# Patient Record
Sex: FEMALE | Race: WHITE | NOT HISPANIC OR LATINO | Employment: OTHER | URBAN - NONMETROPOLITAN AREA
[De-identification: names, ages, dates, MRNs, and addresses within clinical notes are randomized per-mention and may not be internally consistent; named-entity substitution may affect disease eponyms.]

---

## 2017-01-16 DIAGNOSIS — I10 ESSENTIAL HYPERTENSION: ICD-10-CM

## 2017-01-16 DIAGNOSIS — R60.1 GENERALIZED EDEMA: ICD-10-CM

## 2017-01-16 RX ORDER — POTASSIUM CHLORIDE 750 MG/1
10 CAPSULE, EXTENDED RELEASE ORAL DAILY
Qty: 30 CAPSULE | Refills: 5 | Status: SHIPPED | OUTPATIENT
Start: 2017-01-16 | End: 2017-07-20 | Stop reason: SDUPTHER

## 2017-01-16 RX ORDER — METOPROLOL SUCCINATE 25 MG/1
25 TABLET, EXTENDED RELEASE ORAL DAILY
Qty: 30 TABLET | Refills: 5 | Status: SHIPPED | OUTPATIENT
Start: 2017-01-16 | End: 2017-07-20 | Stop reason: SDUPTHER

## 2017-01-16 RX ORDER — FUROSEMIDE 20 MG/1
20 TABLET ORAL DAILY
Qty: 30 TABLET | Refills: 5 | Status: SHIPPED | OUTPATIENT
Start: 2017-01-16 | End: 2017-07-20 | Stop reason: SDUPTHER

## 2017-06-14 ENCOUNTER — OFFICE VISIT (OUTPATIENT)
Dept: CARDIOLOGY | Facility: CLINIC | Age: 47
End: 2017-06-14

## 2017-06-14 VITALS
HEIGHT: 61 IN | HEART RATE: 67 BPM | WEIGHT: 166.2 LBS | OXYGEN SATURATION: 96 % | DIASTOLIC BLOOD PRESSURE: 62 MMHG | BODY MASS INDEX: 31.38 KG/M2 | SYSTOLIC BLOOD PRESSURE: 104 MMHG

## 2017-06-14 DIAGNOSIS — I35.0 AORTIC VALVE STENOSIS, UNSPECIFIED ETIOLOGY: ICD-10-CM

## 2017-06-14 DIAGNOSIS — R07.9 CHEST PAIN, UNSPECIFIED TYPE: Primary | ICD-10-CM

## 2017-06-14 DIAGNOSIS — R00.2 PALPITATIONS: ICD-10-CM

## 2017-06-14 DIAGNOSIS — I38 VHD (VALVULAR HEART DISEASE): ICD-10-CM

## 2017-06-14 DIAGNOSIS — I34.2 NON-RHEUMATIC MITRAL VALVE STENOSIS: ICD-10-CM

## 2017-06-14 PROCEDURE — 99214 OFFICE O/P EST MOD 30 MIN: CPT | Performed by: PHYSICIAN ASSISTANT

## 2017-06-14 PROCEDURE — 93000 ELECTROCARDIOGRAM COMPLETE: CPT | Performed by: PHYSICIAN ASSISTANT

## 2017-06-14 NOTE — PROGRESS NOTES
Problem list     Subjective   Mara Martínez is a 46 y.o. female     Chief Complaint   Patient presents with   • Follow-up     presents as a follow up       HPI      1.Valvular heart disease  a. Moderate Aortic Stenosis with GUEVARA of 1.22cm^2, mean gradient of 24mmHg with moderate to severe AI  b. Moderate to severe MR with no evidence of Mitral Stenosis  c. Repeat echo on December 22 2014 showed worsening GUEVARA at 1cm^2, Moderate MR with Mild MS with MVA at 1.69cm^2 and mean gradient of 5mmHg. Grade 2 DD  d. LHC and RHC revealed normal coronaries, mod-severe AI with GUEVARA at 1.2 with moderate AS, Moderate Mitral Stenosis with mild PTHN arguing against severe mitral disease. Medical management indicated at this point.  E. mild to moderate aortic stenosis with moderate aortic regurgitation, mean gradient of 29 and aortic valve area 1.5 cm². Moderate mitral regurgitation with mild to moderate mitral stenosis with mitral valve area 1.  Meter squared   2)Normal systolic fxn   3)Hypertension  3.1) Stress test 8/3/15 - no ischemia, low risk  4)Dyslipidemia  5)Epilepsy  6) Nonobstructive ISREAL by carotid duplex on December 2014  7) Tobacco Habituation, smokes pack a day  8) Migraines      Patient is a 46-year-old female that presents back for routine follow-up. She has been doing well. She denies chest pain or pressure. She has moderate dyspnea at baseline but does not describe progressive dyspnea. She denies PND orthopnea. She does not palpitate or have dysrhythmic symptoms. Otherwise is doing well  Outpatient Encounter Prescriptions as of 6/14/2017   Medication Sig Dispense Refill   • Ascorbic Acid (VITAMIN C) 500 MG capsule Take 1 tablet by mouth 2 (two) times a day.     • aspirin 325 MG tablet Take 1 tablet by mouth daily.     • Cholecalciferol (VITAMIN D3 PO) Take 500 Units by mouth. 1 capsules BID      • furosemide (LASIX) 20 MG tablet Take 1 tablet by mouth Daily. TAKE 1 TABLET BY MOUTH DAILY EVERY MORNING FOR FLUID  RETENTION & SWELLING 30 tablet 5   • isosorbide mononitrate (IMDUR) 30 MG 24 hr tablet Take 0.5 tablets by mouth daily. 30 tablet 5   • meloxicam (MOBIC) 15 MG tablet Daily.     • metoprolol succinate XL (TOPROL-XL) 25 MG 24 hr tablet Take 1 tablet by mouth Daily. TAKE 1 TABLET BY MOUTH DAILY FOR HEART RATE & BLOOD PRESSURE 30 tablet 5   • nitroglycerin (NITROSTAT) 0.4 MG SL tablet Place  under the tongue. PLACE 1 TABLET UNDER THE TONGUE EVERY 5 MINUTES FOR UP TO 3 DOSES AS NEEDED FOR CHEST PAIN CALL 911 IF PAIN PERSISTS     • nitroglycerin (NITROSTAT) 0.4 MG SL tablet 1 under the tongue as needed for angina, may repeat q5mins for up three doses 100 tablet 11   • nortriptyline (PAMELOR) 50 MG capsule Take  by mouth. 2 CAPSULES AT HS     • OXcarbazepine (TRILEPTAL) 300 MG tablet Take  by mouth. 2 tabs in am and 3 tabs in pm     • pantoprazole (PROTONIX) 40 MG EC tablet Take 1 tablet by mouth Daily. 30 tablet 6   • potassium chloride (MICRO-K) 10 MEQ CR capsule Take 1 capsule by mouth Daily. For potassium 30 capsule 5   • topiramate (TOPAMAX) 100 MG tablet Take 1 tablet by mouth 2 (two) times a day.       No facility-administered encounter medications on file as of 6/14/2017.        Azithromycin and Corticosteroids    Past Medical History:   Diagnosis Date   • Anemia    • Aortic regurgitation    • Carotid bruit    • ISREAL (cerebral atherosclerosis) 12/2014    nonobstructive   • Chest pain    • D-dimer, elevated    • Dizziness    • Edema    • Epilepsy    • Fatigue    • Heart murmur    • Hyperlipidemia    • Hypertension    • Migraines    • Mitral regurgitation    • Mitral stenosis     mild   • Palpitations    • Pulmonary edema    • Seizure disorder    • Sleeping difficulties    • Snoring    • SOB (shortness of breath)    • Supraventricular aortic stenosis    • Syncope    • Tachycardia    • Tobacco abuse    • VHD (valvular heart disease)        Social History     Social History   • Marital status:      Spouse name:  "N/A   • Number of children: N/A   • Years of education: N/A     Occupational History   • Not on file.     Social History Main Topics   • Smoking status: Current Every Day Smoker     Packs/day: 0.25   • Smokeless tobacco: Not on file   • Alcohol use No   • Drug use: No   • Sexual activity: Not on file     Other Topics Concern   • Not on file     Social History Narrative       Family History   Problem Relation Age of Onset   • Cancer Mother    • Cancer Father    • Hypertension Father    • Other Sister      ACUTE MYOCARDIAL INFARCTION,CABG   • Heart failure Sister    • Hypertension Sister    • Stroke Other        Review of Systems   Constitutional: Positive for fatigue.   Eyes: Positive for visual disturbance (wears glasses).   Respiratory: Positive for shortness of breath.    Cardiovascular: Positive for leg swelling. Negative for chest pain and palpitations.   Gastrointestinal: Positive for constipation and diarrhea.   Endocrine: Negative.    Genitourinary: Positive for frequency.   Musculoskeletal: Positive for arthralgias.   Skin: Negative.    Allergic/Immunologic: Negative.    Neurological: Positive for headaches.   Hematological: Bruises/bleeds easily.   Psychiatric/Behavioral: Negative.        Objective     /62 (BP Location: Left arm, Patient Position: Sitting)  Pulse 67  Ht 61\" (154.9 cm)  Wt 166 lb 3.2 oz (75.4 kg)  SpO2 96%  BMI 31.4 kg/m2    Lab Results (most recent)     None          Physical Exam   Constitutional: She is oriented to person, place, and time. She appears well-developed and well-nourished. No distress.   HENT:   Head: Normocephalic and atraumatic.   Eyes: EOM are normal. Pupils are equal, round, and reactive to light.   Neck: No JVD present.   Cardiovascular: Normal rate, regular rhythm and normal heart sounds.  Exam reveals no gallop and no friction rub.    No murmur heard.  Grade 2/6 systolic ejection murmur right upper sternal border, systolic murmur heard at the entirety of the " left sternal border, no mitral stenosis murmur noted   Pulmonary/Chest: Effort normal and breath sounds normal. No respiratory distress. She has no wheezes. She has no rales. She exhibits no tenderness.   Abdominal: Soft.   Musculoskeletal: Normal range of motion. She exhibits no edema.   Neurological: She is alert and oriented to person, place, and time. No cranial nerve deficit.   Skin: Skin is warm and dry. No rash noted. No erythema. No pallor.   Psychiatric: She has a normal mood and affect. Her behavior is normal.   Nursing note and vitals reviewed.      Procedure     ECG 12 Lead  Date/Time: 6/14/2017 10:27 AM  Performed by: MIKKI GRACE  Authorized by: MIKKI GRACE   Comments: Chest pain  Palpitations    EKG demonstrates sinus rhythm at 67 bpm, first-degree AV block, right bundle branch block (bifascicular block), no acute ST changes                 Assessment/Plan     Problems Addressed this Visit        Cardiovascular and Mediastinum    Palpitations    Relevant Orders    ECG 12 Lead    Adult Transthoracic Echo Complete    Mitral stenosis    Relevant Orders    Adult Transthoracic Echo Complete    VHD (valvular heart disease)    Relevant Orders    Adult Transthoracic Echo Complete    Aortic valve stenosis    Relevant Orders    Adult Transthoracic Echo Complete       Nervous and Auditory    Chest pain - Primary    Relevant Orders    ECG 12 Lead              Recommendations  1. Patient doing well from cardiac standpoint. She describes no symptoms of angina failure or dysrhythmia. We will like to reevaluate patient's valvular disease by echocardiogram. With history of mitral and aortic stenosis, we will like to monitor this closely. She has no symptoms of severe valvular disease but would like to monitor closely. Otherwise she is on appropriate medications. We will see her back for follow-up after echocardiogram. Follow-up primary as scheduled

## 2017-07-20 DIAGNOSIS — K21.9 GASTROESOPHAGEAL REFLUX DISEASE, ESOPHAGITIS PRESENCE NOT SPECIFIED: Primary | ICD-10-CM

## 2017-07-20 DIAGNOSIS — I10 ESSENTIAL HYPERTENSION: ICD-10-CM

## 2017-07-20 DIAGNOSIS — R60.1 GENERALIZED EDEMA: ICD-10-CM

## 2017-07-20 RX ORDER — FUROSEMIDE 20 MG/1
20 TABLET ORAL DAILY
Qty: 30 TABLET | Refills: 5 | Status: SHIPPED | OUTPATIENT
Start: 2017-07-20 | End: 2017-12-28

## 2017-07-20 RX ORDER — METOPROLOL SUCCINATE 25 MG/1
25 TABLET, EXTENDED RELEASE ORAL DAILY
Qty: 30 TABLET | Refills: 5 | Status: SHIPPED | OUTPATIENT
Start: 2017-07-20 | End: 2018-01-24 | Stop reason: SDUPTHER

## 2017-07-20 RX ORDER — POTASSIUM CHLORIDE 750 MG/1
10 CAPSULE, EXTENDED RELEASE ORAL DAILY
Qty: 30 CAPSULE | Refills: 5 | Status: SHIPPED | OUTPATIENT
Start: 2017-07-20 | End: 2018-01-27 | Stop reason: SDUPTHER

## 2017-07-20 RX ORDER — PANTOPRAZOLE SODIUM 40 MG/1
40 TABLET, DELAYED RELEASE ORAL DAILY
Qty: 30 TABLET | Refills: 6 | Status: SHIPPED | OUTPATIENT
Start: 2017-07-20 | End: 2018-03-20 | Stop reason: SDUPTHER

## 2017-08-14 ENCOUNTER — TELEPHONE (OUTPATIENT)
Dept: CARDIOLOGY | Facility: CLINIC | Age: 47
End: 2017-08-14

## 2017-08-14 DIAGNOSIS — R07.89 OTHER CHEST PAIN: ICD-10-CM

## 2017-08-14 RX ORDER — ISOSORBIDE MONONITRATE 30 MG/1
15 TABLET, EXTENDED RELEASE ORAL DAILY
Qty: 30 TABLET | Refills: 5 | Status: SHIPPED | OUTPATIENT
Start: 2017-08-14 | End: 2018-01-24 | Stop reason: SDUPTHER

## 2017-08-14 NOTE — TELEPHONE ENCOUNTER
----- Message from Mckenzie Posdaas sent at 8/14/2017 10:18 AM EDT -----  Contact: PT  PT NEEDS ISOSORBIDE REFILLED TO "[employer].

## 2017-11-08 ENCOUNTER — OUTSIDE FACILITY SERVICE (OUTPATIENT)
Dept: CARDIOLOGY | Facility: CLINIC | Age: 47
End: 2017-11-08

## 2017-11-08 ENCOUNTER — HOSPITAL ENCOUNTER (OUTPATIENT)
Dept: CARDIOLOGY | Facility: HOSPITAL | Age: 47
Discharge: HOME | End: 2017-11-08
Admitting: PHYSICIAN ASSISTANT

## 2017-11-08 LAB
MAXIMAL PREDICTED HEART RATE: 173 BPM
STRESS TARGET HR: 147 BPM

## 2017-11-08 PROCEDURE — 93306 TTE W/DOPPLER COMPLETE: CPT | Performed by: INTERNAL MEDICINE

## 2017-11-08 PROCEDURE — 93306 TTE W/DOPPLER COMPLETE: CPT

## 2017-11-09 ENCOUNTER — DOCUMENTATION (OUTPATIENT)
Dept: CARDIOLOGY | Facility: CLINIC | Age: 47
End: 2017-11-09

## 2017-11-22 ENCOUNTER — OFFICE VISIT (OUTPATIENT)
Dept: CARDIOLOGY | Facility: CLINIC | Age: 47
End: 2017-11-22

## 2017-11-22 VITALS
DIASTOLIC BLOOD PRESSURE: 56 MMHG | HEIGHT: 61 IN | SYSTOLIC BLOOD PRESSURE: 106 MMHG | BODY MASS INDEX: 31.6 KG/M2 | WEIGHT: 167.4 LBS | OXYGEN SATURATION: 97 % | HEART RATE: 81 BPM

## 2017-11-22 DIAGNOSIS — I05.0 RHEUMATIC MITRAL STENOSIS: ICD-10-CM

## 2017-11-22 DIAGNOSIS — I06.0 RHEUMATIC AORTIC STENOSIS: ICD-10-CM

## 2017-11-22 DIAGNOSIS — R06.02 SHORTNESS OF BREATH: Primary | ICD-10-CM

## 2017-11-22 DIAGNOSIS — R07.9 CHEST PAIN, UNSPECIFIED TYPE: ICD-10-CM

## 2017-11-22 DIAGNOSIS — R06.02 SHORTNESS OF BREATH: ICD-10-CM

## 2017-11-22 DIAGNOSIS — R07.9 CHEST PAIN, UNSPECIFIED TYPE: Primary | ICD-10-CM

## 2017-11-22 DIAGNOSIS — I06.1 RHEUMATIC AORTIC VALVE INSUFFICIENCY: ICD-10-CM

## 2017-11-22 PROCEDURE — 99214 OFFICE O/P EST MOD 30 MIN: CPT | Performed by: PHYSICIAN ASSISTANT

## 2017-11-22 RX ORDER — HYDROCODONE BITARTRATE AND ACETAMINOPHEN 5; 325 MG/1; MG/1
TABLET ORAL
COMMUNITY
Start: 2017-09-25 | End: 2018-03-19

## 2017-11-22 NOTE — PROGRESS NOTES
Problem list     Subjective   Mara Martínez is a 47 y.o. female     Chief Complaint   Patient presents with   • Hypertension     Here for 2-4 week f/u on echo   • Heart Murmur   • Hyperlipidemia   • Rapid Heart Rate   • Cardiac Valve Problem   • Loss of Consciousness   • Palpitations       HPI    1.Valvular heart disease  a. Moderate Aortic Stenosis with GUEVARA of 1.22cm^2, mean gradient of 24mmHg with moderate to severe AI  b. Moderate to severe MR with no evidence of Mitral Stenosis  c. Repeat echo on December 22 2014 showed worsening GUEVARA at 1cm^2, Moderate MR with Mild MS with MVA at 1.69cm^2 and mean gradient of 5mmHg. Grade 2 DD  d. LHC and RHC revealed normal coronaries, mod-severe AI with GUEVARA at 1.2 with moderate AS, Moderate Mitral Stenosis with mild PTHN arguing against severe mitral disease. Medical management indicated at this point.  E. mild to moderate aortic stenosis with moderate aortic regurgitation, mean gradient of 29 and aortic valve area 1.5 cm². Moderate mitral regurgitation with mild to moderate mitral stenosis with mitral valve area 1.Meter squared   F.  Moderate to severe aortic stenosis, severe aortic insufficiency, severe mitral stenosis with mild to moderate mitral regurgitation by echocardiogram November 2017  2)Normal systolic fxn   3)Hypertension  3.1) Stress test 8/3/15 - no ischemia, low risk  4)Dyslipidemia  5)Epilepsy  6) Nonobstructive ISREAL by carotid duplex on December 2014  7) Tobacco Habituation, smokes pack a day  8) Migraines    Patient is a 47-year-old female that presents back for follow-up on echocardiogram.  She describes feeling poorly.  She has been experiencing chest discomfort on occasion but it only happens on occasion.  It is usually resolves without any medications.  She describes having moderate levels of shortness of breath which do appear to have progressed.  She also describes to me that she's had failure symptoms to include PND and orthopnea.  She doesn't palpitate  but on occasion.  No syncopal episodes noted.  Otherwise she feels well.  Her main complaint is her level of dyspnea and the chest discomfort which is recently started.    Endocardial gram findings were reviewed as above      Outpatient Encounter Prescriptions as of 11/22/2017   Medication Sig Dispense Refill   • Ascorbic Acid (VITAMIN C) 500 MG capsule Take 1 tablet by mouth 2 (two) times a day.     • aspirin 325 MG tablet Take 1 tablet by mouth daily.     • Cholecalciferol (VITAMIN D3 PO) Take 500 Units by mouth. 1 capsules BID      • furosemide (LASIX) 20 MG tablet Take 1 tablet by mouth Daily. TAKE 1 TABLET BY MOUTH DAILY EVERY MORNING FOR FLUID RETENTION & SWELLING 30 tablet 5   • HYDROcodone-acetaminophen (NORCO) 5-325 MG per tablet prn     • isosorbide mononitrate (IMDUR) 30 MG 24 hr tablet Take 0.5 tablets by mouth Daily. 30 tablet 5   • meloxicam (MOBIC) 15 MG tablet Daily.     • metoprolol succinate XL (TOPROL-XL) 25 MG 24 hr tablet Take 1 tablet by mouth Daily. TAKE 1 TABLET BY MOUTH DAILY FOR HEART RATE & BLOOD PRESSURE 30 tablet 5   • nortriptyline (PAMELOR) 50 MG capsule Take  by mouth. 2 CAPSULES AT HS     • OXcarbazepine (TRILEPTAL) 300 MG tablet Take  by mouth. 2 tabs in am and 2 tabs in pm     • pantoprazole (PROTONIX) 40 MG EC tablet Take 1 tablet by mouth Daily. 30 tablet 6   • potassium chloride (MICRO-K) 10 MEQ CR capsule Take 1 capsule by mouth Daily. For potassium 30 capsule 5   • topiramate (TOPAMAX) 100 MG tablet Take 1 tablet by mouth 2 (two) times a day.     • nitroglycerin (NITROSTAT) 0.4 MG SL tablet Place  under the tongue. PLACE 1 TABLET UNDER THE TONGUE EVERY 5 MINUTES FOR UP TO 3 DOSES AS NEEDED FOR CHEST PAIN CALL 911 IF PAIN PERSISTS     • [DISCONTINUED] nitroglycerin (NITROSTAT) 0.4 MG SL tablet 1 under the tongue as needed for angina, may repeat q5mins for up three doses 100 tablet 11     No facility-administered encounter medications on file as of 11/22/2017.         Azithromycin and Corticosteroids    Past Medical History:   Diagnosis Date   • Anemia    • Aortic regurgitation    • Carotid bruit    • ISREAL (cerebral atherosclerosis) 12/2014    nonobstructive   • Chest pain    • D-dimer, elevated    • Dizziness    • Edema    • Epilepsy    • Fatigue    • Heart murmur    • Hyperlipidemia    • Hypertension    • Migraines    • Mitral regurgitation    • Mitral stenosis     mild   • Palpitations    • Pulmonary edema    • Seizure disorder    • Sleeping difficulties    • Snoring    • SOB (shortness of breath)    • Supraventricular aortic stenosis    • Syncope    • Tachycardia    • Tobacco abuse    • VHD (valvular heart disease)        Social History     Social History   • Marital status:      Spouse name: N/A   • Number of children: N/A   • Years of education: N/A     Occupational History   • Not on file.     Social History Main Topics   • Smoking status: Current Every Day Smoker     Packs/day: 0.25   • Smokeless tobacco: Not on file   • Alcohol use No   • Drug use: No   • Sexual activity: Not on file     Other Topics Concern   • Not on file     Social History Narrative       Family History   Problem Relation Age of Onset   • Cancer Mother    • Cancer Father    • Hypertension Father    • Other Sister      ACUTE MYOCARDIAL INFARCTION,CABG   • Heart failure Sister    • Hypertension Sister    • Stroke Other        Review of Systems   Constitutional: Negative.    HENT: Negative.    Eyes: Positive for visual disturbance (wears glasses).   Respiratory: Positive for shortness of breath.    Cardiovascular: Positive for chest pain. Negative for leg swelling.   Gastrointestinal: Negative.    Endocrine: Negative.    Genitourinary: Negative.    Musculoskeletal: Positive for arthralgias and myalgias.   Skin: Negative.    Allergic/Immunologic: Negative.    Neurological: Positive for dizziness.   Hematological: Bruises/bleeds easily.   Psychiatric/Behavioral: Positive for sleep disturbance.  "      Objective   Vitals:    11/22/17 0958   BP: 106/56   BP Location: Left arm   Patient Position: Sitting   Pulse: 81   SpO2: 97%   Weight: 167 lb 6.4 oz (75.9 kg)   Height: 61\" (154.9 cm)      /56 (BP Location: Left arm, Patient Position: Sitting)  Pulse 81  Ht 61\" (154.9 cm)  Wt 167 lb 6.4 oz (75.9 kg)  SpO2 97%  BMI 31.63 kg/m2    Lab Results (most recent)     None          Physical Exam   Constitutional: She is oriented to person, place, and time. She appears well-developed and well-nourished. No distress.   HENT:   Head: Normocephalic and atraumatic.   Eyes: EOM are normal. Pupils are equal, round, and reactive to light.   Neck: No JVD present.   Cardiovascular: Normal rate, regular rhythm and normal heart sounds.  Exam reveals no gallop and no friction rub.    No murmur heard.  Grade 3/6 systolic ejection murmur of aortic stenosis noted at the right upper sternal border radiating to almost the entirety of the precordium.  No AI murmur appreciated.  MR murmur noted but no mitral stenosis murmur auscultated   Pulmonary/Chest: Effort normal and breath sounds normal. No respiratory distress. She has no wheezes. She has no rales. She exhibits no tenderness.   Abdominal: Soft.   Musculoskeletal: Normal range of motion. She exhibits no edema.   Neurological: She is alert and oriented to person, place, and time. No cranial nerve deficit.   Skin: Skin is warm and dry. No rash noted. No erythema. No pallor.   Psychiatric: She has a normal mood and affect. Her behavior is normal.   Nursing note and vitals reviewed.      Procedure   Procedures       Assessment/Plan     Problems Addressed this Visit        Cardiovascular and Mediastinum    Mitral stenosis    Relevant Orders    Cardiac catheterization    Catheter TDILUT [place] VIP PLS 7.5F 110CM    Rheumatic aortic stenosis    Relevant Orders    Cardiac catheterization    Catheter TDILUT [place] VIP PLS 7.5F 110CM    Rheumatic aortic valve insufficiency    " Relevant Orders    Cardiac catheterization    Catheter TDILUT [place] VIP PLS 7.5F 110CM       Respiratory    Shortness of breath - Primary    Relevant Orders    Cardiac catheterization    Catheter TDILUT [place] VIP PLS 7.5F 110CM       Nervous and Auditory    Chest pain    Relevant Orders    Cardiac catheterization    Catheter TDILUT [place] VIP PLS 7.5F 110CM              Recommendation  1.  Patient has echocardiogram demonstrating worsening valvular heart disease with moderate to severe aortic and mitral stenosis with severe aortic insufficiency and mild-to-moderate mitral regurgitation.  She has symptoms of failure as well as angina.  Patient will be scheduled for left and right heart catheterization to assess valves and redefine coronary anatomy as it is been 2 years since evaluation.  She will likely need referral to CT surgery if catheterization findings corroborate echocardiogram findings.  2.  She is on appropriate medications.we will see her back for follow-up after catheterization.  Follow-up primary as scheduled.  For any chest pain resolved by nitroglycerin, she is currently on.

## 2017-12-18 ENCOUNTER — OUTSIDE FACILITY SERVICE (OUTPATIENT)
Dept: CARDIOLOGY | Facility: CLINIC | Age: 47
End: 2017-12-18

## 2017-12-18 PROCEDURE — 93460 R&L HRT ART/VENTRICLE ANGIO: CPT | Performed by: INTERNAL MEDICINE

## 2017-12-28 ENCOUNTER — OFFICE VISIT (OUTPATIENT)
Dept: CARDIOLOGY | Facility: CLINIC | Age: 47
End: 2017-12-28

## 2017-12-28 VITALS
HEIGHT: 61 IN | BODY MASS INDEX: 32.17 KG/M2 | OXYGEN SATURATION: 94 % | WEIGHT: 170.4 LBS | DIASTOLIC BLOOD PRESSURE: 59 MMHG | SYSTOLIC BLOOD PRESSURE: 97 MMHG | HEART RATE: 74 BPM

## 2017-12-28 DIAGNOSIS — I35.0 MODERATE AORTIC STENOSIS: Primary | ICD-10-CM

## 2017-12-28 DIAGNOSIS — I06.1 RHEUMATIC AORTIC VALVE INSUFFICIENCY: ICD-10-CM

## 2017-12-28 DIAGNOSIS — I38 VHD (VALVULAR HEART DISEASE): ICD-10-CM

## 2017-12-28 DIAGNOSIS — I34.0 MITRAL VALVE INSUFFICIENCY, UNSPECIFIED ETIOLOGY: ICD-10-CM

## 2017-12-28 PROCEDURE — 99213 OFFICE O/P EST LOW 20 MIN: CPT | Performed by: INTERNAL MEDICINE

## 2017-12-28 NOTE — PROGRESS NOTES
Subjective   Mara Martínez is a 47 y.o. female     Chief Complaint   Patient presents with   • Follow-up     cath f/u    • Dizziness     patient c/o worsening dizziness since cath        PROBLEM LIST:     1.Valvular heart disease  a. Moderate Aortic Stenosis with GUEVARA of 1.22cm^2, mean gradient of 24mmHg with moderate to severe AI  b. Moderate to severe MR with no evidence of Mitral Stenosis  c. Repeat echo on December 22 2014 showed worsening GUEVARA at 1cm^2, Moderate MR with Mild MS with MVA at 1.69cm^2 and mean gradient of 5mmHg. Grade 2 DD  d. LHC and RHC revealed normal coronaries, mod-severe AI with GUEVARA at 1.2 with moderate AS, Moderate Mitral Stenosis with mild PTHN arguing against severe mitral disease. Medical management indicated at this point.  E. mild to moderate aortic stenosis with moderate aortic regurgitation, mean gradient of 29 and aortic valve area 1.5 cm². Moderate mitral regurgitation with mild to moderate mitral stenosis with mitral valve area 1.Meter squared   F.  Moderate to severe aortic stenosis, severe aortic insufficiency, severe mitral stenosis with mild to moderate mitral regurgitation by echocardiogram November 2017  G. Cardiac cath 12/18/17 demonstrated potentially hemodynamically significant mitral regurgitation associated with mildly to moderately elevated PA pressures. moderate aortic stenosis. The study excluded angiographically significant epicardial coronary disease as a contributing factor to the patient's symptoms.   2)Normal systolic fxn   3)Hypertension  3.1) Stress test 8/3/15 - no ischemia, low risk  4)Dyslipidemia  5)Epilepsy  6) Nonobstructive ISREAL by carotid duplex on December 2014  7) Tobacco Habituation, smokes pack a day  8) Migraines      Specialty Problems        Cardiology Problems    ISREAL (cerebral atherosclerosis)        Aortic valve insufficiency        Carotid bruit        Heart murmur        Hyperlipidemia        Hypertension        Mitral valve insufficiency         Mitral valve stenosis        Palpitations        Supravalvular aortic stenosis        Syncope        Tachycardia        Mitral stenosis        Rheumatic aortic stenosis        Aortic regurgitation        Migraines        Mitral regurgitation        Supraventricular aortic stenosis        VHD (valvular heart disease)        Aortic valve stenosis        Rheumatic aortic valve insufficiency                HPI:  Ms. Martínez  returns for follow-up after cardiac catheterization which demonstrated hemodynamically significant mitral stenosis, mitral regurgitation, and potentially aortic stenosis.     Since the time of her surgery she has had a mild increase in her orthostatic dizziness.  She has had no catheter site complications.                CURRENT MEDICATION:    Current Outpatient Prescriptions   Medication Sig Dispense Refill   • Ascorbic Acid (VITAMIN C) 500 MG capsule Take 1 tablet by mouth 2 (two) times a day.     • aspirin 325 MG tablet Take 1 tablet by mouth daily.     • furosemide (LASIX) 20 MG tablet Take 1 tablet by mouth Daily. TAKE 1 TABLET BY MOUTH DAILY EVERY MORNING FOR FLUID RETENTION & SWELLING 30 tablet 5   • HYDROcodone-acetaminophen (NORCO) 5-325 MG per tablet prn     • isosorbide mononitrate (IMDUR) 30 MG 24 hr tablet Take 0.5 tablets by mouth Daily. 30 tablet 5   • meloxicam (MOBIC) 15 MG tablet Daily.     • metoprolol succinate XL (TOPROL-XL) 25 MG 24 hr tablet Take 1 tablet by mouth Daily. TAKE 1 TABLET BY MOUTH DAILY FOR HEART RATE & BLOOD PRESSURE 30 tablet 5   • nitroglycerin (NITROSTAT) 0.4 MG SL tablet Place  under the tongue. PLACE 1 TABLET UNDER THE TONGUE EVERY 5 MINUTES FOR UP TO 3 DOSES AS NEEDED FOR CHEST PAIN CALL 911 IF PAIN PERSISTS     • nortriptyline (PAMELOR) 50 MG capsule Take  by mouth. 2 CAPSULES AT HS     • OXcarbazepine (TRILEPTAL) 300 MG tablet Take  by mouth. 2 tabs in am and 2 tabs in pm     • pantoprazole (PROTONIX) 40 MG EC tablet Take 1 tablet by mouth Daily. 30 tablet  6   • potassium chloride (MICRO-K) 10 MEQ CR capsule Take 1 capsule by mouth Daily. For potassium 30 capsule 5   • topiramate (TOPAMAX) 100 MG tablet Take 1 tablet by mouth 2 (two) times a day.     • Cholecalciferol (VITAMIN D3 PO) Take 500 Units by mouth. 1 capsules BID        No current facility-administered medications for this visit.        ALLERGIES:    Azithromycin and Corticosteroids    PAST MEDICAL HISTORY:    Past Medical History:   Diagnosis Date   • Anemia    • Aortic regurgitation    • Carotid bruit    • ISREAL (cerebral atherosclerosis) 2014    nonobstructive   • Chest pain    • D-dimer, elevated    • Dizziness    • Edema    • Epilepsy    • Fatigue    • Heart murmur    • Hyperlipidemia    • Hypertension    • Migraines    • Mitral regurgitation    • Mitral stenosis     mild   • Palpitations    • Pulmonary edema    • Seizure disorder    • Sleeping difficulties    • Snoring    • SOB (shortness of breath)    • Supraventricular aortic stenosis    • Syncope    • Tachycardia    • Tobacco abuse    • VHD (valvular heart disease)        SURGICAL HISTORY:    Past Surgical History:   Procedure Laterality Date   • APPENDECTOMY     •  SECTION     • CHOLECYSTECTOMY     • HYSTERECTOMY     • KNEE SURGERY         SOCIAL HISTORY:    Social History     Social History   • Marital status:      Spouse name: N/A   • Number of children: N/A   • Years of education: N/A     Occupational History   • Not on file.     Social History Main Topics   • Smoking status: Current Every Day Smoker     Packs/day: 0.25   • Smokeless tobacco: Not on file   • Alcohol use No   • Drug use: No   • Sexual activity: Not on file     Other Topics Concern   • Not on file     Social History Narrative       FAMILY HISTORY:    Family History   Problem Relation Age of Onset   • Cancer Mother    • Cancer Father    • Hypertension Father    • Other Sister      ACUTE MYOCARDIAL INFARCTION,CABG   • Heart failure Sister    • Hypertension Sister   "  • Stroke Other        Review of Systems   Constitutional: Positive for fatigue. Negative for activity change (stable strenght and stamina,limited due to dizziness and fatigue), chills, diaphoresis and fever.   HENT: Negative.  Negative for congestion, ear discharge, ear pain, facial swelling, hearing loss, nosebleeds, sinus pain, sinus pressure, sneezing, sore throat, tinnitus and trouble swallowing.    Eyes: Positive for visual disturbance (wears glasses).   Respiratory: Negative.  Negative for apnea, cough, choking, chest tightness, shortness of breath (no orthopnea or PND), wheezing and stridor.    Cardiovascular: Positive for palpitations (occasional fluttering ). Negative for chest pain and leg swelling.   Gastrointestinal: Negative.  Negative for abdominal pain, blood in stool (no melena, no hematuria, no hematemesis, no hematochezia), constipation, diarrhea, nausea and vomiting.   Endocrine: Negative.  Negative for cold intolerance and heat intolerance.   Genitourinary: Negative.  Negative for difficulty urinating, dyspareunia, dysuria, enuresis and flank pain.   Musculoskeletal: Positive for arthralgias and back pain. Negative for myalgias.   Skin: Negative.  Negative for color change, pallor, rash and wound.   Allergic/Immunologic: Negative.  Negative for environmental allergies, food allergies and immunocompromised state.   Neurological: Positive for dizziness (pt states dizziness has worsened since cath ) and light-headedness. Negative for tremors, seizures, syncope, facial asymmetry (no stroke like symptoms), speech difficulty, weakness, numbness and headaches.   Hematological: Negative.  Negative for adenopathy. Does not bruise/bleed easily.   Psychiatric/Behavioral: Positive for sleep disturbance.       VISIT VITALS:  Vitals:    12/28/17 0928   BP: 97/59   BP Location: Left arm   Patient Position: Sitting   Pulse: 74   SpO2: 94%   Weight: 77.3 kg (170 lb 6.4 oz)   Height: 154.9 cm (61\")      BP 97/59 " "(BP Location: Left arm, Patient Position: Sitting)  Pulse 74  Ht 154.9 cm (61\")  Wt 77.3 kg (170 lb 6.4 oz)  SpO2 94%  BMI 32.2 kg/m2    RECENT LABS:    Objective       Physical Exam   Constitutional: She is oriented to person, place, and time. She appears well-developed and well-nourished. No distress.   HENT:   Head: Normocephalic and atraumatic.   Eyes: Conjunctivae and EOM are normal. Pupils are equal, round, and reactive to light.   Neck: Normal range of motion. Neck supple. Normal carotid pulses, no hepatojugular reflux and no JVD present. Carotid bruit is not present. No tracheal deviation present. No thyroid mass and no thyromegaly present.   Cardiovascular: Normal rate, regular rhythm and intact distal pulses.  Exam reveals no gallop and no friction rub.    Murmur (1-2/6 AI murmur, 1-2/6 MR murmur) heard.   Systolic (2-3/6 mid peaking MYRON ) murmur is present   Pulmonary/Chest: Effort normal and breath sounds normal. No respiratory distress. She has no decreased breath sounds. She has no wheezes. She has no rhonchi. She has no rales. She exhibits no tenderness.   Abdominal: Soft. Bowel sounds are normal. She exhibits no distension, no abdominal bruit (negative organomegaly ) and no mass. There is no tenderness. There is no rebound and no guarding.   Musculoskeletal: Normal range of motion. She exhibits no edema, tenderness or deformity.   Lymphadenopathy:     She has no cervical adenopathy.     She has no axillary adenopathy.   Neurological: She is alert and oriented to person, place, and time.   Skin: Skin is warm and dry. No rash noted. No erythema. No pallor.   Psychiatric: She has a normal mood and affect. Her speech is normal and behavior is normal. Judgment and thought content normal. Cognition and memory are normal.   Nursing note and vitals reviewed.      Procedures      Assessment/Plan    #1.  I reviewed catheterization findings with the patient and her  today.  Catheter findings " supported echocardiographic evaluation with significant MS, MR, and moderate to severe aortic stenosis.  There was no evidence of coronary artery disease.  The patient has expressed an interest in pursuing surgical correction in [place], therefore, I will contact Dr. Alfonzo Charles to see if he will evaluate the patient's suitability for valve surgery.    #2.  Because end-diastolic pressure was not significantly elevated at the time of catheter, and with low blood pressure dizziness, we will have the patient temporarily hold Lasix.  She will also liberalize her sodium intake.    #3.  Other medications will be continued.    #4.  Mara will follow-up with Richard as instructed, and we will contact her reference scheduling appointment for her surgery.              No diagnosis found.    No Follow-up on file.         Marcelle Hernández MA  Scribed for Dr. Alfonzo Garcia by DEBORAH Cruz December 28, 2017 10:26 AM             Electronically signed by:            This note is dictated utilizing voice recognition software.  Although this record has been proof read, transcriptional errors may still be present. If questions occur regarding the content of this record please do not hesitate to call our office.

## 2018-01-08 ENCOUNTER — HOSPITAL ENCOUNTER (OUTPATIENT)
Dept: CARDIOLOGY | Facility: HOSPITAL | Age: 48
Discharge: HOME | End: 2018-01-08
Attending: THORACIC SURGERY (CARDIOTHORACIC VASCULAR SURGERY)

## 2018-01-08 DIAGNOSIS — Z00.6 ENCOUNTER FOR EXAMINATION FOR NORMAL COMPARISON AND CONTROL IN CLINICAL RESEARCH PROGRAM: ICD-10-CM

## 2018-01-22 ENCOUNTER — OFFICE VISIT (OUTPATIENT)
Dept: CARDIAC SURGERY | Facility: CLINIC | Age: 48
End: 2018-01-22

## 2018-01-22 VITALS
HEIGHT: 61 IN | BODY MASS INDEX: 32.28 KG/M2 | WEIGHT: 171 LBS | OXYGEN SATURATION: 96 % | HEART RATE: 81 BPM | DIASTOLIC BLOOD PRESSURE: 55 MMHG | TEMPERATURE: 98 F | SYSTOLIC BLOOD PRESSURE: 101 MMHG

## 2018-01-22 DIAGNOSIS — I05.0 MITRAL VALVE STENOSIS, UNSPECIFIED ETIOLOGY: ICD-10-CM

## 2018-01-22 DIAGNOSIS — I35.0 AORTIC VALVE STENOSIS, ETIOLOGY OF CARDIAC VALVE DISEASE UNSPECIFIED: Primary | ICD-10-CM

## 2018-01-22 PROCEDURE — 99205 OFFICE O/P NEW HI 60 MIN: CPT | Performed by: THORACIC SURGERY (CARDIOTHORACIC VASCULAR SURGERY)

## 2018-01-22 NOTE — PROGRESS NOTES
01/22/2018  Patient Information  Mara HERNANDEZ Lay                                                                                          [address]  [place] [address]   1970  'PCP/Referring Physician'  William Ramos, APRN  [phone]  No ref. provider found    Chief Complaint   Patient presents with   • Dizziness     Referred by Dr. Alfonzo Garcia for mitral and aortic stenosis       History of Present Illness:  This patient is referred to me to evaluate for double valve surgery.  She has had worsening shortness of breath and fatigue for 3-6 months.  She has had no distinct anginal symptoms.  Echocardiogram has demonstrated mixed aortic valve stenosis and insufficiency and mitral stenosis and insufficiency.  Cardiac catheterization demonstrates normal coronary arteries.  At this time in the office she is pain-free but states that if she walks fast as little as 100-150 feet she becomes winded and if she rests for a few minutes and her breathing returns to normal.  There has been no associated nausea      Patient Active Problem List   Diagnosis   • Anemia   • Aortic valve insufficiency   • Carotid bruit   • Chest pain   • Positive D-dimer   • Dizziness   • Edema   • Fatigue   • Hypertension   • Heart murmur   • Hyperlipidemia   • Mitral valve stenosis   • Mitral valve insufficiency   • Palpitations   • Pulmonary edema   • Seizure disorder   • Shortness of breath   • Difficulty sleeping   • Snoring   • Supravalvular aortic stenosis   • Syncope   • Tachycardia   • Mitral stenosis   • Rheumatic aortic stenosis   • VHD (valvular heart disease)   • Epilepsy   • ISREAL (cerebral atherosclerosis)   • Tobacco abuse   • Migraines   • Aortic regurgitation   • Mitral regurgitation   • SOB (shortness of breath)   • Supraventricular aortic stenosis   • Aortic valve stenosis   • Rheumatic aortic valve insufficiency   • Moderate aortic stenosis     Past Medical History:   Diagnosis Date   • Anemia    • Aortic regurgitation    •  Arthritis    • Carotid bruit    • ISREAL (cerebral atherosclerosis) 2014    nonobstructive   • Chest pain    • D-dimer, elevated    • Dizziness    • Edema    • Epilepsy    • Fatigue    • Heart murmur    • Hyperlipidemia    • Hypertension    • Kidney stones    • Migraines    • Mitral regurgitation    • Mitral stenosis     mild   • Palpitations    • Pulmonary edema    • Seizure disorder    • Sleeping difficulties    • Snoring    • SOB (shortness of breath)    • Supraventricular aortic stenosis    • Syncope    • Tachycardia    • Tobacco abuse    • VHD (valvular heart disease)      Past Surgical History:   Procedure Laterality Date   • APPENDECTOMY     • CARDIAC CATHETERIZATION     •  SECTION     • CHOLECYSTECTOMY     • COLONOSCOPY     • HYSTERECTOMY     • KNEE SURGERY     • UPPER GASTROINTESTINAL ENDOSCOPY         Current Outpatient Prescriptions:   •  Ascorbic Acid (VITAMIN C) 500 MG capsule, Take 1 tablet by mouth 2 (two) times a day., Disp: , Rfl:   •  aspirin 325 MG tablet, Take 1 tablet by mouth daily., Disp: , Rfl:   •  Cholecalciferol (VITAMIN D3 PO), Take 500 Units by mouth. 1 capsules BID , Disp: , Rfl:   •  isosorbide mononitrate (IMDUR) 30 MG 24 hr tablet, Take 0.5 tablets by mouth Daily., Disp: 30 tablet, Rfl: 5  •  meloxicam (MOBIC) 15 MG tablet, Daily., Disp: , Rfl:   •  metoprolol succinate XL (TOPROL-XL) 25 MG 24 hr tablet, Take 1 tablet by mouth Daily. TAKE 1 TABLET BY MOUTH DAILY FOR HEART RATE & BLOOD PRESSURE, Disp: 30 tablet, Rfl: 5  •  nitroglycerin (NITROSTAT) 0.4 MG SL tablet, Place  under the tongue. PLACE 1 TABLET UNDER THE TONGUE EVERY 5 MINUTES FOR UP TO 3 DOSES AS NEEDED FOR CHEST PAIN CALL 911 IF PAIN PERSISTS, Disp: , Rfl:   •  nortriptyline (PAMELOR) 50 MG capsule, Take  by mouth. 2 CAPSULES AT HS, Disp: , Rfl:   •  OXcarbazepine (TRILEPTAL) 300 MG tablet, Take  by mouth. 2 tabs in am and 2 tabs in pm, Disp: , Rfl:   •  pantoprazole (PROTONIX) 40 MG EC tablet, Take 1 tablet by  mouth Daily., Disp: 30 tablet, Rfl: 6  •  potassium chloride (MICRO-K) 10 MEQ CR capsule, Take 1 capsule by mouth Daily. For potassium, Disp: 30 capsule, Rfl: 5  •  topiramate (TOPAMAX) 100 MG tablet, Take 1 tablet by mouth 2 (two) times a day., Disp: , Rfl:   •  HYDROcodone-acetaminophen (NORCO) 5-325 MG per tablet, prn, Disp: , Rfl:   Allergies   Allergen Reactions   • Azithromycin    • Corticosteroids      Social History     Social History   • Marital status:      Spouse name: N/A   • Number of children: 2   • Years of education: N/A     Occupational History   •  Disabled     Social History Main Topics   • Smoking status: Current Every Day Smoker     Packs/day: 0.25     Years: 36.00     Types: Cigarettes   • Smokeless tobacco: Never Used   • Alcohol use No   • Drug use: No   • Sexual activity: Not on file     Other Topics Concern   • Not on file     Social History Narrative    Lives in [place], [place] with spouse, children, and grandchildren     Family History   Problem Relation Age of Onset   • Cancer Mother    • Cancer Father    • Hypertension Father    • Other Sister      ACUTE MYOCARDIAL INFARCTION,CABG   • Heart failure Sister    • Hypertension Sister    • Stroke Other      Review of Systems   Constitution: Positive for malaise/fatigue and night sweats. Negative for chills, fever and weight loss.   HENT: Negative for hearing loss, odynophagia and sore throat.    Cardiovascular: Positive for dyspnea on exertion and palpitations. Negative for chest pain, leg swelling and orthopnea.   Respiratory: Positive for cough and shortness of breath. Negative for hemoptysis.    Endocrine: Negative for cold intolerance, heat intolerance, polydipsia, polyphagia and polyuria.   Hematologic/Lymphatic: Does not bruise/bleed easily.   Skin: Negative for itching and rash.   Musculoskeletal: Positive for back pain. Negative for joint pain, joint swelling and myalgias.   Gastrointestinal: Negative for abdominal pain,  "constipation, diarrhea, hematemesis, hematochezia, melena, nausea and vomiting.   Genitourinary: Negative for dysuria, frequency and hematuria.   Neurological: Positive for dizziness, headaches, light-headedness, loss of balance and vertigo. Negative for focal weakness, numbness and seizures.   Psychiatric/Behavioral: Negative for depression and suicidal ideas. The patient is not nervous/anxious.    All other systems reviewed and are negative.    Vitals:    01/22/18 0659   BP: 101/55   BP Location: Right arm   Patient Position: Sitting   Pulse: 81   Temp: 98 °F (36.7 °C)   SpO2: 96%   Weight: 77.6 kg (171 lb)   Height: 154.9 cm (61\")      Physical Exam   CONSTITUTIONAL: Alert and conversant, Well dressed, Well nourished, No acute distress  EYES: Sclera clean, Anicteric, Pupils equal  ENT: No nasal deviation, Trachea midline  NECK: No neck masses, Supple  LUNGS: No wheezing, Cough, non-congested  HEART: No rubs, there is a soft murmur to the right and left of the sternal border  GASTROINTESTINAL: Soft, non-distended, No masses, Non tender  to palpation, normal bowel sounds  NEURO: No motor deficits, No sensory deficits, Cranial Nerves 2 through 12 grossly intact  PSYCHIATRIC: Oriented to person, place and time, No memory deficits, Mood appropriate  VASCULAR: No carotid bruits, Femoral pulses palpable and symmetric  MUSKULOSKELETAL: No extremity trauma or extremity asymmetry    Labs/Imaging:   I reviewed the echocardiogram demonstrating both aortic and mitral valvular disease.    Assessment/Plan:   Patient will most likely require double valve replacement with a replaced aortic and mitral valves.  We have discussed that this surgery is a complex procedure with risk of stroke, bleeding, infection and death and renal failure and no guarantees are made as to outcome.  We further discussed that at her age and with need for double valve surgery a mechanical valve would be her best option and that would require the risks " and responsibilities of lifetime anticoagulation with Coumadin.  Patient is agreeable to proceed and we will try to control the continued time to schedule this within the next 30 days.        Patient Active Problem List   Diagnosis   • Anemia   • Aortic valve insufficiency   • Carotid bruit   • Chest pain   • Positive D-dimer   • Dizziness   • Edema   • Fatigue   • Hypertension   • Heart murmur   • Hyperlipidemia   • Mitral valve stenosis   • Mitral valve insufficiency   • Palpitations   • Pulmonary edema   • Seizure disorder   • Shortness of breath   • Difficulty sleeping   • Snoring   • Supravalvular aortic stenosis   • Syncope   • Tachycardia   • Mitral stenosis   • Rheumatic aortic stenosis   • VHD (valvular heart disease)   • Epilepsy   • ISREAL (cerebral atherosclerosis)   • Tobacco abuse   • Migraines   • Aortic regurgitation   • Mitral regurgitation   • SOB (shortness of breath)   • Supraventricular aortic stenosis   • Aortic valve stenosis   • Rheumatic aortic valve insufficiency   • Moderate aortic stenosis     Signed by: Alfonzo Charles M.D.    1/22/2018    CC:  CHARLIE Fraire MD Debbie Moore, , editing for Alfonzo Charles M.D.    I, Alfonzo Charles MD, have read and agree with the editing done by Geneva Napoles .

## 2018-01-24 DIAGNOSIS — I10 ESSENTIAL HYPERTENSION: ICD-10-CM

## 2018-01-24 DIAGNOSIS — R07.89 OTHER CHEST PAIN: ICD-10-CM

## 2018-01-24 RX ORDER — ISOSORBIDE MONONITRATE 30 MG/1
15 TABLET, EXTENDED RELEASE ORAL DAILY
Qty: 30 TABLET | Refills: 5 | Status: SHIPPED | OUTPATIENT
Start: 2018-01-24 | End: 2018-02-27 | Stop reason: SDUPTHER

## 2018-01-24 RX ORDER — METOPROLOL SUCCINATE 25 MG/1
25 TABLET, EXTENDED RELEASE ORAL DAILY
Qty: 30 TABLET | Refills: 5 | Status: SHIPPED | OUTPATIENT
Start: 2018-01-24 | End: 2018-02-27 | Stop reason: SDUPTHER

## 2018-01-27 DIAGNOSIS — R60.1 GENERALIZED EDEMA: ICD-10-CM

## 2018-01-29 RX ORDER — FUROSEMIDE 20 MG/1
TABLET ORAL
Qty: 30 TABLET | Refills: 5 | Status: SHIPPED | OUTPATIENT
Start: 2018-01-29 | End: 2018-03-19 | Stop reason: SDUPTHER

## 2018-01-29 RX ORDER — POTASSIUM CHLORIDE 750 MG/1
CAPSULE, EXTENDED RELEASE ORAL
Qty: 30 CAPSULE | Refills: 5 | Status: ON HOLD | OUTPATIENT
Start: 2018-01-29 | End: 2018-03-02

## 2018-02-08 ENCOUNTER — OFFICE VISIT (OUTPATIENT)
Dept: CARDIOLOGY | Facility: CLINIC | Age: 48
End: 2018-02-08

## 2018-02-08 VITALS
DIASTOLIC BLOOD PRESSURE: 58 MMHG | BODY MASS INDEX: 31.53 KG/M2 | SYSTOLIC BLOOD PRESSURE: 108 MMHG | OXYGEN SATURATION: 100 % | HEART RATE: 76 BPM | WEIGHT: 167 LBS | HEIGHT: 61 IN

## 2018-02-08 DIAGNOSIS — I35.0 MODERATE AORTIC STENOSIS: ICD-10-CM

## 2018-02-08 DIAGNOSIS — I06.0 RHEUMATIC AORTIC STENOSIS: ICD-10-CM

## 2018-02-08 DIAGNOSIS — I35.1 AORTIC VALVE INSUFFICIENCY, ETIOLOGY OF CARDIAC VALVE DISEASE UNSPECIFIED: ICD-10-CM

## 2018-02-08 DIAGNOSIS — R53.82 CHRONIC FATIGUE: ICD-10-CM

## 2018-02-08 DIAGNOSIS — I38 VHD (VALVULAR HEART DISEASE): Primary | ICD-10-CM

## 2018-02-08 DIAGNOSIS — I05.1 RHEUMATIC MITRAL REGURGITATION: ICD-10-CM

## 2018-02-08 DIAGNOSIS — R06.02 SHORTNESS OF BREATH: ICD-10-CM

## 2018-02-08 DIAGNOSIS — I06.1 RHEUMATIC AORTIC VALVE INSUFFICIENCY: ICD-10-CM

## 2018-02-08 DIAGNOSIS — I05.0 RHEUMATIC MITRAL STENOSIS: ICD-10-CM

## 2018-02-08 DIAGNOSIS — R42 DIZZINESS: ICD-10-CM

## 2018-02-08 DIAGNOSIS — I10 ESSENTIAL HYPERTENSION: ICD-10-CM

## 2018-02-08 PROCEDURE — 99214 OFFICE O/P EST MOD 30 MIN: CPT | Performed by: INTERNAL MEDICINE

## 2018-02-08 RX ORDER — AMOXICILLIN AND CLAVULANATE POTASSIUM 875; 125 MG/1; MG/1
1 TABLET, FILM COATED ORAL EVERY 12 HOURS SCHEDULED
Qty: 14 TABLET | Refills: 0 | Status: SHIPPED | OUTPATIENT
Start: 2018-02-08 | End: 2018-03-19

## 2018-02-08 RX ORDER — FERROUS SULFATE 325(65) MG
325 TABLET ORAL 2 TIMES DAILY
COMMUNITY

## 2018-02-08 NOTE — PROGRESS NOTES
Subjective   Mara Martínez is a 47 y.o. female     Chief Complaint   Patient presents with   • Follow-up     6 week f/u        PROBLEM LIST:     1.Valvular heart disease  a. Moderate Aortic Stenosis with GUEVARA of 1.22cm^2, mean gradient of 24mmHg with moderate to severe AI  b. Moderate to severe MR with no evidence of Mitral Stenosis  c. Repeat echo on December 22 2014 showed worsening GUEVARA at 1cm^2, Moderate MR with Mild MS with MVA at 1.69cm^2 and mean gradient of 5mmHg. Grade 2 DD  d. LHC and RHC revealed normal coronaries, mod-severe AI with GUEVARA at 1.2 with moderate AS, Moderate Mitral Stenosis with mild PTHN arguing against severe mitral disease. Medical management indicated at this point.  E. mild to moderate aortic stenosis with moderate aortic regurgitation, mean gradient of 29 and aortic valve area 1.5 cm². Moderate mitral regurgitation with mild to moderate mitral stenosis with mitral valve area 1.Meter squared   F.  Moderate to severe aortic stenosis, severe aortic insufficiency, severe mitral stenosis with mild to moderate mitral regurgitation by echocardiogram November 2017  G. Cardiac cath 12/18/17 demonstrated potentially hemodynamically significant mitral regurgitation associated with mildly to moderately elevated PA pressures. moderate aortic stenosis. The study excluded angiographically significant epicardial coronary disease as a contributing factor to the patient's symptoms.   2)Normal systolic fxn   3)Hypertension  3.1) Stress test 8/3/15 - no ischemia, low risk  4)Dyslipidemia  5)Epilepsy  6) Nonobstructive ISREAL by carotid duplex on December 2014  7) Tobacco Habituation, smokes pack a day  8) Migraines       Specialty Problems        Cardiology Problems    ISREAL (cerebral atherosclerosis)        Aortic valve insufficiency        Carotid bruit        Heart murmur        Hyperlipidemia        Hypertension        Mitral valve insufficiency        Mitral valve stenosis        Palpitations         "Supravalvular aortic stenosis        Syncope        Tachycardia        Mitral stenosis        Rheumatic aortic stenosis        Aortic regurgitation        Migraines        Mitral regurgitation        Supraventricular aortic stenosis        VHD (valvular heart disease)        Aortic valve stenosis        Rheumatic aortic valve insufficiency        Moderate aortic stenosis                HPI:  Ms. Al returns for follow-up on valvular heart disease.    She saw Dr. Alfonzo Charles and isn't dissipated he mitral and aortic valve replacement in March.    Ms. ortiz describes several concerning circumstances.  She states that she has had a \"cold\" for several weeks.  For the last several days she has coughed up blood tinged sputum.  She has no riders, chills, or fever.  Coughing is keeping her awake at night.  She continues to have profound exertional dyspnea which is unchanged.  She describes no symptoms to suggest endocarditis or embolism.    Additionally, the patient relates that she was given warfarin after knee surgery apparently for DVT prophylaxis.  He had severe bleeding from her surgical site and actually required 2 transfusions the first day or so after surgery the next a week later.  She also states that she has severe nosebleeds and that warfarin had to be stopped.  I think this may be pertinent as the intent was for mechanical valve replacement.  I'm not sure that the patient prior bleeding is an absolute contraindication by think it certainly needs to be taken into consideration preoperatively.            CURRENT MEDICATION:    Current Outpatient Prescriptions   Medication Sig Dispense Refill   • Ascorbic Acid (VITAMIN C) 500 MG capsule Take 1 tablet by mouth 2 (two) times a day.     • aspirin 325 MG tablet Take 1 tablet by mouth daily.     • Cholecalciferol (VITAMIN D3 PO) Take 500 Units by mouth. 1 capsules BID      • ferrous sulfate 325 (65 FE) MG tablet Take 325 mg by mouth 2 (Two) Times a Day.     • " furosemide (LASIX) 20 MG tablet TAKE 1 TABLET BY MOUTH DAILY EVERY MORNING FOR FLUID RETENTION &SWELLLING 30 tablet 5   • isosorbide mononitrate (IMDUR) 30 MG 24 hr tablet Take 0.5 tablets by mouth Daily. 30 tablet 5   • meloxicam (MOBIC) 15 MG tablet Daily.     • metoprolol succinate XL (TOPROL-XL) 25 MG 24 hr tablet Take 1 tablet by mouth Daily. TAKE 1 TABLET BY MOUTH DAILY FOR HEART RATE & BLOOD PRESSURE 30 tablet 5   • nortriptyline (PAMELOR) 50 MG capsule Take  by mouth. 2 CAPSULES AT HS     • OXcarbazepine (TRILEPTAL) 300 MG tablet Take  by mouth. 2 tabs in am and 2 tabs in pm     • pantoprazole (PROTONIX) 40 MG EC tablet Take 1 tablet by mouth Daily. 30 tablet 6   • potassium chloride (MICRO-K) 10 MEQ CR capsule TAKE 1 CAPSULE BY MOUTH DAILY FOR POTASSIUM 30 capsule 5   • topiramate (TOPAMAX) 100 MG tablet Take 1 tablet by mouth 2 (two) times a day.     • amoxicillin-clavulanate (AUGMENTIN) 875-125 MG per tablet Take 1 tablet by mouth Every 12 (Twelve) Hours. 14 tablet 0   • HYDROcodone-acetaminophen (NORCO) 5-325 MG per tablet prn     • nitroglycerin (NITROSTAT) 0.4 MG SL tablet Place  under the tongue. PLACE 1 TABLET UNDER THE TONGUE EVERY 5 MINUTES FOR UP TO 3 DOSES AS NEEDED FOR CHEST PAIN CALL 911 IF PAIN PERSISTS       No current facility-administered medications for this visit.        ALLERGIES:    Azithromycin and Corticosteroids    PAST MEDICAL HISTORY:    Past Medical History:   Diagnosis Date   • Anemia    • Aortic regurgitation    • Arthritis    • Carotid bruit    • ISREAL (cerebral atherosclerosis) 12/2014    nonobstructive   • Chest pain    • D-dimer, elevated    • Dizziness    • Edema    • Epilepsy    • Fatigue    • Heart murmur    • Hyperlipidemia    • Hypertension    • Kidney stones    • Migraines    • Mitral regurgitation    • Mitral stenosis     mild   • Palpitations    • Pulmonary edema    • Seizure disorder    • Sleeping difficulties    • Snoring    • SOB (shortness of breath)    •  "Supraventricular aortic stenosis    • Syncope    • Tachycardia    • Tobacco abuse    • VHD (valvular heart disease)        SURGICAL HISTORY:    Past Surgical History:   Procedure Laterality Date   • APPENDECTOMY     • CARDIAC CATHETERIZATION     •  SECTION     • CHOLECYSTECTOMY     • COLONOSCOPY     • HYSTERECTOMY     • KNEE SURGERY     • UPPER GASTROINTESTINAL ENDOSCOPY         SOCIAL HISTORY:    Social History     Social History   • Marital status:      Spouse name: N/A   • Number of children: 2   • Years of education: N/A     Occupational History   •  Disabled     Social History Main Topics   • Smoking status: Current Every Day Smoker     Packs/day: 0.25     Years: 36.00     Types: Cigarettes   • Smokeless tobacco: Never Used   • Alcohol use No   • Drug use: No   • Sexual activity: Not on file     Other Topics Concern   • Not on file     Social History Narrative    Lives in [place], [place] with spouse, children, and grandchildren       FAMILY HISTORY:    Family History   Problem Relation Age of Onset   • Cancer Mother    • Cancer Father    • Hypertension Father    • Other Sister      ACUTE MYOCARDIAL INFARCTION,CABG   • Heart failure Sister    • Hypertension Sister    • Stroke Other        Review of Systems   Constitutional: Positive for fatigue. Negative for chills, diaphoresis and fever.   HENT: Positive for nosebleeds (occurs with pressure while blowing nose, very mild ).         Bleeding with blowing nose   Eyes: Positive for visual disturbance (glasses).   Respiratory: Positive for cough (\"coughing up blood\" with sputum ) and shortness of breath. Negative for choking, chest tightness, wheezing and stridor.    Cardiovascular: Positive for palpitations (increased with coughing). Negative for chest pain and leg swelling.   Gastrointestinal: Positive for constipation. Negative for abdominal pain, blood in stool, diarrhea, nausea and vomiting.        \"spitting up blood\"   Endocrine: Negative for cold " "intolerance and heat intolerance.   Genitourinary: Negative.  Negative for difficulty urinating, hematuria and urgency.   Musculoskeletal: Positive for arthralgias and myalgias.   Skin: Negative.  Negative for color change, pallor, rash and wound.   Allergic/Immunologic: Negative.    Neurological: Positive for dizziness. Negative for tremors, seizures, syncope, facial asymmetry, speech difficulty, weakness, light-headedness, numbness and headaches.   Hematological: Bruises/bleeds easily.   Psychiatric/Behavioral: Positive for sleep disturbance.       VISIT VITALS:  Vitals:    02/08/18 1106   BP: 108/58   BP Location: Left arm   Patient Position: Sitting   Pulse: 76   SpO2: 100%   Weight: 75.8 kg (167 lb)   Height: 154.9 cm (60.98\")      /58 (BP Location: Left arm, Patient Position: Sitting)  Pulse 76  Ht 154.9 cm (60.98\")  Wt 75.8 kg (167 lb)  SpO2 100%  BMI 31.57 kg/m2    RECENT LABS:    Objective       Physical Exam   Constitutional: She is oriented to person, place, and time. She appears well-developed and well-nourished. No distress.   HENT:   Head: Normocephalic and atraumatic.   Eyes: Conjunctivae, EOM and lids are normal. Pupils are equal, round, and reactive to light. Lids are everted and swept, no foreign bodies found.   Neck: Normal range of motion. Neck supple. Normal carotid pulses, no hepatojugular reflux and no JVD present. Carotid bruit is present (transmitted murmur BL). No tracheal deviation present. No thyroid mass and no thyromegaly present.   Cardiovascular: Normal rate, regular rhythm and intact distal pulses.    Murmur (1-2/6 AI murmur, 1-2/6 MR murmur ) heard.   Systolic murmur is present with a grade of 3/6   Pulses:       Carotid pulses are 2+ on the right side, and 2+ on the left side.       Radial pulses are 2+ on the right side, and 2+ on the left side.        Dorsalis pedis pulses are 2+ on the right side, and 2+ on the left side.        Posterior tibial pulses are 2+ on the " right side, and 2+ on the left side.   S1 decreased   S2 single      Pulmonary/Chest: Effort normal and breath sounds normal. She has no decreased breath sounds. She has no wheezes. She has no rhonchi. She has no rales.   Abdominal: Soft. Bowel sounds are normal. She exhibits no distension, no abdominal bruit and no mass. There is no tenderness. There is no rebound and no guarding.   Negative organomegaly      Musculoskeletal: Normal range of motion. She exhibits no edema, tenderness or deformity.   Lymphadenopathy:     She has no cervical adenopathy.     She has no axillary adenopathy.   Neurological: She is alert and oriented to person, place, and time.   Skin: Skin is warm and dry. No rash noted. No erythema. No pallor.   Psychiatric: She has a normal mood and affect. Her behavior is normal. Judgment and thought content normal.   Nursing note and vitals reviewed.      Procedures      Assessment/Plan   #1.  Given the patient's chronic cough and blood-tinged sputum I would like to treat empirically for upper respiratory tract infection.  The patient is allergic to azithromycin therefore, we will start Augmentin 875 mg twice daily for 7 days.  I discussed using Mucinex and floor fan for secretions and cough suppression.    #2.  I've also asked the patient to stop aspirin for a week.  If her moccasins resolve she is to restart aspirin at 81 mg a day.  If not she will notify us for further evaluation.    #3.  I also asked the patient to ensure that Dr. Zamora awoke aware of her prior problems with bleeding on warfarin.  I will attempt to related this information to Route telephonically.    #4.  His labile follow-up with Mr. Ramos as instructed, with Dr. Charles as scheduled, and I would like to see the patient in our office within a week after her return home from surgery.                           Diagnosis Plan   1. VHD (valvular heart disease)     2. Moderate aortic stenosis     3. Essential hypertension     4.  Shortness of breath     5. Chronic fatigue     6. Aortic valve insufficiency, etiology of cardiac valve disease unspecified     7. Dizziness     8. Rheumatic aortic valve insufficiency     9. Rheumatic aortic stenosis     10. Rheumatic mitral regurgitation     11. Rheumatic mitral stenosis         Return for F/U AFTER dR. [facility]         Alfonzo Garcia MD   Scribed for Dr. Alfonzo Garcia by DEBORAH Cruz February 8, 2018 12:00 PM               Electronically signed by:            This note is dictated utilizing voice recognition software.  Although this record has been proof read, transcriptional errors may still be present. If questions occur regarding the content of this record please do not hesitate to call our office.

## 2018-02-13 DIAGNOSIS — I06.1 RHEUMATIC AORTIC VALVE INSUFFICIENCY: Primary | ICD-10-CM

## 2018-02-21 ENCOUNTER — PREP FOR SURGERY (OUTPATIENT)
Dept: OTHER | Facility: HOSPITAL | Age: 48
End: 2018-02-21

## 2018-02-21 DIAGNOSIS — I35.9 AORTIC VALVE DISEASE: Primary | ICD-10-CM

## 2018-02-21 RX ORDER — NITROGLYCERIN 0.4 MG/1
0.4 TABLET SUBLINGUAL
Status: CANCELLED | OUTPATIENT
Start: 2018-03-02

## 2018-02-21 RX ORDER — CEFAZOLIN SODIUM 2 G/100ML
2 INJECTION, SOLUTION INTRAVENOUS ONCE
Status: CANCELLED | OUTPATIENT
Start: 2018-03-02 | End: 2018-03-02

## 2018-02-21 RX ORDER — CHLORHEXIDINE GLUCONATE 0.12 MG/ML
15 RINSE ORAL ONCE
Status: CANCELLED | OUTPATIENT
Start: 2018-03-02 | End: 2018-03-02

## 2018-02-21 RX ORDER — CHLORHEXIDINE GLUCONATE 500 MG/1
1 CLOTH TOPICAL EVERY 12 HOURS PRN
Status: CANCELLED | OUTPATIENT
Start: 2018-03-01

## 2018-02-21 RX ORDER — CHLORHEXIDINE GLUCONATE 500 MG/1
1 CLOTH TOPICAL EVERY 12 HOURS PRN
Status: CANCELLED | OUTPATIENT
Start: 2018-03-02

## 2018-02-21 RX ORDER — ASPIRIN 325 MG
325 TABLET ORAL NIGHTLY
Status: CANCELLED | OUTPATIENT
Start: 2018-03-01 | End: 2018-03-02

## 2018-02-21 RX ORDER — ACETAMINOPHEN 325 MG/1
650 TABLET ORAL EVERY 4 HOURS PRN
Status: CANCELLED | OUTPATIENT
Start: 2018-03-02

## 2018-02-27 DIAGNOSIS — R07.89 OTHER CHEST PAIN: ICD-10-CM

## 2018-02-27 DIAGNOSIS — I10 ESSENTIAL HYPERTENSION: ICD-10-CM

## 2018-02-27 RX ORDER — METOPROLOL SUCCINATE 25 MG/1
25 TABLET, EXTENDED RELEASE ORAL DAILY
Qty: 30 TABLET | Refills: 5 | Status: SHIPPED | OUTPATIENT
Start: 2018-02-27 | End: 2018-07-26

## 2018-02-27 RX ORDER — ISOSORBIDE MONONITRATE 30 MG/1
15 TABLET, EXTENDED RELEASE ORAL DAILY
Qty: 30 TABLET | Refills: 5 | Status: SHIPPED | OUTPATIENT
Start: 2018-02-27 | End: 2018-07-26

## 2018-03-01 ENCOUNTER — HOSPITAL ENCOUNTER (OUTPATIENT)
Dept: PULMONOLOGY | Facility: HOSPITAL | Age: 48
Discharge: HOME | End: 2018-03-01

## 2018-03-01 ENCOUNTER — APPOINTMENT (OUTPATIENT)
Dept: PREADMISSION TESTING | Facility: HOSPITAL | Age: 48
End: 2018-03-01

## 2018-03-01 ENCOUNTER — HOSPITAL ENCOUNTER (OUTPATIENT)
Dept: GENERAL RADIOLOGY | Facility: HOSPITAL | Age: 48
Discharge: HOME | End: 2018-03-01
Admitting: PHYSICIAN ASSISTANT

## 2018-03-01 VITALS — WEIGHT: 167.11 LBS | BODY MASS INDEX: 31.55 KG/M2 | HEIGHT: 61 IN | OXYGEN SATURATION: 97 %

## 2018-03-01 DIAGNOSIS — I35.9 AORTIC VALVE DISEASE: ICD-10-CM

## 2018-03-01 LAB
ABO GROUP BLD: NORMAL
ALBUMIN SERPL-MCNC: 4.4 G/DL (ref 3.2–4.8)
ALBUMIN/GLOB SERPL: 1.5 G/DL (ref 1.5–2.5)
ALP SERPL-CCNC: 151 U/L (ref 25–100)
ALT SERPL W P-5'-P-CCNC: 11 U/L (ref 7–40)
AMPHET+METHAMPHET UR QL: NEGATIVE
AMPHETAMINES UR QL: NEGATIVE
ANION GAP SERPL CALCULATED.3IONS-SCNC: 5 MMOL/L (ref 3–11)
APTT PPP: 31.3 SECONDS (ref 24–31)
AST SERPL-CCNC: 13 U/L (ref 0–33)
BARBITURATES UR QL SCN: NEGATIVE
BASOPHILS # BLD AUTO: 0.02 10*3/MM3 (ref 0–0.2)
BASOPHILS NFR BLD AUTO: 0.2 % (ref 0–1)
BENZODIAZ UR QL SCN: NEGATIVE
BILIRUB SERPL-MCNC: 0.2 MG/DL (ref 0.3–1.2)
BLD GP AB SCN SERPL QL: NEGATIVE
BUN BLD-MCNC: 16 MG/DL (ref 9–23)
BUN/CREAT SERPL: 17.8 (ref 7–25)
BUPRENORPHINE SERPL-MCNC: NEGATIVE NG/ML
CALCIUM SPEC-SCNC: 8.9 MG/DL (ref 8.7–10.4)
CANNABINOIDS SERPL QL: NEGATIVE
CHLORIDE SERPL-SCNC: 107 MMOL/L (ref 99–109)
CO2 SERPL-SCNC: 26 MMOL/L (ref 20–31)
COCAINE UR QL: NEGATIVE
CREAT BLD-MCNC: 0.9 MG/DL (ref 0.6–1.3)
DEPRECATED RDW RBC AUTO: 59.6 FL (ref 37–54)
EOSINOPHIL # BLD AUTO: 0.14 10*3/MM3 (ref 0–0.3)
EOSINOPHIL NFR BLD AUTO: 1.6 % (ref 0–3)
ERYTHROCYTE [DISTWIDTH] IN BLOOD BY AUTOMATED COUNT: 17 % (ref 11.3–14.5)
GFR SERPL CREATININE-BSD FRML MDRD: 67 ML/MIN/1.73
GLOBULIN UR ELPH-MCNC: 3 GM/DL
GLUCOSE BLD-MCNC: 92 MG/DL (ref 70–100)
HBA1C MFR BLD: 5.1 % (ref 4.8–5.6)
HCT VFR BLD AUTO: 37.4 % (ref 34.5–44)
HGB BLD-MCNC: 12 G/DL (ref 11.5–15.5)
IMM GRANULOCYTES # BLD: 0.03 10*3/MM3 (ref 0–0.03)
IMM GRANULOCYTES NFR BLD: 0.4 % (ref 0–0.6)
INR PPP: 0.95 (ref 0.91–1.09)
LYMPHOCYTES # BLD AUTO: 1.75 10*3/MM3 (ref 0.6–4.8)
LYMPHOCYTES NFR BLD AUTO: 20.5 % (ref 24–44)
MAGNESIUM SERPL-MCNC: 2 MG/DL (ref 1.3–2.7)
MCH RBC QN AUTO: 30.5 PG (ref 27–31)
MCHC RBC AUTO-ENTMCNC: 32.1 G/DL (ref 32–36)
MCV RBC AUTO: 94.9 FL (ref 80–99)
METHADONE UR QL SCN: NEGATIVE
MONOCYTES # BLD AUTO: 0.86 10*3/MM3 (ref 0–1)
MONOCYTES NFR BLD AUTO: 10.1 % (ref 0–12)
NEUTROPHILS # BLD AUTO: 5.75 10*3/MM3 (ref 1.5–8.3)
NEUTROPHILS NFR BLD AUTO: 67.2 % (ref 41–71)
OPIATES UR QL: NEGATIVE
OXYCODONE UR QL SCN: NEGATIVE
PA ADP PRP-ACNC: 264 PRU
PCP UR QL SCN: NEGATIVE
PLATELET # BLD AUTO: 240 10*3/MM3 (ref 150–450)
PMV BLD AUTO: 10.1 FL (ref 6–12)
POTASSIUM BLD-SCNC: 3.9 MMOL/L (ref 3.5–5.5)
PROPOXYPH UR QL: NEGATIVE
PROT SERPL-MCNC: 7.4 G/DL (ref 5.7–8.2)
PROTHROMBIN TIME: 10 SECONDS (ref 9.6–11.5)
RBC # BLD AUTO: 3.94 10*6/MM3 (ref 3.89–5.14)
RH BLD: POSITIVE
SODIUM BLD-SCNC: 138 MMOL/L (ref 132–146)
TRICYCLICS UR QL SCN: POSITIVE
WBC NRBC COR # BLD: 8.55 10*3/MM3 (ref 3.5–10.8)

## 2018-03-01 PROCEDURE — 85025 COMPLETE CBC W/AUTO DIFF WBC: CPT | Performed by: PHYSICIAN ASSISTANT

## 2018-03-01 PROCEDURE — 93010 ELECTROCARDIOGRAM REPORT: CPT | Performed by: INTERNAL MEDICINE

## 2018-03-01 PROCEDURE — 94010 BREATHING CAPACITY TEST: CPT

## 2018-03-01 PROCEDURE — 80053 COMPREHEN METABOLIC PANEL: CPT | Performed by: PHYSICIAN ASSISTANT

## 2018-03-01 PROCEDURE — 86901 BLOOD TYPING SEROLOGIC RH(D): CPT | Performed by: PHYSICIAN ASSISTANT

## 2018-03-01 PROCEDURE — 36415 COLL VENOUS BLD VENIPUNCTURE: CPT

## 2018-03-01 PROCEDURE — 71046 X-RAY EXAM CHEST 2 VIEWS: CPT

## 2018-03-01 PROCEDURE — 86900 BLOOD TYPING SEROLOGIC ABO: CPT | Performed by: PHYSICIAN ASSISTANT

## 2018-03-01 PROCEDURE — 93005 ELECTROCARDIOGRAM TRACING: CPT

## 2018-03-01 PROCEDURE — 83735 ASSAY OF MAGNESIUM: CPT | Performed by: PHYSICIAN ASSISTANT

## 2018-03-01 PROCEDURE — 86923 COMPATIBILITY TEST ELECTRIC: CPT

## 2018-03-01 PROCEDURE — 83036 HEMOGLOBIN GLYCOSYLATED A1C: CPT | Performed by: PHYSICIAN ASSISTANT

## 2018-03-01 PROCEDURE — 86850 RBC ANTIBODY SCREEN: CPT | Performed by: PHYSICIAN ASSISTANT

## 2018-03-01 PROCEDURE — 94010 BREATHING CAPACITY TEST: CPT | Performed by: INTERNAL MEDICINE

## 2018-03-01 PROCEDURE — 85576 BLOOD PLATELET AGGREGATION: CPT | Performed by: PHYSICIAN ASSISTANT

## 2018-03-01 PROCEDURE — 85610 PROTHROMBIN TIME: CPT | Performed by: PHYSICIAN ASSISTANT

## 2018-03-01 PROCEDURE — 85730 THROMBOPLASTIN TIME PARTIAL: CPT | Performed by: PHYSICIAN ASSISTANT

## 2018-03-01 PROCEDURE — 80306 DRUG TEST PRSMV INSTRMNT: CPT | Performed by: PHYSICIAN ASSISTANT

## 2018-03-01 RX ORDER — FUROSEMIDE 20 MG/1
20 TABLET ORAL DAILY
COMMUNITY
End: 2018-07-26

## 2018-03-01 RX ORDER — ASPIRIN 81 MG/1
81 TABLET ORAL DAILY
COMMUNITY

## 2018-03-01 RX ORDER — CHLORHEXIDINE GLUCONATE 500 MG/1
1 CLOTH TOPICAL EVERY 12 HOURS PRN
Status: ACTIVE | OUTPATIENT
Start: 2018-03-01

## 2018-03-01 RX ORDER — ASPIRIN 325 MG
325 TABLET ORAL NIGHTLY
Status: SHIPPED | OUTPATIENT
Start: 2018-03-01 | End: 2018-03-02

## 2018-03-01 RX ORDER — ASCORBIC ACID 500 MG
500 TABLET ORAL 2 TIMES DAILY
COMMUNITY
End: 2018-03-19 | Stop reason: SDUPTHER

## 2018-03-01 RX ORDER — POTASSIUM CHLORIDE 750 MG/1
10 TABLET, FILM COATED, EXTENDED RELEASE ORAL DAILY
COMMUNITY
End: 2018-08-16 | Stop reason: SDUPTHER

## 2018-03-01 NOTE — PAT
BACTROBAN APPLIED TO EACH NOSTRIL DURING PAT VISIT  instructed to take three 81 mg asa this evening per orders- has taken one 81 mg asa this morning- pt agrees

## 2018-03-01 NOTE — DISCHARGE INSTRUCTIONS
The following information and instructions were given:    NPO after MN except sips of water with routine prescribed medication (except blood thinner, diabetes, or weight reducing medication) unless otherwise instructed by your physician.  Do not eat, drink, smoke or chew gum after MN the night before surgery. This also includes no mints.    DO NOT shave for two days before your procedure.  Do not wear makeup.      DO NOT wear fingernail polish (gel/regular) and/or acrylic/artificial nails on the day of surgery.   If a patient had recent manicure and would rather not remove polish or artificial nails, then the minimum requirement is that the polish/artificial nails must be removed from the middle finger on each hand.      If patient was having surgery on an upper extremity, then the patient was instructed that fingernail polish/artificial fingernails must be removed for surgery.  NO EXCEPTIONS.      If patient was having surgery on a lower extremity, then the patient was instructed that toenail polish on both extremities must be removed for surgery.  NO EXCEPTIONS.    Remove all jewelry (advised to go to jeweler if unable to remove).  Jewelry especially rings can no longer be taped for surgery.    Leave anything you consider valuable at home.    Leave your suitcase in the car until after your surgery.    Bring the following with you (if applicable)   -picture ID and insurance cards   -Co-pay/deductible required by insurance   -Medications in the original bottles (not a list) including all over-the-counter  medications if not brought to PAT   -Copy of advance directive, living will or power of  documents if not  brought to PAT   -CPAP or BIPAP mask and tubing (do not bring machine)   -Skin prep instructions sheet   -PAT Pass    Education booklet, brochure, handout or binder given to patient.    Pain Control After Surgery handout given to patient.    Respirex use (handout given to patient) and pneumonia  prevention.    Signs and Symptoms of infection.    DVT Prevention stressing the importance of ambulation.    Patient to apply Chlorhexadine wipes to surgical area (as instructed) the night before procedure and the AM of procedure.    When applicable for ERAS patients (colon, orthropedic), patients were instructed to drink 20 ounces of Gatorade or G2 for diabetics (or until full) the morning of surgery.  The Gatorade or G2 must be consumed at least 3 hours before surgery start time.  No RED Gatorade or G2.  Appropriated ERAS handout given to patient during PAT visit.      cabg book and sheet given at office

## 2018-03-01 NOTE — RESEARCH
RISK SCORES  About the STS Risk Calculator  Procedure: AV Replacement   Risk of Mortality: 0.639%   Morbidity or Mortality: 7.854%   Long Length of Stay: 2.168%   Short Length of Stay: 56.624%   Permanent Stroke: 0.949%   Prolonged Ventilation: 4.103%   DSW Infection: 0.072%   Renal Failure: 1.197%   Reoperation: 4.562%

## 2018-03-02 ENCOUNTER — ANESTHESIA EVENT (OUTPATIENT)
Dept: PERIOP | Facility: HOSPITAL | Age: 48
End: 2018-03-02

## 2018-03-02 ENCOUNTER — HOSPITAL ENCOUNTER (OUTPATIENT)
Facility: HOSPITAL | Age: 48
Setting detail: HOSPITAL OUTPATIENT SURGERY
Discharge: HOME | End: 2018-03-02
Attending: THORACIC SURGERY (CARDIOTHORACIC VASCULAR SURGERY) | Admitting: THORACIC SURGERY (CARDIOTHORACIC VASCULAR SURGERY)

## 2018-03-02 ENCOUNTER — ANESTHESIA (OUTPATIENT)
Dept: PERIOP | Facility: HOSPITAL | Age: 48
End: 2018-03-02

## 2018-03-02 VITALS
DIASTOLIC BLOOD PRESSURE: 65 MMHG | HEIGHT: 61 IN | BODY MASS INDEX: 31.53 KG/M2 | TEMPERATURE: 97.9 F | WEIGHT: 167 LBS | OXYGEN SATURATION: 94 % | HEART RATE: 69 BPM | SYSTOLIC BLOOD PRESSURE: 136 MMHG | RESPIRATION RATE: 18 BRPM

## 2018-03-02 DIAGNOSIS — I35.9 AORTIC VALVE DISEASE: ICD-10-CM

## 2018-03-02 LAB
ABO GROUP BLD: NORMAL
RH BLD: POSITIVE

## 2018-03-02 PROCEDURE — 86900 BLOOD TYPING SEROLOGIC ABO: CPT

## 2018-03-02 PROCEDURE — 86901 BLOOD TYPING SEROLOGIC RH(D): CPT

## 2018-03-02 PROCEDURE — S0260 H&P FOR SURGERY: HCPCS | Performed by: THORACIC SURGERY (CARDIOTHORACIC VASCULAR SURGERY)

## 2018-03-02 RX ORDER — SODIUM CHLORIDE, SODIUM LACTATE, POTASSIUM CHLORIDE, CALCIUM CHLORIDE 600; 310; 30; 20 MG/100ML; MG/100ML; MG/100ML; MG/100ML
9 INJECTION, SOLUTION INTRAVENOUS CONTINUOUS
Status: DISCONTINUED | OUTPATIENT
Start: 2018-03-02 | End: 2018-03-02 | Stop reason: HOSPADM

## 2018-03-02 RX ORDER — FAMOTIDINE 20 MG/1
20 TABLET, FILM COATED ORAL ONCE
Status: COMPLETED | OUTPATIENT
Start: 2018-03-02 | End: 2018-03-02

## 2018-03-02 RX ORDER — NITROGLYCERIN 0.4 MG/1
0.4 TABLET SUBLINGUAL
Status: DISCONTINUED | OUTPATIENT
Start: 2018-03-02 | End: 2018-03-02 | Stop reason: HOSPADM

## 2018-03-02 RX ORDER — LIDOCAINE HYDROCHLORIDE 10 MG/ML
0.5 INJECTION, SOLUTION EPIDURAL; INFILTRATION; INTRACAUDAL; PERINEURAL ONCE AS NEEDED
Status: COMPLETED | OUTPATIENT
Start: 2018-03-02 | End: 2018-03-02

## 2018-03-02 RX ORDER — CEFAZOLIN SODIUM 2 G/100ML
2 INJECTION, SOLUTION INTRAVENOUS ONCE
Status: DISCONTINUED | OUTPATIENT
Start: 2018-03-02 | End: 2018-03-02 | Stop reason: HOSPADM

## 2018-03-02 RX ORDER — CHLORHEXIDINE GLUCONATE 500 MG/1
1 CLOTH TOPICAL EVERY 12 HOURS PRN
Status: DISCONTINUED | OUTPATIENT
Start: 2018-03-02 | End: 2018-03-02 | Stop reason: HOSPADM

## 2018-03-02 RX ORDER — ACETAMINOPHEN 325 MG/1
650 TABLET ORAL EVERY 4 HOURS PRN
Status: DISCONTINUED | OUTPATIENT
Start: 2018-03-02 | End: 2018-03-02 | Stop reason: HOSPADM

## 2018-03-02 RX ORDER — MIDAZOLAM HYDROCHLORIDE 1 MG/ML
INJECTION INTRAMUSCULAR; INTRAVENOUS AS NEEDED
Status: SHIPPED | OUTPATIENT
Start: 2018-03-02

## 2018-03-02 RX ORDER — SODIUM CHLORIDE 0.9 % (FLUSH) 0.9 %
1-10 SYRINGE (ML) INJECTION AS NEEDED
Status: DISCONTINUED | OUTPATIENT
Start: 2018-03-02 | End: 2018-03-02 | Stop reason: HOSPADM

## 2018-03-02 RX ORDER — CHLORHEXIDINE GLUCONATE 0.12 MG/ML
15 RINSE ORAL ONCE
Status: COMPLETED | OUTPATIENT
Start: 2018-03-02 | End: 2018-03-02

## 2018-03-02 RX ADMIN — MIDAZOLAM HYDROCHLORIDE 2 MG: 1 INJECTION INTRAMUSCULAR; INTRAVENOUS at 06:55

## 2018-03-02 RX ADMIN — SODIUM CHLORIDE, POTASSIUM CHLORIDE, SODIUM LACTATE AND CALCIUM CHLORIDE 9 ML/HR: 600; 310; 30; 20 INJECTION, SOLUTION INTRAVENOUS at 06:05

## 2018-03-02 RX ADMIN — CHLORHEXIDINE GLUCONATE 15 ML: 1.2 RINSE ORAL at 06:27

## 2018-03-02 RX ADMIN — MUPIROCIN 1 APPLICATION: 20 OINTMENT TOPICAL at 06:27

## 2018-03-02 RX ADMIN — LIDOCAINE HYDROCHLORIDE 0.2 ML: 10 INJECTION, SOLUTION EPIDURAL; INFILTRATION; INTRACAUDAL; PERINEURAL at 06:05

## 2018-03-02 RX ADMIN — FAMOTIDINE 20 MG: 20 TABLET, FILM COATED ORAL at 06:27

## 2018-03-02 NOTE — PROGRESS NOTES
"[facility] preop:    Dr. Charles with plans today for mechanical valve replacement with lifelong Coumadin therapy.  She reports history of adverse reaction to blood thinner.  In discussion with pt in preop, unable to determine what her history and reaction is with unknown blood thinner in 2014 following knee surgery in [place].  Per Dr. Garcia's note, she reported to him, it was Coumadin with post op incisional bleeding and nosebleeds requiring PRBC transfusions.  However, in preop she reports the blood thinner started with a \"T\" and only took for a few days during her hospitalization for her knee surgery with subsequent hospitalization for nosebleeds after discontinuation of blood thinner. Pt is a poor historian and family is unaware of details.  Dr. Charles discussed in length with pt and  and unable to determine what blood thinner she was on in 2014 which caused bleeding.  Surgery has been cancelled today until further information can be obtained about adverse reaction to blood thinner in 2014 and to determine proper plan of care with valve choice.  Plan was discussed with pt and  and verbalized understanding.    See H&P for Dr. Charles's full note    Lauren Jimenez, CHARLIE    "

## 2018-03-02 NOTE — H&P
Pre-Op H&P  Mara Martínez  [id number]  1970    Chief complaint: Dizziness and Fatigue    HPI:  1/22/18 Office visit:  This patient is referred to me to evaluate for double valve surgery.  She has had worsening shortness of breath and fatigue for 3-6 months.  She has had no distinct anginal symptoms.  Echocardiogram has demonstrated mixed aortic valve stenosis and insufficiency and mitral stenosis and insufficiency.  Cardiac catheterization demonstrates normal coronary arteries.  At this time in the office she is pain-free but states that if she walks fast as little as 100-150 feet she becomes winded and if she rests for a few minutes and her breathing returns to normal.  There has been no associated nausea    3/2/18:  Here today for aortic valve repair/replacement with mitral valve repair/replacement, JASON    Review of Systems:  General ROS: negative for chills, fever or skin lesions;  No changes since last office visit.  Recent URI with Augmentin therapy  Cardiovascular ROS: no chest pain or dyspnea on exertion  Respiratory ROS: no cough, shortness of breath, or wheezing    Allergies:   Allergies   Allergen Reactions   • Azithromycin Shortness Of Breath     Rash, itching   • Corticosteroids Swelling     rash       Home Meds:    No current facility-administered medications on file prior to encounter.      Current Outpatient Prescriptions on File Prior to Encounter   Medication Sig Dispense Refill   • Ascorbic Acid (VITAMIN C) 500 MG capsule Take 1 tablet by mouth 2 (two) times a day.     • Cholecalciferol (VITAMIN D3 PO) Take 1,000 Units by mouth Daily.     • ferrous sulfate 325 (65 FE) MG tablet Take 325 mg by mouth 2 (Two) Times a Day.     • furosemide (LASIX) 20 MG tablet TAKE 1 TABLET BY MOUTH DAILY EVERY MORNING FOR FLUID RETENTION &SWELLLING 30 tablet 5   • meloxicam (MOBIC) 15 MG tablet Daily.     • nortriptyline (PAMELOR) 50 MG capsule Take 100 mg by mouth Every Night. 2 CAPSULES AT HS     •  OXcarbazepine (TRILEPTAL) 300 MG tablet Take 600 mg by mouth Every 12 (Twelve) Hours. 2 tabs in am and 2 tabs in pm     • pantoprazole (PROTONIX) 40 MG EC tablet Take 1 tablet by mouth Daily. 30 tablet 6   • topiramate (TOPAMAX) 100 MG tablet Take 100 mg by mouth 2 (Two) Times a Day.     • amoxicillin-clavulanate (AUGMENTIN) 875-125 MG per tablet Take 1 tablet by mouth Every 12 (Twelve) Hours. 14 tablet 0   • aspirin 325 MG tablet Take 1 tablet by mouth daily.     • HYDROcodone-acetaminophen (NORCO) 5-325 MG per tablet prn     • nitroglycerin (NITROSTAT) 0.4 MG SL tablet Place 0.4 mg under the tongue Every 5 (Five) Minutes As Needed for Chest Pain. PLACE 1 TABLET UNDER THE TONGUE EVERY 5 MINUTES FOR UP TO 3 DOSES AS NEEDED FOR CHEST PAIN CALL 911 IF PAIN PERSISTS     • [DISCONTINUED] potassium chloride (MICRO-K) 10 MEQ CR capsule TAKE 1 CAPSULE BY MOUTH DAILY FOR POTASSIUM 30 capsule 5       PMH:   Past Medical History:   Diagnosis Date   • Anemia    • Aortic regurgitation    • Arthritis    • Back pain    • Carotid bruit    • ISREAL (cerebral atherosclerosis) 2014    nonobstructive   • Chest pain    • D-dimer, elevated    • Dizziness    • Edema    • Epilepsy    • Fatigue    • Heart murmur    • Hyperlipidemia    • Hypertension    • Kidney stones    • Migraines    • Mitral regurgitation    • Mitral stenosis     mild   • Palpitations    • Pulmonary edema    • Sleeping difficulties    • SOB (shortness of breath)    • Supraventricular aortic stenosis    • Syncope    • Tachycardia    • Tobacco abuse    • VHD (valvular heart disease)    • Wears dentures    • Wears eyeglasses      PSH:    Past Surgical History:   Procedure Laterality Date   • APPENDECTOMY     • CARDIAC CATHETERIZATION     •  SECTION      x 2   • CHOLECYSTECTOMY     • COLONOSCOPY     • EXPLORATORY LAPAROTOMY     • HERNIA REPAIR     • HYSTERECTOMY     • JOINT REPLACEMENT Right     knee replacement   • UPPER GASTROINTESTINAL ENDOSCOPY    "      Immunization History:  Influenza: no  Pneumococcal: no  Tetanus: unknown    Social History:   Tobacco:   History   Smoking Status   • Current Every Day Smoker   • Packs/day: 0.25   • Years: 36.00   • Types: Cigarettes   Smokeless Tobacco   • Never Used      Alcohol:     History   Alcohol Use No       Vitals:           /65 (BP Location: Right arm, Patient Position: Lying) Comment: Left arm 135/67  Pulse 69  Temp 97.9 °F (36.6 °C) (Temporal Artery )   Resp 18  Ht 154.9 cm (61\")  Wt 75.8 kg (167 lb)  SpO2 94%  BMI 31.55 kg/m2    Physical Exam:  General Appearance:    Alert, cooperative, no distress, appears stated age   Head:    Normocephalic, without obvious abnormality, atraumatic   Lungs:     Clear to auscultation bilaterally, respirations unlabored    Heart:   Regular rate and rhythm, S1 and S2 normal, + murmur, rub    or gallop    Abdomen:    Soft, non-tender.  +bowel sounds   Breast Exam:    deferred   Genitalia:    deferred   Extremities:   Extremities normal, atraumatic, no cyanosis or edema   Skin:   Skin color, texture, turgor normal, no rashes or lesions   Neurologic:   Grossly intact   Results Review  I reviewed the patient's new clinical results.  2/8/18:      Cancer Staging (if applicable)  Cancer Patient: __ yes _x_no __unknown; If yes, clinical stage T:__ N:__M:__, stage group or __N/A    Impression/Plan: Patient will most likely require double valve replacement with a replaced aortic and mitral valves.  We have discussed that this surgery is a complex procedure with risk of stroke, bleeding, infection and death and renal failure and no guarantees are made as to outcome.  We further discussed that at her age and with need for double valve surgery a mechanical valve would be her best option and that would require the risks and responsibilities of lifetime anticoagulation with Coumadin.  Patient is agreeable to proceed.  Per Dr. Garcia 2/8/18 note, Concerns with Coumadin and bleeding " history.  In discussion with pt in preop, pt unaware of what blood thinner she was on in 2014 for ortho surgery and subsequent nose bleeds and PRBC transfusions.  Dr. Charles will discuss with pt prior to surgery.    I just spoke with the patient and her family in the preoperative area again in detail regarding blood thinners.  She had a seen  me in the office on January 22, 2018 and we have discussed double valve replacement with mechanical valves because of her young age.  We had also discussed that lifetime anticoagulation with Coumadin blood thinner would be required and we discussed the risks and responsibility of lifetime anticoagulation.  She seemed to clearly understand this in the office at that time.  Additionally in the office she had not indicated any past medical history of bleeding from her colon or nosebleeds.  Actually when asked the question she denied issues with colon bleeding.  But apparently when she has been anticoagulated before she had colonic bleeding requiring transfusion and also apparently after knee surgery she had been placed on warfarin and had a significant bleed requiring transfusion.  None of these issues were mentioned to me in the office when we discussed Coumadin.  Therefore I believe we should not proceed with surgery today and we should regroup and rethink this. I will  contact the patient's cardiologist Dr. Garcia later today we'll try to decide the best plan for this patient.  I am becoming convinced that this patient is an extremely poor historian.  Her surgery is not an emergency and I believe we need to cancel today's surgery and I will talk in detail with her other physicians involved so that we can come up with the best and safest plan for the care of this patient.  Lauren Jimenez, APRN   3/2/2018   6:40 AM

## 2018-03-02 NOTE — PERIOPERATIVE NURSING NOTE
Arterial line removed from right wrist post patient being cancelled for surgery.  Suture removed and pressure applied for 10 minutes upon removal with pressure dressing applied thereafter.

## 2018-03-02 NOTE — ANESTHESIA PREPROCEDURE EVALUATION
Anesthesia Evaluation     Patient summary reviewed and Nursing notes reviewed   NPO Solid Status: > 8 hours  NPO Liquid Status: > 8 hours           Airway   Mallampati: I  TM distance: >3 FB  Neck ROM: full  No difficulty expected  Dental    (+) upper dentures    Pulmonary    (+) decreased breath sounds,   Cardiovascular   Exercise tolerance: poor (<4 METS)    Rhythm: regular  Rate: normal    (+) murmur,       Neuro/Psych  GI/Hepatic/Renal/Endo      Musculoskeletal     Abdominal    Substance History      OB/GYN          Other                        Anesthesia Plan    ASA 4     general     intravenous induction   Anesthetic plan and risks discussed with patient.    A line ebenezer pa cath return to icu post op post op ventilation

## 2018-03-05 LAB
ABO + RH BLD: NORMAL
ABO + RH BLD: NORMAL
BH BB BLOOD EXPIRATION DATE: NORMAL
BH BB BLOOD EXPIRATION DATE: NORMAL
BH BB BLOOD TYPE BARCODE: 5100
BH BB BLOOD TYPE BARCODE: 5100
BH BB DISPENSE STATUS: NORMAL
BH BB DISPENSE STATUS: NORMAL
BH BB PRODUCT CODE: NORMAL
BH BB PRODUCT CODE: NORMAL
BH BB UNIT NUMBER: NORMAL
BH BB UNIT NUMBER: NORMAL
UNIT  ABO: NORMAL
UNIT  ABO: NORMAL
UNIT  RH: NORMAL
UNIT  RH: NORMAL

## 2018-03-07 ENCOUNTER — APPOINTMENT (OUTPATIENT)
Dept: PREADMISSION TESTING | Facility: HOSPITAL | Age: 48
End: 2018-03-07

## 2018-03-08 ENCOUNTER — TELEPHONE (OUTPATIENT)
Dept: CARDIAC SURGERY | Facility: CLINIC | Age: 48
End: 2018-03-08

## 2018-03-08 NOTE — TELEPHONE ENCOUNTER
S/w pt told her that Dr. Charles will schedule her once she ahs spoken w/ Dr Garcia on valve choice.

## 2018-03-13 NOTE — DISCHARGE SUMMARY
CTS Discharge Summary    Patient Care Team:  CHARLIE Fraire as PCP - General (Certified Clinical Nurse Specialist)  Alfonzo Garcia MD as Consulting Physician (Cardiology)      Date of Admission: 3/2/2018  5:23 AM  Date of Discharge:  3/2/18    Discharge Diagnosis  Past Medical History:   Diagnosis Date   • Anemia    • Aortic regurgitation    • Arthritis    • Back pain    • Carotid bruit    • ISREAL (cerebral atherosclerosis) 12/2014    nonobstructive   • Chest pain    • D-dimer, elevated    • Dizziness    • Edema    • Epilepsy    • Fatigue    • Heart murmur    • Hyperlipidemia    • Hypertension    • Kidney stones    • Migraines    • Mitral regurgitation    • Mitral stenosis     mild   • Palpitations    • Pulmonary edema    • Sleeping difficulties    • SOB (shortness of breath)    • Supraventricular aortic stenosis    • Syncope    • Tachycardia    • Tobacco abuse    • VHD (valvular heart disease)    • Wears dentures    • Wears eyeglasses        Active Problems:    Aortic valve disorder      History of Present Illness1/22/18 Office visit:  This patient is referred to me to evaluate for double valve surgery.  She has had worsening shortness of breath and fatigue for 3-6 months.  She has had no distinct anginal symptoms.  Echocardiogram has demonstrated mixed aortic valve stenosis and insufficiency and mitral stenosis and insufficiency.  Cardiac catheterization demonstrates normal coronary arteries.  At this time in the office she is pain-free but states that if she walks fast as little as 100-150 feet she becomes winded and if she rests for a few minutes and her breathing returns to normal.  There has been no associated nausea        Hospital Course  Patient is a 47 y.o. female admitted for aortic valve possible mitral valve replacement.  While in preop long discussions with the patient as to her inability to take anticoagulation secondary to previous problems with bleeding.  At that point it was felt the patient  may be treated medically for the time being and after further evaluation of her bleeding diathesis may still require replacement in the future.    Procedures Performed  Procedure(s): None        Consults:   Consults     No orders found from 2/1/2018 to 3/3/2018.            Discharge Medications   Mara Martínez   Home Medication Instructions JONO:[id number]    Printed on:03/13/18 1164   Medication Information                      amoxicillin-clavulanate (AUGMENTIN) 875-125 MG per tablet  Take 1 tablet by mouth Every 12 (Twelve) Hours.             Ascorbic Acid (VITAMIN C) 500 MG capsule  Take 1 tablet by mouth 2 (two) times a day.             aspirin 325 MG tablet  Take 1 tablet by mouth daily.             aspirin 81 MG EC tablet  Take 81 mg by mouth Daily.             Cholecalciferol (VITAMIN D3 PO)  Take 1,000 Units by mouth Daily.             ferrous sulfate 325 (65 FE) MG tablet  Take 325 mg by mouth 2 (Two) Times a Day.             furosemide (LASIX) 20 MG tablet  TAKE 1 TABLET BY MOUTH DAILY EVERY MORNING FOR FLUID RETENTION &SWELLLING             furosemide (LASIX) 20 MG tablet  Take 20 mg by mouth Daily.             HYDROcodone-acetaminophen (NORCO) 5-325 MG per tablet  prn             isosorbide mononitrate (IMDUR) 30 MG 24 hr tablet  Take 0.5 tablets by mouth Daily.             meloxicam (MOBIC) 15 MG tablet  Daily.             metoprolol succinate XL (TOPROL-XL) 25 MG 24 hr tablet  Take 1 tablet by mouth Daily. TAKE 1 TABLET BY MOUTH DAILY FOR HEART RATE & BLOOD PRESSURE             nitroglycerin (NITROSTAT) 0.4 MG SL tablet  Place 0.4 mg under the tongue Every 5 (Five) Minutes As Needed for Chest Pain. PLACE 1 TABLET UNDER THE TONGUE EVERY 5 MINUTES FOR UP TO 3 DOSES AS NEEDED FOR CHEST PAIN CALL 911 IF PAIN PERSISTS             nortriptyline (PAMELOR) 50 MG capsule  Take 100 mg by mouth Every Night. 2 CAPSULES AT HS             OXcarbazepine (TRILEPTAL) 300 MG tablet  Take 600 mg by mouth Every 12  (Twelve) Hours. 2 tabs in am and 2 tabs in pm             pantoprazole (PROTONIX) 40 MG EC tablet  Take 1 tablet by mouth Daily.             potassium chloride (K-DUR) 10 MEQ CR tablet  Take 10 mEq by mouth Daily.             topiramate (TOPAMAX) 100 MG tablet  Take 100 mg by mouth 2 (Two) Times a Day.             vitamin C (ASCORBIC ACID) 500 MG tablet  Take 500 mg by mouth 2 (Two) Times a Day.                 Discharge Diet:  as per preadmission     Activity at Discharge:   Do not drive while taking narcotics    Follow-up Appointments  Future Appointments  Date Time Provider Department Center   3/19/2018 8:45 AM YUE Melara [facility] None    discharge summary took less than 30 minutes to compile        YUE Alfaro  03/13/18  10:57 AM

## 2018-03-19 ENCOUNTER — OFFICE VISIT (OUTPATIENT)
Dept: CARDIOLOGY | Facility: CLINIC | Age: 48
End: 2018-03-19

## 2018-03-19 VITALS
OXYGEN SATURATION: 97 % | BODY MASS INDEX: 31.11 KG/M2 | HEIGHT: 61 IN | DIASTOLIC BLOOD PRESSURE: 65 MMHG | WEIGHT: 164.8 LBS | HEART RATE: 83 BPM | SYSTOLIC BLOOD PRESSURE: 106 MMHG

## 2018-03-19 DIAGNOSIS — R06.02 SHORTNESS OF BREATH: Primary | ICD-10-CM

## 2018-03-19 DIAGNOSIS — I05.0 RHEUMATIC MITRAL STENOSIS: ICD-10-CM

## 2018-03-19 DIAGNOSIS — I05.1 RHEUMATIC MITRAL REGURGITATION: ICD-10-CM

## 2018-03-19 DIAGNOSIS — I06.0 RHEUMATIC AORTIC STENOSIS: ICD-10-CM

## 2018-03-19 DIAGNOSIS — I06.1 RHEUMATIC AORTIC VALVE INSUFFICIENCY: ICD-10-CM

## 2018-03-19 PROCEDURE — 99214 OFFICE O/P EST MOD 30 MIN: CPT | Performed by: PHYSICIAN ASSISTANT

## 2018-03-19 NOTE — PROGRESS NOTES
Problem list     Subjective   Mara Martínez is a 47 y.o. female     Chief Complaint   Patient presents with   • Cardiac Valve Problem     mitral and aortic. Here for f/u on appt. with Dr. Charles   • Hypertension   • Hyperlipidemia   • Heart Murmur   • Rapid Heart Rate   • Syncope       HPI       PROBLEM LIST:      1.Valvular heart disease  a. Moderate Aortic Stenosis with GUEVARA of 1.22cm^2, mean gradient of 24mmHg with moderate to severe AI  b. Moderate to severe MR with no evidence of Mitral Stenosis  c. Repeat echo on December 22 2014 showed worsening GUEVARA at 1cm^2, Moderate MR with Mild MS with MVA at 1.69cm^2 and mean gradient of 5mmHg. Grade 2 DD  d. LHC and RHC revealed normal coronaries, mod-severe AI with GUEVARA at 1.2 with moderate AS, Moderate Mitral Stenosis with mild PTHN arguing against severe mitral disease. Medical management indicated at this point.  E. mild to moderate aortic stenosis with moderate aortic regurgitation, mean gradient of 29 and aortic valve area 1.5 cm². Moderate mitral regurgitation with mild to moderate mitral stenosis with mitral valve area 1.Meter squared   F.  Moderate to severe aortic stenosis, severe aortic insufficiency, severe mitral stenosis with mild to moderate mitral regurgitation by echocardiogram November 2017  G. Cardiac cath 12/18/17 demonstrated potentially hemodynamically significant mitral regurgitation associated with mildly to moderately elevated PA pressures. moderate aortic stenosis. The study excluded angiographically significant epicardial coronary disease as a contributing factor to the patient's symptoms. Double valve surgery replacement recommended  2)Normal systolic fxn   3)Hypertension  3.1) Stress test 8/3/15 - no ischemia, low risk  4)Dyslipidemia  5)Epilepsy  6) Nonobstructive ISREAL by carotid duplex on December 2014  7) Tobacco Habituation, smokes pack a day  8) Migraines     patient is a 47-year-old female that presents back for follow-up.  Patient was  evaluated by CT surgery and was present for double valve replacement by Dr. Charles.  Patient procedure was aborted because of concern about anticoagulation according to the patient and from review of records from [facility].  I discussed with the patient who describes being on anticoagulation in the past and having epistaxis but that it resolved.  She also described having a GI bleed in the past that was corrected.  Patient is very concerned today and would like to have valve replacement surgery performed.    She has chest discomfort and moderate to severe levels of dyspnea.  She has been to the emergency room because of the symptoms.  She has had a significant status: Functional status.  She has occasional orthopnea although end diastolic pressure was not elevated during catheterization.  She doesn't palpitate or dysrhythmic symptoms and otherwise is feeling well        Outpatient Encounter Prescriptions as of 3/19/2018   Medication Sig Dispense Refill   • Ascorbic Acid (VITAMIN C) 500 MG capsule Take 1 tablet by mouth 2 (two) times a day.     • aspirin 81 MG EC tablet Take 81 mg by mouth Daily.     • Cholecalciferol (VITAMIN D3 PO) Take 1,000 Units by mouth Daily.     • ferrous sulfate 325 (65 FE) MG tablet Take 325 mg by mouth 2 (Two) Times a Day.     • furosemide (LASIX) 20 MG tablet Take 20 mg by mouth Daily.     • isosorbide mononitrate (IMDUR) 30 MG 24 hr tablet Take 0.5 tablets by mouth Daily. 30 tablet 5   • meloxicam (MOBIC) 15 MG tablet Daily.     • metoprolol succinate XL (TOPROL-XL) 25 MG 24 hr tablet Take 1 tablet by mouth Daily. TAKE 1 TABLET BY MOUTH DAILY FOR HEART RATE & BLOOD PRESSURE 30 tablet 5   • nitroglycerin (NITROSTAT) 0.4 MG SL tablet Place 0.4 mg under the tongue Every 5 (Five) Minutes As Needed for Chest Pain. PLACE 1 TABLET UNDER THE TONGUE EVERY 5 MINUTES FOR UP TO 3 DOSES AS NEEDED FOR CHEST PAIN CALL 911 IF PAIN PERSISTS     • nortriptyline (PAMELOR) 50 MG capsule  Take 100 mg by mouth Every Night. 2 CAPSULES AT HS     • OXcarbazepine (TRILEPTAL) 300 MG tablet Take 600 mg by mouth Every 12 (Twelve) Hours. 2 tabs in am and 2 tabs in pm     • pantoprazole (PROTONIX) 40 MG EC tablet Take 1 tablet by mouth Daily. 30 tablet 6   • potassium chloride (K-DUR) 10 MEQ CR tablet Take 10 mEq by mouth Daily.     • topiramate (TOPAMAX) 100 MG tablet Take 100 mg by mouth 2 (Two) Times a Day.     • [DISCONTINUED] amoxicillin-clavulanate (AUGMENTIN) 875-125 MG per tablet Take 1 tablet by mouth Every 12 (Twelve) Hours. 14 tablet 0   • [DISCONTINUED] aspirin 325 MG tablet Take 1 tablet by mouth daily.     • [DISCONTINUED] furosemide (LASIX) 20 MG tablet TAKE 1 TABLET BY MOUTH DAILY EVERY MORNING FOR FLUID RETENTION &SWELLLING 30 tablet 5   • [DISCONTINUED] HYDROcodone-acetaminophen (NORCO) 5-325 MG per tablet prn     • [DISCONTINUED] vitamin C (ASCORBIC ACID) 500 MG tablet Take 500 mg by mouth 2 (Two) Times a Day.       Facility-Administered Encounter Medications as of 3/19/2018   Medication Dose Route Frequency Provider Last Rate Last Dose   • Chlorhexidine Gluconate Cloth 2 % pads 1 application  1 application Topical Q12H PRN YUE Brunson       • midazolam (VERSED) injection   Intravenous PRN Krishna Blank MD   2 mg at 03/02/18 0655       Azithromycin and Corticosteroids    Past Medical History:   Diagnosis Date   • Anemia    • Aortic regurgitation    • Arthritis    • Back pain    • Carotid bruit    • ISREAL (cerebral atherosclerosis) 12/2014    nonobstructive   • Chest pain    • D-dimer, elevated    • Dizziness    • Edema    • Epilepsy    • Fatigue    • Heart murmur    • Hyperlipidemia    • Hypertension    • Kidney stones    • Migraines    • Mitral regurgitation    • Mitral stenosis     mild   • Palpitations    • Pulmonary edema    • Sleeping difficulties    • SOB (shortness of breath)    • Supraventricular aortic stenosis    • Syncope    • Tachycardia    • Tobacco abuse    • VHD  "(valvular heart disease)    • Wears dentures    • Wears eyeglasses        Social History     Social History   • Marital status:      Spouse name: N/A   • Number of children: 2   • Years of education: N/A     Occupational History   •  Disabled     Social History Main Topics   • Smoking status: Current Every Day Smoker     Packs/day: 0.25     Years: 36.00     Types: Cigarettes   • Smokeless tobacco: Never Used   • Alcohol use No   • Drug use: No   • Sexual activity: Defer     Other Topics Concern   • Not on file     Social History Narrative    Lives in [place], [place] with spouse, children, and grandchildren       Family History   Problem Relation Age of Onset   • Cancer Mother    • Cancer Father    • Hypertension Father    • Other Sister      ACUTE MYOCARDIAL INFARCTION,CABG   • Heart failure Sister    • Hypertension Sister    • Stroke Other        Review of Systems   Constitutional: Positive for fatigue.   HENT: Negative.    Eyes: Positive for visual disturbance (glasses).   Respiratory: Positive for shortness of breath (with c/p).    Cardiovascular: Positive for chest pain. Negative for palpitations and leg swelling.   Gastrointestinal: Positive for abdominal pain.   Endocrine: Negative.    Genitourinary: Negative.    Musculoskeletal: Positive for arthralgias and myalgias.   Skin: Negative.    Allergic/Immunologic: Negative.    Neurological: Positive for dizziness and light-headedness.   Hematological: Bruises/bleeds easily (bleed).   Psychiatric/Behavioral: Positive for sleep disturbance.       Objective   Vitals:    03/19/18 0856   BP: 106/65   BP Location: Left arm   Patient Position: Sitting   Pulse: 83   SpO2: 97%   Weight: 74.8 kg (164 lb 12.8 oz)   Height: 154.9 cm (60.98\")      /65 (BP Location: Left arm, Patient Position: Sitting)   Pulse 83   Ht 154.9 cm (60.98\")   Wt 74.8 kg (164 lb 12.8 oz)   SpO2 97%   BMI 31.16 kg/m²     Lab Results (most recent)     None          Physical Exam "   Constitutional: She is oriented to person, place, and time. She appears well-developed and well-nourished. No distress.   HENT:   Head: Normocephalic and atraumatic.   Eyes: EOM are normal. Pupils are equal, round, and reactive to light.   Neck: No JVD present.   Cardiovascular: Normal rate, regular rhythm and intact distal pulses.  Exam reveals no gallop and no friction rub.    Murmur heard.  Grade 2/6 systolic ejection murmur right upper sternal border of aortic stenosis, grade 1-2/6  MR murmur noted   Pulmonary/Chest: Effort normal and breath sounds normal. No respiratory distress. She has no wheezes. She has no rales. She exhibits no tenderness.   Musculoskeletal: Normal range of motion. She exhibits no edema.   Neurological: She is alert and oriented to person, place, and time. No cranial nerve deficit.   Skin: Skin is warm and dry. No rash noted. No erythema. No pallor.   Psychiatric: She has a normal mood and affect. Her behavior is normal.   Nursing note and vitals reviewed.      Procedure   Procedures       Assessment/Plan     Problems Addressed this Visit        Cardiovascular and Mediastinum    Mitral stenosis    Relevant Orders    Ambulatory Referral to Cardiothoracic Surgery    Rheumatic aortic stenosis    Relevant Orders    Ambulatory Referral to Cardiothoracic Surgery    Rheumatic mitral regurgitation    Relevant Orders    Ambulatory Referral to Cardiothoracic Surgery    Rheumatic aortic valve insufficiency    Relevant Orders    Ambulatory Referral to Cardiothoracic Surgery       Respiratory    Shortness of breath - Primary    Relevant Orders    Ambulatory Referral to Cardiothoracic Surgery      Other Visit Diagnoses    None.           Recommendations  1.  Patient with severe mitral regurgitation and mitral stenosis as well as moderate to severe aortic stenosis confirmed by catheterization.  Normal coronaries by catheterization.  Patient will need double valve replacement and would like referral to  another cardiothoracic surgeon for another opinion.  We will refer to Dr. Cavazos.  We will see her back for follow-up in one month.  Any chest pain that is resolved with nitroglycerin, she is to go to the ER.  Otherwise we'll see her back for follow-up after evaluation by CT surgeon              Discussed the patient's BMI with her. BMI is within normal parameters. No follow-up required.       Electronically signed by:

## 2018-03-20 DIAGNOSIS — K21.9 GASTROESOPHAGEAL REFLUX DISEASE, ESOPHAGITIS PRESENCE NOT SPECIFIED: ICD-10-CM

## 2018-03-20 RX ORDER — PANTOPRAZOLE SODIUM 40 MG/1
40 TABLET, DELAYED RELEASE ORAL DAILY
Qty: 90 TABLET | Refills: 3 | Status: SHIPPED | OUTPATIENT
Start: 2018-03-20 | End: 2019-04-24 | Stop reason: SDUPTHER

## 2018-03-21 ENCOUNTER — TELEPHONE (OUTPATIENT)
Dept: CARDIAC SURGERY | Facility: CLINIC | Age: 48
End: 2018-03-21

## 2018-03-21 NOTE — TELEPHONE ENCOUNTER
S/w dr Charles and advised him Mrs Martínez was wanting a second opinion from Dr. Cavazos . Dr Charles said this was fine and that he would not see the patient back .

## 2018-04-19 ENCOUNTER — OFFICE VISIT (OUTPATIENT)
Dept: CARDIOLOGY | Facility: CLINIC | Age: 48
End: 2018-04-19

## 2018-04-19 VITALS
OXYGEN SATURATION: 97 % | HEIGHT: 61 IN | HEART RATE: 82 BPM | BODY MASS INDEX: 31.64 KG/M2 | WEIGHT: 167.6 LBS | DIASTOLIC BLOOD PRESSURE: 61 MMHG | SYSTOLIC BLOOD PRESSURE: 102 MMHG

## 2018-04-19 DIAGNOSIS — I25.10 CORONARY ARTERY DISEASE INVOLVING NATIVE CORONARY ARTERY OF NATIVE HEART WITHOUT ANGINA PECTORIS: ICD-10-CM

## 2018-04-19 DIAGNOSIS — I05.0 RHEUMATIC MITRAL STENOSIS: ICD-10-CM

## 2018-04-19 DIAGNOSIS — I06.0 RHEUMATIC AORTIC STENOSIS: ICD-10-CM

## 2018-04-19 DIAGNOSIS — R06.02 SHORTNESS OF BREATH: Primary | ICD-10-CM

## 2018-04-19 DIAGNOSIS — I06.1 RHEUMATIC AORTIC VALVE INSUFFICIENCY: ICD-10-CM

## 2018-04-19 DIAGNOSIS — I05.1 RHEUMATIC MITRAL REGURGITATION: ICD-10-CM

## 2018-04-19 PROCEDURE — 99213 OFFICE O/P EST LOW 20 MIN: CPT | Performed by: PHYSICIAN ASSISTANT

## 2018-04-19 RX ORDER — WARFARIN SODIUM 4 MG/1
4 TABLET ORAL DAILY
COMMUNITY
Start: 2018-04-05 | End: 2019-07-25

## 2018-04-19 NOTE — PATIENT INSTRUCTIONS
For more information:    Quit Now [place]  1-800-QUIT-NOW  https://[place].quitlogix.org/en-[place]/  Steps to Quit Smoking  Smoking tobacco can be harmful to your health and can affect almost every organ in your body. Smoking puts you, and those around you, at risk for developing many serious chronic diseases. Quitting smoking is difficult, but it is one of the best things that you can do for your health. It is never too late to quit.  What are the benefits of quitting smoking?  When you quit smoking, you lower your risk of developing serious diseases and conditions, such as:  · Lung cancer or lung disease, such as COPD.  · Heart disease.  · Stroke.  · Heart attack.  · Infertility.  · Osteoporosis and bone fractures.  Additionally, symptoms such as coughing, wheezing, and shortness of breath may get better when you quit. You may also find that you get sick less often because your body is stronger at fighting off colds and infections. If you are pregnant, quitting smoking can help to reduce your chances of having a baby of low birth weight.  How do I get ready to quit?  When you decide to quit smoking, create a plan to make sure that you are successful. Before you quit:  · Pick a date to quit. Set a date within the next two weeks to give you time to prepare.  · Write down the reasons why you are quitting. Keep this list in places where you will see it often, such as on your bathroom mirror or in your car or wallet.  · Identify the people, places, things, and activities that make you want to smoke (triggers) and avoid them. Make sure to take these actions:  ¨ Throw away all cigarettes at home, at work, and in your car.  ¨ Throw away smoking accessories, such as ashtrays and lighters.  ¨ Clean your car and make sure to empty the ashtray.  ¨ Clean your home, including curtains and carpets.  · Tell your family, friends, and coworkers that you are quitting. Support from your loved ones can make quitting easier.  · Talk with  your health care provider about your options for quitting smoking.  · Find out what treatment options are covered by your health insurance.  What strategies can I use to quit smoking?  Talk with your healthcare provider about different strategies to quit smoking. Some strategies include:  · Quitting smoking altogether instead of gradually lessening how much you smoke over a period of time. Research shows that quitting “cold turkey” is more successful than gradually quitting.  · Attending in-person counseling to help you build problem-solving skills. You are more likely to have success in quitting if you attend several counseling sessions. Even short sessions of 10 minutes can be effective.  · Finding resources and support systems that can help you to quit smoking and remain smoke-free after you quit. These resources are most helpful when you use them often. They can include:  ¨ Online chats with a counselor.  ¨ Telephone quitlines.  ¨ Printed self-help materials.  ¨ Support groups or group counseling.  ¨ Text messaging programs.  ¨ Mobile phone applications.  · Taking medicines to help you quit smoking. (If you are pregnant or breastfeeding, talk with your health care provider first.) Some medicines contain nicotine and some do not. Both types of medicines help with cravings, but the medicines that include nicotine help to relieve withdrawal symptoms. Your health care provider may recommend:  ¨ Nicotine patches, gum, or lozenges.  ¨ Nicotine inhalers or sprays.  ¨ Non-nicotine medicine that is taken by mouth.  Talk with your health care provider about combining strategies, such as taking medicines while you are also receiving in-person counseling. Using these two strategies together makes you more likely to succeed in quitting than if you used either strategy on its own.  If you are pregnant or breastfeeding, talk with your health care provider about finding counseling or other support strategies to quit smoking. Do  not take medicine to help you quit smoking unless told to do so by your health care provider.  What things can I do to make it easier to quit?  Quitting smoking might feel overwhelming at first, but there is a lot that you can do to make it easier. Take these important actions:  · Reach out to your family and friends and ask that they support and encourage you during this time. Call telephone quitlines, reach out to support groups, or work with a counselor for support.  · Ask people who smoke to avoid smoking around you.  · Avoid places that trigger you to smoke, such as bars, parties, or smoke-break areas at work.  · Spend time around people who do not smoke.  · Lessen stress in your life, because stress can be a smoking trigger for some people. To lessen stress, try:  ¨ Exercising regularly.  ¨ Deep-breathing exercises.  ¨ Yoga.  ¨ Meditating.  ¨ Performing a body scan. This involves closing your eyes, scanning your body from head to toe, and noticing which parts of your body are particularly tense. Purposefully relax the muscles in those areas.  · Download or purchase mobile phone or tablet apps (applications) that can help you stick to your quit plan by providing reminders, tips, and encouragement. There are many free apps, such as QuitGuide from the CDC (Centers for Disease Control and Prevention). You can find other support for quitting smoking (smoking cessation) through smokefree.gov and other websites.  How will I feel when I quit smoking?  Within the first 24 hours of quitting smoking, you may start to feel some withdrawal symptoms. These symptoms are usually most noticeable 2-3 days after quitting, but they usually do not last beyond 2-3 weeks. Changes or symptoms that you might experience include:  · Mood swings.  · Restlessness, anxiety, or irritation.  · Difficulty concentrating.  · Dizziness.  · Strong cravings for sugary foods in addition to nicotine.  · Mild weight  gain.  · Constipation.  · Nausea.  · Coughing or a sore throat.  · Changes in how your medicines work in your body.  · A depressed mood.  · Difficulty sleeping (insomnia).  After the first 2-3 weeks of quitting, you may start to notice more positive results, such as:  · Improved sense of smell and taste.  · Decreased coughing and sore throat.  · Slower heart rate.  · Lower blood pressure.  · Clearer skin.  · The ability to breathe more easily.  · Fewer sick days.  Quitting smoking is very challenging for most people. Do not get discouraged if you are not successful the first time. Some people need to make many attempts to quit before they achieve long-term success. Do your best to stick to your quit plan, and talk with your health care provider if you have any questions or concerns.  This information is not intended to replace advice given to you by your health care provider. Make sure you discuss any questions you have with your health care provider.  Document Released: 12/12/2002 Document Revised: 08/15/2017 Document Reviewed: 05/03/2016  [employer] Interactive Patient Education © 2017 [employer]  MyPlate from [employer]  The general, healthful diet is based on the 2010 Dietary Guidelines for Americans. The amount of food you need to eat from each food group depends on your age, sex, and level of physical activity and can be individualized by a dietitian. Go to ChooseMyPlate.gov for more information.  What do I need to know about the MyPlate plan?  · Enjoy your food, but eat less.  · Avoid oversized portions.  ¨ ½ of your plate should include fruits and vegetables.  ¨ ¼ of your plate should be grains.  ¨ ¼ of your plate should be protein.  Grains   · Make at least half of your grains whole grains.  · For a 2,000 calorie daily food plan, eat 6 oz every day.  · 1 oz is about 1 slice bread, 1 cup cereal, or ½ cup cooked rice, cereal, or pasta.  Vegetables   · Make half your plate fruits and vegetables.  · For a 2,000  calorie daily food plan, eat 2½ cups every day.  · 1 cup is about 1 cup raw or cooked vegetables or vegetable juice or 2 cups raw leafy greens.  Fruits   · Make half your plate fruits and vegetables.  · For a 2,000 calorie daily food plan, eat 2 cups every day.  · 1 cup is about 1 cup fruit or 100% fruit juice or ½ cup dried fruit.  Protein   · For a 2,000 calorie daily food plan, eat 5½ oz every day.  · 1 oz is about 1 oz meat, poultry, or fish, ¼ cup cooked beans, 1 egg, 1 Tbsp peanut butter, or ½ oz nuts or seeds.  Dairy   · Switch to fat-free or low-fat (1%) milk.  · For a 2,000 calorie daily food plan, eat 3 cups every day.  · 1 cup is about 1 cup milk or yogurt or soy milk (soy beverage), 1½ oz natural cheese, or 2 oz processed cheese.  Fats, Oils, and Empty Calories   · Only small amounts of oils are recommended.  · Empty calories are calories from solid fats or added sugars.  · Compare sodium in foods like soup, bread, and frozen meals. Choose the foods with lower numbers.  · Drink water instead of sugary drinks.  What foods can I eat?  Grains   Whole grains such as whole wheat, quinoa, millet, and bulgur. Bread, rolls, and pasta made from whole grains. Brown or wild rice. Hot or cold cereals made from whole grains and without added sugar.  Vegetables   All fresh vegetables, especially fresh red, dark green, or orange vegetables. Peas and beans. Low-sodium frozen or canned vegetables prepared without added salt. Low-sodium vegetable juices.  Fruits   All fresh, frozen, and dried fruits. Canned fruit packed in water or fruit juice without added sugar. Fruit juices without added sugar.  Meats and Other Protein Sources   Boiled, baked, or grilled lean meat trimmed of fat. Skinless poultry. Fresh seafood and shellfish. Canned seafood packed in water. Unsalted nuts and unsalted nut butters. Tofu. Dried beans and pea. Eggs.  Dairy   Low-fat or fat-free milk, yogurt, and cheeses.  Sweets and Desserts   Frozen  desserts made from low-fat milk.  Fats and Oils   Olive, peanut, and canola oils and margarine. Salad dressing and mayonnaise made from these oils.  Other   Soups and casseroles made from allowed ingredients and without added fat or salt.  The items listed above may not be a complete list of recommended foods or beverages. Contact your dietitian for more options.   What foods are not recommended?  Grains   Sweetened, low-fiber cereals. Packaged baked goods. Snack crackers and chips. Cheese crackers, butter crackers, and biscuits. Frozen waffles, sweet breads, doughnuts, pastries, packaged baking mixes, pancakes, cakes, and cookies.  Vegetables   Regular canned or frozen vegetables or vegetables prepared with salt. Canned tomatoes. Canned tomato sauce. Fried vegetables. Vegetables in cream sauce or cheese sauce.  Fruits   Fruits packed in syrup or made with added sugar.  Meats and Other Protein Sources   Marbled or fatty meats such as ribs. Poultry with skin. Fried meats, poultry, eggs, or fish. Sausages, hot dogs, and deli meats such as pastrami, bologna, or salami.  Dairy   Whole milk, cream, cheeses made from whole milk, sour cream. Ice cream or yogurt made from whole milk or with added sugar.  Beverages   For adults, no more than one alcoholic drink per day. Regular soft drinks or other sugary beverages. Juice drinks.  Sweets and Desserts   Sugary or fatty desserts, candy, and other sweets.  Fats and Oils   Solid shortening or partially hydrogenated oils. Solid margarine. Margarine that contains trans fats. Butter.  The items listed above may not be a complete list of foods and beverages to avoid. Contact your dietitian for more information.   This information is not intended to replace advice given to you by your health care provider. Make sure you discuss any questions you have with your health care provider.  Document Released: 01/06/2009 Document Revised: 05/25/2017 Document Reviewed: 11/26/2014  [employer]  Interactive Patient Education © 2017 Elsevier Inc.

## 2018-04-19 NOTE — PROGRESS NOTES
Problem list     Subjective   Mara Martínez is a 47 y.o. female     Chief Complaint   Patient presents with   • Hypertension     presents for 1 month follow up (started Coumadin)   • Hyperlipidemia       HPI    PROBLEM LIST:      1.Valvular heart disease  a. Moderate Aortic Stenosis with GUEVARA of 1.22cm^2, mean gradient of 24mmHg with moderate to severe AI  b. Moderate to severe MR with no evidence of Mitral Stenosis  c. Repeat echo on December 22 2014 showed worsening GUEVARA at 1cm^2, Moderate MR with Mild MS with MVA at 1.69cm^2 and mean gradient of 5mmHg. Grade 2 DD  d. LHC and RHC revealed normal coronaries, mod-severe AI with GUEVARA at 1.2 with moderate AS, Moderate Mitral Stenosis with mild PTHN arguing against severe mitral disease. Medical management indicated at this point.  E. mild to moderate aortic stenosis with moderate aortic regurgitation, mean gradient of 29 and aortic valve area 1.5 cm². Moderate mitral regurgitation with mild to moderate mitral stenosis with mitral valve area 1.Meter squared   F.  Moderate to severe aortic stenosis, severe aortic insufficiency, severe mitral stenosis with mild to moderate mitral regurgitation by echocardiogram November 2017  G. Cardiac cath 12/18/17 demonstrated potentially hemodynamically significant mitral regurgitation associated with mildly to moderately elevated PA pressures. moderate aortic stenosis. The study excluded angiographically significant epicardial coronary disease as a contributing factor to the patient's symptoms. Double valve surgery replacement recommended  2)Normal systolic fxn   3)Hypertension  3.1) Stress test 8/3/15 - no ischemia, low risk  4)Dyslipidemia  5)Epilepsy  6) Nonobstructive ISREAL by carotid duplex on December 2014  7) Tobacco Habituation, smokes pack a day  8) Migraines    9.  History of rheumatic fever      Patient is a 47-year-old female that presents back for follow-up.  She recently saw Dr. Cavazos or consideration of double valve  surgery.  She had been placed on short-term anticoagulation to see if she can tolerate prior to surgery.  She describes no episodes of bleeding.    Her symptoms are the same.  She has mild discomfort moderate to severe exertional dyspnea.  This is unchanged since last office visit.  No PND orthopnea.  She doesn't palpitate or dysrhythmic symptoms.  Otherwise is doing well      Outpatient Encounter Prescriptions as of 4/19/2018   Medication Sig Dispense Refill   • Ascorbic Acid (VITAMIN C) 500 MG capsule Take 1 tablet by mouth 2 (two) times a day.     • aspirin 81 MG EC tablet Take 81 mg by mouth Daily.     • Cholecalciferol (VITAMIN D3 PO) Take 1,000 Units by mouth Daily.     • ferrous sulfate 325 (65 FE) MG tablet Take 325 mg by mouth 2 (Two) Times a Day.     • furosemide (LASIX) 20 MG tablet Take 20 mg by mouth Daily.     • isosorbide mononitrate (IMDUR) 30 MG 24 hr tablet Take 0.5 tablets by mouth Daily. 30 tablet 5   • metoprolol succinate XL (TOPROL-XL) 25 MG 24 hr tablet Take 1 tablet by mouth Daily. TAKE 1 TABLET BY MOUTH DAILY FOR HEART RATE & BLOOD PRESSURE 30 tablet 5   • nitroglycerin (NITROSTAT) 0.4 MG SL tablet Place 0.4 mg under the tongue Every 5 (Five) Minutes As Needed for Chest Pain. PLACE 1 TABLET UNDER THE TONGUE EVERY 5 MINUTES FOR UP TO 3 DOSES AS NEEDED FOR CHEST PAIN CALL 911 IF PAIN PERSISTS     • nortriptyline (PAMELOR) 50 MG capsule Take 100 mg by mouth Every Night. 2 CAPSULES AT HS     • OXcarbazepine (TRILEPTAL) 300 MG tablet Take 600 mg by mouth Every 12 (Twelve) Hours. 2 tabs in am and 2 tabs in pm     • pantoprazole (PROTONIX) 40 MG EC tablet Take 1 tablet by mouth Daily. 90 tablet 3   • potassium chloride (K-DUR) 10 MEQ CR tablet Take 10 mEq by mouth Daily.     • topiramate (TOPAMAX) 100 MG tablet Take 100 mg by mouth 2 (Two) Times a Day.     • warfarin (COUMADIN) 4 MG tablet Daily.     • [DISCONTINUED] meloxicam (MOBIC) 15 MG tablet Daily.       Facility-Administered Encounter  Medications as of 4/19/2018   Medication Dose Route Frequency Provider Last Rate Last Dose   • Chlorhexidine Gluconate Cloth 2 % pads 1 application  1 application Topical Q12H PRN YUE Brunson       • midazolam (VERSED) injection   Intravenous PRN Krishna Blank MD   2 mg at 03/02/18 0655       Azithromycin and Corticosteroids    Past Medical History:   Diagnosis Date   • Anemia    • Aortic regurgitation    • Arthritis    • Back pain    • Carotid bruit    • ISREAL (cerebral atherosclerosis) 12/2014    nonobstructive   • Chest pain    • D-dimer, elevated    • Dizziness    • Edema    • Epilepsy    • Fatigue    • Heart murmur    • Hyperlipidemia    • Hypertension    • Kidney stones    • Migraines    • Mitral regurgitation    • Mitral stenosis     mild   • Palpitations    • Pulmonary edema    • Sleeping difficulties    • SOB (shortness of breath)    • Supraventricular aortic stenosis    • Syncope    • Tachycardia    • Tobacco abuse    • VHD (valvular heart disease)    • Wears dentures    • Wears eyeglasses        Social History     Social History   • Marital status:      Spouse name: N/A   • Number of children: 2   • Years of education: N/A     Occupational History   •  Disabled     Social History Main Topics   • Smoking status: Current Every Day Smoker     Packs/day: 0.25     Years: 36.00     Types: Cigarettes   • Smokeless tobacco: Never Used   • Alcohol use No   • Drug use: No   • Sexual activity: Defer     Other Topics Concern   • Not on file     Social History Narrative    Lives in [place], [place] with spouse, children, and grandchildren       Family History   Problem Relation Age of Onset   • Cancer Mother    • Cancer Father    • Hypertension Father    • Other Sister      ACUTE MYOCARDIAL INFARCTION,CABG   • Heart failure Sister    • Hypertension Sister    • Stroke Other        Review of Systems   Constitutional: Positive for diaphoresis (night sweats) and fatigue.   HENT: Negative.    Eyes: Positive  "for visual disturbance (wears glasses).   Respiratory: Positive for shortness of breath.    Cardiovascular: Positive for chest pain. Negative for palpitations and leg swelling.   Gastrointestinal: Positive for constipation.   Endocrine: Negative.    Genitourinary: Negative.    Musculoskeletal: Positive for arthralgias and myalgias.   Skin: Negative.    Allergic/Immunologic: Negative.    Neurological: Positive for dizziness.   Hematological: Bruises/bleeds easily (bruise ).   Psychiatric/Behavioral: Positive for sleep disturbance.       Objective   Vitals:    04/19/18 0956   BP: 102/61   BP Location: Left arm   Patient Position: Sitting   Pulse: 82   SpO2: 97%   Weight: 76 kg (167 lb 9.6 oz)   Height: 154.9 cm (61\")      /61 (BP Location: Left arm, Patient Position: Sitting)   Pulse 82   Ht 154.9 cm (61\")   Wt 76 kg (167 lb 9.6 oz)   SpO2 97%   BMI 31.67 kg/m²     Lab Results (most recent)     None          Physical Exam   Constitutional: She is oriented to person, place, and time. She appears well-developed and well-nourished. No distress.   HENT:   Head: Normocephalic and atraumatic.   Eyes: EOM are normal. Pupils are equal, round, and reactive to light.   Neck: No JVD present.   Cardiovascular: Normal rate, regular rhythm, normal heart sounds and intact distal pulses.  Exam reveals no gallop and no friction rub.    No murmur heard.  Grade 2/6 systolic ejection murmur right upper sternal border of aortic stenosis.  Systolic murmur grade 1-2/6 entirety of left sternal border   Pulmonary/Chest: Effort normal and breath sounds normal. No respiratory distress. She has no wheezes. She has no rales. She exhibits no tenderness.   Abdominal: She exhibits no distension.   Musculoskeletal: Normal range of motion. She exhibits no edema.   Neurological: She is alert and oriented to person, place, and time. No cranial nerve deficit.   Skin: Skin is warm and dry. No rash noted. No erythema. No pallor.   Psychiatric: " She has a normal mood and affect. Her behavior is normal.   Nursing note and vitals reviewed.      Procedure   Procedures       Assessment/Plan     Problems Addressed this Visit        Cardiovascular and Mediastinum    Mitral stenosis    Rheumatic aortic stenosis    Rheumatic mitral regurgitation    Rheumatic aortic valve insufficiency    Coronary artery disease involving native coronary artery of native heart without angina pectoris       Respiratory    Shortness of breath - Primary      Other Visit Diagnoses    None.          recommendation    1.  Patient was severe valvular heart disease likely from rheumatic fever as a child.  She is sent to undergo double valve surgery.  2.  Otherwise, we'll like to see her back for follow-up in one to 2 months.  She will follow-up primary as scheduled           I advised the patient of the risks in continuing to use tobacco, and I provided this patient with smoking cessation educational materials.    During this visit, I spent ,< 3minutes counseling the patient regarding smoking cessation.     Patient's Body mass index is 31.67 kg/m². BMI is above normal parameters. Follow-up plan includes:  educational material.       Electronically signed by:

## 2018-06-29 DIAGNOSIS — Z95.2 S/P MVR (MITRAL VALVE REPLACEMENT): ICD-10-CM

## 2018-06-29 DIAGNOSIS — Z95.2 S/P AVR: Primary | ICD-10-CM

## 2018-07-17 ENCOUNTER — DOCUMENTATION (OUTPATIENT)
Dept: CARDIOLOGY | Facility: CLINIC | Age: 48
End: 2018-07-17

## 2018-07-17 NOTE — PROGRESS NOTES
Cardiac clearance req was received from [facility]. Per verbal order Matthew Galo PA-C this must reviewed and cleared by Dr. Yuan at [facility] due to recent cardiac surgery. Pat at [facility] was notified. -ALEXANDR;PRIYA

## 2018-07-24 DIAGNOSIS — Z95.2 S/P AVR: Primary | ICD-10-CM

## 2018-07-24 DIAGNOSIS — Z95.2 S/P MVR (MITRAL VALVE REPLACEMENT): ICD-10-CM

## 2018-07-26 ENCOUNTER — OFFICE VISIT (OUTPATIENT)
Dept: CARDIOLOGY | Facility: CLINIC | Age: 48
End: 2018-07-26

## 2018-07-26 VITALS
HEART RATE: 63 BPM | WEIGHT: 152 LBS | OXYGEN SATURATION: 97 % | HEIGHT: 61 IN | SYSTOLIC BLOOD PRESSURE: 94 MMHG | BODY MASS INDEX: 28.7 KG/M2 | DIASTOLIC BLOOD PRESSURE: 64 MMHG

## 2018-07-26 DIAGNOSIS — Z95.2 STATUS POST AORTIC VALVE REPLACEMENT: ICD-10-CM

## 2018-07-26 DIAGNOSIS — I25.10 CORONARY ARTERY DISEASE INVOLVING NATIVE CORONARY ARTERY OF NATIVE HEART WITHOUT ANGINA PECTORIS: ICD-10-CM

## 2018-07-26 DIAGNOSIS — Z95.2 S/P MITRAL VALVE REPLACEMENT: ICD-10-CM

## 2018-07-26 DIAGNOSIS — R06.02 SHORTNESS OF BREATH: Primary | ICD-10-CM

## 2018-07-26 PROCEDURE — 99214 OFFICE O/P EST MOD 30 MIN: CPT | Performed by: PHYSICIAN ASSISTANT

## 2018-07-26 RX ORDER — BUMETANIDE 1 MG/1
1 TABLET ORAL EVERY OTHER DAY
COMMUNITY
Start: 2018-06-25 | End: 2018-07-26 | Stop reason: SDUPTHER

## 2018-07-26 RX ORDER — BUMETANIDE 1 MG/1
1 TABLET ORAL EVERY OTHER DAY
Qty: 15 TABLET | Refills: 0 | Status: SHIPPED | OUTPATIENT
Start: 2018-07-26 | End: 2018-10-29

## 2018-07-26 NOTE — PATIENT INSTRUCTIONS
Obesity, Adult  Obesity is the condition of having too much total body fat. Being overweight or obese means that your weight is greater than what is considered healthy for your body size. Obesity is determined by a measurement called BMI. BMI is an estimate of body fat and is calculated from height and weight. For adults, a BMI of 30 or higher is considered obese.  Obesity can eventually lead to other health concerns and major illnesses, including:  · Stroke.  · Coronary artery disease (CAD).  · Type 2 diabetes.  · Some types of cancer, including cancers of the colon, breast, uterus, and gallbladder.  · Osteoarthritis.  · High blood pressure (hypertension).  · High cholesterol.  · Sleep apnea.  · Gallbladder stones.  · Infertility problems.    What are the causes?  The main cause of obesity is taking in (consuming) more calories than your body uses for energy. Other factors that contribute to this condition may include:  · Being born with genes that make you more likely to become obese.  · Having a medical condition that causes obesity. These conditions include:  ? Hypothyroidism.  ? Polycystic ovarian syndrome (PCOS).  ? Binge-eating disorder.  ? Cushing syndrome.  · Taking certain medicines, such as steroids, antidepressants, and seizure medicines.  · Not being physically active (sedentary lifestyle).  · Living where there are limited places to exercise safely or buy healthy foods.  · Not getting enough sleep.    What increases the risk?  The following factors may increase your risk of this condition:  · Having a family history of obesity.  · Being a woman of -American descent.  · Being a man of  descent.    What are the signs or symptoms?  Having excessive body fat is the main symptom of this condition.  How is this diagnosed?  This condition may be diagnosed based on:  · Your symptoms.  · Your medical history.  · A physical exam. Your health care provider may measure:  ? Your BMI. If you are an  adult with a BMI between 25 and less than 30, you are considered overweight. If you are an adult with a BMI of 30 or higher, you are considered obese.  ? The distances around your hips and your waist (circumferences). These may be compared to each other to help diagnose your condition.  ? Your skinfold thickness. Your health care provider may gently pinch a fold of your skin and measure it.    How is this treated?  Treatment for this condition often includes changing your lifestyle. Treatment may include some or all of the following:  · Dietary changes. Work with your health care provider and a dietitian to set a weight-loss goal that is healthy and reasonable for you. Dietary changes may include eating:  ? Smaller portions. A portion size is the amount of a particular food that is healthy for you to eat at one time. This varies from person to person.  ? Low-calorie or low-fat options.  ? More whole grains, fruits, and vegetables.  · Regular physical activity. This may include aerobic activity (cardio) and strength training.  · Medicine to help you lose weight. Your health care provider may prescribe medicine if you are unable to lose 1 pound a week after 6 weeks of eating more healthily and doing more physical activity.  · Surgery. Surgical options may include gastric banding and gastric bypass. Surgery may be done if:  ? Other treatments have not helped to improve your condition.  ? You have a BMI of 40 or higher.  ? You have life-threatening health problems related to obesity.    Follow these instructions at home:    Eating and drinking    · Follow recommendations from your health care provider about what you eat and drink. Your health care provider may advise you to:  ? Limit fast foods, sweets, and processed snack foods.  ? Choose low-fat options, such as low-fat milk instead of whole milk.  ? Eat 5 or more servings of fruits or vegetables every day.  ? Eat at home more often. This gives you more control over  what you eat.  ? Choose healthy foods when you eat out.  ? Learn what a healthy portion size is.  ? Keep low-fat snacks on hand.  ? Avoid sugary drinks, such as soda, fruit juice, iced tea sweetened with sugar, and flavored milk.  ? Eat a healthy breakfast.  · Drink enough water to keep your urine clear or pale yellow.  · Do not go without eating for long periods of time (do not fast) or follow a fad diet. Fasting and fad diets can be unhealthy and even dangerous.  Physical Activity  · Exercise regularly, as told by your health care provider. Ask your health care provider what types of exercise are safe for you and how often you should exercise.  · Warm up and stretch before being active.  · Cool down and stretch after being active.  · Rest between periods of activity.  Lifestyle  · Limit the time that you spend in front of your TV, computer, or video game system.  · Find ways to reward yourself that do not involve food.  · Limit alcohol intake to no more than 1 drink a day for nonpregnant women and 2 drinks a day for men. One drink equals 12 oz of beer, 5 oz of wine, or 1½ oz of hard liquor.  General instructions  · Keep a weight loss journal to keep track of the food you eat and how much you exercise you get.  · Take over-the-counter and prescription medicines only as told by your health care provider.  · Take vitamins and supplements only as told by your health care provider.  · Consider joining a support group. Your health care provider may be able to recommend a support group.  · Keep all follow-up visits as told by your health care provider. This is important.  Contact a health care provider if:  · You are unable to meet your weight loss goal after 6 weeks of dietary and lifestyle changes.  This information is not intended to replace advice given to you by your health care provider. Make sure you discuss any questions you have with your health care provider.  Document Released: 01/25/2006 Document Revised:  05/22/2017 Document Reviewed: 10/05/2016  [employer] Interactive Patient Education © 2018 [employer]  MyPlate from [employer]  The general, healthful diet is based on the 2010 Dietary Guidelines for Americans. The amount of food you need to eat from each food group depends on your age, sex, and level of physical activity and can be individualized by a dietitian. Go to ChooseMyPlate.gov for more information.  What do I need to know about the MyPlate plan?  · Enjoy your food, but eat less.  · Avoid oversized portions.  ? ½ of your plate should include fruits and vegetables.  ? ¼ of your plate should be grains.  ? ¼ of your plate should be protein.  Grains  · Make at least half of your grains whole grains.  · For a 2,000 calorie daily food plan, eat 6 oz every day.  · 1 oz is about 1 slice bread, 1 cup cereal, or ½ cup cooked rice, cereal, or pasta.  Vegetables  · Make half your plate fruits and vegetables.  · For a 2,000 calorie daily food plan, eat 2½ cups every day.  · 1 cup is about 1 cup raw or cooked vegetables or vegetable juice or 2 cups raw leafy greens.  Fruits  · Make half your plate fruits and vegetables.  · For a 2,000 calorie daily food plan, eat 2 cups every day.  · 1 cup is about 1 cup fruit or 100% fruit juice or ½ cup dried fruit.  Protein  · For a 2,000 calorie daily food plan, eat 5½ oz every day.  · 1 oz is about 1 oz meat, poultry, or fish, ¼ cup cooked beans, 1 egg, 1 Tbsp peanut butter, or ½ oz nuts or seeds.  Dairy  · Switch to fat-free or low-fat (1%) milk.  · For a 2,000 calorie daily food plan, eat 3 cups every day.  · 1 cup is about 1 cup milk or yogurt or soy milk (soy beverage), 1½ oz natural cheese, or 2 oz processed cheese.  Fats, Oils, and Empty Calories  · Only small amounts of oils are recommended.  · Empty calories are calories from solid fats or added sugars.  · Compare sodium in foods like soup, bread, and frozen meals. Choose the foods with lower numbers.  · Drink water instead of  sugary drinks.  What foods can I eat?  Grains  Whole grains such as whole wheat, quinoa, millet, and bulgur. Bread, rolls, and pasta made from whole grains. Brown or wild rice. Hot or cold cereals made from whole grains and without added sugar.  Vegetables  All fresh vegetables, especially fresh red, dark green, or orange vegetables. Peas and beans. Low-sodium frozen or canned vegetables prepared without added salt. Low-sodium vegetable juices.  Fruits  All fresh, frozen, and dried fruits. Canned fruit packed in water or fruit juice without added sugar. Fruit juices without added sugar.  Meats and Other Protein Sources  Boiled, baked, or grilled lean meat trimmed of fat. Skinless poultry. Fresh seafood and shellfish. Canned seafood packed in water. Unsalted nuts and unsalted nut butters. Tofu. Dried beans and pea. Eggs.  Dairy  Low-fat or fat-free milk, yogurt, and cheeses.  Sweets and Desserts  Frozen desserts made from low-fat milk.  Fats and Oils  Olive, peanut, and canola oils and margarine. Salad dressing and mayonnaise made from these oils.  Other  Soups and casseroles made from allowed ingredients and without added fat or salt.  The items listed above may not be a complete list of recommended foods or beverages. Contact your dietitian for more options.  What foods are not recommended?  Grains  Sweetened, low-fiber cereals. Packaged baked goods. Snack crackers and chips. Cheese crackers, butter crackers, and biscuits. Frozen waffles, sweet breads, doughnuts, pastries, packaged baking mixes, pancakes, cakes, and cookies.  Vegetables  Regular canned or frozen vegetables or vegetables prepared with salt. Canned tomatoes. Canned tomato sauce. Fried vegetables. Vegetables in cream sauce or cheese sauce.  Fruits  Fruits packed in syrup or made with added sugar.  Meats and Other Protein Sources  Marbled or fatty meats such as ribs. Poultry with skin. Fried meats, poultry, eggs, or fish. Sausages, hot dogs, and deli  meats such as pastrami, bologna, or salami.  Dairy  Whole milk, cream, cheeses made from whole milk, sour cream. Ice cream or yogurt made from whole milk or with added sugar.  Beverages  For adults, no more than one alcoholic drink per day. Regular soft drinks or other sugary beverages. Juice drinks.  Sweets and Desserts  Sugary or fatty desserts, candy, and other sweets.  Fats and Oils  Solid shortening or partially hydrogenated oils. Solid margarine. Margarine that contains trans fats. Butter.  The items listed above may not be a complete list of foods and beverages to avoid. Contact your dietitian for more information.  This information is not intended to replace advice given to you by your health care provider. Make sure you discuss any questions you have with your health care provider.  Document Released: 01/06/2009 Document Revised: 05/25/2017 Document Reviewed: 11/26/2014  [facility] Interactive Patient Education © 2018 [facility] [employer]  For more information:    Quit Now [place]  1-800-QUIT-NOW  https://[place].quitlogix.org/en-[place]/  Steps to Quit Smoking  Smoking tobacco can be harmful to your health and can affect almost every organ in your body. Smoking puts you, and those around you, at risk for developing many serious chronic diseases. Quitting smoking is difficult, but it is one of the best things that you can do for your health. It is never too late to quit.  What are the benefits of quitting smoking?  When you quit smoking, you lower your risk of developing serious diseases and conditions, such as:  · Lung cancer or lung disease, such as COPD.  · Heart disease.  · Stroke.  · Heart attack.  · Infertility.  · Osteoporosis and bone fractures.  Additionally, symptoms such as coughing, wheezing, and shortness of breath may get better when you quit. You may also find that you get sick less often because your body is stronger at fighting off colds and infections. If you are pregnant, quitting smoking can help to  reduce your chances of having a baby of low birth weight.  How do I get ready to quit?  When you decide to quit smoking, create a plan to make sure that you are successful. Before you quit:  · Pick a date to quit. Set a date within the next two weeks to give you time to prepare.  · Write down the reasons why you are quitting. Keep this list in places where you will see it often, such as on your bathroom mirror or in your car or wallet.  · Identify the people, places, things, and activities that make you want to smoke (triggers) and avoid them. Make sure to take these actions:  ¨ Throw away all cigarettes at home, at work, and in your car.  ¨ Throw away smoking accessories, such as ashtrays and lighters.  ¨ Clean your car and make sure to empty the ashtray.  ¨ Clean your home, including curtains and carpets.  · Tell your family, friends, and coworkers that you are quitting. Support from your loved ones can make quitting easier.  · Talk with your health care provider about your options for quitting smoking.  · Find out what treatment options are covered by your health insurance.  What strategies can I use to quit smoking?  Talk with your healthcare provider about different strategies to quit smoking. Some strategies include:  · Quitting smoking altogether instead of gradually lessening how much you smoke over a period of time. Research shows that quitting “cold turkey” is more successful than gradually quitting.  · Attending in-person counseling to help you build problem-solving skills. You are more likely to have success in quitting if you attend several counseling sessions. Even short sessions of 10 minutes can be effective.  · Finding resources and support systems that can help you to quit smoking and remain smoke-free after you quit. These resources are most helpful when you use them often. They can include:  ¨ Online chats with a counselor.  ¨ Telephone quitlines.  ¨ Printed self-help materials.  ¨ Support groups  or group counseling.  ¨ Text messaging programs.  ¨ Mobile phone applications.  · Taking medicines to help you quit smoking. (If you are pregnant or breastfeeding, talk with your health care provider first.) Some medicines contain nicotine and some do not. Both types of medicines help with cravings, but the medicines that include nicotine help to relieve withdrawal symptoms. Your health care provider may recommend:  ¨ Nicotine patches, gum, or lozenges.  ¨ Nicotine inhalers or sprays.  ¨ Non-nicotine medicine that is taken by mouth.  Talk with your health care provider about combining strategies, such as taking medicines while you are also receiving in-person counseling. Using these two strategies together makes you more likely to succeed in quitting than if you used either strategy on its own.  If you are pregnant or breastfeeding, talk with your health care provider about finding counseling or other support strategies to quit smoking. Do not take medicine to help you quit smoking unless told to do so by your health care provider.  What things can I do to make it easier to quit?  Quitting smoking might feel overwhelming at first, but there is a lot that you can do to make it easier. Take these important actions:  · Reach out to your family and friends and ask that they support and encourage you during this time. Call telephone quitlines, reach out to support groups, or work with a counselor for support.  · Ask people who smoke to avoid smoking around you.  · Avoid places that trigger you to smoke, such as bars, parties, or smoke-break areas at work.  · Spend time around people who do not smoke.  · Lessen stress in your life, because stress can be a smoking trigger for some people. To lessen stress, try:  ¨ Exercising regularly.  ¨ Deep-breathing exercises.  ¨ Yoga.  ¨ Meditating.  ¨ Performing a body scan. This involves closing your eyes, scanning your body from head to toe, and noticing which parts of your body  are particularly tense. Purposefully relax the muscles in those areas.  · Download or purchase mobile phone or tablet apps (applications) that can help you stick to your quit plan by providing reminders, tips, and encouragement. There are many free apps, such as QuitGuide from the CDC (Centers for Disease Control and Prevention). You can find other support for quitting smoking (smoking cessation) through smokefree.gov and other websites.  How will I feel when I quit smoking?  Within the first 24 hours of quitting smoking, you may start to feel some withdrawal symptoms. These symptoms are usually most noticeable 2-3 days after quitting, but they usually do not last beyond 2-3 weeks. Changes or symptoms that you might experience include:  · Mood swings.  · Restlessness, anxiety, or irritation.  · Difficulty concentrating.  · Dizziness.  · Strong cravings for sugary foods in addition to nicotine.  · Mild weight gain.  · Constipation.  · Nausea.  · Coughing or a sore throat.  · Changes in how your medicines work in your body.  · A depressed mood.  · Difficulty sleeping (insomnia).  After the first 2-3 weeks of quitting, you may start to notice more positive results, such as:  · Improved sense of smell and taste.  · Decreased coughing and sore throat.  · Slower heart rate.  · Lower blood pressure.  · Clearer skin.  · The ability to breathe more easily.  · Fewer sick days.  Quitting smoking is very challenging for most people. Do not get discouraged if you are not successful the first time. Some people need to make many attempts to quit before they achieve long-term success. Do your best to stick to your quit plan, and talk with your health care provider if you have any questions or concerns.  This information is not intended to replace advice given to you by your health care provider. Make sure you discuss any questions you have with your health care provider.  Document Released: 12/12/2002 Document Revised: 08/15/2017  Document Reviewed: 05/03/2016  [email or web address] Interactive Patient Education © 2017 [employer]

## 2018-07-26 NOTE — PROGRESS NOTES
Problem list     Subjective   Mara Martínez is a 47 y.o. female     Chief Complaint   Patient presents with   • Dizziness     presents as a follow up       HPI         PROBLEM LIST:      1.Valvular heart disease  a. Moderate Aortic Stenosis with GUEVARA of 1.22cm^2, mean gradient of 24mmHg with moderate to severe AI  b. Moderate to severe MR with no evidence of Mitral Stenosis  c. Repeat echo on December 22 2014 showed worsening GUEVARA at 1cm^2, Moderate MR with Mild MS with MVA at 1.69cm^2 and mean gradient of 5mmHg. Grade 2 DD  d. LHC and RHC revealed normal coronaries, mod-severe AI with GUEVARA at 1.2 with moderate AS, Moderate Mitral Stenosis with mild PTHN arguing against severe mitral disease. Medical management indicated at this point.  E. mild to moderate aortic stenosis with moderate aortic regurgitation, mean gradient of 29 and aortic valve area 1.5 cm². Moderate mitral regurgitation with mild to moderate mitral stenosis with mitral valve area 1.Meter squared   F.  Moderate to severe aortic stenosis, severe aortic insufficiency, severe mitral stenosis with mild to moderate mitral regurgitation by echocardiogram November 2017  G. Cardiac cath 12/18/17 demonstrated potentially hemodynamically significant mitral regurgitation associated with mildly to moderately elevated PA pressures. moderate aortic stenosis. The study excluded angiographically significant epicardial coronary disease as a contributing factor to the patient's symptoms. Double valve surgery replacement recommended  H.  Status post aortic and mitral valve replacement by Dr. Cavazos with mechanical valves July 2018  2)Normal systolic fxn   3)Hypertension  3.1) Stress test 8/3/15 - no ischemia, low risk  4)Dyslipidemia  5)Epilepsy  6) Nonobstructive ISREAL by carotid duplex on December 2014  7) Tobacco Habituation, smokes pack a day  8) Migraines    9.  History of rheumatic fever    Patient is a 47-year-old female that presents back to the office for  "follow-up.  Is done remarkably well.  She had double valve surgery was discharged from the hospital .  She already followed up with the surgeon.  Patient has been taking Bumex every other day because of lower extremity edema.  She describes afterward they placed her on Bumex daily apparently she lost \"too much weight\" and lost approximately 30 pounds..  Because of renal issues she was decreased to every other day.    She is feeling well.  She has right-sided chest discomfort from the recent sternotomy but has no significant chest pain.  She feels much improved.  Shortness of breath is improved significantly.  It is only mild to moderate at this time.  She has no PND orthopnea.  She doesn't palpitate or have dysrhythmic symptoms.  Primary care provider is monitoring patient's INR as discussed with the patient.  Otherwise she is doing well       Outpatient Encounter Prescriptions as of 7/26/2018   Medication Sig Dispense Refill   • Ascorbic Acid (VITAMIN C) 500 MG capsule Take 1 tablet by mouth 2 (two) times a day.     • aspirin 81 MG EC tablet Take 81 mg by mouth Daily.     • bumetanide (BUMEX) 1 MG tablet Take 1 tablet by mouth Every Other Day. 15 tablet 0   • Cholecalciferol (VITAMIN D3 PO) Take 1,000 Units by mouth Daily.     • ferrous sulfate 325 (65 FE) MG tablet Take 325 mg by mouth 2 (Two) Times a Day.     • metoprolol tartrate (LOPRESSOR) 25 MG tablet Take 12.5 mg by mouth 2 (Two) Times a Day.     • nortriptyline (PAMELOR) 50 MG capsule Take 50 mg by mouth Every Night.     • OXcarbazepine (TRILEPTAL) 300 MG tablet Take 300 mg by mouth Every 12 (Twelve) Hours.     • pantoprazole (PROTONIX) 40 MG EC tablet Take 1 tablet by mouth Daily. 90 tablet 3   • potassium chloride (K-DUR) 10 MEQ CR tablet Take 10 mEq by mouth Daily.     • topiramate (TOPAMAX) 100 MG tablet Take 100 mg by mouth 2 (Two) Times a Day.     • warfarin (COUMADIN) 4 MG tablet Take 4 mg by mouth Daily. 4mg daily    pcp monitor     • " [DISCONTINUED] bumetanide (BUMEX) 1 MG tablet Take 1 mg by mouth Every Other Day.     • nitroglycerin (NITROSTAT) 0.4 MG SL tablet Place 0.4 mg under the tongue Every 5 (Five) Minutes As Needed for Chest Pain. PLACE 1 TABLET UNDER THE TONGUE EVERY 5 MINUTES FOR UP TO 3 DOSES AS NEEDED FOR CHEST PAIN CALL 911 IF PAIN PERSISTS     • [DISCONTINUED] furosemide (LASIX) 20 MG tablet Take 20 mg by mouth Daily.     • [DISCONTINUED] isosorbide mononitrate (IMDUR) 30 MG 24 hr tablet Take 0.5 tablets by mouth Daily. 30 tablet 5   • [DISCONTINUED] metoprolol succinate XL (TOPROL-XL) 25 MG 24 hr tablet Take 1 tablet by mouth Daily. TAKE 1 TABLET BY MOUTH DAILY FOR HEART RATE & BLOOD PRESSURE 30 tablet 5     Facility-Administered Encounter Medications as of 7/26/2018   Medication Dose Route Frequency Provider Last Rate Last Dose   • Chlorhexidine Gluconate Cloth 2 % pads 1 application  1 application Topical Q12H PRN YUE Brunson       • midazolam (VERSED) injection   Intravenous PRN Krishna Blank MD   2 mg at 03/02/18 0655       Azithromycin and Corticosteroids    Past Medical History:   Diagnosis Date   • Anemia    • Aortic regurgitation    • Arthritis    • Back pain    • Carotid bruit    • ISREAL (cerebral atherosclerosis) 12/2014    nonobstructive   • Chest pain    • D-dimer, elevated    • Dizziness    • Edema    • Epilepsy (CMS/HCC)    • Fatigue    • Heart murmur    • Hyperlipidemia    • Hypertension    • Kidney stones    • Migraines    • Mitral regurgitation    • Mitral stenosis     mild   • Palpitations    • Pulmonary edema    • Sleeping difficulties    • SOB (shortness of breath)    • Supraventricular aortic stenosis    • Syncope    • Tachycardia    • Tobacco abuse    • VHD (valvular heart disease)    • Wears dentures    • Wears eyeglasses        Social History     Social History   • Marital status:      Spouse name: N/A   • Number of children: 2   • Years of education: N/A     Occupational History   •   "Disabled     Social History Main Topics   • Smoking status: Current Every Day Smoker     Packs/day: 0.25     Years: 36.00     Types: Cigarettes   • Smokeless tobacco: Never Used      Comment: 5 daily   • Alcohol use No   • Drug use: No   • Sexual activity: Defer     Other Topics Concern   • Not on file     Social History Narrative    Lives in [place], [place] with spouse, children, and grandchildren       Family History   Problem Relation Age of Onset   • Cancer Mother    • Cancer Father    • Hypertension Father    • Other Sister         ACUTE MYOCARDIAL INFARCTION,CABG   • Heart failure Sister    • Hypertension Sister    • Stroke Other        Review of Systems   Constitutional: Positive for fatigue (has gotten better since cabg).   HENT: Negative.    Eyes: Positive for visual disturbance ( wears glasses ).   Respiratory: Positive for shortness of breath.    Cardiovascular: Positive for chest pain. Negative for palpitations and leg swelling.   Gastrointestinal: Negative for constipation and diarrhea.   Endocrine: Negative.    Genitourinary: Negative for difficulty urinating.   Musculoskeletal: Positive for arthralgias ( left leg going numb), back pain and myalgias.   Skin: Negative.    Allergic/Immunologic: Negative for environmental allergies and food allergies.   Neurological: Positive for dizziness and light-headedness.   Hematological: Does not bruise/bleed easily.   Psychiatric/Behavioral: The patient is not nervous/anxious.    All other systems reviewed and are negative.      Objective   Vitals:    07/26/18 1110   BP: 94/64   BP Location: Left arm   Patient Position: Sitting   Pulse: 63   SpO2: 97%   Weight: 68.9 kg (152 lb)   Height: 154.9 cm (61\")      BP 94/64 (BP Location: Left arm, Patient Position: Sitting)   Pulse 63   Ht 154.9 cm (61\")   Wt 68.9 kg (152 lb)   SpO2 97%   BMI 28.72 kg/m²     Lab Results (most recent)     None          Physical Exam   Constitutional: She is oriented to person, place, " and time. She appears well-developed and well-nourished. No distress.   HENT:   Head: Normocephalic and atraumatic.   Eyes: Pupils are equal, round, and reactive to light. EOM are normal.   Neck: No JVD present.   Cardiovascular: Normal rate, regular rhythm, normal heart sounds and intact distal pulses.  Exam reveals no gallop and no friction rub.    No murmur heard.  Mechanical click and systolic ejection murmur right upper sternal border radiating to the left   Pulmonary/Chest: Effort normal and breath sounds normal. No respiratory distress. She has no wheezes. She has no rales. She exhibits no tenderness.   Abdominal: She exhibits no distension.   Musculoskeletal: Normal range of motion. She exhibits no edema.   Neurological: She is alert and oriented to person, place, and time. No cranial nerve deficit.   Skin: Skin is warm and dry. No rash noted. No erythema. No pallor.   Psychiatric: She has a normal mood and affect. Her behavior is normal.   Nursing note and vitals reviewed.      Procedure   Procedures       Assessment/Plan     Problems Addressed this Visit        Cardiovascular and Mediastinum    Coronary artery disease involving native coronary artery of native heart without angina pectoris    Relevant Medications    metoprolol tartrate (LOPRESSOR) 25 MG tablet    Other Relevant Orders    Adult Transthoracic Echo Complete W/ Cont if Necessary Per Protocol    S/P mitral valve replacement    Relevant Orders    Adult Transthoracic Echo Complete W/ Cont if Necessary Per Protocol    Status post aortic valve replacement    Relevant Orders    Adult Transthoracic Echo Complete W/ Cont if Necessary Per Protocol       Respiratory    Shortness of breath - Primary    Relevant Orders    Adult Transthoracic Echo Complete W/ Cont if Necessary Per Protocol              Recommendation  1.  I would like to obtain an echocardiogram to evaluate LV function and valvular parameters post valve surgery.  2.  Otherwise she is doing  remarkably well.  I told her to take Bumex as needed for now.  I'm stopping metoprolol because of orthostatic lightheadedness.  She monitors weights and swelling daily.  3.  Patient is having INR monitored by primary care provider.  This was confirmed by  as well.  4.  We will see her back for follow-up after echocardiogram.  She'll follow-up primary as scheduled       I advised Mara of the risks of continuing to use tobacco, and I provided her with tobacco cessation educational materials in the After Visit Summary.     During this visit, I spent <3 minutes counseling the patient regarding tobacco cessation.     Patient's Body mass index is 28.72 kg/m². BMI is above normal parameters. Recommendations include: educational material.       Electronically signed by:

## 2018-07-31 ENCOUNTER — APPOINTMENT (OUTPATIENT)
Dept: CARDIAC REHAB | Facility: HOSPITAL | Age: 48
End: 2018-07-31

## 2018-08-02 ENCOUNTER — HOSPITAL ENCOUNTER (OUTPATIENT)
Dept: CARDIOLOGY | Facility: HOSPITAL | Age: 48
Discharge: HOME | End: 2018-08-02
Admitting: NURSE PRACTITIONER

## 2018-08-02 ENCOUNTER — TRANSCRIBE ORDERS (OUTPATIENT)
Dept: ADMINISTRATIVE | Facility: HOSPITAL | Age: 48
End: 2018-08-02

## 2018-08-02 DIAGNOSIS — M54.5 LOW BACK PAIN, UNSPECIFIED BACK PAIN LATERALITY, UNSPECIFIED CHRONICITY, WITH SCIATICA PRESENCE UNSPECIFIED: ICD-10-CM

## 2018-08-02 DIAGNOSIS — R20.0 NUMBNESS: ICD-10-CM

## 2018-08-02 DIAGNOSIS — M79.605 PAIN OF LEFT LOWER EXTREMITY: Primary | ICD-10-CM

## 2018-08-02 DIAGNOSIS — M79.605 PAIN OF LEFT LOWER EXTREMITY: ICD-10-CM

## 2018-08-02 DIAGNOSIS — R09.89 DECREASED PULSE: ICD-10-CM

## 2018-08-02 DIAGNOSIS — R52 PAIN: ICD-10-CM

## 2018-08-02 PROCEDURE — 93971 EXTREMITY STUDY: CPT

## 2018-08-02 PROCEDURE — 93971 EXTREMITY STUDY: CPT | Performed by: RADIOLOGY

## 2018-08-09 ENCOUNTER — HOSPITAL ENCOUNTER (OUTPATIENT)
Dept: CT IMAGING | Facility: HOSPITAL | Age: 48
Discharge: HOME | End: 2018-08-09
Admitting: NURSE PRACTITIONER

## 2018-08-09 DIAGNOSIS — M54.5 LOW BACK PAIN, UNSPECIFIED BACK PAIN LATERALITY, UNSPECIFIED CHRONICITY, WITH SCIATICA PRESENCE UNSPECIFIED: ICD-10-CM

## 2018-08-09 PROCEDURE — 72131 CT LUMBAR SPINE W/O DYE: CPT

## 2018-08-09 PROCEDURE — 72131 CT LUMBAR SPINE W/O DYE: CPT | Performed by: RADIOLOGY

## 2018-08-15 DIAGNOSIS — R60.1 GENERALIZED EDEMA: ICD-10-CM

## 2018-08-15 RX ORDER — POTASSIUM CHLORIDE 750 MG/1
CAPSULE, EXTENDED RELEASE ORAL
Qty: 30 CAPSULE | Refills: 0 | OUTPATIENT
Start: 2018-08-15

## 2018-08-16 ENCOUNTER — DOCUMENTATION (OUTPATIENT)
Dept: CARDIAC REHAB | Facility: HOSPITAL | Age: 48
End: 2018-08-16

## 2018-08-16 DIAGNOSIS — I10 ESSENTIAL HYPERTENSION: Primary | ICD-10-CM

## 2018-08-16 DIAGNOSIS — R60.9 EDEMA, UNSPECIFIED TYPE: ICD-10-CM

## 2018-08-16 RX ORDER — POTASSIUM CHLORIDE 750 MG/1
10 TABLET, FILM COATED, EXTENDED RELEASE ORAL DAILY
Qty: 30 TABLET | Refills: 11 | Status: SHIPPED | OUTPATIENT
Start: 2018-08-16 | End: 2019-08-28 | Stop reason: SDUPTHER

## 2018-08-16 NOTE — PROGRESS NOTES
Spoke with patient on 7/30/18 she declined the Cardiac Rehab Program stated she had hurt her back and leg  And she also could not afford the co pay and travel expense for three days a week.

## 2018-08-20 ENCOUNTER — HOSPITAL ENCOUNTER (OUTPATIENT)
Dept: CARDIOLOGY | Facility: HOSPITAL | Age: 48
Discharge: HOME | End: 2018-08-20
Admitting: PHYSICIAN ASSISTANT

## 2018-08-20 ENCOUNTER — OUTSIDE FACILITY SERVICE (OUTPATIENT)
Dept: CARDIOLOGY | Facility: CLINIC | Age: 48
End: 2018-08-20

## 2018-08-20 DIAGNOSIS — R06.02 SHORTNESS OF BREATH: ICD-10-CM

## 2018-08-20 DIAGNOSIS — I25.10 CORONARY ARTERY DISEASE INVOLVING NATIVE CORONARY ARTERY OF NATIVE HEART WITHOUT ANGINA PECTORIS: ICD-10-CM

## 2018-08-20 DIAGNOSIS — Z95.2 S/P MITRAL VALVE REPLACEMENT: ICD-10-CM

## 2018-08-20 DIAGNOSIS — Z95.2 STATUS POST AORTIC VALVE REPLACEMENT: ICD-10-CM

## 2018-08-20 LAB
MAXIMAL PREDICTED HEART RATE: 173 BPM
STRESS TARGET HR: 147 BPM

## 2018-08-20 PROCEDURE — 93306 TTE W/DOPPLER COMPLETE: CPT

## 2018-08-20 PROCEDURE — 93306 TTE W/DOPPLER COMPLETE: CPT | Performed by: INTERNAL MEDICINE

## 2018-08-23 ENCOUNTER — TELEPHONE (OUTPATIENT)
Dept: CARDIOLOGY | Facility: CLINIC | Age: 48
End: 2018-08-23

## 2018-10-29 ENCOUNTER — OFFICE VISIT (OUTPATIENT)
Dept: CARDIOLOGY | Facility: CLINIC | Age: 48
End: 2018-10-29

## 2018-10-29 VITALS
WEIGHT: 158 LBS | BODY MASS INDEX: 29.83 KG/M2 | SYSTOLIC BLOOD PRESSURE: 122 MMHG | DIASTOLIC BLOOD PRESSURE: 68 MMHG | OXYGEN SATURATION: 100 % | HEART RATE: 80 BPM | HEIGHT: 61 IN

## 2018-10-29 DIAGNOSIS — I38 VALVULAR HEART DISEASE: ICD-10-CM

## 2018-10-29 DIAGNOSIS — I25.10 CORONARY ARTERY DISEASE INVOLVING NATIVE CORONARY ARTERY OF NATIVE HEART WITHOUT ANGINA PECTORIS: ICD-10-CM

## 2018-10-29 DIAGNOSIS — I65.29 STENOSIS OF CAROTID ARTERY, UNSPECIFIED LATERALITY: Primary | ICD-10-CM

## 2018-10-29 DIAGNOSIS — R09.89 CAROTID BRUIT, UNSPECIFIED LATERALITY: ICD-10-CM

## 2018-10-29 PROCEDURE — 99214 OFFICE O/P EST MOD 30 MIN: CPT | Performed by: PHYSICIAN ASSISTANT

## 2018-10-29 RX ORDER — OXCARBAZEPINE 150 MG/1
600 TABLET, FILM COATED ORAL NIGHTLY
COMMUNITY
Start: 2018-09-26

## 2018-10-29 RX ORDER — WARFARIN SODIUM 5 MG/1
5 TABLET ORAL TAKE AS DIRECTED
COMMUNITY
Start: 2018-10-05 | End: 2020-03-04

## 2018-10-29 RX ORDER — TRAMADOL HYDROCHLORIDE 50 MG/1
50 TABLET ORAL AS NEEDED
COMMUNITY
Start: 2018-10-17 | End: 2019-04-09

## 2018-10-29 NOTE — PROGRESS NOTES
Problem list     Subjective   Mara Martínez is a 48 y.o. female     Chief Complaint   Patient presents with   • Follow-up     patient appears in office today for follow up; stated SOA has improved    • Shortness of Breath       HPI    PROBLEM LIST:      1.Valvular heart disease  a. Moderate Aortic Stenosis with GUEVARA of 1.22cm^2, mean gradient of 24mmHg with moderate to severe AI  b. Moderate to severe MR with no evidence of Mitral Stenosis  c. Repeat echo on December 22 2014 showed worsening GUEVARA at 1cm^2, Moderate MR with Mild MS with MVA at 1.69cm^2 and mean gradient of 5mmHg. Grade 2 DD  d. LHC and RHC revealed normal coronaries, mod-severe AI with GUEVARA at 1.2 with moderate AS, Moderate Mitral Stenosis with mild PTHN arguing against severe mitral disease. Medical management indicated at this point.  E. mild to moderate aortic stenosis with moderate aortic regurgitation, mean gradient of 29 and aortic valve area 1.5 cm². Moderate mitral regurgitation with mild to moderate mitral stenosis with mitral valve area 1.Meter squared   F.  Moderate to severe aortic stenosis, severe aortic insufficiency, severe mitral stenosis with mild to moderate mitral regurgitation by echocardiogram November 2017  G. Cardiac cath 12/18/17 demonstrated potentially hemodynamically significant mitral regurgitation associated with mildly to moderately elevated PA pressures. moderate aortic stenosis. The study excluded angiographically significant epicardial coronary disease as a contributing factor to the patient's symptoms. Double valve surgery replacement recommended  H.  Status post aortic and mitral valve replacement by Dr. Cavazos with mechanical valves July 2018  I.  Echocardiogram August 2018 demonstrates normal seated valves with trace aortic insufficiency and mitral insufficiency.  2)Normal systolic fxn   3)Hypertension  3.1) Stress test 8/3/15 - no ischemia, low risk  4)Dyslipidemia  5)Epilepsy  6) Nonobstructive ISREAL by carotid  "duplex on December 2014  7) Tobacco Habituation, smokes pack a day  8) Migraines    9.  History of rheumatic fever    Patient is a 48-year-old female that presents back to the office for follow-up.  She is feeling remarkably well.  She does not have any chest pain or discomfort.  She does have mild discomfort around the sternotomy site.  She was told by CT surgery that the wires may be causing her discomfort.  She describes pain when she moves and can feel a \"poking\" she has mild levels of dyspnea.  Her shortness of breath has improved significantly post surgery.  She has no PND orthopnea.    She doesn't palpitate or have dysrhythmic symptoms.  She has no evidence of bleeding on Coumadin.  Otherwise is doing well    Outpatient Encounter Prescriptions as of 10/29/2018   Medication Sig Dispense Refill   • Ascorbic Acid (VITAMIN C) 500 MG capsule Take 1 tablet by mouth 2 (two) times a day.     • aspirin 81 MG EC tablet Take 81 mg by mouth Daily.     • Cholecalciferol (VITAMIN D3 PO) Take 1,000 Units by mouth Daily.     • ferrous sulfate 325 (65 FE) MG tablet Take 325 mg by mouth 2 (Two) Times a Day.     • nitroglycerin (NITROSTAT) 0.4 MG SL tablet Place 0.4 mg under the tongue Every 5 (Five) Minutes As Needed for Chest Pain. PLACE 1 TABLET UNDER THE TONGUE EVERY 5 MINUTES FOR UP TO 3 DOSES AS NEEDED FOR CHEST PAIN CALL 911 IF PAIN PERSISTS     • nortriptyline (PAMELOR) 50 MG capsule Take 50 mg by mouth Every Night.     • OXcarbazepine (TRILEPTAL) 600 MG tablet Take 600 mg by mouth 2 (Two) Times a Day.     • pantoprazole (PROTONIX) 40 MG EC tablet Take 1 tablet by mouth Daily. 90 tablet 3   • potassium chloride (K-DUR) 10 MEQ CR tablet Take 1 tablet by mouth Daily. 30 tablet 11   • topiramate (TOPAMAX) 100 MG tablet Take 100 mg by mouth 2 (Two) Times a Day.     • traMADol (ULTRAM) 50 MG tablet Take 50 mg by mouth As Needed.     • warfarin (COUMADIN) 4 MG tablet Take 4 mg by mouth Daily. 4mg daily    pcp monitor     • " warfarin (COUMADIN) 5 MG tablet Take 5 mg by mouth Take As Directed.     • [DISCONTINUED] bumetanide (BUMEX) 1 MG tablet Take 1 tablet by mouth Every Other Day. 15 tablet 0   • [DISCONTINUED] metoprolol tartrate (LOPRESSOR) 25 MG tablet Take 12.5 mg by mouth 2 (Two) Times a Day.     • [DISCONTINUED] OXcarbazepine (TRILEPTAL) 300 MG tablet Take 300 mg by mouth Every 12 (Twelve) Hours.       Facility-Administered Encounter Medications as of 10/29/2018   Medication Dose Route Frequency Provider Last Rate Last Dose   • Chlorhexidine Gluconate Cloth 2 % pads 1 application  1 application Topical Q12H PRN Cristobal Lozano PA       • midazolam (VERSED) injection   Intravenous PRN Krishna Blank MD   2 mg at 03/02/18 0655       Azithromycin and Corticosteroids    Past Medical History:   Diagnosis Date   • Anemia    • Aortic regurgitation    • Arthritis    • Back pain    • Carotid bruit    • ISREAL (cerebral atherosclerosis) 12/2014    nonobstructive   • Chest pain    • D-dimer, elevated    • Dizziness    • Edema    • Epilepsy (CMS/HCC)    • Fatigue    • Heart murmur    • Hyperlipidemia    • Hypertension    • Kidney stones    • Migraines    • Mitral regurgitation    • Mitral stenosis     mild   • Palpitations    • Pulmonary edema    • Sleeping difficulties    • SOB (shortness of breath)    • Supraventricular aortic stenosis    • Syncope    • Tachycardia    • Tobacco abuse    • VHD (valvular heart disease)    • Wears dentures    • Wears eyeglasses        Social History     Social History   • Marital status:      Spouse name: N/A   • Number of children: 2   • Years of education: N/A     Occupational History   •  Disabled     Social History Main Topics   • Smoking status: Current Every Day Smoker     Packs/day: 0.25     Years: 36.00     Types: Cigarettes   • Smokeless tobacco: Never Used      Comment: 5 daily   • Alcohol use No   • Drug use: No   • Sexual activity: Defer     Other Topics Concern   • Not on file      Social History Narrative    Lives in [place], [place] with spouse, children, and grandchildren       Family History   Problem Relation Age of Onset   • Cancer Mother    • Cancer Father    • Hypertension Father    • Other Sister         ACUTE MYOCARDIAL INFARCTION,CABG   • Heart failure Sister    • Hypertension Sister    • Stroke Other        Review of Systems   Constitutional: Positive for fatigue (easily fatigued).   HENT: Positive for rhinorrhea (runny nose due to allergies) and sneezing (sneezing due to allergies). Negative for congestion.    Eyes: Positive for visual disturbance (wears glasses daily).   Respiratory: Positive for shortness of breath (SOA with exertion only; states this has gotten better since last OV). Negative for apnea, cough, chest tightness and wheezing.    Cardiovascular: Negative.  Negative for chest pain (denies CP), palpitations (denies palpitations) and leg swelling.   Gastrointestinal: Negative.  Negative for abdominal distention, abdominal pain, nausea and vomiting.   Endocrine: Negative.  Negative for cold intolerance and heat intolerance.   Genitourinary: Negative.  Negative for difficulty urinating, frequency and urgency.   Musculoskeletal: Positive for arthralgias (joints) and back pain (back pain from old injury). Negative for myalgias, neck pain and neck stiffness.   Skin: Negative.  Negative for rash and wound.   Allergic/Immunologic: Negative.  Negative for environmental allergies and food allergies.   Neurological: Negative.  Negative for dizziness, syncope, weakness, light-headedness and headaches.   Hematological: Bruises/bleeds easily (bruises and bleeds easily).   Psychiatric/Behavioral: Negative.  Negative for agitation, confusion and sleep disturbance (denies waking up smothering/SOA). The patient is not nervous/anxious.    All other systems reviewed and are negative.      Objective   Vitals:    10/29/18 1507   BP: 122/68   BP Location: Left arm   Patient Position:  "Sitting   Pulse: 80   SpO2: 100%   Weight: 71.7 kg (158 lb)   Height: 154.9 cm (61\")      /68 (BP Location: Left arm, Patient Position: Sitting)   Pulse 80   Ht 154.9 cm (61\")   Wt 71.7 kg (158 lb)   SpO2 100%   BMI 29.85 kg/m²     Lab Results (most recent)     None          Physical Exam   Constitutional: She is oriented to person, place, and time. She appears well-developed and well-nourished. No distress.   HENT:   Head: Normocephalic and atraumatic.   Eyes: Pupils are equal, round, and reactive to light. EOM are normal.   Neck: Carotid bruit is present.   Cardiovascular: Normal rate, regular rhythm, normal heart sounds and intact distal pulses.  Exam reveals no gallop and no friction rub.    No murmur heard.  Grade 2/6 systolic ejection murmur right upper sternal border radiating to the left.  Mechanical clicks auscultated   Pulmonary/Chest: Effort normal and breath sounds normal. No respiratory distress. She has no wheezes. She has no rales. She exhibits no tenderness.   Abdominal: Soft. She exhibits no distension and no mass. There is no tenderness. There is no rebound and no guarding.   Musculoskeletal: Normal range of motion. She exhibits no edema.   Neurological: She is alert and oriented to person, place, and time. No cranial nerve deficit.   Skin: Skin is warm and dry. No rash noted. No erythema. No pallor.   Psychiatric: She has a normal mood and affect. Her behavior is normal.   Nursing note and vitals reviewed.      Procedure   Procedures       Assessment/Plan     Problems Addressed this Visit        Cardiovascular and Mediastinum    Carotid bruit    Relevant Orders    Duplex Carotid Ultrasound CAR    Valvular heart disease    Coronary artery disease involving native coronary artery of native heart without angina pectoris    Stenosis of carotid artery - Primary    Relevant Orders    Duplex Carotid Ultrasound CAR            Recommendation  1.  Patient doing well post surgery.  LV function is " normal and valves working properly.  She is taking Coumadin and doing well.  2.  I would like to schedule for repeat carotid duplex based on significant bradycardia versus transmitted heart sounds.  3.  Otherwise she is on appropriate medication.  We'll see her back for follow-up after testing.  Follow-up primary as scheduled         I advised Mara of the risks of continuing to use tobacco, and I provided her with tobacco cessation educational materials in the After Visit Summary.     During this visit, I spent <3 minutes counseling the patient regarding tobacco cessation.     Patient's Body mass index is 29.85 kg/m². BMI is within normal parameters. No follow-up required.       Electronically signed by:

## 2018-11-16 ENCOUNTER — HOSPITAL ENCOUNTER (OUTPATIENT)
Dept: CARDIOLOGY | Facility: HOSPITAL | Age: 48
Discharge: HOME | End: 2018-11-16
Admitting: PHYSICIAN ASSISTANT

## 2018-11-16 PROCEDURE — 93880 EXTRACRANIAL BILAT STUDY: CPT | Performed by: INTERNAL MEDICINE

## 2018-11-16 PROCEDURE — 93880 EXTRACRANIAL BILAT STUDY: CPT

## 2018-11-20 ENCOUNTER — APPOINTMENT (OUTPATIENT)
Dept: CARDIOLOGY | Facility: HOSPITAL | Age: 48
End: 2018-11-20

## 2018-11-28 LAB
BH CV ECHO MEAS - BSA(HAYCOCK): 1.8 M^2
BH CV ECHO MEAS - BSA: 1.7 M^2
BH CV ECHO MEAS - BZI_BMI: 29.9 KILOGRAMS/M^2
BH CV ECHO MEAS - BZI_METRIC_HEIGHT: 154.9 CM
BH CV ECHO MEAS - BZI_METRIC_WEIGHT: 71.7 KG
BH CV XLRA MEAS LEFT BULB EDV: -47.9 CM/SEC
BH CV XLRA MEAS LEFT BULB PSV: -204 CM/SEC
BH CV XLRA MEAS LEFT CCA RATIO VEL: 192 CM/SEC
BH CV XLRA MEAS LEFT DIST CCA EDV: 50.3 CM/SEC
BH CV XLRA MEAS LEFT DIST CCA PSV: 192 CM/SEC
BH CV XLRA MEAS LEFT DIST ICA EDV: -54 CM/SEC
BH CV XLRA MEAS LEFT DIST ICA PSV: -131 CM/SEC
BH CV XLRA MEAS LEFT ICA RATIO VEL: -152 CM/SEC
BH CV XLRA MEAS LEFT ICA/CCA RATIO: -0.79
BH CV XLRA MEAS LEFT MID CCA EDV: 56.3 CM/SEC
BH CV XLRA MEAS LEFT MID CCA PSV: 177 CM/SEC
BH CV XLRA MEAS LEFT MID ICA EDV: -62.6 CM/SEC
BH CV XLRA MEAS LEFT MID ICA PSV: -135 CM/SEC
BH CV XLRA MEAS LEFT PROX CCA EDV: 42 CM/SEC
BH CV XLRA MEAS LEFT PROX CCA PSV: 141 CM/SEC
BH CV XLRA MEAS LEFT PROX ECA EDV: -28.2 CM/SEC
BH CV XLRA MEAS LEFT PROX ECA PSV: -129 CM/SEC
BH CV XLRA MEAS LEFT PROX ICA EDV: -45.4 CM/SEC
BH CV XLRA MEAS LEFT PROX ICA PSV: -152 CM/SEC
BH CV XLRA MEAS LEFT VERTEBRAL A EDV: -18.4 CM/SEC
BH CV XLRA MEAS LEFT VERTEBRAL A PSV: -60.2 CM/SEC
BH CV XLRA MEAS RIGHT BULB EDV: -19.1 CM/SEC
BH CV XLRA MEAS RIGHT BULB PSV: -94.5 CM/SEC
BH CV XLRA MEAS RIGHT CCA RATIO VEL: 131 CM/SEC
BH CV XLRA MEAS RIGHT DIST CCA EDV: 36.3 CM/SEC
BH CV XLRA MEAS RIGHT DIST CCA PSV: 131 CM/SEC
BH CV XLRA MEAS RIGHT DIST ICA EDV: -40.1 CM/SEC
BH CV XLRA MEAS RIGHT DIST ICA PSV: -95.5 CM/SEC
BH CV XLRA MEAS RIGHT ICA RATIO VEL: -109 CM/SEC
BH CV XLRA MEAS RIGHT ICA/CCA RATIO: -0.83
BH CV XLRA MEAS RIGHT MID CCA EDV: 31.5 CM/SEC
BH CV XLRA MEAS RIGHT MID CCA PSV: 143 CM/SEC
BH CV XLRA MEAS RIGHT MID ICA EDV: -43.9 CM/SEC
BH CV XLRA MEAS RIGHT MID ICA PSV: -108 CM/SEC
BH CV XLRA MEAS RIGHT PROX CCA EDV: 38.2 CM/SEC
BH CV XLRA MEAS RIGHT PROX CCA PSV: 213 CM/SEC
BH CV XLRA MEAS RIGHT PROX ECA EDV: -37.2 CM/SEC
BH CV XLRA MEAS RIGHT PROX ECA PSV: -150 CM/SEC
BH CV XLRA MEAS RIGHT PROX ICA EDV: -30.6 CM/SEC
BH CV XLRA MEAS RIGHT PROX ICA PSV: -109 CM/SEC
BH CV XLRA MEAS RIGHT VERTEBRAL A EDV: -21 CM/SEC
BH CV XLRA MEAS RIGHT VERTEBRAL A PSV: -69.7 CM/SEC

## 2018-11-29 ENCOUNTER — TELEPHONE (OUTPATIENT)
Dept: CARDIOLOGY | Facility: CLINIC | Age: 48
End: 2018-11-29

## 2018-11-29 NOTE — TELEPHONE ENCOUNTER
PATIENT AWARE OF CAROTID U/S RESULTS AND TO KEEP F/U APPT. ON 12-20-18. FREEDOM BECKETT          ----- Message from Janna Clemente MA sent at 11/29/2018 11:23 AM EST -----      ----- Message -----  From: Tavares Yeh PA  Sent: 11/29/2018   8:55 AM  To: Janna Clemente MA    Routine follow up

## 2018-12-20 ENCOUNTER — OFFICE VISIT (OUTPATIENT)
Dept: CARDIOLOGY | Facility: CLINIC | Age: 48
End: 2018-12-20

## 2018-12-20 VITALS
SYSTOLIC BLOOD PRESSURE: 119 MMHG | DIASTOLIC BLOOD PRESSURE: 73 MMHG | WEIGHT: 164.8 LBS | HEIGHT: 61 IN | OXYGEN SATURATION: 99 % | BODY MASS INDEX: 31.11 KG/M2 | HEART RATE: 81 BPM

## 2018-12-20 DIAGNOSIS — Z95.2 STATUS POST AORTIC VALVE REPLACEMENT: ICD-10-CM

## 2018-12-20 DIAGNOSIS — Z95.2 S/P MITRAL VALVE REPLACEMENT: ICD-10-CM

## 2018-12-20 DIAGNOSIS — R06.02 SHORTNESS OF BREATH: ICD-10-CM

## 2018-12-20 DIAGNOSIS — I38 VALVULAR HEART DISEASE: Primary | ICD-10-CM

## 2018-12-20 PROCEDURE — 99213 OFFICE O/P EST LOW 20 MIN: CPT | Performed by: PHYSICIAN ASSISTANT

## 2018-12-20 NOTE — PROGRESS NOTES
Problem list     Subjective   Mara Martínez is a 48 y.o. female     Chief Complaint   Patient presents with   • Follow-up     presents for carotid f/u   • Shortness of Breath   PROBLEM LIST:      1.Valvular heart disease  a. Moderate Aortic Stenosis with GUEVARA of 1.22cm^2, mean gradient of 24mmHg with moderate to severe AI  b. Moderate to severe MR with no evidence of Mitral Stenosis  c. Repeat echo on December 22 2014 showed worsening GUEVARA at 1cm^2, Moderate MR with Mild MS with MVA at 1.69cm^2 and mean gradient of 5mmHg. Grade 2 DD  d. LHC and RHC revealed normal coronaries, mod-severe AI with GUEVARA at 1.2 with moderate AS, Moderate Mitral Stenosis with mild PTHN arguing against severe mitral disease. Medical management indicated at this point.  E. mild to moderate aortic stenosis with moderate aortic regurgitation, mean gradient of 29 and aortic valve area 1.5 cm². Moderate mitral regurgitation with mild to moderate mitral stenosis with mitral valve area 1.Meter squared   F.  Moderate to severe aortic stenosis, severe aortic insufficiency, severe mitral stenosis with mild to moderate mitral regurgitation by echocardiogram November 2017  G. Cardiac cath 12/18/17 demonstrated potentially hemodynamically significant mitral regurgitation associated with mildly to moderately elevated PA pressures. moderate aortic stenosis. The study excluded angiographically significant epicardial coronary disease as a contributing factor to the patient's symptoms. Double valve surgery replacement recommended  H.  Status post aortic and mitral valve replacement by Dr. Cavazos with mechanical valves July 2018  I.  Echocardiogram August 2018 demonstrates normal seated valves with trace aortic insufficiency and mitral insufficiency.  2)Normal systolic fxn   3)Hypertension  3.1) Stress test 8/3/15 - no ischemia, low risk  4)Dyslipidemia  5)Epilepsy  6) Nonobstructive ISREAL by carotid duplex on December 2014  7) Tobacco Habituation, smokes pack a  day  8) Migraines        9)  History of rheumatic fever    HPI  The patient presents in follow-up of carotid duplex studies.  If carotid duplex was evaluated because of possible bruit.  That study suggested nonobstructive disease bilaterally.  We also reviewed her recent echocardiogram from just a few months ago.  That suggested stable aortic and mitral valve replacements.  EF was normal by that study as well.  The patient tells me that outside of chest wall discomfort, specifically aside of previous sternotomy, she feels very well.  She denies chest pain.  Her overall dyspnea, and level, etc. of all improved post valve replacements.  Blood pressures remained normal.  She has no further complaints otherwise and feels she is doing well.    Current Outpatient Medications   Medication Sig Dispense Refill   • Ascorbic Acid (VITAMIN C) 500 MG capsule Take 1 tablet by mouth 2 (two) times a day.     • aspirin 81 MG EC tablet Take 81 mg by mouth Daily.     • Cholecalciferol (VITAMIN D3 PO) Take 1,000 Units by mouth Daily.     • ferrous sulfate 325 (65 FE) MG tablet Take 325 mg by mouth 2 (Two) Times a Day.     • nitroglycerin (NITROSTAT) 0.4 MG SL tablet Place 0.4 mg under the tongue Every 5 (Five) Minutes As Needed for Chest Pain. PLACE 1 TABLET UNDER THE TONGUE EVERY 5 MINUTES FOR UP TO 3 DOSES AS NEEDED FOR CHEST PAIN CALL 911 IF PAIN PERSISTS     • nortriptyline (PAMELOR) 50 MG capsule Take 50 mg by mouth Every Night.     • OXcarbazepine (TRILEPTAL) 600 MG tablet Take 600 mg by mouth 2 (Two) Times a Day.     • pantoprazole (PROTONIX) 40 MG EC tablet Take 1 tablet by mouth Daily. 90 tablet 3   • potassium chloride (K-DUR) 10 MEQ CR tablet Take 1 tablet by mouth Daily. 30 tablet 11   • topiramate (TOPAMAX) 100 MG tablet Take 100 mg by mouth 2 (Two) Times a Day.     • traMADol (ULTRAM) 50 MG tablet Take 50 mg by mouth As Needed.     • warfarin (COUMADIN) 4 MG tablet Take 4 mg by mouth Daily. 4mg daily    pcp monitor     •  warfarin (COUMADIN) 5 MG tablet Take 5 mg by mouth Take As Directed.       No current facility-administered medications for this visit.      Facility-Administered Medications Ordered in Other Visits   Medication Dose Route Frequency Provider Last Rate Last Dose   • Chlorhexidine Gluconate Cloth 2 % pads 1 application  1 application Topical Q12H PRN Cristobal Lozano PA       • midazolam (VERSED) injection   Intravenous PRN Krishna Blank MD   2 mg at 03/02/18 0655       Azithromycin and Corticosteroids    Past Medical History:   Diagnosis Date   • Anemia    • Aortic regurgitation    • Arthritis    • Back pain    • Carotid bruit    • ISREAL (cerebral atherosclerosis) 12/2014    nonobstructive   • Chest pain    • D-dimer, elevated    • Dizziness    • Edema    • Epilepsy (CMS/HCC)    • Fatigue    • Heart murmur    • Hyperlipidemia    • Hypertension    • Kidney stones    • Migraines    • Mitral regurgitation    • Mitral stenosis     mild   • Palpitations    • Pulmonary edema    • Sleeping difficulties    • SOB (shortness of breath)    • Supraventricular aortic stenosis    • Syncope    • Tachycardia    • Tobacco abuse    • VHD (valvular heart disease)    • Wears dentures    • Wears eyeglasses        Social History     Socioeconomic History   • Marital status:      Spouse name: Not on file   • Number of children: 2   • Years of education: Not on file   • Highest education level: Not on file   Social Needs   • Financial resource strain: Not on file   • Food insecurity - worry: Not on file   • Food insecurity - inability: Not on file   • Transportation needs - medical: Not on file   • Transportation needs - non-medical: Not on file   Occupational History     Employer: DISABLED   Tobacco Use   • Smoking status: Current Every Day Smoker     Packs/day: 0.25     Years: 36.00     Pack years: 9.00     Types: Cigarettes   • Smokeless tobacco: Never Used   • Tobacco comment: 5 daily   Substance and Sexual Activity   •  "Alcohol use: No   • Drug use: No   • Sexual activity: Defer   Other Topics Concern   • Not on file   Social History Narrative    Lives in [place], [place] with spouse, children, and grandchildren       Family History   Problem Relation Age of Onset   • Cancer Mother    • Cancer Father    • Hypertension Father    • Other Sister         ACUTE MYOCARDIAL INFARCTION,CABG   • Heart failure Sister    • Hypertension Sister    • Stroke Other        Review of Systems   Constitutional: Negative for chills, diaphoresis, fatigue and fever.   HENT: Negative.    Eyes: Positive for visual disturbance (wears glasses).   Respiratory: Positive for shortness of breath (with increased activity and climbing stairs). Negative for apnea, cough, chest tightness and wheezing.    Cardiovascular: Negative.  Negative for chest pain, palpitations and leg swelling.   Gastrointestinal: Positive for constipation. Negative for abdominal pain, blood in stool, diarrhea, nausea and vomiting.   Endocrine: Negative.    Genitourinary: Negative for hematuria.   Musculoskeletal: Positive for back pain. Negative for arthralgias, myalgias and neck pain.   Skin: Negative.    Allergic/Immunologic: Negative.  Negative for environmental allergies and food allergies.   Neurological: Positive for dizziness (sporadic episodes). Negative for weakness, light-headedness, numbness and headaches.   Hematological: Bruises/bleeds easily.   Psychiatric/Behavioral: Positive for sleep disturbance ( insomnia ). Negative for agitation. The patient is not nervous/anxious.        Objective   Vitals:    12/20/18 1504   BP: 119/73   BP Location: Left arm   Patient Position: Sitting   Pulse: 81   SpO2: 99%   Weight: 74.8 kg (164 lb 12.8 oz)   Height: 154.9 cm (61\")      /73 (BP Location: Left arm, Patient Position: Sitting)   Pulse 81   Ht 154.9 cm (61\")   Wt 74.8 kg (164 lb 12.8 oz)   SpO2 99%   BMI 31.14 kg/m²    Lab Results (most recent)     None        Physical Exam "   Constitutional: She is oriented to person, place, and time. She appears well-developed and well-nourished. No distress.   HENT:   Head: Normocephalic and atraumatic.   Eyes: EOM are normal. Pupils are equal, round, and reactive to light.   Neck: Carotid bruit is present.   Cardiovascular: Normal rate, regular rhythm, normal heart sounds and intact distal pulses. Exam reveals no gallop and no friction rub.   No murmur heard.  Grade 2/6 systolic ejection murmur right upper sternal border radiating to the left.  Mechanical clicks auscultated   Pulmonary/Chest: Effort normal and breath sounds normal. No respiratory distress. She has no wheezes. She has no rales. She exhibits no tenderness.   Abdominal: Soft. She exhibits no distension and no mass. There is no tenderness. There is no rebound and no guarding.   Musculoskeletal: Normal range of motion. She exhibits no edema.   Neurological: She is alert and oriented to person, place, and time. No cranial nerve deficit.   Skin: Skin is warm and dry. No rash noted. No erythema. No pallor.   Psychiatric: She has a normal mood and affect. Her behavior is normal.   Nursing note and vitals reviewed.        Procedure   Procedures       Assessment/Plan      Diagnosis Plan   1. Valvular heart disease     2. Shortness of breath     3. Status post aortic valve replacement     4. S/P mitral valve replacement         The patient is doing well from cardiovascular standpoint.  She is doing well post valve replacement as above.  Her carotid duplex performed recently was very much unremarkable.  She is on appropriate medications which I will not change.  We have discussed consideration for statin therapy but I would like to review labs in the future prior to consideration of the same.  Otherwise, we can see her back in 3-4 months, sooner for complications.  I feel she is doing well at this time.         I advised Mara of the risks of continuing to use tobacco, and I provided her with  tobacco cessation educational materials in the After Visit Summary.     During this visit, I spent <3minutes counseling the patient regarding tobacco cessation.     Patient's Body mass index is 31.14 kg/m². BMI is above normal parameters. Recommendations include: educational material.             Electronically signed by:

## 2018-12-20 NOTE — PATIENT INSTRUCTIONS
For more information:    Quit Now [place]  1-800-QUIT-NOW  https://[place].quitlogix.org/en-[place]/  Steps to Quit Smoking  Smoking tobacco can be harmful to your health and can affect almost every organ in your body. Smoking puts you, and those around you, at risk for developing many serious chronic diseases. Quitting smoking is difficult, but it is one of the best things that you can do for your health. It is never too late to quit.  What are the benefits of quitting smoking?  When you quit smoking, you lower your risk of developing serious diseases and conditions, such as:  · Lung cancer or lung disease, such as COPD.  · Heart disease.  · Stroke.  · Heart attack.  · Infertility.  · Osteoporosis and bone fractures.  Additionally, symptoms such as coughing, wheezing, and shortness of breath may get better when you quit. You may also find that you get sick less often because your body is stronger at fighting off colds and infections. If you are pregnant, quitting smoking can help to reduce your chances of having a baby of low birth weight.  How do I get ready to quit?  When you decide to quit smoking, create a plan to make sure that you are successful. Before you quit:  · Pick a date to quit. Set a date within the next two weeks to give you time to prepare.  · Write down the reasons why you are quitting. Keep this list in places where you will see it often, such as on your bathroom mirror or in your car or wallet.  · Identify the people, places, things, and activities that make you want to smoke (triggers) and avoid them. Make sure to take these actions:  ¨ Throw away all cigarettes at home, at work, and in your car.  ¨ Throw away smoking accessories, such as ashtrays and lighters.  ¨ Clean your car and make sure to empty the ashtray.  ¨ Clean your home, including curtains and carpets.  · Tell your family, friends, and coworkers that you are quitting. Support from your loved ones can make quitting easier.  · Talk with  your health care provider about your options for quitting smoking.  · Find out what treatment options are covered by your health insurance.  What strategies can I use to quit smoking?  Talk with your healthcare provider about different strategies to quit smoking. Some strategies include:  · Quitting smoking altogether instead of gradually lessening how much you smoke over a period of time. Research shows that quitting “cold turkey” is more successful than gradually quitting.  · Attending in-person counseling to help you build problem-solving skills. You are more likely to have success in quitting if you attend several counseling sessions. Even short sessions of 10 minutes can be effective.  · Finding resources and support systems that can help you to quit smoking and remain smoke-free after you quit. These resources are most helpful when you use them often. They can include:  ¨ Online chats with a counselor.  ¨ Telephone quitlines.  ¨ Printed self-help materials.  ¨ Support groups or group counseling.  ¨ Text messaging programs.  ¨ Mobile phone applications.  · Taking medicines to help you quit smoking. (If you are pregnant or breastfeeding, talk with your health care provider first.) Some medicines contain nicotine and some do not. Both types of medicines help with cravings, but the medicines that include nicotine help to relieve withdrawal symptoms. Your health care provider may recommend:  ¨ Nicotine patches, gum, or lozenges.  ¨ Nicotine inhalers or sprays.  ¨ Non-nicotine medicine that is taken by mouth.  Talk with your health care provider about combining strategies, such as taking medicines while you are also receiving in-person counseling. Using these two strategies together makes you more likely to succeed in quitting than if you used either strategy on its own.  If you are pregnant or breastfeeding, talk with your health care provider about finding counseling or other support strategies to quit smoking. Do  not take medicine to help you quit smoking unless told to do so by your health care provider.  What things can I do to make it easier to quit?  Quitting smoking might feel overwhelming at first, but there is a lot that you can do to make it easier. Take these important actions:  · Reach out to your family and friends and ask that they support and encourage you during this time. Call telephone quitlines, reach out to support groups, or work with a counselor for support.  · Ask people who smoke to avoid smoking around you.  · Avoid places that trigger you to smoke, such as bars, parties, or smoke-break areas at work.  · Spend time around people who do not smoke.  · Lessen stress in your life, because stress can be a smoking trigger for some people. To lessen stress, try:  ¨ Exercising regularly.  ¨ Deep-breathing exercises.  ¨ Yoga.  ¨ Meditating.  ¨ Performing a body scan. This involves closing your eyes, scanning your body from head to toe, and noticing which parts of your body are particularly tense. Purposefully relax the muscles in those areas.  · Download or purchase mobile phone or tablet apps (applications) that can help you stick to your quit plan by providing reminders, tips, and encouragement. There are many free apps, such as QuitGuide from the CDC (Centers for Disease Control and Prevention). You can find other support for quitting smoking (smoking cessation) through smokefree.gov and other websites.  How will I feel when I quit smoking?  Within the first 24 hours of quitting smoking, you may start to feel some withdrawal symptoms. These symptoms are usually most noticeable 2-3 days after quitting, but they usually do not last beyond 2-3 weeks. Changes or symptoms that you might experience include:  · Mood swings.  · Restlessness, anxiety, or irritation.  · Difficulty concentrating.  · Dizziness.  · Strong cravings for sugary foods in addition to nicotine.  · Mild weight  gain.  · Constipation.  · Nausea.  · Coughing or a sore throat.  · Changes in how your medicines work in your body.  · A depressed mood.  · Difficulty sleeping (insomnia).  After the first 2-3 weeks of quitting, you may start to notice more positive results, such as:  · Improved sense of smell and taste.  · Decreased coughing and sore throat.  · Slower heart rate.  · Lower blood pressure.  · Clearer skin.  · The ability to breathe more easily.  · Fewer sick days.  Quitting smoking is very challenging for most people. Do not get discouraged if you are not successful the first time. Some people need to make many attempts to quit before they achieve long-term success. Do your best to stick to your quit plan, and talk with your health care provider if you have any questions or concerns.  This information is not intended to replace advice given to you by your health care provider. Make sure you discuss any questions you have with your health care provider.  Document Released: 12/12/2002 Document Revised: 08/15/2017 Document Reviewed: 05/03/2016  [employer] Interactive Patient Education © 2017 [employer]  Heart-Healthy Eating Plan  Many factors influence your heart health, including eating and exercise habits. Heart (coronary) risk increases with abnormal blood fat (lipid) levels. Heart-healthy meal planning includes limiting unhealthy fats, increasing healthy fats, and making other small dietary changes. This includes maintaining a healthy body weight to help keep lipid levels within a normal range.  What is my plan?  Your health care provider recommends that you:  · Get no more than _________% of the total calories in your daily diet from fat.  · Limit your intake of saturated fat to less than _________% of your total calories each day.  · Limit the amount of cholesterol in your diet to less than _________ mg per day.    What types of fat should I choose?  · Choose healthy fats more often. Choose monounsaturated and  "polyunsaturated fats, such as olive oil and canola oil, flaxseeds, walnuts, almonds, and seeds.  · Eat more omega-3 fats. Good choices include salmon, mackerel, sardines, tuna, flaxseed oil, and ground flaxseeds. Aim to eat fish at least two times each week.  · Limit saturated fats. Saturated fats are primarily found in animal products, such as meats, butter, and cream. Plant sources of saturated fats include palm oil, palm kernel oil, and coconut oil.  · Avoid foods with partially hydrogenated oils in them. These contain trans fats. Examples of foods that contain trans fats are stick margarine, some tub margarines, cookies, crackers, and other baked goods.  What general guidelines do I need to follow?  · Check food labels carefully to identify foods with trans fats or high amounts of saturated fat.  · Fill one half of your plate with vegetables and green salads. Eat 4-5 servings of vegetables per day. A serving of vegetables equals 1 cup of raw leafy vegetables, ½ cup of raw or cooked cut-up vegetables, or ½ cup of vegetable juice.  · Fill one fourth of your plate with whole grains. Look for the word \"whole\" as the first word in the ingredient list.  · Fill one fourth of your plate with lean protein foods.  · Eat 4-5 servings of fruit per day. A serving of fruit equals one medium whole fruit, ¼ cup of dried fruit, ½ cup of fresh, frozen, or canned fruit, or ½ cup of 100% fruit juice.  · Eat more foods that contain soluble fiber. Examples of foods that contain this type of fiber are apples, broccoli, carrots, beans, peas, and barley. Aim to get 20-30 g of fiber per day.  · Eat more home-cooked food and less restaurant, buffet, and fast food.  · Limit or avoid alcohol.  · Limit foods that are high in starch and sugar.  · Avoid fried foods.  · Cook foods by using methods other than frying. Baking, boiling, grilling, and broiling are all great options. Other fat-reducing suggestions include:  ? Removing the skin from " poultry.  ? Removing all visible fats from meats.  ? Skimming the fat off of stews, soups, and gravies before serving them.  ? Steaming vegetables in water or broth.  · Lose weight if you are overweight. Losing just 5-10% of your initial body weight can help your overall health and prevent diseases such as diabetes and heart disease.  · Increase your consumption of nuts, legumes, and seeds to 4-5 servings per week. One serving of dried beans or legumes equals ½ cup after being cooked, one serving of nuts equals 1½ ounces, and one serving of seeds equals ½ ounce or 1 tablespoon.  · You may need to monitor your salt (sodium) intake, especially if you have high blood pressure. Talk with your health care provider or dietitian to get more information about reducing sodium.  What foods can I eat?  Grains    Breads, including Welsh, white, germain, wheat, raisin, rye, oatmeal, and Italian. Tortillas that are neither fried nor made with lard or trans fat. Low-fat rolls, including hotdog and hamburger buns and English muffins. Biscuits. Muffins. Waffles. Pancakes. Light popcorn. Whole-grain cereals. Flatbread. [place] toast. Pretzels. Breadsticks. Rusks. Low-fat snacks and crackers, including oyster, saltine, matzo, janes, animal, and rye. Rice and pasta, including brown rice and those that are made with whole wheat.  Vegetables  All vegetables.  Fruits  All fruits, but limit coconut.  Meats and Other Protein Sources  Lean, well-trimmed beef, veal, pork, and lamb. Chicken and turkey without skin. All fish and shellfish. Wild duck, rabbit, pheasant, and venison. Egg whites or low-cholesterol egg substitutes. Dried beans, peas, lentils, and tofu. Seeds and most nuts.  Dairy  Low-fat or nonfat cheeses, including ricotta, string, and mozzarella. Skim or 1% milk that is liquid, powdered, or evaporated. Buttermilk that is made with low-fat milk. Nonfat or low-fat yogurt.  Beverages  Mineral water. Diet carbonated beverages.  Sweets  and Desserts  Sherbets and fruit ices. Honey, jam, marmalade, jelly, and syrups. Meringues and gelatins. Pure sugar candy, such as hard candy, jelly beans, gumdrops, mints, marshmallows, and small amounts of dark chocolate. Aaron food cake.  Eat all sweets and desserts in moderation.  Fats and Oils  Nonhydrogenated (trans-free) margarines. Vegetable oils, including soybean, sesame, sunflower, olive, peanut, safflower, corn, canola, and cottonseed. Salad dressings or mayonnaise that are made with a vegetable oil. Limit added fats and oils that you use for cooking, baking, salads, and as spreads.  Other  Cocoa powder. Coffee and tea. All seasonings and condiments.  The items listed above may not be a complete list of recommended foods or beverages. Contact your dietitian for more options.  What foods are not recommended?  Grains  Breads that are made with saturated or trans fats, oils, or whole milk. Croissants. Butter rolls. Cheese breads. Sweet rolls. Donuts. Buttered popcorn. Chow mein noodles. High-fat crackers, such as cheese or butter crackers.  Meats and Other Protein Sources  Fatty meats, such as hotdogs, short ribs, sausage, spareribs, hernandez, ribeye roast or steak, and mutton. High-fat deli meats, such as salami and bologna. Caviar. Domestic duck and goose. Organ meats, such as kidney, liver, sweetbreads, brains, gizzard, chitterlings, and heart.  Dairy  Cream, sour cream, cream cheese, and creamed cottage cheese. Whole milk cheeses, including blue (km), [place] Tony, Brie, Kt, American, Hav[place]i, Swiss, cheddar, Camembert, and [place]. Whole or 2% milk that is liquid, evaporated, or condensed. Whole buttermilk. Cream sauce or high-fat cheese sauce. Yogurt that is made from whole milk.  Beverages  Regular sodas and drinks with added sugar.  Sweets and Desserts  Frosting. Pudding. Cookies. Cakes other than aaron food cake. Candy that has milk chocolate or white chocolate, hydrogenated fat, butter,  coconut, or unknown ingredients. Buttered syrups. Full-fat ice cream or ice cream drinks.  Fats and Oils  Gravy that has suet, meat fat, or shortening. Cocoa butter, hydrogenated oils, palm oil, coconut oil, palm kernel oil. These can often be found in baked products, candy, fried foods, nondairy creamers, and whipped toppings. Solid fats and shortenings, including hernandez fat, salt pork, lard, and butter. Nondairy cream substitutes, such as coffee creamers and sour cream substitutes. Salad dressings that are made of unknown oils, cheese, or sour cream.  The items listed above may not be a complete list of foods and beverages to avoid. Contact your dietitian for more information.  This information is not intended to replace advice given to you by your health care provider. Make sure you discuss any questions you have with your health care provider.  Document Released: 09/26/2009 Document Revised: 07/07/2017 Document Reviewed: 06/11/2015  [employer] Interactive Patient Education © 2018 [employer]

## 2019-04-09 ENCOUNTER — OFFICE VISIT (OUTPATIENT)
Dept: SURGERY | Facility: CLINIC | Age: 49
End: 2019-04-09

## 2019-04-09 VITALS — WEIGHT: 164 LBS | BODY MASS INDEX: 30.96 KG/M2 | HEIGHT: 61 IN

## 2019-04-09 DIAGNOSIS — K62.5 RECTAL BLEEDING: ICD-10-CM

## 2019-04-09 DIAGNOSIS — R10.84 GENERALIZED ABDOMINAL PAIN: Primary | ICD-10-CM

## 2019-04-09 DIAGNOSIS — K59.1 FUNCTIONAL DIARRHEA: ICD-10-CM

## 2019-04-09 PROCEDURE — 99204 OFFICE O/P NEW MOD 45 MIN: CPT | Performed by: SURGERY

## 2019-04-09 PROCEDURE — 99406 BEHAV CHNG SMOKING 3-10 MIN: CPT | Performed by: SURGERY

## 2019-04-09 RX ORDER — SODIUM, POTASSIUM,MAG SULFATES 17.5-3.13G
SOLUTION, RECONSTITUTED, ORAL ORAL
Qty: 2 BOTTLE | Refills: 0 | Status: SHIPPED | OUTPATIENT
Start: 2019-04-09 | End: 2019-04-18

## 2019-04-09 NOTE — PROGRESS NOTES
Subjective   Mara Martínez is a 48 y.o. female.     History of Present Illness She had a colonoscopy in 2017 in [place] and is not sure if anything was found. She has rectal bleeding often and is on coumadin for heart valves. She is on aspirin. As well. She has had many scopes in the past. She had been constipated most of her life, but the last two weeks has been diarrhea. She gets crampy abdominal pain after eating. Her gallbladder was removed 20 years ago.     The following portions of the patient's history were reviewed and updated as appropriate: current medications, past family history, past medical history, past social history, past surgical history and problem list.    Review of Systems   Constitutional: Negative for activity change, appetite change, chills, fever and unexpected weight change.   HENT: Negative for congestion, facial swelling and sore throat.    Eyes: Negative for photophobia and visual disturbance.   Respiratory: Negative for chest tightness, shortness of breath and wheezing.    Cardiovascular: Positive for leg swelling. Negative for chest pain and palpitations.   Gastrointestinal: Positive for abdominal pain, blood in stool and diarrhea. Negative for abdominal distention, anal bleeding, constipation, nausea, rectal pain and vomiting.   Endocrine: Negative for cold intolerance, heat intolerance, polydipsia and polyuria.   Genitourinary: Negative for difficulty urinating, dysuria, flank pain and urgency.   Musculoskeletal: Negative for back pain and myalgias.   Skin: Negative for rash and wound.   Allergic/Immunologic: Negative for immunocompromised state.   Neurological: Negative for dizziness, seizures, syncope, light-headedness, numbness and headaches.   Hematological: Negative for adenopathy. Does not bruise/bleed easily.   Psychiatric/Behavioral: Negative for behavioral problems and confusion. The patient is not nervous/anxious.        Objective   Physical Exam   Constitutional: She is  oriented to person, place, and time. She appears well-developed and well-nourished. She does not appear ill. No distress.   HENT:   Head: Normocephalic. Head is without laceration. Hair is normal.   Right Ear: Hearing and ear canal normal.   Left Ear: Hearing and ear canal normal.   Nose: Nose normal. No sinus tenderness. No epistaxis. Right sinus exhibits no maxillary sinus tenderness and no frontal sinus tenderness. Left sinus exhibits no maxillary sinus tenderness and no frontal sinus tenderness.   Eyes: Conjunctivae and lids are normal. Pupils are equal, round, and reactive to light.   Neck: Normal range of motion. No JVD present. No tracheal tenderness present. No tracheal deviation present. No thyroid mass and no thyromegaly present.   Cardiovascular: Normal rate and regular rhythm. Exam reveals no gallop.   No murmur heard.  Pulmonary/Chest: Effort normal and breath sounds normal. No stridor. She has no wheezes. She exhibits no tenderness.   Abdominal: Soft. Bowel sounds are normal. She exhibits no distension, no ascites and no mass. There is no tenderness. There is no rebound and no guarding. No hernia.   Musculoskeletal: She exhibits no edema or deformity.   Lymphadenopathy:     She has no cervical adenopathy.     She has no axillary adenopathy.        Right: No inguinal and no supraclavicular adenopathy present.        Left: No inguinal and no supraclavicular adenopathy present.   Neurological: She is alert and oriented to person, place, and time. She exhibits normal muscle tone.   Skin: Skin is warm, dry and intact. No rash noted. No erythema. No pallor.   Psychiatric: She has a normal mood and affect. Her behavior is normal. Thought content normal.   Vitals reviewed.      Assessment/Plan   Mara was seen today for colonoscopy.    Diagnoses and all orders for this visit:    Generalized abdominal pain    Rectal bleeding    Functional diarrhea    colonoscopy, but will need to contact cardiology about  anticoagulation and cardiac clearance.    I advised Mara of the risks of continuing to use tobacco, and I provided her with tobacco cessation educational materials in the After Visit Summary.     During this visit, I spent 5 minutes counseling the patient regarding tobacco cessation.

## 2019-04-16 ENCOUNTER — TELEPHONE (OUTPATIENT)
Dept: CARDIOLOGY | Facility: CLINIC | Age: 49
End: 2019-04-16

## 2019-04-16 NOTE — TELEPHONE ENCOUNTER
Cardiac clearance req was received in office from  General Surgery. This was reviewed by Matthew Galo PA-C and faxed back on 04/16/2019. -;[facility]    ----- Message from Nidhi De La Cruz sent at 4/15/2019  4:34 PM EDT -----  Dr. Quintanilla left a vm wanting to know if we have a status on pt's cardiac clearance for her colonoscopy. They can be reached at [phone]

## 2019-04-18 ENCOUNTER — OFFICE VISIT (OUTPATIENT)
Dept: CARDIOLOGY | Facility: CLINIC | Age: 49
End: 2019-04-18

## 2019-04-18 VITALS
WEIGHT: 182 LBS | SYSTOLIC BLOOD PRESSURE: 121 MMHG | DIASTOLIC BLOOD PRESSURE: 68 MMHG | BODY MASS INDEX: 34.36 KG/M2 | HEIGHT: 61 IN | OXYGEN SATURATION: 98 % | HEART RATE: 78 BPM

## 2019-04-18 DIAGNOSIS — R06.02 SOB (SHORTNESS OF BREATH): ICD-10-CM

## 2019-04-18 DIAGNOSIS — I35.1 AORTIC VALVE INSUFFICIENCY, ETIOLOGY OF CARDIAC VALVE DISEASE UNSPECIFIED: ICD-10-CM

## 2019-04-18 DIAGNOSIS — I38 VALVULAR HEART DISEASE: ICD-10-CM

## 2019-04-18 DIAGNOSIS — I25.10 CORONARY ARTERY DISEASE INVOLVING NATIVE CORONARY ARTERY OF NATIVE HEART WITHOUT ANGINA PECTORIS: ICD-10-CM

## 2019-04-18 DIAGNOSIS — R60.0 LOWER EXTREMITY EDEMA: ICD-10-CM

## 2019-04-18 DIAGNOSIS — R53.83 FATIGUE, UNSPECIFIED TYPE: Primary | ICD-10-CM

## 2019-04-18 DIAGNOSIS — K62.5 RECTAL BLEED: Primary | ICD-10-CM

## 2019-04-18 PROCEDURE — 99214 OFFICE O/P EST MOD 30 MIN: CPT | Performed by: PHYSICIAN ASSISTANT

## 2019-04-18 PROCEDURE — 93000 ELECTROCARDIOGRAM COMPLETE: CPT | Performed by: PHYSICIAN ASSISTANT

## 2019-04-18 RX ORDER — SODIUM, POTASSIUM,MAG SULFATES 17.5-3.13G
SOLUTION, RECONSTITUTED, ORAL ORAL
Qty: 2 BOTTLE | Refills: 0 | Status: SHIPPED | OUTPATIENT
Start: 2019-04-18 | End: 2019-09-26

## 2019-04-18 RX ORDER — FUROSEMIDE 20 MG/1
20 TABLET ORAL DAILY
Qty: 30 TABLET | Refills: 11 | Status: SHIPPED | OUTPATIENT
Start: 2019-04-18 | End: 2019-07-25 | Stop reason: SDUPTHER

## 2019-04-18 NOTE — PATIENT INSTRUCTIONS
For more information:    Quit Now [place]  1-800-QUIT-NOW  https://[place].quitlogix.org/en-[place]/  Steps to Quit Smoking  Smoking tobacco can be harmful to your health and can affect almost every organ in your body. Smoking puts you, and those around you, at risk for developing many serious chronic diseases. Quitting smoking is difficult, but it is one of the best things that you can do for your health. It is never too late to quit.  What are the benefits of quitting smoking?  When you quit smoking, you lower your risk of developing serious diseases and conditions, such as:  · Lung cancer or lung disease, such as COPD.  · Heart disease.  · Stroke.  · Heart attack.  · Infertility.  · Osteoporosis and bone fractures.  Additionally, symptoms such as coughing, wheezing, and shortness of breath may get better when you quit. You may also find that you get sick less often because your body is stronger at fighting off colds and infections. If you are pregnant, quitting smoking can help to reduce your chances of having a baby of low birth weight.  How do I get ready to quit?  When you decide to quit smoking, create a plan to make sure that you are successful. Before you quit:  · Pick a date to quit. Set a date within the next two weeks to give you time to prepare.  · Write down the reasons why you are quitting. Keep this list in places where you will see it often, such as on your bathroom mirror or in your car or wallet.  · Identify the people, places, things, and activities that make you want to smoke (triggers) and avoid them. Make sure to take these actions:  ¨ Throw away all cigarettes at home, at work, and in your car.  ¨ Throw away smoking accessories, such as ashtrays and lighters.  ¨ Clean your car and make sure to empty the ashtray.  ¨ Clean your home, including curtains and carpets.  · Tell your family, friends, and coworkers that you are quitting. Support from your loved ones can make quitting easier.  · Talk with  your health care provider about your options for quitting smoking.  · Find out what treatment options are covered by your health insurance.  What strategies can I use to quit smoking?  Talk with your healthcare provider about different strategies to quit smoking. Some strategies include:  · Quitting smoking altogether instead of gradually lessening how much you smoke over a period of time. Research shows that quitting “cold turkey” is more successful than gradually quitting.  · Attending in-person counseling to help you build problem-solving skills. You are more likely to have success in quitting if you attend several counseling sessions. Even short sessions of 10 minutes can be effective.  · Finding resources and support systems that can help you to quit smoking and remain smoke-free after you quit. These resources are most helpful when you use them often. They can include:  ¨ Online chats with a counselor.  ¨ Telephone quitlines.  ¨ Printed self-help materials.  ¨ Support groups or group counseling.  ¨ Text messaging programs.  ¨ Mobile phone applications.  · Taking medicines to help you quit smoking. (If you are pregnant or breastfeeding, talk with your health care provider first.) Some medicines contain nicotine and some do not. Both types of medicines help with cravings, but the medicines that include nicotine help to relieve withdrawal symptoms. Your health care provider may recommend:  ¨ Nicotine patches, gum, or lozenges.  ¨ Nicotine inhalers or sprays.  ¨ Non-nicotine medicine that is taken by mouth.  Talk with your health care provider about combining strategies, such as taking medicines while you are also receiving in-person counseling. Using these two strategies together makes you more likely to succeed in quitting than if you used either strategy on its own.  If you are pregnant or breastfeeding, talk with your health care provider about finding counseling or other support strategies to quit smoking. Do  not take medicine to help you quit smoking unless told to do so by your health care provider.  What things can I do to make it easier to quit?  Quitting smoking might feel overwhelming at first, but there is a lot that you can do to make it easier. Take these important actions:  · Reach out to your family and friends and ask that they support and encourage you during this time. Call telephone quitlines, reach out to support groups, or work with a counselor for support.  · Ask people who smoke to avoid smoking around you.  · Avoid places that trigger you to smoke, such as bars, parties, or smoke-break areas at work.  · Spend time around people who do not smoke.  · Lessen stress in your life, because stress can be a smoking trigger for some people. To lessen stress, try:  ¨ Exercising regularly.  ¨ Deep-breathing exercises.  ¨ Yoga.  ¨ Meditating.  ¨ Performing a body scan. This involves closing your eyes, scanning your body from head to toe, and noticing which parts of your body are particularly tense. Purposefully relax the muscles in those areas.  · Download or purchase mobile phone or tablet apps (applications) that can help you stick to your quit plan by providing reminders, tips, and encouragement. There are many free apps, such as QuitGuide from the CDC (Centers for Disease Control and Prevention). You can find other support for quitting smoking (smoking cessation) through smokefree.gov and other websites.  How will I feel when I quit smoking?  Within the first 24 hours of quitting smoking, you may start to feel some withdrawal symptoms. These symptoms are usually most noticeable 2-3 days after quitting, but they usually do not last beyond 2-3 weeks. Changes or symptoms that you might experience include:  · Mood swings.  · Restlessness, anxiety, or irritation.  · Difficulty concentrating.  · Dizziness.  · Strong cravings for sugary foods in addition to nicotine.  · Mild weight  gain.  · Constipation.  · Nausea.  · Coughing or a sore throat.  · Changes in how your medicines work in your body.  · A depressed mood.  · Difficulty sleeping (insomnia).  After the first 2-3 weeks of quitting, you may start to notice more positive results, such as:  · Improved sense of smell and taste.  · Decreased coughing and sore throat.  · Slower heart rate.  · Lower blood pressure.  · Clearer skin.  · The ability to breathe more easily.  · Fewer sick days.  Quitting smoking is very challenging for most people. Do not get discouraged if you are not successful the first time. Some people need to make many attempts to quit before they achieve long-term success. Do your best to stick to your quit plan, and talk with your health care provider if you have any questions or concerns.  This information is not intended to replace advice given to you by your health care provider. Make sure you discuss any questions you have with your health care provider.  Document Released: 12/12/2002 Document Revised: 08/15/2017 Document Reviewed: 05/03/2016  [employer] Interactive Patient Education © 2017 [employer]  Heart-Healthy Eating Plan  Many factors influence your heart health, including eating and exercise habits. Heart (coronary) risk increases with abnormal blood fat (lipid) levels. Heart-healthy meal planning includes limiting unhealthy fats, increasing healthy fats, and making other small dietary changes. This includes maintaining a healthy body weight to help keep lipid levels within a normal range.  What is my plan?  Your health care provider recommends that you:  · Get no more than _________% of the total calories in your daily diet from fat.  · Limit your intake of saturated fat to less than _________% of your total calories each day.  · Limit the amount of cholesterol in your diet to less than _________ mg per day.    What types of fat should I choose?  · Choose healthy fats more often. Choose monounsaturated and  "polyunsaturated fats, such as olive oil and canola oil, flaxseeds, walnuts, almonds, and seeds.  · Eat more omega-3 fats. Good choices include salmon, mackerel, sardines, tuna, flaxseed oil, and ground flaxseeds. Aim to eat fish at least two times each week.  · Limit saturated fats. Saturated fats are primarily found in animal products, such as meats, butter, and cream. Plant sources of saturated fats include palm oil, palm kernel oil, and coconut oil.  · Avoid foods with partially hydrogenated oils in them. These contain trans fats. Examples of foods that contain trans fats are stick margarine, some tub margarines, cookies, crackers, and other baked goods.  What general guidelines do I need to follow?  · Check food labels carefully to identify foods with trans fats or high amounts of saturated fat.  · Fill one half of your plate with vegetables and green salads. Eat 4-5 servings of vegetables per day. A serving of vegetables equals 1 cup of raw leafy vegetables, ½ cup of raw or cooked cut-up vegetables, or ½ cup of vegetable juice.  · Fill one fourth of your plate with whole grains. Look for the word \"whole\" as the first word in the ingredient list.  · Fill one fourth of your plate with lean protein foods.  · Eat 4-5 servings of fruit per day. A serving of fruit equals one medium whole fruit, ¼ cup of dried fruit, ½ cup of fresh, frozen, or canned fruit, or ½ cup of 100% fruit juice.  · Eat more foods that contain soluble fiber. Examples of foods that contain this type of fiber are apples, broccoli, carrots, beans, peas, and barley. Aim to get 20-30 g of fiber per day.  · Eat more home-cooked food and less restaurant, buffet, and fast food.  · Limit or avoid alcohol.  · Limit foods that are high in starch and sugar.  · Avoid fried foods.  · Cook foods by using methods other than frying. Baking, boiling, grilling, and broiling are all great options. Other fat-reducing suggestions include:  ? Removing the skin from " poultry.  ? Removing all visible fats from meats.  ? Skimming the fat off of stews, soups, and gravies before serving them.  ? Steaming vegetables in water or broth.  · Lose weight if you are overweight. Losing just 5-10% of your initial body weight can help your overall health and prevent diseases such as diabetes and heart disease.  · Increase your consumption of nuts, legumes, and seeds to 4-5 servings per week. One serving of dried beans or legumes equals ½ cup after being cooked, one serving of nuts equals 1½ ounces, and one serving of seeds equals ½ ounce or 1 tablespoon.  · You may need to monitor your salt (sodium) intake, especially if you have high blood pressure. Talk with your health care provider or dietitian to get more information about reducing sodium.  What foods can I eat?  Grains    Breads, including Welsh, white, germain, wheat, raisin, rye, oatmeal, and Italian. Tortillas that are neither fried nor made with lard or trans fat. Low-fat rolls, including hotdog and hamburger buns and English muffins. Biscuits. Muffins. Waffles. Pancakes. Light popcorn. Whole-grain cereals. Flatbread. [place] toast. Pretzels. Breadsticks. Rusks. Low-fat snacks and crackers, including oyster, saltine, matzo, janes, animal, and rye. Rice and pasta, including brown rice and those that are made with whole wheat.  Vegetables  All vegetables.  Fruits  All fruits, but limit coconut.  Meats and Other Protein Sources  Lean, well-trimmed beef, veal, pork, and lamb. Chicken and turkey without skin. All fish and shellfish. Wild duck, rabbit, pheasant, and venison. Egg whites or low-cholesterol egg substitutes. Dried beans, peas, lentils, and tofu. Seeds and most nuts.  Dairy  Low-fat or nonfat cheeses, including ricotta, string, and mozzarella. Skim or 1% milk that is liquid, powdered, or evaporated. Buttermilk that is made with low-fat milk. Nonfat or low-fat yogurt.  Beverages  Mineral water. Diet carbonated beverages.  Sweets  and Desserts  Sherbets and fruit ices. Honey, jam, marmalade, jelly, and syrups. Meringues and gelatins. Pure sugar candy, such as hard candy, jelly beans, gumdrops, mints, marshmallows, and small amounts of dark chocolate. [place] food cake.  Eat all sweets and desserts in moderation.  Fats and Oils  Nonhydrogenated (trans-free) margarines. Vegetable oils, including soybean, sesame, sunflower, olive, peanut, safflower, corn, canola, and cottonseed. Salad dressings or mayonnaise that are made with a vegetable oil. Limit added fats and oils that you use for cooking, baking, salads, and as spreads.  Other  Cocoa powder. Coffee and tea. All seasonings and condiments.  The items listed above may not be a complete list of recommended foods or beverages. Contact your dietitian for more options.  What foods are not recommended?  Grains  Breads that are made with saturated or trans fats, oils, or whole milk. Croissants. Butter rolls. Cheese breads. Sweet rolls. Donuts. Buttered popcorn. Chow mein noodles. High-fat crackers, such as cheese or butter crackers.  Meats and Other Protein Sources  Fatty meats, such as hotdogs, short ribs, sausage, spareribs, hernandez, ribeye roast or steak, and mutton. High-fat deli meats, such as salami and bologna. Caviar. Domestic duck and goose. Organ meats, such as kidney, liver, sweetbreads, brains, gizzard, chitterlings, and heart.  Dairy  Cream, sour cream, cream cheese, and creamed cottage cheese. Whole milk cheeses, including blue (km), Vina Tony, Brie, Kt, American, Havarti, Swiss, cheddar, Camembert, and Laurel. Whole or 2% milk that is liquid, evaporated, or condensed. Whole buttermilk. Cream sauce or high-fat cheese sauce. Yogurt that is made from whole milk.  Beverages  Regular sodas and drinks with added sugar.  Sweets and Desserts  Frosting. Pudding. Cookies. Cakes other than aaron food cake. Candy that has milk chocolate or white chocolate, hydrogenated fat, butter,  coconut, or unknown ingredients. Buttered syrups. Full-fat ice cream or ice cream drinks.  Fats and Oils  Gravy that has suet, meat fat, or shortening. Cocoa butter, hydrogenated oils, palm oil, coconut oil, palm kernel oil. These can often be found in baked products, candy, fried foods, nondairy creamers, and whipped toppings. Solid fats and shortenings, including hernandez fat, salt pork, lard, and butter. Nondairy cream substitutes, such as coffee creamers and sour cream substitutes. Salad dressings that are made of unknown oils, cheese, or sour cream.  The items listed above may not be a complete list of foods and beverages to avoid. Contact your dietitian for more information.  This information is not intended to replace advice given to you by your health care provider. Make sure you discuss any questions you have with your health care provider.  Document Released: 09/26/2009 Document Revised: 07/07/2017 Document Reviewed: 06/11/2015  [employer] Interactive Patient Education © 2019 [employer]

## 2019-04-18 NOTE — PROGRESS NOTES
Problem list     Subjective   Mara Martínez is a 48 y.o. female     Chief Complaint   Patient presents with   • Shortness of Breath     presents for 4 month f/u   • Fatigue       HPI      PROBLEM LIST:      1.Valvular heart disease  a. Moderate Aortic Stenosis with GUEVARA of 1.22cm^2, mean gradient of 24mmHg with moderate to severe AI  b. Moderate to severe MR with no evidence of Mitral Stenosis  c. Repeat echo on December 22 2014 showed worsening GUEVARA at 1cm^2, Moderate MR with Mild MS with MVA at 1.69cm^2 and mean gradient of 5mmHg. Grade 2 DD  d. LHC and RHC revealed normal coronaries, mod-severe AI with GUEVARA at 1.2 with moderate AS, Moderate Mitral Stenosis with mild PTHN arguing against severe mitral disease. Medical management indicated at this point.  E. mild to moderate aortic stenosis with moderate aortic regurgitation, mean gradient of 29 and aortic valve area 1.5 cm². Moderate mitral regurgitation with mild to moderate mitral stenosis with mitral valve area 1.Meter squared   F.  Moderate to severe aortic stenosis, severe aortic insufficiency, severe mitral stenosis with mild to moderate mitral regurgitation by echocardiogram November 2017  G. Cardiac cath 12/18/17 demonstrated potentially hemodynamically significant mitral regurgitation associated with mildly to moderately elevated PA pressures. moderate aortic stenosis. The study excluded angiographically significant epicardial coronary disease as a contributing factor to the patient's symptoms. Double valve surgery replacement recommended  H.  Status post aortic and mitral valve replacement by Dr. Cavazos with mechanical valves July 2018  I.  Echocardiogram August 2018 demonstrates normal seated valves with trace aortic insufficiency and mitral insufficiency.  2)Normal systolic fxn   3)Hypertension  3.1) Stress test 8/3/15 - no ischemia, low risk  4)Dyslipidemia  5)Epilepsy  6) Nonobstructive ISREAL by carotid duplex on December 2014  7) Tobacco Habituation,  smokes pack a day  8) Migraines        9)  History of rheumatic fever         Patient is a 48-year-old female that presents back for follow-up.  Patient describes feeling poorly.  She describes to me that she has no chest discomfort.  Over the last few weeks she has developed shortness of breath.  She notices this with casual activity.  No PND orthopnea.  She does notice lower extremity edema.  She has been concerned about her dyspnea.  She does not palpitate or have dysrhythmic symptoms.  Otherwise is doing well    Outpatient Encounter Medications as of 4/18/2019   Medication Sig Dispense Refill   • Ascorbic Acid (VITAMIN C) 500 MG capsule Take 1 tablet by mouth 2 (two) times a day.     • aspirin 81 MG EC tablet Take 81 mg by mouth Daily.     • Cholecalciferol (VITAMIN D3 PO) Take 1,000 Units by mouth Daily.     • ferrous sulfate 325 (65 FE) MG tablet Take 325 mg by mouth 2 (Two) Times a Day.     • nitroglycerin (NITROSTAT) 0.4 MG SL tablet Place 0.4 mg under the tongue Every 5 (Five) Minutes As Needed for Chest Pain. PLACE 1 TABLET UNDER THE TONGUE EVERY 5 MINUTES FOR UP TO 3 DOSES AS NEEDED FOR CHEST PAIN CALL 911 IF PAIN PERSISTS     • nortriptyline (PAMELOR) 50 MG capsule Take 100 mg by mouth Every Night.     • OXcarbazepine (TRILEPTAL) 600 MG tablet Take 600 mg by mouth 2 (Two) Times a Day.     • pantoprazole (PROTONIX) 40 MG EC tablet Take 1 tablet by mouth Daily. 90 tablet 3   • potassium chloride (K-DUR) 10 MEQ CR tablet Take 1 tablet by mouth Daily. 30 tablet 11   • topiramate (TOPAMAX) 100 MG tablet Take 100 mg by mouth 2 (Two) Times a Day.     • warfarin (COUMADIN) 4 MG tablet Take 4 mg by mouth Daily. 4mg daily    pcp monitor     • warfarin (COUMADIN) 5 MG tablet Take 5 mg by mouth Take As Directed.     • furosemide (LASIX) 20 MG tablet Take 1 tablet by mouth Daily. 30 tablet 11   • [DISCONTINUED] SUPREP BOWEL PREP KIT 17.5-3.13-1.6 GM/177ML solution oral solution Dispense one Kit        Sig: Used as  Directed 2 bottle 0     Facility-Administered Encounter Medications as of 4/18/2019   Medication Dose Route Frequency Provider Last Rate Last Dose   • Chlorhexidine Gluconate Cloth 2 % pads 1 application  1 application Topical Q12H PRN Cristobal Lozano PA       • midazolam (VERSED) injection   Intravenous PRN Krishna Blank MD   2 mg at 03/02/18 0655       Azithromycin and Corticosteroids    Past Medical History:   Diagnosis Date   • Anemia    • Aortic regurgitation    • Arthritis    • Back pain    • Carotid bruit    • ISREAL (cerebral atherosclerosis) 12/2014    nonobstructive   • Chest pain    • D-dimer, elevated    • Dizziness    • Edema    • Epilepsy (CMS/HCC)    • Fatigue    • Heart murmur    • Hyperlipidemia    • Hypertension    • Kidney stones    • Migraines    • Mitral regurgitation    • Mitral stenosis     mild   • Palpitations    • Pulmonary edema    • Sleeping difficulties    • SOB (shortness of breath)    • Supraventricular aortic stenosis    • Syncope    • Tachycardia    • Tobacco abuse    • VHD (valvular heart disease)    • Wears dentures    • Wears eyeglasses        Social History     Socioeconomic History   • Marital status:      Spouse name: Not on file   • Number of children: 2   • Years of education: Not on file   • Highest education level: Not on file   Occupational History     Employer: DISABLED   Tobacco Use   • Smoking status: Current Every Day Smoker     Packs/day: 0.25     Years: 36.00     Pack years: 9.00     Types: Cigarettes   • Smokeless tobacco: Never Used   • Tobacco comment: 5 daily   Substance and Sexual Activity   • Alcohol use: No   • Drug use: No   • Sexual activity: Defer   Social History Narrative    Lives in [place], [place] with spouse, children, and grandchildren       Family History   Problem Relation Age of Onset   • Cancer Mother    • Cancer Father    • Hypertension Father    • Other Sister         ACUTE MYOCARDIAL INFARCTION,CABG   • Heart failure Sister    •  "Hypertension Sister    • Stroke Other        Review of Systems   Constitutional: Positive for fatigue (mor the last couple months).   HENT: Negative.    Eyes: Positive for visual disturbance (wears glasses).   Respiratory: Positive for shortness of breath (easily soa with just normal walking).    Cardiovascular: Positive for leg swelling. Negative for chest pain and palpitations.   Gastrointestinal: Positive for abdominal pain, blood in stool (seeing gastro) and diarrhea.   Endocrine: Negative.    Genitourinary: Negative.    Musculoskeletal: Positive for arthralgias, back pain, myalgias and neck pain.   Skin: Negative.    Allergic/Immunologic: Negative.    Neurological: Positive for dizziness (with position changes).   Hematological: Bruises/bleeds easily (bleed).   Psychiatric/Behavioral: Positive for sleep disturbance (insomnia).   All other systems reviewed and are negative.      Objective   Vitals:    04/18/19 1016   BP: 121/68   BP Location: Left arm   Patient Position: Sitting   Pulse: 78   SpO2: 98%   Weight: 82.6 kg (182 lb)   Height: 154.9 cm (60.98\")      /68 (BP Location: Left arm, Patient Position: Sitting)   Pulse 78   Ht 154.9 cm (60.98\")   Wt 82.6 kg (182 lb)   SpO2 98%   BMI 34.41 kg/m²     Lab Results (most recent)     None          Physical Exam   Constitutional: She is oriented to person, place, and time. She appears well-developed and well-nourished. No distress.   HENT:   Head: Normocephalic and atraumatic.   Eyes: EOM are normal. Pupils are equal, round, and reactive to light.   Neck: No JVD present.   Cardiovascular: Normal rate, regular rhythm, normal heart sounds and intact distal pulses. Exam reveals no gallop and no friction rub.   No murmur heard.  Grade 1/6 systolic murmur left sternal border and right upper sternal border with mechanical click auscultated   Pulmonary/Chest: Effort normal and breath sounds normal. No respiratory distress. She has no wheezes. She has no rales. " She exhibits no tenderness.   Musculoskeletal: Normal range of motion. She exhibits no edema.   Neurological: She is alert and oriented to person, place, and time. No cranial nerve deficit.   Skin: Skin is warm and dry. No rash noted. No erythema. No pallor.   Psychiatric: She has a normal mood and affect. Her behavior is normal.   Nursing note and vitals reviewed.      Procedure     ECG 12 Lead  Date/Time: 4/18/2019 10:22 AM  Performed by: Tavares Yeh PA  Authorized by: Tavares Yeh PA   Comments: EKG demonstrates sinus rhythm at 80 bpm, right bundle branch block, no acute ST changes               Assessment/Plan     Problems Addressed this Visit        Cardiovascular and Mediastinum    Valvular heart disease    Relevant Orders    Basic Metabolic Panel    Pulmonary Function Test    Adult Transthoracic Echo Complete W/ Cont if Necessary Per Protocol    Coronary artery disease involving native coronary artery of native heart without angina pectoris    Relevant Orders    Basic Metabolic Panel    Pulmonary Function Test    Adult Transthoracic Echo Complete W/ Cont if Necessary Per Protocol       Respiratory    SOB (shortness of breath)    Relevant Orders    ECG 12 Lead    Basic Metabolic Panel    Pulmonary Function Test    Adult Transthoracic Echo Complete W/ Cont if Necessary Per Protocol       Other    Fatigue - Primary    Relevant Orders    ECG 12 Lead    Basic Metabolic Panel    Pulmonary Function Test    Adult Transthoracic Echo Complete W/ Cont if Necessary Per Protocol    Lower extremity edema    Relevant Orders    Basic Metabolic Panel    Pulmonary Function Test    Adult Transthoracic Echo Complete W/ Cont if Necessary Per Protocol            Recommendation  1.  Patient with complaints of shortness of breath and lower extremity edema that recently occurred.  This is concerning especially with history of valvular disease.  I would like to schedule for echocardiogram to evaluate LV function, assess  valvular structures, evaluate diastolic function and pulmonary pressures.  2.  I am starting her on Lasix 20 mg daily.  We will check a BMP in 1 week.  3.  I would like to schedule pulmonary function studies because of patient's shortness of breath and greater than 30-year smoking history.  4.  We will see patient back for follow-up after testing.  Follow-up with primary as scheduled         I advised Mara of the risks of continuing to use tobacco, and I provided her with tobacco cessation educational materials in the After Visit Summary.     During this visit, I spent <3 minutes counseling the patient regarding tobacco cessation.     Patient's Body mass index is 34.41 kg/m². BMI is above normal parameters. Recommendations include: educational material.       Electronically signed by:

## 2019-04-24 ENCOUNTER — HOSPITAL ENCOUNTER (OUTPATIENT)
Dept: CARDIOLOGY | Facility: HOSPITAL | Age: 49
Discharge: HOME | End: 2019-04-24
Admitting: PHYSICIAN ASSISTANT

## 2019-04-24 DIAGNOSIS — R53.83 FATIGUE, UNSPECIFIED TYPE: ICD-10-CM

## 2019-04-24 DIAGNOSIS — R06.02 SOB (SHORTNESS OF BREATH): ICD-10-CM

## 2019-04-24 DIAGNOSIS — I25.10 CORONARY ARTERY DISEASE INVOLVING NATIVE CORONARY ARTERY OF NATIVE HEART WITHOUT ANGINA PECTORIS: ICD-10-CM

## 2019-04-24 DIAGNOSIS — I38 VALVULAR HEART DISEASE: ICD-10-CM

## 2019-04-24 DIAGNOSIS — R60.0 LOWER EXTREMITY EDEMA: ICD-10-CM

## 2019-04-24 DIAGNOSIS — K21.9 GASTROESOPHAGEAL REFLUX DISEASE, ESOPHAGITIS PRESENCE NOT SPECIFIED: ICD-10-CM

## 2019-04-24 PROCEDURE — 93306 TTE W/DOPPLER COMPLETE: CPT

## 2019-04-24 PROCEDURE — 93306 TTE W/DOPPLER COMPLETE: CPT | Performed by: INTERNAL MEDICINE

## 2019-04-24 RX ORDER — PANTOPRAZOLE SODIUM 40 MG/1
40 TABLET, DELAYED RELEASE ORAL DAILY
Qty: 90 TABLET | Refills: 3 | Status: SHIPPED | OUTPATIENT
Start: 2019-04-24 | End: 2020-05-13 | Stop reason: SDUPTHER

## 2019-04-30 ENCOUNTER — TELEPHONE (OUTPATIENT)
Dept: SURGERY | Facility: CLINIC | Age: 49
End: 2019-04-30

## 2019-04-30 NOTE — TELEPHONE ENCOUNTER
Patient has been doing her Lovenox injections as replacement for her blood thinners. Patient is aware she is to be NPO after midnight the night before her colonoscopy and to have a  with her on the day of. She was also explained to follow her clear liquid diet all day tomorrow and do her prep tomorrow at 5 pm and 8 pm.

## 2019-05-01 PROBLEM — K62.5 RECTAL BLEED: Status: ACTIVE | Noted: 2019-05-01

## 2019-05-02 ENCOUNTER — ANESTHESIA (OUTPATIENT)
Dept: PERIOP | Facility: HOSPITAL | Age: 49
End: 2019-05-02

## 2019-05-02 ENCOUNTER — ANESTHESIA EVENT (OUTPATIENT)
Dept: PERIOP | Facility: HOSPITAL | Age: 49
End: 2019-05-02

## 2019-05-02 ENCOUNTER — HOSPITAL ENCOUNTER (OUTPATIENT)
Facility: HOSPITAL | Age: 49
Setting detail: HOSPITAL OUTPATIENT SURGERY
Discharge: HOME | End: 2019-05-02
Attending: SURGERY | Admitting: SURGERY

## 2019-05-02 VITALS
RESPIRATION RATE: 20 BRPM | WEIGHT: 180 LBS | DIASTOLIC BLOOD PRESSURE: 52 MMHG | TEMPERATURE: 97 F | SYSTOLIC BLOOD PRESSURE: 109 MMHG | BODY MASS INDEX: 33.99 KG/M2 | HEART RATE: 74 BPM | HEIGHT: 61 IN | OXYGEN SATURATION: 98 %

## 2019-05-02 DIAGNOSIS — K62.5 RECTAL BLEED: ICD-10-CM

## 2019-05-02 PROCEDURE — 45378 DIAGNOSTIC COLONOSCOPY: CPT | Performed by: SURGERY

## 2019-05-02 PROCEDURE — 25010000002 PROPOFOL 10 MG/ML EMULSION: Performed by: NURSE ANESTHETIST, CERTIFIED REGISTERED

## 2019-05-02 PROCEDURE — 25010000002 PROPOFOL 1000 MG/ML EMULSION: Performed by: NURSE ANESTHETIST, CERTIFIED REGISTERED

## 2019-05-02 RX ORDER — IPRATROPIUM BROMIDE AND ALBUTEROL SULFATE 2.5; .5 MG/3ML; MG/3ML
3 SOLUTION RESPIRATORY (INHALATION) ONCE AS NEEDED
Status: CANCELLED | OUTPATIENT
Start: 2019-05-02

## 2019-05-02 RX ORDER — OXYCODONE HYDROCHLORIDE AND ACETAMINOPHEN 5; 325 MG/1; MG/1
1 TABLET ORAL ONCE AS NEEDED
Status: CANCELLED | OUTPATIENT
Start: 2019-05-02

## 2019-05-02 RX ORDER — SODIUM CHLORIDE 0.9 % (FLUSH) 0.9 %
3 SYRINGE (ML) INJECTION EVERY 12 HOURS SCHEDULED
Status: DISCONTINUED | OUTPATIENT
Start: 2019-05-02 | End: 2019-05-02 | Stop reason: HOSPADM

## 2019-05-02 RX ORDER — SODIUM CHLORIDE 0.9 % (FLUSH) 0.9 %
3-10 SYRINGE (ML) INJECTION AS NEEDED
Status: DISCONTINUED | OUTPATIENT
Start: 2019-05-02 | End: 2019-05-02 | Stop reason: HOSPADM

## 2019-05-02 RX ORDER — ONDANSETRON 2 MG/ML
4 INJECTION INTRAMUSCULAR; INTRAVENOUS ONCE AS NEEDED
Status: CANCELLED | OUTPATIENT
Start: 2019-05-02

## 2019-05-02 RX ORDER — MEPERIDINE HYDROCHLORIDE 25 MG/ML
12.5 INJECTION INTRAMUSCULAR; INTRAVENOUS; SUBCUTANEOUS
Status: CANCELLED | OUTPATIENT
Start: 2019-05-02 | End: 2019-05-03

## 2019-05-02 RX ORDER — SODIUM CHLORIDE, SODIUM LACTATE, POTASSIUM CHLORIDE, CALCIUM CHLORIDE 600; 310; 30; 20 MG/100ML; MG/100ML; MG/100ML; MG/100ML
125 INJECTION, SOLUTION INTRAVENOUS CONTINUOUS
Status: DISCONTINUED | OUTPATIENT
Start: 2019-05-02 | End: 2019-05-02 | Stop reason: HOSPADM

## 2019-05-02 RX ORDER — PROPOFOL 10 MG/ML
VIAL (ML) INTRAVENOUS AS NEEDED
Status: DISCONTINUED | OUTPATIENT
Start: 2019-05-02 | End: 2019-05-02 | Stop reason: SURG

## 2019-05-02 RX ORDER — FENTANYL CITRATE 50 UG/ML
50 INJECTION, SOLUTION INTRAMUSCULAR; INTRAVENOUS
Status: CANCELLED | OUTPATIENT
Start: 2019-05-02

## 2019-05-02 RX ADMIN — SODIUM CHLORIDE, POTASSIUM CHLORIDE, SODIUM LACTATE AND CALCIUM CHLORIDE: 600; 310; 30; 20 INJECTION, SOLUTION INTRAVENOUS at 07:20

## 2019-05-02 RX ADMIN — PROPOFOL 40 MG: 10 INJECTION, EMULSION INTRAVENOUS at 07:32

## 2019-05-02 RX ADMIN — PROPOFOL 140 MCG/KG/MIN: 10 INJECTION, EMULSION INTRAVENOUS at 07:32

## 2019-05-02 NOTE — DISCHARGE INSTRUCTIONS
Colonoscopy, Adult, Care After  This sheet gives you information about how to care for yourself after your procedure. Your doctor may also give you more specific instructions. If you have problems or questions, call your doctor.  What can I expect after the procedure?  After the procedure, it is common to have:  · A small amount of blood in your poop for 24 hours.  · Some gas.  · Mild cramping or bloating in your belly.    Follow these instructions at home:  General instructions  · For the first 24 hours after the procedure:  ? Do not drive or use machinery.  ? Do not sign important documents.  ? Do not drink alcohol.  ? Do your daily activities more slowly than normal.  ? Eat foods that are soft and easy to digest.  · Take over-the-counter or prescription medicines only as told by your doctor.  To help cramping and bloating:  · Try walking around.  · Put heat on your belly (abdomen) as told by your doctor. Use a heat source that your doctor recommends, such as a moist heat pack or a heating pad.  ? Put a towel between your skin and the heat source.  ? Leave the heat on for 20-30 minutes.  ? Remove the heat if your skin turns bright red. This is especially important if you cannot feel pain, heat, or cold. You can get burned.  Eating and drinking  · Drink enough fluid to keep your pee (urine) clear or pale yellow.  · Return to your normal diet as told by your doctor. Avoid heavy or fried foods that are hard to digest.  · Avoid drinking alcohol for as long as told by your doctor.  Contact a doctor if:  · You have blood in your poop (stool) 2-3 days after the procedure.  Get help right away if:  · You have more than a small amount of blood in your poop.  · You see large clumps of tissue (blood clots) in your poop.  · Your belly is swollen.  · You feel sick to your stomach (nauseous).  · You throw up (vomit).  · You have a fever.  · You have belly pain that gets worse, and medicine does not help your  pain.  Summary  · After the procedure, it is common to have a small amount of blood in your poop. You may also have mild cramping and bloating in your belly.  · For the first 24 hours after the procedure, do not drive or use machinery, do not sign important documents, and do not drink alcohol.  · Get help right away if you have a lot of blood in your poop, feel sick to your stomach, have a fever, or have more belly pain.  This information is not intended to replace advice given to you by your health care provider. Make sure you discuss any questions you have with your health care provider.  Document Released: 01/20/2012 Document Revised: 10/18/2018 Document Reviewed: 09/11/2017  [employer] Interactive Patient Education © 2019 [employer]

## 2019-05-02 NOTE — OP NOTE
COLONOSCOPY  Procedure Note    Mara Martínez  5/2/2019    Pre-op Diagnosis:   Rectal bleed [K62.5]    Post-op Diagnosis: fixed kink in the sigmoid but no stricture, diverticulosis, hemorrhoids        Procedure(s):  COLONOSCOPY    Surgeon(s):  Kurt Gaines MD    Anesthesia: General    Staff:   Circulator: Tamie Contreras RN  Endo Technician: Yasir Vinson    Estimated Blood Loss: none    Specimens:                * No orders in the log *      Drains:      Procedure: The scope had a difficult time passing the sigmoid as she appears to have fixed kink in the pelvis involving the sigmoid. She does have diverticulosis there but no inflammation. The colon was fairly short and no inflammation seen in the colon. The ileocecal valve and terminal ileum looked normal as well. She also has internal and external hemorrhoids.     Findings: diverticulosis, hemorrhoids    Complications: none   Grafts / Implants N/A    Kurt Gaines MD     Date: 5/2/2019  Time: 8:59 AM

## 2019-05-02 NOTE — ANESTHESIA POSTPROCEDURE EVALUATION
Patient: Mara Martínez    Procedure Summary     Date:  05/02/19 Room / Location:  [facility] OR  /  [facility] OR    Anesthesia Start:  0730 Anesthesia Stop:  0809    Procedure:  COLONOSCOPY (N/A ) Diagnosis:       Rectal bleed      (Rectal bleed [K62.5])    Surgeon:  Kurt Gaines MD Provider:  Jonathan Weiss MD    Anesthesia Type:  general ASA Status:  4          Anesthesia Type: general  Last vitals  BP   109/52 (05/02/19 0839)   Temp   97 °F (36.1 °C) (05/02/19 0809)   Pulse   74 (05/02/19 0839)   Resp   20 (05/02/19 0829)     SpO2   98 % (05/02/19 0839)     Post Anesthesia Care and Evaluation    Patient location during evaluation: PHASE II  Patient participation: complete - patient participated  Level of consciousness: awake and alert  Pain score: 1  Pain management: adequate  Airway patency: patent  Anesthetic complications: No anesthetic complications  PONV Status: controlled  Cardiovascular status: acceptable  Respiratory status: acceptable  Hydration status: acceptable

## 2019-05-02 NOTE — ANESTHESIA PREPROCEDURE EVALUATION
Anesthesia Evaluation     Patient summary reviewed and Nursing notes reviewed   no history of anesthetic complications:  NPO Solid Status: > 8 hours  NPO Liquid Status: > 8 hours           Airway   Mallampati: II  TM distance: >3 FB  Neck ROM: full  No difficulty expected  Dental - normal exam   (+) upper dentures and lower dentures    Pulmonary - normal exam   (+) a smoker Current, decreased breath sounds,   (-) asthma, shortness of breath  Cardiovascular - normal exam  Exercise tolerance: poor (<4 METS)    PT is on anticoagulation therapy  Rhythm: regular  Rate: normal    (+) hypertension, valvular problems/murmurs AI and murmur, CAD, CABG, dysrhythmias, murmur, PVD, hyperlipidemia,  carotid artery disease  (-) past MI, angina, CHF      Neuro/Psych  (+) seizures, headaches, dizziness/light headedness, syncope, psychiatric history Depression,     GI/Hepatic/Renal/Endo    (+) obesity,  GERD, GI bleeding, renal disease stones,   (-) diabetes, hypothyroidism    Musculoskeletal     (+) back pain,   Abdominal  - normal exam    Bowel sounds: normal.   Substance History      OB/GYN          Other   (+) arthritis             Anesthesia Evaluation     Patient summary reviewed and Nursing notes reviewed   NPO Solid Status: > 8 hours  NPO Liquid Status: > 8 hours           Airway   Mallampati: I  TM distance: >3 FB  Neck ROM: full  No difficulty expected  Dental    (+) upper dentures    Pulmonary    (+) decreased breath sounds,   Cardiovascular   Exercise tolerance: poor (<4 METS)    Rhythm: regular  Rate: normal    (+) murmur,       Neuro/Psych  GI/Hepatic/Renal/Endo      Musculoskeletal     Abdominal    Substance History      OB/GYN          Other                        Anesthesia Plan    ASA 4     general     intravenous induction   Anesthetic plan and risks discussed with patient.    A line ebenezer pa cath return to icu post op post op ventilation           Anesthesia Plan    ASA 4     general     intravenous induction    Anesthetic plan, all risks, benefits, and alternatives have been provided, discussed and informed consent has been obtained with: patient and spouse/significant other.  Use of blood products discussed with patient and spouse/significant other  Consented to blood products.   A line ebenezer pa cath return to icu post op post op ventilation

## 2019-05-04 LAB
BH CV ECHO MEAS - ACS: 0.96 CM
BH CV ECHO MEAS - AO MAX PG: 31 MMHG
BH CV ECHO MEAS - AO MEAN PG (FULL): 14.2 MMHG
BH CV ECHO MEAS - AO MEAN PG: 18.1 MMHG
BH CV ECHO MEAS - AO ROOT AREA (BSA CORRECTED): 1.9
BH CV ECHO MEAS - AO ROOT AREA: 8.7 CM^2
BH CV ECHO MEAS - AO ROOT DIAM: 3.3 CM
BH CV ECHO MEAS - AO V2 MAX: 280 CM/SEC
BH CV ECHO MEAS - AO V2 MEAN: 200.4 CM/SEC
BH CV ECHO MEAS - AO V2 VTI: 55.8 CM
BH CV ECHO MEAS - AVA(I,A): 1.3 CM^2
BH CV ECHO MEAS - AVA(I,D): 1.3 CM^2
BH CV ECHO MEAS - BSA(HAYCOCK): 1.9 M^2
BH CV ECHO MEAS - BSA: 1.8 M^2
BH CV ECHO MEAS - BZI_BMI: 35.5 KILOGRAMS/M^2
BH CV ECHO MEAS - BZI_METRIC_HEIGHT: 152.4 CM
BH CV ECHO MEAS - BZI_METRIC_WEIGHT: 82.6 KG
BH CV ECHO MEAS - EDV(CUBED): 107.4 ML
BH CV ECHO MEAS - EDV(MOD-SP4): 100 ML
BH CV ECHO MEAS - EDV(TEICH): 105.1 ML
BH CV ECHO MEAS - EF(CUBED): 73.9 %
BH CV ECHO MEAS - EF(MOD-SP4): 65 %
BH CV ECHO MEAS - EF(TEICH): 65.6 %
BH CV ECHO MEAS - ESV(CUBED): 28 ML
BH CV ECHO MEAS - ESV(MOD-SP4): 35 ML
BH CV ECHO MEAS - ESV(TEICH): 36.1 ML
BH CV ECHO MEAS - FS: 36.1 %
BH CV ECHO MEAS - IVS/LVPW: 0.91
BH CV ECHO MEAS - IVSD: 1.2 CM
BH CV ECHO MEAS - LA DIMENSION: 3.8 CM
BH CV ECHO MEAS - LA/AO: 1.2
BH CV ECHO MEAS - LV DIASTOLIC VOL/BSA (35-75): 55.8 ML/M^2
BH CV ECHO MEAS - LV IVRT: 0.07 SEC
BH CV ECHO MEAS - LV MASS(C)D: 238.5 GRAMS
BH CV ECHO MEAS - LV MASS(C)DI: 133 GRAMS/M^2
BH CV ECHO MEAS - LV MEAN PG: 3.9 MMHG
BH CV ECHO MEAS - LV SYSTOLIC VOL/BSA (12-30): 19.5 ML/M^2
BH CV ECHO MEAS - LV V1 MEAN: 90.2 CM/SEC
BH CV ECHO MEAS - LV V1 VTI: 26.4 CM
BH CV ECHO MEAS - LVIDD: 4.8 CM
BH CV ECHO MEAS - LVIDS: 3 CM
BH CV ECHO MEAS - LVLD AP4: 6.8 CM
BH CV ECHO MEAS - LVLS AP4: 5.4 CM
BH CV ECHO MEAS - LVOT AREA (M): 2.8 CM^2
BH CV ECHO MEAS - LVOT AREA: 2.8 CM^2
BH CV ECHO MEAS - LVOT DIAM: 1.9 CM
BH CV ECHO MEAS - LVPWD: 1.3 CM
BH CV ECHO MEAS - MV A MAX VEL: 76.2 CM/SEC
BH CV ECHO MEAS - MV DEC SLOPE: 552.5 CM/SEC^2
BH CV ECHO MEAS - MV E MAX VEL: 154.1 CM/SEC
BH CV ECHO MEAS - MV E/A: 2
BH CV ECHO MEAS - MV MAX PG: 172 MMHG
BH CV ECHO MEAS - MV MEAN PG: 4.8 MMHG
BH CV ECHO MEAS - MV P1/2T MAX VEL: 173.3 CM/SEC
BH CV ECHO MEAS - MV P1/2T: 91.9 MSEC
BH CV ECHO MEAS - MV V2 MEAN: 102 CM/SEC
BH CV ECHO MEAS - MV V2 VTI: 42.7 CM
BH CV ECHO MEAS - MVA P1/2T LCG: 1.3 CM^2
BH CV ECHO MEAS - MVA(P1/2T): 2.4 CM^2
BH CV ECHO MEAS - MVA(VTI): 1.8 CM^2
BH CV ECHO MEAS - RVDD: 2.3 CM
BH CV ECHO MEAS - SI(AO): 270.7 ML/M^2
BH CV ECHO MEAS - SI(CUBED): 44.2 ML/M^2
BH CV ECHO MEAS - SI(LVOT): 42 ML/M^2
BH CV ECHO MEAS - SI(MOD-SP4): 36.2 ML/M^2
BH CV ECHO MEAS - SI(TEICH): 38.5 ML/M^2
BH CV ECHO MEAS - SV(AO): 485.4 ML
BH CV ECHO MEAS - SV(CUBED): 79.3 ML
BH CV ECHO MEAS - SV(LVOT): 75.3 ML
BH CV ECHO MEAS - SV(MOD-SP4): 65 ML
BH CV ECHO MEAS - SV(TEICH): 69 ML
MAXIMAL PREDICTED HEART RATE: 172 BPM
STRESS TARGET HR: 146 BPM

## 2019-05-06 ENCOUNTER — TELEPHONE (OUTPATIENT)
Dept: CARDIOLOGY | Facility: CLINIC | Age: 49
End: 2019-05-06

## 2019-05-06 NOTE — TELEPHONE ENCOUNTER
Echo to be addressed on follow up appointment scheduled for 7/25/19. Janna Clemente MA     ----- Message from YUE Melara sent at 5/6/2019  1:13 PM EDT -----  Routine follow up

## 2019-05-09 ENCOUNTER — OFFICE VISIT (OUTPATIENT)
Dept: SURGERY | Facility: CLINIC | Age: 49
End: 2019-05-09

## 2019-05-09 VITALS — BODY MASS INDEX: 33.99 KG/M2 | WEIGHT: 180 LBS | HEIGHT: 61 IN

## 2019-05-09 DIAGNOSIS — K62.5 RECTAL BLEED: Primary | ICD-10-CM

## 2019-05-09 PROCEDURE — 99212 OFFICE O/P EST SF 10 MIN: CPT | Performed by: SURGERY

## 2019-05-09 NOTE — PROGRESS NOTES
Michael Martínez is a 48 y.o. female.     History of Present Illness Her colon has a kink in the sigmoid with diverticulosis. No polyps or inflammation was see. She does have hemorrhoids, and those were most likely the source of her bleeding.     The following portions of the patient's history were reviewed and updated as appropriate: current medications, past family history, past medical history, past social history, past surgical history and problem list.    Review of Systems rectal bleeding    Objective   Physical Exam abdomen is soft and not tender    Assessment/Plan   Diagnoses and all orders for this visit:    Rectal bleed    The bleed was likely from hemorrhoids. If she develops symptoms from the diverticulosis, consider surgery, but nothing is necessary at this time. Repeat colonoscopy in 3-5 years.

## 2019-07-25 ENCOUNTER — OFFICE VISIT (OUTPATIENT)
Dept: CARDIOLOGY | Facility: CLINIC | Age: 49
End: 2019-07-25

## 2019-07-25 VITALS
OXYGEN SATURATION: 100 % | HEART RATE: 75 BPM | SYSTOLIC BLOOD PRESSURE: 119 MMHG | WEIGHT: 172.6 LBS | HEIGHT: 61 IN | DIASTOLIC BLOOD PRESSURE: 64 MMHG | BODY MASS INDEX: 32.59 KG/M2

## 2019-07-25 DIAGNOSIS — I38 VALVULAR HEART DISEASE: ICD-10-CM

## 2019-07-25 DIAGNOSIS — R06.02 SHORTNESS OF BREATH: Primary | ICD-10-CM

## 2019-07-25 DIAGNOSIS — I50.30 DIASTOLIC CONGESTIVE HEART FAILURE, UNSPECIFIED HF CHRONICITY (HCC): ICD-10-CM

## 2019-07-25 PROCEDURE — 99214 OFFICE O/P EST MOD 30 MIN: CPT | Performed by: PHYSICIAN ASSISTANT

## 2019-07-25 RX ORDER — FUROSEMIDE 40 MG/1
40 TABLET ORAL DAILY
Qty: 30 TABLET | Refills: 6 | Status: ON HOLD | OUTPATIENT
Start: 2019-07-25 | End: 2020-03-23 | Stop reason: SDUPTHER

## 2019-07-25 RX ORDER — AZITHROMYCIN 250 MG/1
TABLET, FILM COATED ORAL
Qty: 6 TABLET | Refills: 0 | Status: SHIPPED | OUTPATIENT
Start: 2019-07-25 | End: 2019-09-26

## 2019-07-25 NOTE — PATIENT INSTRUCTIONS
Heart-Healthy Eating Plan  Many factors influence your heart health, including eating and exercise habits. Heart (coronary) risk increases with abnormal blood fat (lipid) levels. Heart-healthy meal planning includes limiting unhealthy fats, increasing healthy fats, and making other small dietary changes. This includes maintaining a healthy body weight to help keep lipid levels within a normal range.  What is my plan?  Your health care provider recommends that you:  · Get no more than _________% of the total calories in your daily diet from fat.  · Limit your intake of saturated fat to less than _________% of your total calories each day.  · Limit the amount of cholesterol in your diet to less than _________ mg per day.    What types of fat should I choose?  · Choose healthy fats more often. Choose monounsaturated and polyunsaturated fats, such as olive oil and canola oil, flaxseeds, walnuts, almonds, and seeds.  · Eat more omega-3 fats. Good choices include salmon, mackerel, sardines, tuna, flaxseed oil, and ground flaxseeds. Aim to eat fish at least two times each week.  · Limit saturated fats. Saturated fats are primarily found in animal products, such as meats, butter, and cream. Plant sources of saturated fats include palm oil, palm kernel oil, and coconut oil.  · Avoid foods with partially hydrogenated oils in them. These contain trans fats. Examples of foods that contain trans fats are stick margarine, some tub margarines, cookies, crackers, and other baked goods.  What general guidelines do I need to follow?  · Check food labels carefully to identify foods with trans fats or high amounts of saturated fat.  · Fill one half of your plate with vegetables and green salads. Eat 4-5 servings of vegetables per day. A serving of vegetables equals 1 cup of raw leafy vegetables, ½ cup of raw or cooked cut-up vegetables, or ½ cup of vegetable juice.  · Fill one fourth of your plate with whole grains. Look for the word  "\"whole\" as the first word in the ingredient list.  · Fill one fourth of your plate with lean protein foods.  · Eat 4-5 servings of fruit per day. A serving of fruit equals one medium whole fruit, ¼ cup of dried fruit, ½ cup of fresh, frozen, or canned fruit, or ½ cup of 100% fruit juice.  · Eat more foods that contain soluble fiber. Examples of foods that contain this type of fiber are apples, broccoli, carrots, beans, peas, and barley. Aim to get 20-30 g of fiber per day.  · Eat more home-cooked food and less restaurant, buffet, and fast food.  · Limit or avoid alcohol.  · Limit foods that are high in starch and sugar.  · Avoid fried foods.  · Cook foods by using methods other than frying. Baking, boiling, grilling, and broiling are all great options. Other fat-reducing suggestions include:  ? Removing the skin from poultry.  ? Removing all visible fats from meats.  ? Skimming the fat off of stews, soups, and gravies before serving them.  ? Steaming vegetables in water or broth.  · Lose weight if you are overweight. Losing just 5-10% of your initial body weight can help your overall health and prevent diseases such as diabetes and heart disease.  · Increase your consumption of nuts, legumes, and seeds to 4-5 servings per week. One serving of dried beans or legumes equals ½ cup after being cooked, one serving of nuts equals 1½ ounces, and one serving of seeds equals ½ ounce or 1 tablespoon.  · You may need to monitor your salt (sodium) intake, especially if you have high blood pressure. Talk with your health care provider or dietitian to get more information about reducing sodium.  What foods can I eat?  Grains    Breads, including Guatemalan, white, germain, wheat, raisin, rye, oatmeal, and Italian. Tortillas that are neither fried nor made with lard or trans fat. Low-fat rolls, including hotdog and hamburger buns and English muffins. Biscuits. Muffins. Waffles. Pancakes. Light popcorn. Whole-grain cereals. Flatbread. " Abi toast. Pretzels. Breadsticks. Rusks. Low-fat snacks and crackers, including oyster, saltine, matzo, janes, animal, and rye. Rice and pasta, including brown rice and those that are made with whole wheat.  Vegetables  All vegetables.  Fruits  All fruits, but limit coconut.  Meats and Other Protein Sources  Lean, well-trimmed beef, veal, pork, and lamb. Chicken and turkey without skin. All fish and shellfish. Wild duck, rabbit, pheasant, and venison. Egg whites or low-cholesterol egg substitutes. Dried beans, peas, lentils, and tofu. Seeds and most nuts.  Dairy  Low-fat or nonfat cheeses, including ricotta, string, and mozzarella. Skim or 1% milk that is liquid, powdered, or evaporated. Buttermilk that is made with low-fat milk. Nonfat or low-fat yogurt.  Beverages  Mineral water. Diet carbonated beverages.  Sweets and Desserts  Sherbets and fruit ices. Honey, jam, marmalade, jelly, and syrups. Meringues and gelatins. Pure sugar candy, such as hard candy, jelly beans, gumdrops, mints, marshmallows, and small amounts of dark chocolate. Aaron food cake.  Eat all sweets and desserts in moderation.  Fats and Oils  Nonhydrogenated (trans-free) margarines. Vegetable oils, including soybean, sesame, sunflower, olive, peanut, safflower, corn, canola, and cottonseed. Salad dressings or mayonnaise that are made with a vegetable oil. Limit added fats and oils that you use for cooking, baking, salads, and as spreads.  Other  Cocoa powder. Coffee and tea. All seasonings and condiments.  The items listed above may not be a complete list of recommended foods or beverages. Contact your dietitian for more options.  What foods are not recommended?  Grains  Breads that are made with saturated or trans fats, oils, or whole milk. Croissants. Butter rolls. Cheese breads. Sweet rolls. Donuts. Buttered popcorn. Chow mein noodles. High-fat crackers, such as cheese or butter crackers.  Meats and Other Protein Sources  Fatty meats, such  as hotdogs, short ribs, sausage, spareribs, hernandez, ribeye roast or steak, and mutton. High-fat deli meats, such as salami and bologna. Caviar. Domestic duck and goose. Organ meats, such as kidney, liver, sweetbreads, brains, gizzard, chitterlings, and heart.  Dairy  Cream, sour cream, cream cheese, and creamed cottage cheese. Whole milk cheeses, including blue (km), Diana Tony, Brie, Kt, American, Havarti, Swiss, cheddar, Camembert, and [place]. Whole or 2% milk that is liquid, evaporated, or condensed. Whole buttermilk. Cream sauce or high-fat cheese sauce. Yogurt that is made from whole milk.  Beverages  Regular sodas and drinks with added sugar.  Sweets and Desserts  Frosting. Pudding. Cookies. Cakes other than mariola food cake. Candy that has milk chocolate or white chocolate, hydrogenated fat, butter, coconut, or unknown ingredients. Buttered syrups. Full-fat ice cream or ice cream drinks.  Fats and Oils  Gravy that has suet, meat fat, or shortening. Cocoa butter, hydrogenated oils, palm oil, coconut oil, palm kernel oil. These can often be found in baked products, candy, fried foods, nondairy creamers, and whipped toppings. Solid fats and shortenings, including hernandez fat, salt pork, lard, and butter. Nondairy cream substitutes, such as coffee creamers and sour cream substitutes. Salad dressings that are made of unknown oils, cheese, or sour cream.  The items listed above may not be a complete list of foods and beverages to avoid. Contact your dietitian for more information.  This information is not intended to replace advice given to you by your health care provider. Make sure you discuss any questions you have with your health care provider.  Document Released: 09/26/2009 Document Revised: 07/07/2017 Document Reviewed: 06/11/2015  [employer] Interactive Patient Education © 2019 [employer]  For more information:    Quit Now [place]  1-800-QUIT-NOW  https://gily.quitlogix.org/en-[place]/  Steps to Quit  Smoking  Smoking tobacco can be harmful to your health and can affect almost every organ in your body. Smoking puts you, and those around you, at risk for developing many serious chronic diseases. Quitting smoking is difficult, but it is one of the best things that you can do for your health. It is never too late to quit.  What are the benefits of quitting smoking?  When you quit smoking, you lower your risk of developing serious diseases and conditions, such as:  · Lung cancer or lung disease, such as COPD.  · Heart disease.  · Stroke.  · Heart attack.  · Infertility.  · Osteoporosis and bone fractures.  Additionally, symptoms such as coughing, wheezing, and shortness of breath may get better when you quit. You may also find that you get sick less often because your body is stronger at fighting off colds and infections. If you are pregnant, quitting smoking can help to reduce your chances of having a baby of low birth weight.  How do I get ready to quit?  When you decide to quit smoking, create a plan to make sure that you are successful. Before you quit:  · Pick a date to quit. Set a date within the next two weeks to give you time to prepare.  · Write down the reasons why you are quitting. Keep this list in places where you will see it often, such as on your bathroom mirror or in your car or wallet.  · Identify the people, places, things, and activities that make you want to smoke (triggers) and avoid them. Make sure to take these actions:  ¨ Throw away all cigarettes at home, at work, and in your car.  ¨ Throw away smoking accessories, such as ashtrays and lighters.  ¨ Clean your car and make sure to empty the ashtray.  ¨ Clean your home, including curtains and carpets.  · Tell your family, friends, and coworkers that you are quitting. Support from your loved ones can make quitting easier.  · Talk with your health care provider about your options for quitting smoking.  · Find out what treatment options are  covered by your health insurance.  What strategies can I use to quit smoking?  Talk with your healthcare provider about different strategies to quit smoking. Some strategies include:  · Quitting smoking altogether instead of gradually lessening how much you smoke over a period of time. Research shows that quitting “cold turkey” is more successful than gradually quitting.  · Attending in-person counseling to help you build problem-solving skills. You are more likely to have success in quitting if you attend several counseling sessions. Even short sessions of 10 minutes can be effective.  · Finding resources and support systems that can help you to quit smoking and remain smoke-free after you quit. These resources are most helpful when you use them often. They can include:  ¨ Online chats with a counselor.  ¨ Telephone quitlines.  ¨ Printed self-help materials.  ¨ Support groups or group counseling.  ¨ Text messaging programs.  ¨ Mobile phone applications.  · Taking medicines to help you quit smoking. (If you are pregnant or breastfeeding, talk with your health care provider first.) Some medicines contain nicotine and some do not. Both types of medicines help with cravings, but the medicines that include nicotine help to relieve withdrawal symptoms. Your health care provider may recommend:  ¨ Nicotine patches, gum, or lozenges.  ¨ Nicotine inhalers or sprays.  ¨ Non-nicotine medicine that is taken by mouth.  Talk with your health care provider about combining strategies, such as taking medicines while you are also receiving in-person counseling. Using these two strategies together makes you more likely to succeed in quitting than if you used either strategy on its own.  If you are pregnant or breastfeeding, talk with your health care provider about finding counseling or other support strategies to quit smoking. Do not take medicine to help you quit smoking unless told to do so by your health care provider.  What  things can I do to make it easier to quit?  Quitting smoking might feel overwhelming at first, but there is a lot that you can do to make it easier. Take these important actions:  · Reach out to your family and friends and ask that they support and encourage you during this time. Call telephone quitlines, reach out to support groups, or work with a counselor for support.  · Ask people who smoke to avoid smoking around you.  · Avoid places that trigger you to smoke, such as bars, parties, or smoke-break areas at work.  · Spend time around people who do not smoke.  · Lessen stress in your life, because stress can be a smoking trigger for some people. To lessen stress, try:  ¨ Exercising regularly.  ¨ Deep-breathing exercises.  ¨ Yoga.  ¨ Meditating.  ¨ Performing a body scan. This involves closing your eyes, scanning your body from head to toe, and noticing which parts of your body are particularly tense. Purposefully relax the muscles in those areas.  · Download or purchase mobile phone or tablet apps (applications) that can help you stick to your quit plan by providing reminders, tips, and encouragement. There are many free apps, such as QuitGuide from the CDC (Centers for Disease Control and Prevention). You can find other support for quitting smoking (smoking cessation) through smokefree.gov and other websites.  How will I feel when I quit smoking?  Within the first 24 hours of quitting smoking, you may start to feel some withdrawal symptoms. These symptoms are usually most noticeable 2-3 days after quitting, but they usually do not last beyond 2-3 weeks. Changes or symptoms that you might experience include:  · Mood swings.  · Restlessness, anxiety, or irritation.  · Difficulty concentrating.  · Dizziness.  · Strong cravings for sugary foods in addition to nicotine.  · Mild weight gain.  · Constipation.  · Nausea.  · Coughing or a sore throat.  · Changes in how your medicines work in your body.  · A depressed  mood.  · Difficulty sleeping (insomnia).  After the first 2-3 weeks of quitting, you may start to notice more positive results, such as:  · Improved sense of smell and taste.  · Decreased coughing and sore throat.  · Slower heart rate.  · Lower blood pressure.  · Clearer skin.  · The ability to breathe more easily.  · Fewer sick days.  Quitting smoking is very challenging for most people. Do not get discouraged if you are not successful the first time. Some people need to make many attempts to quit before they achieve long-term success. Do your best to stick to your quit plan, and talk with your health care provider if you have any questions or concerns.  This information is not intended to replace advice given to you by your health care provider. Make sure you discuss any questions you have with your health care provider.  Document Released: 12/12/2002 Document Revised: 08/15/2017 Document Reviewed: 05/03/2016  [employer] Interactive Patient Education © 2017 [employer]

## 2019-07-25 NOTE — PROGRESS NOTES
Problem list     Subjective   Mara Martínez is a 48 y.o. female     Chief Complaint   Patient presents with   • Shortness of Breath     here for 3 month f/u   • Fatigue       HPI    PROBLEM LIST:      1.Valvular heart disease  a. Moderate Aortic Stenosis with GUEVARA of 1.22cm^2, mean gradient of 24mmHg with moderate to severe AI  b. Moderate to severe MR with no evidence of Mitral Stenosis  c. Repeat echo on December 22 2014 showed worsening GUEVARA at 1cm^2, Moderate MR with Mild MS with MVA at 1.69cm^2 and mean gradient of 5mmHg. Grade 2 DD  d. LHC and RHC revealed normal coronaries, mod-severe AI with GUEVARA at 1.2 with moderate AS, Moderate Mitral Stenosis with mild PTHN arguing against severe mitral disease. Medical management indicated at this point.  E. mild to moderate aortic stenosis with moderate aortic regurgitation, mean gradient of 29 and aortic valve area 1.5 cm². Moderate mitral regurgitation with mild to moderate mitral stenosis with mitral valve area 1.Meter squared   F.  Moderate to severe aortic stenosis, severe aortic insufficiency, severe mitral stenosis with mild to moderate mitral regurgitation by echocardiogram November 2017  G. Cardiac cath 12/18/17 demonstrated potentially hemodynamically significant mitral regurgitation associated with mildly to moderately elevated PA pressures. moderate aortic stenosis. The study excluded angiographically significant epicardial coronary disease as a contributing factor to the patient's symptoms. Double valve surgery replacement recommended  H.  Status post aortic and mitral valve replacement by Dr. Cavazos with mechanical valves July 2018  I.  Echocardiogram August 2018 demonstrates normal seated valves with trace aortic insufficiency and mitral insufficiency.  2)Normal systolic fxn   3)Hypertension  3.1) Stress test 8/3/15 - no ischemia, low risk  4)Dyslipidemia  5)Epilepsy  6) Nonobstructive ISREAL by carotid duplex on December 2014  7) Tobacco Habituation, smokes  pack a day  8) Migraines        9)  History of rheumatic fever            Patient is a 48-year-old female who presents back to the office for follow-up.  She is here today to review echocardiogram.  It was ordered because patient has significant shortness of breath.  There was some concern about congestive failure.  Echo suggests good LV function but moderate diastolic dysfunction and stable valve parameters.  Fortunately, she recently had to go to the hospital.  She was found to be anemic and required transfusion.  She describes to me having endoscopy performed and is to see a general surgeon for hemorrhoids.    She continues to have shortness of breath.  She had pulmonary function studies performed which did demonstrate a degree of COPD.  She has no significant chest pain.  She occasionally will have a sharp pain but nothing of significance.  Her dyspnea is moderate she does complain of orthopnea.    She does not palpitated of dysrhythmic symptoms and otherwise is doing well      Outpatient Encounter Medications as of 7/25/2019   Medication Sig Dispense Refill   • Ascorbic Acid (VITAMIN C) 500 MG capsule Take 1 tablet by mouth 2 (two) times a day.     • aspirin 81 MG EC tablet Take 81 mg by mouth Daily.     • Cholecalciferol (VITAMIN D3 PO) Take 1,000 Units by mouth Daily.     • ferrous sulfate 325 (65 FE) MG tablet Take 325 mg by mouth 2 (Two) Times a Day.     • furosemide (LASIX) 40 MG tablet Take 1 tablet by mouth Daily. 30 tablet 6   • nitroglycerin (NITROSTAT) 0.4 MG SL tablet Place 0.4 mg under the tongue Every 5 (Five) Minutes As Needed for Chest Pain. PLACE 1 TABLET UNDER THE TONGUE EVERY 5 MINUTES FOR UP TO 3 DOSES AS NEEDED FOR CHEST PAIN CALL 911 IF PAIN PERSISTS     • nortriptyline (PAMELOR) 50 MG capsule Take 100 mg by mouth Every Night.     • OXcarbazepine (TRILEPTAL) 600 MG tablet Take 600 mg by mouth 2 (Two) Times a Day.     • pantoprazole (PROTONIX) 40 MG EC tablet Take 1 tablet by mouth Daily. 90  tablet 3   • potassium chloride (K-DUR) 10 MEQ CR tablet Take 1 tablet by mouth Daily. 30 tablet 11   • SUPREP BOWEL PREP KIT 17.5-3.13-1.6 GM/177ML solution oral solution Dispense one Kit        Sig: Used as Directed 2 bottle 0   • warfarin (COUMADIN) 5 MG tablet Take 5 mg by mouth Take As Directed.     • [DISCONTINUED] furosemide (LASIX) 20 MG tablet Take 1 tablet by mouth Daily. 30 tablet 11   • azithromycin (ZITHROMAX Z-MARGARITA) 250 MG tablet Take 2 tablets the first day, then 1 tablet daily for 4 days. 6 tablet 0   • [DISCONTINUED] enoxaparin (LOVENOX) 40 MG/0.4ML solution syringe Inject 0.4 mL under the skin into the appropriate area as directed Every 12 (Twelve) Hours. 10 syringe 0   • [DISCONTINUED] topiramate (TOPAMAX) 100 MG tablet Take 100 mg by mouth 2 (Two) Times a Day.     • [DISCONTINUED] warfarin (COUMADIN) 4 MG tablet Take 4 mg by mouth Daily. 4mg daily    pcp monitor       Facility-Administered Encounter Medications as of 7/25/2019   Medication Dose Route Frequency Provider Last Rate Last Dose   • Chlorhexidine Gluconate Cloth 2 % pads 1 application  1 application Topical Q12H PRN Cristobal Lozano PA       • midazolam (VERSED) injection   Intravenous PRN Krishna Blank MD   2 mg at 03/02/18 0655       Azithromycin and Corticosteroids    Past Medical History:   Diagnosis Date   • Anemia    • Aortic regurgitation    • Arthritis    • Back pain    • Carotid bruit    • ISREAL (cerebral atherosclerosis) 12/2014    nonobstructive   • Chest pain    • Coronary artery disease    • D-dimer, elevated    • Diastolic congestive heart failure (CMS/HCC) 7/25/2019   • Dizziness    • Edema    • Epilepsy (CMS/HCC)    • Fatigue    • Heart murmur    • History of transfusion    • Hyperlipidemia    • Hypertension    • Kidney stones    • Migraines    • Mitral regurgitation    • Mitral stenosis     mild   • Palpitations    • Pulmonary edema    • Sleeping difficulties    • SOB (shortness of breath)    • Supraventricular aortic  stenosis    • Syncope    • Tachycardia    • Tobacco abuse    • VHD (valvular heart disease)    • Wears dentures    • Wears eyeglasses        Social History     Socioeconomic History   • Marital status:      Spouse name: Not on file   • Number of children: 2   • Years of education: Not on file   • Highest education level: Not on file   Occupational History     Employer: DISABLED   Tobacco Use   • Smoking status: Current Every Day Smoker     Packs/day: 0.50     Years: 36.00     Pack years: 18.00     Types: Cigarettes   • Smokeless tobacco: Never Used   • Tobacco comment: 5 daily   Substance and Sexual Activity   • Alcohol use: No   • Drug use: No   • Sexual activity: Defer   Social History Narrative    Lives in [place], [place] with spouse, children, and grandchildren       Family History   Problem Relation Age of Onset   • Cancer Mother    • Cancer Father    • Hypertension Father    • Other Sister         ACUTE MYOCARDIAL INFARCTION,CABG   • Heart failure Sister    • Hypertension Sister    • Stroke Other        Review of Systems   Constitutional: Positive for fatigue (recent blood transfusion).   HENT: Positive for sore throat.    Eyes: Positive for visual disturbance.   Respiratory: Positive for cough and shortness of breath (with any activity).    Cardiovascular: Positive for leg swelling. Negative for chest pain and palpitations.   Gastrointestinal: Positive for blood in stool and diarrhea.   Endocrine: Negative.    Genitourinary: Negative.    Musculoskeletal: Positive for arthralgias, back pain, myalgias and neck pain.   Skin: Negative.    Allergic/Immunologic: Negative.    Neurological: Positive for dizziness (often with any activity) and weakness.   Hematological: Bruises/bleeds easily.   Psychiatric/Behavioral: Negative.    All other systems reviewed and are negative.      Objective   Vitals:    07/25/19 1003   BP: 119/64   BP Location: Left arm   Patient Position: Sitting   Pulse: 75   SpO2: 100%  "  Weight: 78.3 kg (172 lb 9.6 oz)   Height: 154.9 cm (60.98\")      /64 (BP Location: Left arm, Patient Position: Sitting)   Pulse 75   Ht 154.9 cm (60.98\")   Wt 78.3 kg (172 lb 9.6 oz)   SpO2 100%   BMI 32.63 kg/m²     Lab Results (most recent)     None          Physical Exam   Constitutional: She is oriented to person, place, and time. She appears well-developed and well-nourished. No distress.   HENT:   Head: Normocephalic and atraumatic.   Eyes: EOM are normal. Pupils are equal, round, and reactive to light.   Neck: No JVD present.   Cardiovascular: Normal rate, regular rhythm, normal heart sounds and intact distal pulses. Exam reveals no gallop and no friction rub.   No murmur heard.  Pulmonary/Chest: Effort normal and breath sounds normal. No respiratory distress. She has no wheezes. She has no rales. She exhibits no tenderness.   Musculoskeletal: Normal range of motion. She exhibits no edema.   Neurological: She is alert and oriented to person, place, and time. No cranial nerve deficit.   Skin: Skin is warm and dry. No rash noted. No erythema. No pallor.   Psychiatric: She has a normal mood and affect. Her behavior is normal.   Nursing note and vitals reviewed.      Procedure   Procedures       Assessment/Plan     Problems Addressed this Visit        Cardiovascular and Mediastinum    Valvular heart disease    Relevant Orders    proBNP    Basic Metabolic Panel    Diastolic congestive heart failure (CMS/HCC)    Relevant Orders    proBNP    Basic Metabolic Panel       Respiratory    Shortness of breath - Primary    Relevant Orders    Ambulatory Referral to Pulmonology    proBNP    Basic Metabolic Panel              Recommendation  1.  Patient with what appears to be diastolic heart failure.  I would like to increase her Lasix to 40 mg.  I want to check labs in a week to ensure no azotemia or electrolyte abnormality.  2.  I would like to refer to pulmonology as it appears by her PFTs that she has a " degree of COPD.  3.  Otherwise she is doing well.  I will see her back for follow-up in 1 to 2 months.  She will follow with primary pulmonology as scheduled       Mara Martínez is a current cigarettes user.  She currently smokes and chews 0 pack of cigarettes per day for a duration of 30 years. I have educated her on the risk of diseases from using tobacco products such as cancer, COPD and heart diease.     I advised her to quit and she is not willing to quit.    I spent <3  minutes counseling the patient.         Patient's Body mass index is 32.63 kg/m². BMI is above normal parameters. Recommendations include: educational material.       Electronically signed by:

## 2019-07-31 ENCOUNTER — LAB (OUTPATIENT)
Dept: LAB | Facility: HOSPITAL | Age: 49
End: 2019-07-31

## 2019-07-31 DIAGNOSIS — I50.30 DIASTOLIC CONGESTIVE HEART FAILURE, UNSPECIFIED HF CHRONICITY (HCC): ICD-10-CM

## 2019-07-31 DIAGNOSIS — I38 VALVULAR HEART DISEASE: ICD-10-CM

## 2019-07-31 DIAGNOSIS — R06.02 SHORTNESS OF BREATH: ICD-10-CM

## 2019-07-31 PROCEDURE — 36415 COLL VENOUS BLD VENIPUNCTURE: CPT

## 2019-07-31 PROCEDURE — 83880 ASSAY OF NATRIURETIC PEPTIDE: CPT | Performed by: PHYSICIAN ASSISTANT

## 2019-07-31 PROCEDURE — 80048 BASIC METABOLIC PNL TOTAL CA: CPT | Performed by: PHYSICIAN ASSISTANT

## 2019-08-01 LAB
ANION GAP SERPL CALCULATED.3IONS-SCNC: 16.5 MMOL/L (ref 5–15)
BUN BLD-MCNC: 15 MG/DL (ref 6–20)
BUN/CREAT SERPL: 15.2 (ref 7–25)
CALCIUM SPEC-SCNC: 8.8 MG/DL (ref 8.6–10.5)
CHLORIDE SERPL-SCNC: 103 MMOL/L (ref 98–107)
CO2 SERPL-SCNC: 18.5 MMOL/L (ref 22–29)
CREAT BLD-MCNC: 0.99 MG/DL (ref 0.57–1)
GFR SERPL CREATININE-BSD FRML MDRD: 60 ML/MIN/1.73
GLUCOSE BLD-MCNC: 98 MG/DL (ref 65–99)
NT-PROBNP SERPL-MCNC: 614.9 PG/ML (ref 5–450)
POTASSIUM BLD-SCNC: 3.9 MMOL/L (ref 3.5–5.2)
SODIUM BLD-SCNC: 138 MMOL/L (ref 136–145)

## 2019-08-28 DIAGNOSIS — R60.9 EDEMA, UNSPECIFIED TYPE: ICD-10-CM

## 2019-08-28 DIAGNOSIS — I10 ESSENTIAL HYPERTENSION: ICD-10-CM

## 2019-08-28 RX ORDER — POTASSIUM CHLORIDE 750 MG/1
10 TABLET, FILM COATED, EXTENDED RELEASE ORAL DAILY
Qty: 30 TABLET | Refills: 11 | Status: SHIPPED | OUTPATIENT
Start: 2019-08-28 | End: 2019-09-26 | Stop reason: SDUPTHER

## 2019-09-26 ENCOUNTER — OFFICE VISIT (OUTPATIENT)
Dept: CARDIOLOGY | Facility: CLINIC | Age: 49
End: 2019-09-26

## 2019-09-26 VITALS
OXYGEN SATURATION: 100 % | BODY MASS INDEX: 32.28 KG/M2 | HEART RATE: 82 BPM | WEIGHT: 171 LBS | SYSTOLIC BLOOD PRESSURE: 97 MMHG | HEIGHT: 61 IN | DIASTOLIC BLOOD PRESSURE: 53 MMHG

## 2019-09-26 DIAGNOSIS — R60.9 EDEMA, UNSPECIFIED TYPE: ICD-10-CM

## 2019-09-26 DIAGNOSIS — I50.30 DIASTOLIC CONGESTIVE HEART FAILURE, UNSPECIFIED HF CHRONICITY (HCC): ICD-10-CM

## 2019-09-26 DIAGNOSIS — R06.02 SHORTNESS OF BREATH: Primary | ICD-10-CM

## 2019-09-26 DIAGNOSIS — I10 ESSENTIAL HYPERTENSION: ICD-10-CM

## 2019-09-26 PROCEDURE — 99214 OFFICE O/P EST MOD 30 MIN: CPT | Performed by: PHYSICIAN ASSISTANT

## 2019-09-26 RX ORDER — UBIDECARENONE 75 MG
100 CAPSULE ORAL
COMMUNITY

## 2019-09-26 RX ORDER — POTASSIUM CHLORIDE 750 MG/1
20 TABLET, FILM COATED, EXTENDED RELEASE ORAL DAILY
Qty: 30 TABLET | Refills: 11 | Status: SHIPPED | OUTPATIENT
Start: 2019-09-26 | End: 2021-12-09

## 2019-09-26 NOTE — PROGRESS NOTES
Problem list     Subjective   Mara Martínez is a 49 y.o. female     Chief Complaint   Patient presents with   • Coronary Artery Disease     Here for a follow up    • Shortness of Breath   • Valvular heart disease       HPI    PROBLEM LIST:      1.Valvular heart disease  a. Moderate Aortic Stenosis with GUEVARA of 1.22cm^2, mean gradient of 24mmHg with moderate to severe AI  b. Moderate to severe MR with no evidence of Mitral Stenosis  c. Repeat echo on December 22 2014 showed worsening GUEVARA at 1cm^2, Moderate MR with Mild MS with MVA at 1.69cm^2 and mean gradient of 5mmHg. Grade 2 DD  d. LHC and RHC revealed normal coronaries, mod-severe AI with GUEVARA at 1.2 with moderate AS, Moderate Mitral Stenosis with mild PTHN arguing against severe mitral disease. Medical management indicated at this point.  E. mild to moderate aortic stenosis with moderate aortic regurgitation, mean gradient of 29 and aortic valve area 1.5 cm². Moderate mitral regurgitation with mild to moderate mitral stenosis with mitral valve area 1.Meter squared   F.  Moderate to severe aortic stenosis, severe aortic insufficiency, severe mitral stenosis with mild to moderate mitral regurgitation by echocardiogram November 2017  G. Cardiac cath 12/18/17 demonstrated potentially hemodynamically significant mitral regurgitation associated with mildly to moderately elevated PA pressures. moderate aortic stenosis. The study excluded angiographically significant epicardial coronary disease as a contributing factor to the patient's symptoms. Double valve surgery replacement recommended  H.  Status post aortic and mitral valve replacement by Dr. Cavazos with mechanical valves July 2018  I.  Echocardiogram August 2018 demonstrates normal seated valves with trace aortic insufficiency and mitral insufficiency.  2)Normal systolic fxn   3)Hypertension  3.1) Stress test 8/3/15 - no ischemia, low risk  4)Dyslipidemia  5)Epilepsy  6) Nonobstructive ISREAL by carotid duplex on  December 2014  7) Tobacco Habituation, smokes pack a day  8) Migraines        9)  History of rheumatic fever    Patient is a 49-year-old female who presents back for follow-up.  Last office visit, patient had significant lower extremity edema which prompted diuretic therapy.  She has a degree of diastolic heart failure.  She presents back today for follow-up.  We also referred to pulmonology because pulmonary function test seems to suggest an obstructive pattern.  She has since been diagnosed with COPD and there is evidence of possible interstitial lung disease.  She is soon to be treated for sleep apnea and to follow back up with pulmonology.    Patient describes her edema has improved.  On diuretic therapy there is improvement.  She still have moderate dyspnea which is likely multifactorial.  She does not describe chest discomfort other than occasional chest tightness with shortness of breath.  This is not similar to what she felt with previous cardiac issues.  She has no described PND orthopnea.    She does notice palpitations.  Recently she has began to notice any fluttering sensation.  She does not describe presyncope or syncope but it has become noticeable.  Otherwise she is doing well      Outpatient Encounter Medications as of 9/26/2019   Medication Sig Dispense Refill   • Ascorbic Acid (VITAMIN C) 500 MG capsule Take 1 tablet by mouth 2 (two) times a day.     • aspirin 81 MG EC tablet Take 81 mg by mouth Daily.     • Cholecalciferol (VITAMIN D3 PO) Take 1,000 Units by mouth Daily.     • ferrous sulfate 325 (65 FE) MG tablet Take 325 mg by mouth 2 (Two) Times a Day.     • furosemide (LASIX) 40 MG tablet Take 1 tablet by mouth Daily. 30 tablet 6   • nitroglycerin (NITROSTAT) 0.4 MG SL tablet Place 0.4 mg under the tongue Every 5 (Five) Minutes As Needed for Chest Pain. PLACE 1 TABLET UNDER THE TONGUE EVERY 5 MINUTES FOR UP TO 3 DOSES AS NEEDED FOR CHEST PAIN CALL 911 IF PAIN PERSISTS     • nortriptyline  (PAMELOR) 50 MG capsule Take 100 mg by mouth Every Night.     • OXcarbazepine (TRILEPTAL) 600 MG tablet Take 600 mg by mouth 2 (Two) Times a Day.     • pantoprazole (PROTONIX) 40 MG EC tablet Take 1 tablet by mouth Daily. 90 tablet 3   • potassium chloride (K-DUR) 10 MEQ CR tablet Take 2 tablets by mouth Daily. 30 tablet 11   • topiramate (TOPAMAX) 100 MG tablet Take 100 mg by mouth 2 (Two) Times a Day.     • vitamin B-12 (CYANOCOBALAMIN) 100 MCG tablet Take 100 mcg by mouth.     • warfarin (COUMADIN) 5 MG tablet Take 5 mg by mouth Take As Directed.     • [DISCONTINUED] potassium chloride (K-DUR) 10 MEQ CR tablet Take 1 tablet by mouth Daily. 30 tablet 11   • [DISCONTINUED] azithromycin (ZITHROMAX Z-MARGARITA) 250 MG tablet Take 2 tablets the first day, then 1 tablet daily for 4 days. 6 tablet 0   • [DISCONTINUED] SUPREP BOWEL PREP KIT 17.5-3.13-1.6 GM/177ML solution oral solution Dispense one Kit        Sig: Used as Directed 2 bottle 0     Facility-Administered Encounter Medications as of 9/26/2019   Medication Dose Route Frequency Provider Last Rate Last Dose   • Chlorhexidine Gluconate Cloth 2 % pads 1 application  1 application Topical Q12H PRN Cristobal Lozano PA       • midazolam (VERSED) injection   Intravenous PRN Krishna Blank MD   2 mg at 03/02/18 0655       Azithromycin and Corticosteroids    Past Medical History:   Diagnosis Date   • Anemia    • Aortic regurgitation    • Arthritis    • Back pain    • Carotid bruit    • ISREAL (cerebral atherosclerosis) 12/2014    nonobstructive   • Chest pain    • Coronary artery disease    • D-dimer, elevated    • Diastolic congestive heart failure (CMS/HCC) 7/25/2019   • Dizziness    • Edema    • Epilepsy (CMS/HCC)    • Fatigue    • Heart murmur    • History of blood transfusion 08/2019   • History of transfusion    • Hyperlipidemia    • Hypertension    • Kidney stones    • Migraines    • Mitral regurgitation    • Mitral stenosis     mild   • Palpitations    • Pulmonary  edema    • Sleep apnea 09/2019   • Sleeping difficulties    • SOB (shortness of breath)    • Supraventricular aortic stenosis    • Syncope    • Tachycardia    • Tobacco abuse    • VHD (valvular heart disease)    • Wears dentures    • Wears eyeglasses        Social History     Socioeconomic History   • Marital status:      Spouse name: Not on file   • Number of children: 2   • Years of education: Not on file   • Highest education level: Not on file   Occupational History     Employer: DISABLED   Tobacco Use   • Smoking status: Current Every Day Smoker     Packs/day: 0.50     Years: 36.00     Pack years: 18.00     Types: Cigarettes   • Smokeless tobacco: Never Used   • Tobacco comment: 5 daily   Substance and Sexual Activity   • Alcohol use: No   • Drug use: No   • Sexual activity: Defer   Social History Narrative    Lives in [place], [place] with spouse, children, and grandchildren       Family History   Problem Relation Age of Onset   • Cancer Mother    • Cancer Father    • Hypertension Father    • Other Sister         ACUTE MYOCARDIAL INFARCTION,CABG   • Heart failure Sister    • Hypertension Sister    • Stroke Other        Review of Systems   Constitutional: Negative for chills, fatigue and fever.   HENT: Negative.    Eyes: Positive for visual disturbance (glasses).   Respiratory: Positive for apnea (cpap), chest tightness and shortness of breath.    Cardiovascular: Positive for palpitations (flutters) and leg swelling. Negative for chest pain.   Gastrointestinal: Positive for diarrhea. Negative for abdominal pain, blood in stool, constipation, nausea and vomiting.   Endocrine: Negative.    Genitourinary: Negative.    Musculoskeletal: Positive for back pain. Negative for arthralgias and neck pain.   Skin: Negative.    Allergic/Immunologic: Negative.    Neurological: Positive for dizziness. Negative for syncope and light-headedness.   Hematological: Bruises/bleeds easily (bruises and bleeds).  "  Psychiatric/Behavioral: Positive for sleep disturbance (denies waking with soa or cp, will be starting cpap tonight). Negative for agitation. The patient is not nervous/anxious.    All other systems reviewed and are negative.      Objective   Vitals:    09/26/19 1121   BP: 97/53   BP Location: Left arm   Patient Position: Sitting   Pulse: 82   SpO2: 100%   Weight: 77.6 kg (171 lb)   Height: 154.9 cm (61\")      BP 97/53 (BP Location: Left arm, Patient Position: Sitting)   Pulse 82   Ht 154.9 cm (61\")   Wt 77.6 kg (171 lb)   SpO2 100%   BMI 32.31 kg/m²     Lab Results (most recent)     None          Physical Exam   Constitutional: She is oriented to person, place, and time. She appears well-developed and well-nourished. No distress.   HENT:   Head: Normocephalic and atraumatic.   Eyes: EOM are normal. Pupils are equal, round, and reactive to light.   Cardiovascular: Normal rate, regular rhythm and intact distal pulses. Exam reveals no gallop and no friction rub.   Murmur heard.  Grade 1/6 systolic ejection murmur with mechanical click auscultated at the right upper sternal border.  Grade 1/6 systolic murmur left sternal border   Pulmonary/Chest: Effort normal and breath sounds normal. No respiratory distress. She has no wheezes. She has no rales. She exhibits no tenderness.   Musculoskeletal: Normal range of motion. She exhibits edema.   Neurological: She is alert and oriented to person, place, and time. No cranial nerve deficit.   Skin: Skin is warm and dry. No rash noted. No erythema. No pallor.   Psychiatric: She has a normal mood and affect. Her behavior is normal.   Nursing note and vitals reviewed.      Procedure   Procedures       Assessment/Plan     Problems Addressed this Visit        Cardiovascular and Mediastinum    Hypertension    Relevant Medications    potassium chloride (K-DUR) 10 MEQ CR tablet    Other Relevant Orders    Basic Metabolic Panel    Magnesium    proBNP    Diastolic congestive heart " failure (CMS/HCC)       Respiratory    Shortness of breath - Primary       Other    Edema    Relevant Medications    potassium chloride (K-DUR) 10 MEQ CR tablet    Other Relevant Orders    Basic Metabolic Panel    Magnesium    proBNP          Recommendation  1.  Patient with diastolic heart failure but improving on Lasix therapy.  Recent labs show hypokalemia and I am going to increase potassium.  I would like to recheck laboratories in 1 week.  2.  Edema has improved although still mild.  We will continue diuretic therapy.  3.  Recent echocardiogram suggests good LV function with valve parameters that are stable.  We will continue to monitor.  4.  She is going to follow with pulmonology for treatment of her dyspnea as well with underlying COPD.  We will see patient back for follow-up after testing.  Follow-up with primary as scheduled    I spent 3  minutes counseling the patient.         Patient's Body mass index is 32.31 kg/m². BMI is above normal parameters. Recommendations include: educational material and referral to primary care.       Electronically signed by:

## 2019-09-26 NOTE — PATIENT INSTRUCTIONS
Steps to Quit Smoking    Smoking tobacco can be bad for your health. It can also affect almost every organ in your body. Smoking puts you and people around you at risk for many serious long-lasting (chronic) diseases. Quitting smoking is hard, but it is one of the best things that you can do for your health. It is never too late to quit.  What are the benefits of quitting smoking?  When you quit smoking, you lower your risk for getting serious diseases and conditions. They can include:  · Lung cancer or lung disease.  · Heart disease.  · Stroke.  · Heart attack.  · Not being able to have children (infertility).  · Weak bones (osteoporosis) and broken bones (fractures).  If you have coughing, wheezing, and shortness of breath, those symptoms may get better when you quit. You may also get sick less often. If you are pregnant, quitting smoking can help to lower your chances of having a baby of low birth weight.  What can I do to help me quit smoking?  Talk with your doctor about what can help you quit smoking. Some things you can do (strategies) include:  · Quitting smoking totally, instead of slowly cutting back how much you smoke over a period of time.  · Going to in-person counseling. You are more likely to quit if you go to many counseling sessions.  · Using resources and support systems, such as:  ? Online chats with a counselor.  ? Phone quitlines.  ? Printed self-help materials.  ? Support groups or group counseling.  ? Text messaging programs.  ? Mobile phone apps or applications.  · Taking medicines. Some of these medicines may have nicotine in them. If you are pregnant or breastfeeding, do not take any medicines to quit smoking unless your doctor says it is okay. Talk with your doctor about counseling or other things that can help you.  Talk with your doctor about using more than one strategy at the same time, such as taking medicines while you are also going to in-person counseling. This can help make  quitting easier.  What things can I do to make it easier to quit?  Quitting smoking might feel very hard at first, but there is a lot that you can do to make it easier. Take these steps:  · Talk to your family and friends. Ask them to support and encourage you.  · Call phone quitlines, reach out to support groups, or work with a counselor.  · Ask people who smoke to not smoke around you.  · Avoid places that make you want (trigger) to smoke, such as:  ? Bars.  ? Parties.  ? Smoke-break areas at work.  · Spend time with people who do not smoke.  · Lower the stress in your life. Stress can make you want to smoke. Try these things to help your stress:  ? Getting regular exercise.  ? Deep-breathing exercises.  ? Yoga.  ? Meditating.  ? Doing a body scan. To do this, close your eyes, focus on one area of your body at a time from head to toe, and notice which parts of your body are tense. Try to relax the muscles in those areas.  · Download or buy apps on your mobile phone or tablet that can help you stick to your quit plan. There are many free apps, such as QuitGuide from the CDC (Centers for Disease Control and Prevention). You can find more support from smokefree.gov and other websites.  This information is not intended to replace advice given to you by your health care provider. Make sure you discuss any questions you have with your health care provider.  Document Released: 10/14/2010 Document Revised: 08/15/2017 Document Reviewed: 05/03/2016  [employer] Interactive Patient Education © 2019 [employer]  Fat and Cholesterol Restricted Eating Plan  Getting too much fat and cholesterol in your diet may cause health problems. Choosing the right foods helps keep your fat and cholesterol at normal levels. This can keep you from getting certain diseases.  Your doctor may recommend an eating plan that includes:  · Total fat: ______% or less of total calories a day.  · Saturated fat: ______% or less of total calories a  "day.  · Cholesterol: less than _________mg a day.  · Fiber: ______g a day.  What are tips for following this plan?  General tips    · Work with your doctor to lose weight if you need to.  · Avoid:  ? Foods with added sugar.  ? Fried foods.  ? Foods with partially hydrogenated oils.  · Limit alcohol intake to no more than 1 drink a day for nonpregnant women and 2 drinks a day for men. One drink equals 12 oz of beer, 5 oz of wine, or 1½ oz of hard liquor.  Reading food labels  · Check food labels for:  ? Trans fats.  ? Partially hydrogenated oils.  ? Saturated fat (g) in each serving.  ? Cholesterol (mg) in each serving.  ? Fiber (g) in each serving.  · Choose foods with healthy fats, such as:  ? Monounsaturated fats.  ? Polyunsaturated fats.  ? Omega-3 fats.  · Choose grain products that have whole grains. Look for the word \"whole\" as the first word in the ingredient list.  Cooking  · Cook foods using low-fat methods. These include baking, boiling, grilling, and broiling.  · Eat more home-cooked foods. Eat at restaurants and buffets less often.  · Avoid cooking using saturated fats, such as butter, cream, palm oil, palm kernel oil, and coconut oil.  Meal planning    · At meals, divide your plate into four equal parts:  ? Fill one-half of your plate with vegetables and green salads.  ? Fill one-fourth of your plate with whole grains.  ? Fill one-fourth of your plate with low-fat (lean) protein foods.  · Eat fish that is high in omega-3 fats at least two times a week. This includes mackerel, tuna, sardines, and salmon.  · Eat foods that are high in fiber, such as whole grains, beans, apples, broccoli, carrots, peas, and barley.  Recommended foods  Grains  · Whole grains, such as whole wheat or whole grain breads, crackers, cereals, and pasta. Unsweetened oatmeal, bulgur, barley, quinoa, or brown rice. Corn or whole wheat flour tortillas.  Vegetables  · Fresh or frozen vegetables (raw, steamed, roasted, or grilled). " Green salads.  Fruits  · All fresh, canned (in natural juice), or frozen fruits.  Meats and other protein foods  · Ground beef (85% or leaner), grass-fed beef, or beef trimmed of fat. Skinless chicken or turkey. Ground chicken or turkey. Pork trimmed of fat. All fish and seafood. Egg whites. Dried beans, peas, or lentils. Unsalted nuts or seeds. Unsalted canned beans. Nut butters without added sugar or oil.  Dairy  · Low-fat or nonfat dairy products, such as skim or 1% milk, 2% or reduced-fat cheeses, low-fat and fat-free ricotta or cottage cheese, or plain low-fat and nonfat yogurt.  Fats and oils  · Tub margarine without trans fats. Light or reduced-fat mayonnaise and salad dressings. Avocado. Olive, canola, sesame, or safflower oils.  The items listed above may not be a complete list of recommended foods or beverages. Contact your dietitian for more options.  The items listed above may not be a complete list of foods and beverages [you/your child] can eat. Contact a dietitian for more information.  Foods to avoid  Grains  · White bread. White pasta. White rice. Cornbread. Bagels, pastries, and croissants. Crackers and snack foods that contain trans fat and hydrogenated oils.  Vegetables  · Vegetables cooked in cheese, cream, or butter sauce. Fried vegetables.  Fruits  · Canned fruit in heavy syrup. Fruit in cream or butter sauce. Fried fruit.  Meats and other protein foods  · Fatty cuts of meat. Ribs, chicken wings, hernandez, sausage, bologna, salami, chitterlings, fatback, hot dogs, bratwurst, and packaged lunch meats. Liver and organ meats. Whole eggs and egg yolks. Chicken and turkey with skin. Fried meat.  Dairy  · Whole or 2% milk, cream, half-and-half, and cream cheese. Whole milk cheeses. Whole-fat or sweetened yogurt. Full-fat cheeses. Nondairy creamers and whipped toppings. Processed cheese, cheese spreads, and cheese curds.  Beverages  · Alcohol. Sugar-sweetened drinks such as sodas, lemonade, and fruit  drinks.  Fats and oils  · Butter, stick margarine, lard, shortening, ghee, or hernandez fat. Coconut, palm kernel, and palm oils.  Sweets and desserts  · Corn syrup, sugars, honey, and molasses. Candy. Jam and jelly. Syrup. Sweetened cereals. Cookies, pies, cakes, donuts, muffins, and ice cream.  The items listed above may not be a complete list of foods and beverages to avoid. Contact your dietitian for more information.  The items listed above may not be a complete list of foods and beverages [you/your child] should avoid. Contact a dietitian for more information.  Summary  · Choosing the right foods helps keep your fat and cholesterol at normal levels. This can keep you from getting certain diseases.  · At meals, fill one-half of your plate with vegetables and green salads.  · Eat high-fiber foods, like whole grains, beans, apples, carrots, peas, and barley.  · Limit added sugar, saturated fats, alcohol, and fried foods.  This information is not intended to replace advice given to you by your health care provider. Make sure you discuss any questions you have with your health care provider.  Document Released: 06/18/2013 Document Revised: 09/04/2018 Document Reviewed: 09/04/2018  [employer] Interactive Patient Education © 2019 [employer]  BMI for Adults    Body mass index (BMI) is a number that is calculated from a person's weight and height. BMI may help to estimate how much of a person's weight is composed of fat. BMI can help identify those who may be at higher risk for certain medical problems.  How is BMI used with adults?  BMI is used as a screening tool to identify possible weight problems. It is used to check whether a person is obese, overweight, healthy weight, or underweight.  How is BMI calculated?  BMI measures your weight and compares it to your height. This can be done either in English ([place] or metric measurements. Note that charts are available to help you find your BMI quickly and easily without  "having to do these calculations yourself.  To calculate your BMI in English ([place] measurements, your health care provider will:  1. Measure your weight in pounds (lb).  2. Multiply the number of pounds by 703.  ? For example, for a person who weighs 180 lb, multiply that number by 703, which equals 126,540.  3. Measure your height in inches (in). Then multiply that number by itself to get a measurement called \"inches squared.\"  ? For example, for a person who is 70 in tall, the \"inches squared\" measurement is 70 in x 70 in, which equals 4900 inches squared.  4. Divide the total from Step 2 (number of lb x 703) by the total from Step 3 (inches squared): 126,540 ÷ 4900 = 25.8. This is your BMI.  To calculate your BMI in metric measurements, your health care provider will:  1. Measure your weight in kilograms (kg).  2. Measure your height in meters (m). Then multiply that number by itself to get a measurement called \"meters squared.\"  ? For example, for a person who is 1.75 m tall, the \"meters squared\" measurement is 1.75 m x 1.75 m, which is equal to 3.1 meters squared.  3. Divide the number of kilograms (your weight) by the meters squared number. In this example: 70 ÷ 3.1 = 22.6. This is your BMI.  How is BMI interpreted?  To interpret your results, your health care provider will use BMI charts to identify whether you are underweight, normal weight, overweight, or obese. The following guidelines will be used:  · Underweight: BMI less than 18.5.  · Normal weight: BMI between 18.5 and 24.9.  · Overweight: BMI between 25 and 29.9.  · Obese: BMI of 30 and above.  Please note:  · Weight includes both fat and muscle, so someone with a muscular build, such as an athlete, may have a BMI that is higher than 24.9. In cases like these, BMI is not an accurate measure of body fat.  · To determine if excess body fat is the cause of a BMI of 25 or higher, further assessments may need to be done by a health care provider.  · BMI is " usually interpreted in the same way for men and women.  Why is BMI a useful tool?  BMI is useful in two ways:  · Identifying a weight problem that may be related to a medical condition, or that may increase the risk for medical problems.  · Promoting lifestyle and diet changes in order to reach a healthy weight.  Summary  · Body mass index (BMI) is a number that is calculated from a person's weight and height.  · BMI may help to estimate how much of a person's weight is composed of fat. BMI can help identify those who may be at higher risk for certain medical problems.  · BMI can be measured using English measurements or metric measurements.  · To interpret your results, your health care provider will use BMI charts to identify whether you are underweight, normal weight, overweight, or obese.  This information is not intended to replace advice given to you by your health care provider. Make sure you discuss any questions you have with your health care provider.  Document Released: 08/29/2005 Document Revised: 10/31/2018 Document Reviewed: 10/31/2018  [employer] Interactive Patient Education © 2019 [employer]

## 2019-11-18 ENCOUNTER — TELEPHONE (OUTPATIENT)
Dept: CARDIOLOGY | Facility: CLINIC | Age: 49
End: 2019-11-18

## 2019-11-18 NOTE — TELEPHONE ENCOUNTER
Received clearance request from Dr. Serrato for pt to have port a cath placement scheduled for 11/14. Info given to Sheela Smith MA.

## 2019-11-22 ENCOUNTER — TELEPHONE (OUTPATIENT)
Dept: CARDIOLOGY | Facility: CLINIC | Age: 49
End: 2019-11-22

## 2019-12-19 ENCOUNTER — OFFICE VISIT (OUTPATIENT)
Dept: CARDIOLOGY | Facility: CLINIC | Age: 49
End: 2019-12-19

## 2019-12-19 VITALS
DIASTOLIC BLOOD PRESSURE: 65 MMHG | HEART RATE: 76 BPM | BODY MASS INDEX: 34.29 KG/M2 | WEIGHT: 181.6 LBS | HEIGHT: 61 IN | OXYGEN SATURATION: 98 % | SYSTOLIC BLOOD PRESSURE: 100 MMHG

## 2019-12-19 DIAGNOSIS — Z00.00 HEALTHCARE MAINTENANCE: ICD-10-CM

## 2019-12-19 DIAGNOSIS — R09.89 BRUIT OF LEFT CAROTID ARTERY: ICD-10-CM

## 2019-12-19 DIAGNOSIS — I65.23 BILATERAL CAROTID ARTERY STENOSIS: ICD-10-CM

## 2019-12-19 DIAGNOSIS — R06.02 SHORTNESS OF BREATH: ICD-10-CM

## 2019-12-19 DIAGNOSIS — E78.5 HYPERLIPIDEMIA, UNSPECIFIED HYPERLIPIDEMIA TYPE: ICD-10-CM

## 2019-12-19 DIAGNOSIS — I25.10 CORONARY ARTERY DISEASE INVOLVING NATIVE CORONARY ARTERY OF NATIVE HEART WITHOUT ANGINA PECTORIS: ICD-10-CM

## 2019-12-19 DIAGNOSIS — I50.30 DIASTOLIC CONGESTIVE HEART FAILURE, UNSPECIFIED HF CHRONICITY (HCC): ICD-10-CM

## 2019-12-19 DIAGNOSIS — R07.89 OTHER CHEST PAIN: Primary | ICD-10-CM

## 2019-12-19 PROCEDURE — 99214 OFFICE O/P EST MOD 30 MIN: CPT | Performed by: NURSE PRACTITIONER

## 2019-12-19 RX ORDER — ATORVASTATIN CALCIUM 20 MG/1
20 TABLET, FILM COATED ORAL DAILY
Qty: 30 TABLET | Refills: 11 | Status: SHIPPED | OUTPATIENT
Start: 2019-12-19 | End: 2020-01-09 | Stop reason: DRUGHIGH

## 2019-12-19 RX ORDER — NITROGLYCERIN 0.4 MG/1
0.4 TABLET SUBLINGUAL
Qty: 25 TABLET | Refills: 3 | Status: SHIPPED | OUTPATIENT
Start: 2019-12-19 | End: 2019-12-19 | Stop reason: SDUPTHER

## 2019-12-19 RX ORDER — NITROGLYCERIN 0.4 MG/1
0.4 TABLET SUBLINGUAL
Qty: 25 TABLET | Refills: 3 | Status: SHIPPED | OUTPATIENT
Start: 2019-12-19

## 2019-12-19 NOTE — PATIENT INSTRUCTIONS

## 2019-12-19 NOTE — PROGRESS NOTES
Problem list     Subjective   Mara Martínez is a 49 y.o. female     Chief Complaint   Patient presents with   • Shortness of Breath     here for follow up   • Fatigue       HPI    PROBLEM LIST:      1.Valvular heart disease  a. Moderate Aortic Stenosis with GUEVARA of 1.22cm^2, mean gradient of 24mmHg with moderate to severe AI  b. Moderate to severe MR with no evidence of Mitral Stenosis  c. Repeat echo on December 22 2014 showed worsening GUEVARA at 1cm^2, Moderate MR with Mild MS with MVA at 1.69cm^2 and mean gradient of 5mmHg. Grade 2 DD  d. LHC and RHC revealed normal coronaries, mod-severe AI with GUEVARA at 1.2 with moderate AS, Moderate Mitral Stenosis with mild PTHN arguing against severe mitral disease. Medical management indicated at this point.  E. mild to moderate aortic stenosis with moderate aortic regurgitation, mean gradient of 29 and aortic valve area 1.5 cm². Moderate mitral regurgitation with mild to moderate mitral stenosis with mitral valve area 1.Meter squared   F.  Moderate to severe aortic stenosis, severe aortic insufficiency, severe mitral stenosis with mild to moderate mitral regurgitation by echocardiogram November 2017  G. Cardiac cath 12/18/17 demonstrated potentially hemodynamically significant mitral regurgitation associated with mildly to moderately elevated PA pressures. moderate aortic stenosis. The study excluded angiographically significant epicardial coronary disease as a contributing factor to the patient's symptoms. Double valve surgery replacement recommended  H.  Status post aortic and mitral valve replacement by Dr. Cavazos with mechanical valves July 2018  I.  Echocardiogram August 2018 demonstrates normal seated valves with trace aortic insufficiency and mitral insufficiency.  J.  Echo 4/24/19-mild LVH, diastolic dysfunction 2, mild to moderate left atrial enlargement, mechanical mitral valve, mechanical aortic valve, trivial to mild AI, mild TR, EF 61 to 65%  2)Normal systolic fxn    3)Hypertension  3.1) Stress test 8/3/15 - no ischemia, low risk  4)Dyslipidemia  5)Epilepsy  6) Nonobstructive ISREAL by carotid duplex on December 2014  7) Tobacco Habituation, smokes pack a day  8) Migraines        9)  History of rheumatic fever    Patient is a 49-year-old female who presents today for a follow-up with her  at her side.  She is currently wearing the device for a PillCam that she had to swallow this morning.  She says for a couple weeks now she is been having what she describes as left anterior chest tightness that will cause her to become short of breath.  She says that she does take nitroglycerin or she was until she ran out.  She says she would take at least 2 at a time and it did resolve.  She says she was typically sitting when it occurred.  She denies any palpitations, fluttering, dizziness, presyncope, syncope, orthopnea, PND or edema.  She denies any shortness of breath outside of having it with her chest discomfort.  She states fatigue which she knows is probably from her anemia.  She has had blood in her stools but she has any other signs of bleeding or cerebral ischemic events.  Patient still smokes a pack a day.    Current Outpatient Medications on File Prior to Visit   Medication Sig Dispense Refill   • Ascorbic Acid (VITAMIN C) 500 MG capsule Take 1 tablet by mouth 2 (two) times a day.     • aspirin 81 MG EC tablet Take 81 mg by mouth Daily.     • Cholecalciferol (VITAMIN D3 PO) Take 1,000 Units by mouth Daily.     • ferrous sulfate 325 (65 FE) MG tablet Take 325 mg by mouth 2 (Two) Times a Day.     • furosemide (LASIX) 40 MG tablet Take 1 tablet by mouth Daily. 30 tablet 6   • nortriptyline (PAMELOR) 50 MG capsule Take 100 mg by mouth Every Night.     • OXcarbazepine (TRILEPTAL) 600 MG tablet Take 600 mg by mouth 2 (Two) Times a Day.     • pantoprazole (PROTONIX) 40 MG EC tablet Take 1 tablet by mouth Daily. 90 tablet 3   • potassium chloride (K-DUR) 10 MEQ CR tablet Take 2  tablets by mouth Daily. 30 tablet 11   • topiramate (TOPAMAX) 100 MG tablet Take 100 mg by mouth 2 (Two) Times a Day.     • vitamin B-12 (CYANOCOBALAMIN) 100 MCG tablet Take 100 mcg by mouth.     • warfarin (COUMADIN) 5 MG tablet Take 5 mg by mouth Take As Directed.     • [DISCONTINUED] nitroglycerin (NITROSTAT) 0.4 MG SL tablet Place 0.4 mg under the tongue Every 5 (Five) Minutes As Needed for Chest Pain. PLACE 1 TABLET UNDER THE TONGUE EVERY 5 MINUTES FOR UP TO 3 DOSES AS NEEDED FOR CHEST PAIN CALL 911 IF PAIN PERSISTS       Current Facility-Administered Medications on File Prior to Visit   Medication Dose Route Frequency Provider Last Rate Last Dose   • Chlorhexidine Gluconate Cloth 2 % pads 1 application  1 application Topical Q12H PRN Cristobal Lozano PA       • midazolam (VERSED) injection   Intravenous PRN Krishna Blank MD   2 mg at 03/02/18 0655       Azithromycin and Corticosteroids    Past Medical History:   Diagnosis Date   • Anemia    • Aortic regurgitation    • Arthritis    • Back pain    • Carotid bruit    • ISREAL (cerebral atherosclerosis) 12/2014    nonobstructive   • Chest pain    • Coronary artery disease    • D-dimer, elevated    • Diastolic congestive heart failure (CMS/HCC) 7/25/2019   • Dizziness    • Edema    • Epilepsy (CMS/HCC)    • Fatigue    • Heart murmur    • History of blood transfusion 08/2019   • History of transfusion    • Hyperlipidemia    • Hypertension    • Kidney stones    • Migraines    • Mitral regurgitation    • Mitral stenosis     mild   • Palpitations    • Pulmonary edema    • Sleep apnea 09/2019   • Sleeping difficulties    • SOB (shortness of breath)    • Supraventricular aortic stenosis    • Syncope    • Tachycardia    • Tobacco abuse    • VHD (valvular heart disease)    • Wears dentures    • Wears eyeglasses        Social History     Socioeconomic History   • Marital status:      Spouse name: Not on file   • Number of children: 2   • Years of education: Not on  file   • Highest education level: Not on file   Occupational History     Employer: DISABLED   Tobacco Use   • Smoking status: Current Every Day Smoker     Packs/day: 0.50     Years: 36.00     Pack years: 18.00     Types: Cigarettes   • Smokeless tobacco: Never Used   • Tobacco comment: 5 daily   Substance and Sexual Activity   • Alcohol use: No   • Drug use: No   • Sexual activity: Defer   Social History Narrative    Lives in [place], [place] with spouse, children, and grandchildren       Family History   Problem Relation Age of Onset   • Cancer Mother    • Cancer Father    • Hypertension Father    • Other Sister         ACUTE MYOCARDIAL INFARCTION,CABG   • Heart failure Sister    • Hypertension Sister    • Stroke Other        Review of Systems   Constitutional: Positive for fatigue (easily fatigued; stays tired ). Negative for diaphoresis.   HENT: Positive for trouble swallowing (trouble swalling medications). Negative for congestion, rhinorrhea and sore throat.    Eyes: Positive for visual disturbance (wears glasses daily).   Respiratory: Positive for shortness of breath (with CP). Negative for chest tightness and wheezing.    Cardiovascular: Positive for chest pain (left ant under breast, tightness mostly at rest, will get short of breath, she was taking nitro until she ran out and it did help). Negative for palpitations and leg swelling.   Gastrointestinal: Positive for blood in stool (swallowed pill cam today). Negative for abdominal pain, nausea and vomiting.   Endocrine: Negative for cold intolerance and heat intolerance.   Genitourinary: Negative for difficulty urinating, frequency and urgency.   Musculoskeletal: Positive for arthralgias (joints) and back pain (hurt on the job). Negative for neck pain.   Skin: Negative for rash and wound.   Allergic/Immunologic: Negative for environmental allergies and food allergies.   Neurological: Negative for dizziness, syncope and light-headedness.   Hematological:  "Bruises/bleeds easily (bruises/blds easily).   Psychiatric/Behavioral: Negative for agitation, confusion and sleep disturbance (denies waking up smothering/SOA). The patient is not nervous/anxious.        Objective   Vitals:    12/19/19 1050   BP: 100/65   BP Location: Left arm   Patient Position: Sitting   Pulse: 76   SpO2: 98%   Weight: 82.4 kg (181 lb 9.6 oz)   Height: 154.9 cm (61\")      /65 (BP Location: Left arm, Patient Position: Sitting)   Pulse 76   Ht 154.9 cm (61\")   Wt 82.4 kg (181 lb 9.6 oz)   SpO2 98%   BMI 34.31 kg/m²     Lab Results (most recent)     None          Physical Exam   Constitutional: She is oriented to person, place, and time. Vital signs are normal. She appears well-developed and well-nourished. She is active and cooperative.   HENT:   Head: Normocephalic.   Eyes: Lids are normal.   Wears glasses    Neck: Normal carotid pulses, no hepatojugular reflux and no JVD present. Carotid bruit is present (left ).   Cardiovascular: Normal rate and regular rhythm.   Murmur heard.   Systolic (RUSB and LUSB ) murmur is present with a grade of 1/6.  Pulses:       Radial pulses are 2+ on the right side, and 2+ on the left side.        Dorsalis pedis pulses are 2+ on the right side, and 2+ on the left side.        Posterior tibial pulses are 2+ on the right side, and 2+ on the left side.   No edema BLE; mechanical click    Pulmonary/Chest: Effort normal and breath sounds normal.   Abdominal: Normal appearance and bowel sounds are normal.   Neurological: She is alert and oriented to person, place, and time.   Skin: Skin is warm, dry and intact.   Psychiatric: She has a normal mood and affect. Her speech is normal and behavior is normal. Judgment and thought content normal. Cognition and memory are normal.       Procedure   Procedures       Assessment/Plan     Problems Addressed this Visit        Cardiovascular and Mediastinum    Bruit of left carotid artery    Hyperlipidemia    Relevant " Medications    atorvastatin (LIPITOR) 20 MG tablet    Other Relevant Orders    Stress Test With Myocardial Perfusion One Day    Comprehensive Metabolic Panel    Lipid Panel    Coronary artery disease involving native coronary artery of native heart without angina pectoris    Relevant Medications    nitroglycerin (NITROSTAT) 0.4 MG SL tablet    atorvastatin (LIPITOR) 20 MG tablet    Other Relevant Orders    Stress Test With Myocardial Perfusion One Day    Lipid Panel    Stenosis of carotid artery    Relevant Orders    Stress Test With Myocardial Perfusion One Day    Duplex Carotid Ultrasound CAR    Diastolic congestive heart failure (CMS/HCC)    Relevant Medications    nitroglycerin (NITROSTAT) 0.4 MG SL tablet    Other Relevant Orders    Stress Test With Myocardial Perfusion One Day       Respiratory    Shortness of breath       Nervous and Auditory    Chest pain - Primary    Relevant Medications    nitroglycerin (NITROSTAT) 0.4 MG SL tablet    Other Relevant Orders    Stress Test With Myocardial Perfusion One Day      Other Visit Diagnoses     Healthcare maintenance        Relevant Orders    Stress Test With Myocardial Perfusion One Day    Comprehensive Metabolic Panel    Lipid Panel        Hyperlipidemia/CAD/carotid artery disease-patient is going to start Lipitor and get lipid and CMP in 6 weeks.  Hyperlipidemia/CAD/chest pain/shortness of breath-patient will have a stress test.  She will continue to use nitro PRN for chest pain no resolution she will go to the ER.  Carotid artery disease/carotid bruit-patient will carotid artery ultrasound.  She will continue her medication regimen otherwise.  She will follow-up in 3 months or sooner if any changes.    Mara HERNANDEZ Tanya is a current cigarettes user.  She currently smokes 1 pack of cigarettes per day for a duration of 30 years. I have educated her on the risk of diseases from using tobacco products such as cancer, COPD and heart diease.       Patient's Body mass  index is 34.31 kg/m². BMI is above normal parameters. Recommendations include: educational material and referral to primary care.       Electronically signed by:

## 2020-01-09 ENCOUNTER — TELEPHONE (OUTPATIENT)
Dept: CARDIOLOGY | Facility: CLINIC | Age: 50
End: 2020-01-09

## 2020-01-09 DIAGNOSIS — I25.10 CORONARY ARTERY DISEASE INVOLVING NATIVE CORONARY ARTERY OF NATIVE HEART WITHOUT ANGINA PECTORIS: Primary | ICD-10-CM

## 2020-01-09 DIAGNOSIS — E78.5 DYSLIPIDEMIA: Primary | ICD-10-CM

## 2020-01-09 DIAGNOSIS — E78.5 HYPERLIPIDEMIA, UNSPECIFIED HYPERLIPIDEMIA TYPE: ICD-10-CM

## 2020-01-09 RX ORDER — ATORVASTATIN CALCIUM 40 MG/1
40 TABLET, FILM COATED ORAL DAILY
Qty: 90 TABLET | Refills: 3 | Status: SHIPPED | OUTPATIENT
Start: 2020-01-09 | End: 2020-02-19 | Stop reason: DRUGHIGH

## 2020-01-09 NOTE — TELEPHONE ENCOUNTER
Called patient and discussed lab results with her, patient stated at this time she was tolerating the Lipitor, advised patient to increase this to 40 mg daily. New rx also sent into pharmacy. Patient was advised to also have labs re-drawn in 6 weeks after increasing this medication. Patient stated she was not a diabetic, did have F/H of DM but she would discuss this with her provider upon next OV with him. Also notified to watch K as well. Patient verbalized understanding and had no further questions at this time. -;[facility]    ----- Message from CHARLIE Guillory sent at 1/9/2020 10:43 AM EST -----  Is patient tolerating the lipitor?  If so increase to 40 mg po nightly as LDL is still over 100.  Repeat lipid and CMP in 6 more weeks. Also was she fasting?  Any family history of diabetes?  If she was fasting get a HGBA1C.  K is just .1 low will monitor.

## 2020-01-28 ENCOUNTER — TELEPHONE (OUTPATIENT)
Dept: CARDIOLOGY | Facility: CLINIC | Age: 50
End: 2020-01-28

## 2020-01-28 ENCOUNTER — HOSPITAL ENCOUNTER (OUTPATIENT)
Dept: CARDIOLOGY | Facility: HOSPITAL | Age: 50
Discharge: HOME | End: 2020-01-28
Admitting: NURSE PRACTITIONER

## 2020-01-28 ENCOUNTER — APPOINTMENT (OUTPATIENT)
Dept: CARDIOLOGY | Facility: HOSPITAL | Age: 50
End: 2020-01-28

## 2020-01-28 ENCOUNTER — HOSPITAL ENCOUNTER (OUTPATIENT)
Dept: CARDIOLOGY | Facility: HOSPITAL | Age: 50
End: 2020-01-28

## 2020-01-28 DIAGNOSIS — I65.23 BILATERAL CAROTID ARTERY STENOSIS: ICD-10-CM

## 2020-01-28 PROCEDURE — 93880 EXTRACRANIAL BILAT STUDY: CPT | Performed by: INTERNAL MEDICINE

## 2020-01-28 PROCEDURE — 93880 EXTRACRANIAL BILAT STUDY: CPT

## 2020-01-29 NOTE — TELEPHONE ENCOUNTER
Per Tavares, pt needs Lovenox bridge and to stay on it until INR is therapeutic after surgery. Can hold coumadin 5 days prior to surgery. Will need INR check 1 week after restarting coumadin.       Confirmed Lovenox dose and directions w/ Tavares prior to sending.         Faxed clearance to [phone].

## 2020-02-03 ENCOUNTER — TELEPHONE (OUTPATIENT)
Dept: CARDIOLOGY | Facility: CLINIC | Age: 50
End: 2020-02-03

## 2020-02-03 NOTE — TELEPHONE ENCOUNTER
Pt LVM asking why Lovenox was sent in.   #[phone]      Per chart review Lovenox was sent in for pt's procedure w. [facility], as she will need to hold coumadin.         First attempt to reach pt. No answer and no option to leave a message.

## 2020-02-09 ENCOUNTER — RESULTS ENCOUNTER (OUTPATIENT)
Dept: CARDIOLOGY | Facility: CLINIC | Age: 50
End: 2020-02-09

## 2020-02-09 DIAGNOSIS — I25.10 CORONARY ARTERY DISEASE INVOLVING NATIVE CORONARY ARTERY OF NATIVE HEART WITHOUT ANGINA PECTORIS: ICD-10-CM

## 2020-02-09 DIAGNOSIS — E78.5 HYPERLIPIDEMIA, UNSPECIFIED HYPERLIPIDEMIA TYPE: ICD-10-CM

## 2020-02-16 LAB
BH CV ECHO MEAS - BSA(HAYCOCK): 1.9 M^2
BH CV ECHO MEAS - BSA: 1.8 M^2
BH CV ECHO MEAS - BZI_BMI: 34.2 KILOGRAMS/M^2
BH CV ECHO MEAS - BZI_METRIC_HEIGHT: 154.9 CM
BH CV ECHO MEAS - BZI_METRIC_WEIGHT: 82.1 KG
BH CV XLRA MEAS LEFT BULB EDV: -6.5 CM/SEC
BH CV XLRA MEAS LEFT BULB PSV: -191 CM/SEC
BH CV XLRA MEAS LEFT CCA RATIO VEL: 354 CM/SEC
BH CV XLRA MEAS LEFT DIST CCA EDV: 105 CM/SEC
BH CV XLRA MEAS LEFT DIST CCA PSV: 354 CM/SEC
BH CV XLRA MEAS LEFT ICA RATIO VEL: -148 CM/SEC
BH CV XLRA MEAS LEFT ICA/CCA RATIO: -0.42
BH CV XLRA MEAS LEFT MID ICA EDV: -32.2 CM/SEC
BH CV XLRA MEAS LEFT MID ICA PSV: -119 CM/SEC
BH CV XLRA MEAS LEFT PROX CCA EDV: 32.1 CM/SEC
BH CV XLRA MEAS LEFT PROX CCA PSV: 121 CM/SEC
BH CV XLRA MEAS LEFT PROX ECA EDV: -20.4 CM/SEC
BH CV XLRA MEAS LEFT PROX ECA PSV: -93.5 CM/SEC
BH CV XLRA MEAS LEFT PROX ICA EDV: -25.8 CM/SEC
BH CV XLRA MEAS LEFT PROX ICA PSV: -148 CM/SEC
BH CV XLRA MEAS LEFT VERTEBRAL A EDV: 4.3 CM/SEC
BH CV XLRA MEAS LEFT VERTEBRAL A PSV: 61.2 CM/SEC
BH CV XLRA MEAS RIGHT BULB EDV: -27.7 CM/SEC
BH CV XLRA MEAS RIGHT BULB PSV: -117 CM/SEC
BH CV XLRA MEAS RIGHT CCA RATIO VEL: 118 CM/SEC
BH CV XLRA MEAS RIGHT DIST CCA EDV: 27.7 CM/SEC
BH CV XLRA MEAS RIGHT DIST CCA PSV: 118 CM/SEC
BH CV XLRA MEAS RIGHT DIST ICA EDV: -66.4 CM/SEC
BH CV XLRA MEAS RIGHT DIST ICA PSV: -163.3 CM/SEC
BH CV XLRA MEAS RIGHT ICA RATIO VEL: -162 CM/SEC
BH CV XLRA MEAS RIGHT ICA/CCA RATIO: -1.4
BH CV XLRA MEAS RIGHT MID ICA EDV: -51.6 CM/SEC
BH CV XLRA MEAS RIGHT MID ICA PSV: -145 CM/SEC
BH CV XLRA MEAS RIGHT PROX CCA EDV: 20.1 CM/SEC
BH CV XLRA MEAS RIGHT PROX CCA PSV: 180 CM/SEC
BH CV XLRA MEAS RIGHT PROX ECA EDV: -16.2 CM/SEC
BH CV XLRA MEAS RIGHT PROX ECA PSV: -164 CM/SEC
BH CV XLRA MEAS RIGHT PROX ICA EDV: -35.3 CM/SEC
BH CV XLRA MEAS RIGHT PROX ICA PSV: -109 CM/SEC
BH CV XLRA MEAS RIGHT VERTEBRAL A EDV: 16.8 CM/SEC
BH CV XLRA MEAS RIGHT VERTEBRAL A PSV: 58.8 CM/SEC

## 2020-02-17 ENCOUNTER — TELEPHONE (OUTPATIENT)
Dept: CARDIOLOGY | Facility: CLINIC | Age: 50
End: 2020-02-17

## 2020-02-17 DIAGNOSIS — R06.02 SOB (SHORTNESS OF BREATH): ICD-10-CM

## 2020-02-17 DIAGNOSIS — I65.23 BILATERAL CAROTID ARTERY STENOSIS: Primary | ICD-10-CM

## 2020-02-17 NOTE — TELEPHONE ENCOUNTER
Called patient and notified her of carotid u/s results. Notified provider recommends CTA neck. Patient denies any allergy to contrast. Patient not on Metformin. Patient notified someone will call with appt time and date for scan. All orders verbalized and understood. -;[facility]    ----- Message from CHARLIE Guillory sent at 2/17/2020  6:54 AM EST -----  Needs CTA of neck and BMP.  Thanks!  On asa and statin.  Duplex Carotid Ultrasound CAR   Order: [id number]   Status:  Final result   Visible to patient:  Yes (MyChart) Dx:  Bilateral carotid artery stenosis   Details     Reading Physician Reading Date Result Priority   Alfonzo Garcia MD 1/28/2020 Routine      Result Text     1.  The right common carotid artery is patent throughout.  There is 50 to 75% stenosis in the distal left common carotid artery.     2.  16 to 49% stenosis by Doppler in the bulb and bifurcation bilaterally.     3.  16 to 49% stenosis by Doppler throughout the right internal carotid artery.  A similar degree of stenosis is identified in the mid left internal carotid artery.  The distal left internal carotid artery is not visualized.     4.  Antegrade flow in both vertebral arteries.     Summary: Potentially hemodynamically significant stenosis in the distal left common carotid artery.  Antegrade flow in both vertebral arteries.

## 2020-02-18 ENCOUNTER — HOSPITAL ENCOUNTER (OUTPATIENT)
Dept: CARDIOLOGY | Facility: HOSPITAL | Age: 50
Discharge: HOME | End: 2020-02-18

## 2020-02-18 ENCOUNTER — LAB (OUTPATIENT)
Dept: LAB | Facility: HOSPITAL | Age: 50
End: 2020-02-18

## 2020-02-18 DIAGNOSIS — I65.23 BILATERAL CAROTID ARTERY STENOSIS: ICD-10-CM

## 2020-02-18 DIAGNOSIS — I25.10 CORONARY ARTERY DISEASE INVOLVING NATIVE CORONARY ARTERY OF NATIVE HEART WITHOUT ANGINA PECTORIS: ICD-10-CM

## 2020-02-18 DIAGNOSIS — R07.89 OTHER CHEST PAIN: ICD-10-CM

## 2020-02-18 DIAGNOSIS — R06.02 SOB (SHORTNESS OF BREATH): ICD-10-CM

## 2020-02-18 DIAGNOSIS — I50.30 DIASTOLIC CONGESTIVE HEART FAILURE, UNSPECIFIED HF CHRONICITY (HCC): ICD-10-CM

## 2020-02-18 DIAGNOSIS — Z00.00 HEALTHCARE MAINTENANCE: ICD-10-CM

## 2020-02-18 DIAGNOSIS — E78.5 DYSLIPIDEMIA: ICD-10-CM

## 2020-02-18 DIAGNOSIS — E78.5 HYPERLIPIDEMIA, UNSPECIFIED HYPERLIPIDEMIA TYPE: ICD-10-CM

## 2020-02-18 LAB
ALBUMIN SERPL-MCNC: 3.95 G/DL (ref 3.5–5.2)
ALBUMIN/GLOB SERPL: 1 G/DL
ALP SERPL-CCNC: 166 U/L (ref 39–117)
ALT SERPL W P-5'-P-CCNC: 10 U/L (ref 1–33)
ANION GAP SERPL CALCULATED.3IONS-SCNC: 16.5 MMOL/L (ref 5–15)
AST SERPL-CCNC: 16 U/L (ref 1–32)
BILIRUB SERPL-MCNC: 0.2 MG/DL (ref 0.2–1.2)
BUN BLD-MCNC: 12 MG/DL (ref 6–20)
BUN/CREAT SERPL: 12.8 (ref 7–25)
CALCIUM SPEC-SCNC: 9.2 MG/DL (ref 8.6–10.5)
CHLORIDE SERPL-SCNC: 104 MMOL/L (ref 98–107)
CHOLEST SERPL-MCNC: 195 MG/DL (ref 0–200)
CO2 SERPL-SCNC: 18.5 MMOL/L (ref 22–29)
CREAT BLD-MCNC: 0.94 MG/DL (ref 0.57–1)
GFR SERPL CREATININE-BSD FRML MDRD: 63 ML/MIN/1.73
GLOBULIN UR ELPH-MCNC: 4 GM/DL
GLUCOSE BLD-MCNC: 100 MG/DL (ref 65–99)
HDLC SERPL-MCNC: 39 MG/DL (ref 40–60)
LDLC SERPL CALC-MCNC: 107 MG/DL (ref 0–100)
LDLC/HDLC SERPL: 2.75 {RATIO}
POTASSIUM BLD-SCNC: 3.9 MMOL/L (ref 3.5–5.2)
PROT SERPL-MCNC: 7.9 G/DL (ref 6–8.5)
SODIUM BLD-SCNC: 139 MMOL/L (ref 136–145)
TRIGL SERPL-MCNC: 243 MG/DL (ref 0–150)
VLDLC SERPL-MCNC: 48.6 MG/DL

## 2020-02-18 PROCEDURE — 80061 LIPID PANEL: CPT | Performed by: NURSE PRACTITIONER

## 2020-02-18 PROCEDURE — 78452 HT MUSCLE IMAGE SPECT MULT: CPT

## 2020-02-18 PROCEDURE — 78452 HT MUSCLE IMAGE SPECT MULT: CPT | Performed by: INTERNAL MEDICINE

## 2020-02-18 PROCEDURE — 93016 CV STRESS TEST SUPVJ ONLY: CPT | Performed by: NURSE PRACTITIONER

## 2020-02-18 PROCEDURE — A9500 TC99M SESTAMIBI: HCPCS | Performed by: INTERNAL MEDICINE

## 2020-02-18 PROCEDURE — 0 TECHNETIUM SESTAMIBI: Performed by: INTERNAL MEDICINE

## 2020-02-18 PROCEDURE — 93017 CV STRESS TEST TRACING ONLY: CPT

## 2020-02-18 PROCEDURE — 93018 CV STRESS TEST I&R ONLY: CPT | Performed by: INTERNAL MEDICINE

## 2020-02-18 PROCEDURE — 25010000002 REGADENOSON 0.4 MG/5ML SOLUTION: Performed by: INTERNAL MEDICINE

## 2020-02-18 PROCEDURE — 80053 COMPREHEN METABOLIC PANEL: CPT | Performed by: NURSE PRACTITIONER

## 2020-02-18 RX ADMIN — TECHNETIUM TC 99M SESTAMIBI 1 DOSE: 1 INJECTION INTRAVENOUS at 12:35

## 2020-02-18 RX ADMIN — REGADENOSON 0.4 MG: 0.08 INJECTION, SOLUTION INTRAVENOUS at 12:35

## 2020-02-19 ENCOUNTER — TELEPHONE (OUTPATIENT)
Dept: CARDIOLOGY | Facility: CLINIC | Age: 50
End: 2020-02-19

## 2020-02-19 DIAGNOSIS — I25.10 CORONARY ARTERY DISEASE INVOLVING NATIVE CORONARY ARTERY OF NATIVE HEART WITHOUT ANGINA PECTORIS: Primary | ICD-10-CM

## 2020-02-19 DIAGNOSIS — E78.5 HYPERLIPIDEMIA, UNSPECIFIED HYPERLIPIDEMIA TYPE: ICD-10-CM

## 2020-02-19 RX ORDER — ATORVASTATIN CALCIUM 80 MG/1
80 TABLET, FILM COATED ORAL DAILY
Qty: 30 TABLET | Refills: 11 | Status: SHIPPED | OUTPATIENT
Start: 2020-02-19 | End: 2020-02-19 | Stop reason: SDUPTHER

## 2020-02-19 RX ORDER — ATORVASTATIN CALCIUM 80 MG/1
80 TABLET, FILM COATED ORAL DAILY
Qty: 30 TABLET | Refills: 11 | Status: SHIPPED | OUTPATIENT
Start: 2020-02-19 | End: 2021-03-09

## 2020-02-19 NOTE — TELEPHONE ENCOUNTER
Called patient and notified her of lab results. Notified to increase Lipitor to 80 mg daily and have labs repeated in 6 weeks. Patient verbalized understanding and had no further questions at this time. -;[facility]    ----- Message from CHARLIE Guillory sent at 2/18/2020  7:50 PM EST -----  Please let patient know her LDL is still high, increase her atorvastatin to 80 mg PO nightly and get repeat CMP and lipids in 6 weeks.  Contains abnormal data Lipid Panel   Order: [id number]   Status:  Final result   Visible to patient:  Yes (MyChart) Dx:  Dyslipidemia   Specimen Information: Blood        Component  Ref Range & Units 1d ago   Total Cholesterol  0 - 200 mg/dL 195    Triglycerides  0 - 150 mg/dL 243High     HDL Cholesterol  40 - 60 mg/dL 39Low     LDL Cholesterol   0 - 100 mg/dL 107High     VLDL Cholesterol  mg/dL 48.6    LDL/HDL Ratio 2.75    Resulting Agency  [facility]      Narrative   Performed by:  [facility]   Cholesterol Reference Ranges  (U.S. Department of Health and Human Services ATP III Classifications)    Desirable          <200 mg/dL  Borderline High    200-239 mg/dL  High Risk          >240 mg/dL      Triglyceride Reference Ranges  (U.S. Department of Health and Human Services ATP III Classifications)    Normal           <150 mg/dL  Borderline High  150-199 mg/dL  High             200-499 mg/dL  Very High        >500 mg/dL    HDL Reference Ranges  (U.S. Department of Health and Human Services ATP III Classifcations)    Low     <40 mg/dl (major risk factor for CHD)  High    >60 mg/dl ('negative' risk factor for CHD)        LDL Reference Ranges  (U.S. Department of Health and Human Services ATP III Classifcations)    Optimal          <100 mg/dL  Near Optimal     100-129 mg/dL  Borderline High  130-159 mg/dL  High             160-189 mg/dL  Very High        >189 mg/dL      Specimen Collected: 02/18/20 12:37 Last Resulted: 02/18/20 19:15              Result Notes for Comprehensive Metabolic Panel     Notes  recorded by Lorena Recinos APRN on 2/18/2020 at 7:51 PM EST  Please advise patient.   Contains abnormal data Comprehensive Metabolic Panel   Order: [id number]   Status:  Final result   Visible to patient:  Yes (MyChart) Dx:  Healthcare maintenance; Hyperlipidemi...   Specimen Information: Blood        Component  Ref Range & Units 1d ago 6mo ago 1yr ago   Glucose  65 - 99 mg/dL 100High   98  92 R   BUN  6 - 20 mg/dL 12  15  16 R   Creatinine  0.57 - 1.00 mg/dL 0.94  0.99  0.90 R   Sodium  136 - 145 mmol/L 139  138  138 R   Potassium  3.5 - 5.2 mmol/L 3.9  3.9  3.9 R   Chloride  98 - 107 mmol/L 104  103  107 R   CO2  22.0 - 29.0 mmol/L 18.5Low   18.5Low   26.0 R   Calcium  8.6 - 10.5 mg/dL 9.2  8.8  8.9 R   Total Protein  6.0 - 8.5 g/dL 7.9   7.4 R   Albumin  3.50 - 5.20 g/dL 3.95   4.40 R   ALT (SGPT)  1 - 33 U/L 10   11 R   AST (SGOT)  1 - 32 U/L 16   13 R   Alkaline Phosphatase  39 - 117 U/L 166High    151High  R   Total Bilirubin  0.2 - 1.2 mg/dL 0.2   0.2Low  R   eGFR Non African Amer  >60 mL/min/1.73 63  60Low   67    Globulin  gm/dL 4.0   3.0    A/G Ratio  g/dL 1.0   1.5 R   BUN/Creatinine Ratio  7.0 - 25.0 12.8  15.2  17.8    Anion Gap  5.0 - 15.0 mmol/L 16.5High   16.5High   5.0 R   Resulting Agency  [facility]      Narrative   Performed by:  [facility]   GFR Normal >60  Chronic Kidney Disease <60  Kidney Failure <15      Specimen Collected: 02/18/20 12:37 Last Resulted: 02/18/20 19:13

## 2020-02-20 ENCOUNTER — TELEPHONE (OUTPATIENT)
Dept: CARDIOLOGY | Facility: CLINIC | Age: 50
End: 2020-02-20

## 2020-02-20 LAB
BH CV STRESS COMMENTS STAGE 1: NORMAL
BH CV STRESS DOSE REGADENOSON STAGE 1: 0.4
BH CV STRESS DURATION MIN STAGE 1: 0
BH CV STRESS DURATION SEC STAGE 1: 10
BH CV STRESS PROTOCOL 1: NORMAL
BH CV STRESS RECOVERY BP: NORMAL MMHG
BH CV STRESS RECOVERY HR: 75 BPM
BH CV STRESS STAGE 1: 1
MAXIMAL PREDICTED HEART RATE: 171 BPM
PERCENT MAX PREDICTED HR: 40.94 %
STRESS BASELINE BP: NORMAL MMHG
STRESS BASELINE HR: 67 BPM
STRESS PERCENT HR: 48 %
STRESS POST PEAK BP: NORMAL MMHG
STRESS POST PEAK HR: 70 BPM
STRESS TARGET HR: 145 BPM

## 2020-02-20 NOTE — TELEPHONE ENCOUNTER
Patient notified of results. Will keep f/u on 3/12/20 as scheduled.  DEBORAH JOHNSTON    Notes recorded by Lorena Recinos APRN on 2/20/2020 at 8:19 AM EST  Please advise patient.    Stress:    1.  No scintigraphic evidence of ischemia.     2.  Mildly depressed post stress ejection fraction of 53% with no focal wall motion abnormalities.     3.  No evidence of transient ischemic dilation or of increased lung uptake of radiopharmaceutical.

## 2020-02-27 ENCOUNTER — TELEPHONE (OUTPATIENT)
Dept: CARDIOLOGY | Facility: CLINIC | Age: 50
End: 2020-02-27

## 2020-02-27 DIAGNOSIS — I65.23 BILATERAL CAROTID ARTERY STENOSIS: Primary | ICD-10-CM

## 2020-02-27 DIAGNOSIS — I72.9 ANEURYSM (HCC): ICD-10-CM

## 2020-02-27 NOTE — TELEPHONE ENCOUNTER
Attempted to call patient , no answer. Unable to leave . -BH;[facility]    ----- Message from CHARLIE Guillory sent at 2/27/2020  3:32 PM EST -----  Please advise patient. Refer her to Dr. Guzmán/Lorraine for carotid artery disease and aneurysm.  Comprehensive Metabolic Panel   Order: [id number]   Status:  Final result   Visible to patient:  Yes (MyChart) Dx:  Coronary artery disease involving preston...   Specimen Information: Blood               Last Resulted: 02/27/20 15:11        Order Details       View Encounter       Lab and Collection Details       Routing       Result History            Scans on Order [id number]     Scan on 2/27/2020 1511 by Jojo Tariq RegSched Rep: cta neck w/wo contrast  2-

## 2020-02-27 NOTE — TELEPHONE ENCOUNTER
Called patient and notified her of carotid results. Notified that someone from our office will call with appt time and date. Patient verbalized understanding and had no further questions at this time. -;[facility]    ----- Message from CHARLIE Guillory sent at 2/27/2020  3:32 PM EST -----  Please advise patient. Refer her to Dr. Guzmán/Lorraine for carotid artery disease and aneurysm.  Comprehensive Metabolic Panel   Order: [id number]   Status:  Final result   Visible to patient:  Yes (MyChart) Dx:  Coronary artery disease involving preston...   Specimen Information: Blood               Last Resulted: 02/27/20 15:11        Order Details       View Encounter       Lab and Collection Details       Routing       Result History            Scans on Order [id number]     Scan on 2/27/2020 1511 by Jojo Tariq RegSched Rep: cta neck w/wo contrast  2-

## 2020-03-04 ENCOUNTER — OFFICE VISIT (OUTPATIENT)
Dept: CARDIOLOGY | Facility: CLINIC | Age: 50
End: 2020-03-04

## 2020-03-04 VITALS
BODY MASS INDEX: 33.99 KG/M2 | HEART RATE: 78 BPM | DIASTOLIC BLOOD PRESSURE: 65 MMHG | SYSTOLIC BLOOD PRESSURE: 103 MMHG | HEIGHT: 61 IN | WEIGHT: 180 LBS | OXYGEN SATURATION: 97 %

## 2020-03-04 DIAGNOSIS — R09.89 BRUIT OF LEFT CAROTID ARTERY: ICD-10-CM

## 2020-03-04 DIAGNOSIS — I65.22 CAROTID ARTERY STENOSIS, SYMPTOMATIC, LEFT: Primary | ICD-10-CM

## 2020-03-04 DIAGNOSIS — I10 ESSENTIAL HYPERTENSION: ICD-10-CM

## 2020-03-04 DIAGNOSIS — R42 LIGHT HEADED: ICD-10-CM

## 2020-03-04 DIAGNOSIS — R06.02 SOB (SHORTNESS OF BREATH): ICD-10-CM

## 2020-03-04 PROCEDURE — 99214 OFFICE O/P EST MOD 30 MIN: CPT | Performed by: NURSE PRACTITIONER

## 2020-03-04 RX ORDER — WARFARIN SODIUM 4 MG/1
4 TABLET ORAL DAILY
COMMUNITY

## 2020-03-04 NOTE — PATIENT INSTRUCTIONS
Steps to Quit Smoking    Smoking tobacco can be bad for your health. It can also affect almost every organ in your body. Smoking puts you and people around you at risk for many serious long-lasting (chronic) diseases. Quitting smoking is hard, but it is one of the best things that you can do for your health. It is never too late to quit.  What are the benefits of quitting smoking?  When you quit smoking, you lower your risk for getting serious diseases and conditions. They can include:  · Lung cancer or lung disease.  · Heart disease.  · Stroke.  · Heart attack.  · Not being able to have children (infertility).  · Weak bones (osteoporosis) and broken bones (fractures).  If you have coughing, wheezing, and shortness of breath, those symptoms may get better when you quit. You may also get sick less often. If you are pregnant, quitting smoking can help to lower your chances of having a baby of low birth weight.  What can I do to help me quit smoking?  Talk with your doctor about what can help you quit smoking. Some things you can do (strategies) include:  · Quitting smoking totally, instead of slowly cutting back how much you smoke over a period of time.  · Going to in-person counseling. You are more likely to quit if you go to many counseling sessions.  · Using resources and support systems, such as:  ? Online chats with a counselor.  ? Phone quitlines.  ? Printed self-help materials.  ? Support groups or group counseling.  ? Text messaging programs.  ? Mobile phone apps or applications.  · Taking medicines. Some of these medicines may have nicotine in them. If you are pregnant or breastfeeding, do not take any medicines to quit smoking unless your doctor says it is okay. Talk with your doctor about counseling or other things that can help you.  Talk with your doctor about using more than one strategy at the same time, such as taking medicines while you are also going to in-person counseling. This can help make  quitting easier.  What things can I do to make it easier to quit?  Quitting smoking might feel very hard at first, but there is a lot that you can do to make it easier. Take these steps:  · Talk to your family and friends. Ask them to support and encourage you.  · Call phone quitlines, reach out to support groups, or work with a counselor.  · Ask people who smoke to not smoke around you.  · Avoid places that make you want (trigger) to smoke, such as:  ? Bars.  ? Parties.  ? Smoke-break areas at work.  · Spend time with people who do not smoke.  · Lower the stress in your life. Stress can make you want to smoke. Try these things to help your stress:  ? Getting regular exercise.  ? Deep-breathing exercises.  ? Yoga.  ? Meditating.  ? Doing a body scan. To do this, close your eyes, focus on one area of your body at a time from head to toe, and notice which parts of your body are tense. Try to relax the muscles in those areas.  · Download or buy apps on your mobile phone or tablet that can help you stick to your quit plan. There are many free apps, such as QuitGuide from the CDC (Centers for Disease Control and Prevention). You can find more support from smokefree.gov and other websites.  This information is not intended to replace advice given to you by your health care provider. Make sure you discuss any questions you have with your health care provider.  Document Released: 10/14/2010 Document Revised: 08/15/2017 Document Reviewed: 05/03/2016  [employer] Interactive Patient Education © 2020 [employer]  Fat and Cholesterol Restricted Eating Plan  Getting too much fat and cholesterol in your diet may cause health problems. Choosing the right foods helps keep your fat and cholesterol at normal levels. This can keep you from getting certain diseases.  Your doctor may recommend an eating plan that includes:  · Total fat: ______% or less of total calories a day.  · Saturated fat: ______% or less of total calories a  "day.  · Cholesterol: less than _________mg a day.  · Fiber: ______g a day.  What are tips for following this plan?  Meal planning  · At meals, divide your plate into four equal parts:  ? Fill one-half of your plate with vegetables and green salads.  ? Fill one-fourth of your plate with whole grains.  ? Fill one-fourth of your plate with low-fat (lean) protein foods.  · Eat fish that is high in omega-3 fats at least two times a week. This includes mackerel, tuna, sardines, and salmon.  · Eat foods that are high in fiber, such as whole grains, beans, apples, broccoli, carrots, peas, and barley.  General tips    · Work with your doctor to lose weight if you need to.  · Avoid:  ? Foods with added sugar.  ? Fried foods.  ? Foods with partially hydrogenated oils.  · Limit alcohol intake to no more than 1 drink a day for nonpregnant women and 2 drinks a day for men. One drink equals 12 oz of beer, 5 oz of wine, or 1½ oz of hard liquor.  Reading food labels  · Check food labels for:  ? Trans fats.  ? Partially hydrogenated oils.  ? Saturated fat (g) in each serving.  ? Cholesterol (mg) in each serving.  ? Fiber (g) in each serving.  · Choose foods with healthy fats, such as:  ? Monounsaturated fats.  ? Polyunsaturated fats.  ? Omega-3 fats.  · Choose grain products that have whole grains. Look for the word \"whole\" as the first word in the ingredient list.  Cooking  · Cook foods using low-fat methods. These include baking, boiling, grilling, and broiling.  · Eat more home-cooked foods. Eat at restaurants and buffets less often.  · Avoid cooking using saturated fats, such as butter, cream, palm oil, palm kernel oil, and coconut oil.  Recommended foods    Fruits  · All fresh, canned (in natural juice), or frozen fruits.  Vegetables  · Fresh or frozen vegetables (raw, steamed, roasted, or grilled). Green salads.  Grains  · Whole grains, such as whole wheat or whole grain breads, crackers, cereals, and pasta. Unsweetened " oatmeal, bulgur, barley, quinoa, or brown rice. Corn or whole wheat flour tortillas.  Meats and other protein foods  · Ground beef (85% or leaner), grass-fed beef, or beef trimmed of fat. Skinless chicken or turkey. Ground chicken or turkey. Pork trimmed of fat. All fish and seafood. Egg whites. Dried beans, peas, or lentils. Unsalted nuts or seeds. Unsalted canned beans. Nut butters without added sugar or oil.  Dairy  · Low-fat or nonfat dairy products, such as skim or 1% milk, 2% or reduced-fat cheeses, low-fat and fat-free ricotta or cottage cheese, or plain low-fat and nonfat yogurt.  Fats and oils  · Tub margarine without trans fats. Light or reduced-fat mayonnaise and salad dressings. Avocado. Olive, canola, sesame, or safflower oils.  The items listed above may not be a complete list of foods and beverages you can eat. Contact a dietitian for more information.  Foods to avoid  Fruits  · Canned fruit in heavy syrup. Fruit in cream or butter sauce. Fried fruit.  Vegetables  · Vegetables cooked in cheese, cream, or butter sauce. Fried vegetables.  Grains  · White bread. White pasta. White rice. Cornbread. Bagels, pastries, and croissants. Crackers and snack foods that contain trans fat and hydrogenated oils.  Meats and other protein foods  · Fatty cuts of meat. Ribs, chicken wings, hernandez, sausage, bologna, salami, chitterlings, fatback, hot dogs, bratwurst, and packaged lunch meats. Liver and organ meats. Whole eggs and egg yolks. Chicken and turkey with skin. Fried meat.  Dairy  · Whole or 2% milk, cream, half-and-half, and cream cheese. Whole milk cheeses. Whole-fat or sweetened yogurt. Full-fat cheeses. Nondairy creamers and whipped toppings. Processed cheese, cheese spreads, and cheese curds.  Beverages  · Alcohol. Sugar-sweetened drinks such as sodas, lemonade, and fruit drinks.  Fats and oils  · Butter, stick margarine, lard, shortening, ghee, or hernandez fat. Coconut, palm kernel, and palm oils.  Sweets and  desserts  · Corn syrup, sugars, honey, and molasses. Candy. Jam and jelly. Syrup. Sweetened cereals. Cookies, pies, cakes, donuts, muffins, and ice cream.  The items listed above may not be a complete list of foods and beverages you should avoid. Contact a dietitian for more information.  Summary  · Choosing the right foods helps keep your fat and cholesterol at normal levels. This can keep you from getting certain diseases.  · At meals, fill one-half of your plate with vegetables and green salads.  · Eat high-fiber foods, like whole grains, beans, apples, carrots, peas, and barley.  · Limit added sugar, saturated fats, alcohol, and fried foods.  This information is not intended to replace advice given to you by your health care provider. Make sure you discuss any questions you have with your health care provider.  Document Released: 06/18/2013 Document Revised: 08/21/2019 Document Reviewed: 09/04/2018  [employer] Interactive Patient Education © 2020 [employer]  BMI for Adults    Body mass index (BMI) is a number that is calculated from a person's weight and height. BMI may help to estimate how much of a person's weight is composed of fat. BMI can help identify those who may be at higher risk for certain medical problems.  How is BMI used with adults?  BMI is used as a screening tool to identify possible weight problems. It is used to check whether a person is obese, overweight, healthy weight, or underweight.  How is BMI calculated?  BMI measures your weight and compares it to your height. This can be done either in English ([place] or metric measurements. Note that charts are available to help you find your BMI quickly and easily without having to do these calculations yourself.  To calculate your BMI in English ([place] measurements, your health care provider will:  1. Measure your weight in pounds (lb).  2. Multiply the number of pounds by 703.  ? For example, for a person who weighs 180 lb, multiply that number by  "703, which equals 126,540.  3. Measure your height in inches (in). Then multiply that number by itself to get a measurement called \"inches squared.\"  ? For example, for a person who is 70 in tall, the \"inches squared\" measurement is 70 in x 70 in, which equals 4900 inches squared.  4. Divide the total from Step 2 (number of lb x 703) by the total from Step 3 (inches squared): 126,540 ÷ 4900 = 25.8. This is your BMI.  To calculate your BMI in metric measurements, your health care provider will:  1. Measure your weight in kilograms (kg).  2. Measure your height in meters (m). Then multiply that number by itself to get a measurement called \"meters squared.\"  ? For example, for a person who is 1.75 m tall, the \"meters squared\" measurement is 1.75 m x 1.75 m, which is equal to 3.1 meters squared.  3. Divide the number of kilograms (your weight) by the meters squared number. In this example: 70 ÷ 3.1 = 22.6. This is your BMI.  How is BMI interpreted?  To interpret your results, your health care provider will use BMI charts to identify whether you are underweight, normal weight, overweight, or obese. The following guidelines will be used:  · Underweight: BMI less than 18.5.  · Normal weight: BMI between 18.5 and 24.9.  · Overweight: BMI between 25 and 29.9.  · Obese: BMI of 30 and above.  Please note:  · Weight includes both fat and muscle, so someone with a muscular build, such as an athlete, may have a BMI that is higher than 24.9. In cases like these, BMI is not an accurate measure of body fat.  · To determine if excess body fat is the cause of a BMI of 25 or higher, further assessments may need to be done by a health care provider.  · BMI is usually interpreted in the same way for men and women.  Why is BMI a useful tool?  BMI is useful in two ways:  · Identifying a weight problem that may be related to a medical condition, or that may increase the risk for medical problems.  · Promoting lifestyle and diet changes in " order to reach a healthy weight.  Summary  · Body mass index (BMI) is a number that is calculated from a person's weight and height.  · BMI may help to estimate how much of a person's weight is composed of fat. BMI can help identify those who may be at higher risk for certain medical problems.  · BMI can be measured using English measurements or metric measurements.  · To interpret your results, your health care provider will use BMI charts to identify whether you are underweight, normal weight, overweight, or obese.  This information is not intended to replace advice given to you by your health care provider. Make sure you discuss any questions you have with your health care provider.  Document Released: 08/29/2005 Document Revised: 10/31/2018 Document Reviewed: 10/31/2018  [email or web address] Interactive Patient Education © 2020 [employer]

## 2020-03-04 NOTE — PROGRESS NOTES
Subjective     Mara Martínez is a 49 y.o. female who presents to day for Coronary Artery Disease (Here for a follow up on testing ) and Congestive Heart Failure.    CHIEF COMPLIANT  Chief Complaint   Patient presents with   • Coronary Artery Disease     Here for a follow up on testing    • Congestive Heart Failure       Active Problems:  Problem List Items Addressed This Visit        Cardiovascular and Mediastinum    Bruit of left carotid artery    Hypertension       Respiratory    SOB (shortness of breath)      Other Visit Diagnoses     Carotid artery stenosis, symptomatic, left    -  Primary    Relevant Orders    Ambulatory Referral to Neurosurgery    Light headed        Relevant Orders    Ambulatory Referral to Neurosurgery      PROBLEM LIST:      1.Valvular heart disease  a. Moderate Aortic Stenosis with GUEVARA of 1.22cm^2, mean gradient of 24mmHg with moderate to severe AI  b. Moderate to severe MR with no evidence of Mitral Stenosis  c. Repeat echo on December 22 2014 showed worsening GUEVARA at 1cm^2, Moderate MR with Mild MS with MVA at 1.69cm^2 and mean gradient of 5mmHg. Grade 2 DD  d. LHC and RHC revealed normal coronaries, mod-severe AI with GUEVARA at 1.2 with moderate AS, Moderate Mitral Stenosis with mild PTHN arguing against severe mitral disease. Medical management indicated at this point.  E. mild to moderate aortic stenosis with moderate aortic regurgitation, mean gradient of 29 and aortic valve area 1.5 cm². Moderate mitral regurgitation with mild to moderate mitral stenosis with mitral valve area 1.Meter squared   F.  Moderate to severe aortic stenosis, severe aortic insufficiency, severe mitral stenosis with mild to moderate mitral regurgitation by echocardiogram November 2017  G. Cardiac cath 12/18/17 demonstrated potentially hemodynamically significant mitral regurgitation associated with mildly to moderately elevated PA pressures. moderate aortic stenosis. The study excluded angiographically  significant epicardial coronary disease as a contributing factor to the patient's symptoms. Double valve surgery replacement recommended  H.  Status post aortic and mitral valve replacement by Dr. Cavazos with mechanical valves July 2018  I.  Echocardiogram August 2018 demonstrates normal seated valves with trace aortic insufficiency and mitral insufficiency.  J.  Echo 4/24/19-mild LVH, diastolic dysfunction 2, mild to moderate left atrial enlargement, mechanical mitral valve, mechanical aortic valve, trivial to mild AI, mild TR, EF 61 to 65%  2)Normal systolic fxn   3)Hypertension  3.1) Stress test 8/3/15 - no ischemia, low risk  3.2 stress test 1/2020- for ischemia post-rest ejection fraction 53%  4)Dyslipidemia  5)Epilepsy  6) Nonobstructive ISREAL by carotid duplex on December 2014  6.1 carotid duple 1/28/2020 right common carotid artery patent, 50 to 75% stenosis in the distal left common carotid artery, 16 to 49% in the bulb bilaterally, antegrade flow in the vertebrals.  6.2 CTA of neck with and without contrast 2/27/2020 40% stenosis at the origin of the left common carotid artery and 70% stenosis of the distal left common carotid artery, 30% stenosis of the right common carotid artery, 0.4 cm right MCA bifurcation aneurysm  7) Tobacco Habituation, smokes pack a day  8) Migraines        9)  History of rheumatic fever    HPI  HPI  Ms. Martínez is a 49-year-old female who is being evaluated today for carotid artery disease status post CTA of her neck.  The CT of the neck did identify a potentially significant stenosis of approximately 60 to 70% of the distal left common carotid artery.  The CTA report identified both 60 and 70% left common carotid artery stenosis.  Patient is symptomatic somewhat.  She states that she does become lightheaded even at rest and does occur on occasion.  Patient also reports shortness of breath that is worse with exertion with minimal activity.  She states walking up steps or walking up an  incline or across the floor causes her to be significantly short of breath.  Also reports a chronic persistent dry cough in which she has had for quite some time.  Reports fatigue to the point where she tires very easily.  Concernedly patient does report that she has had blood in her stool but is on chronic iron therapy so is unsure the extent of blood versus blackness associated with the iron therapy.  She is following GI.  Patient denies any chest pain, lower extremity edema, palpitations, syncope, PND, orthopnea, or other neurological changes.  PRIOR MEDS  Current Outpatient Medications on File Prior to Visit   Medication Sig Dispense Refill   • Ascorbic Acid (VITAMIN C) 500 MG capsule Take 1 tablet by mouth 2 (two) times a day.     • aspirin 81 MG EC tablet Take 81 mg by mouth Daily.     • atorvastatin (LIPITOR) 80 MG tablet Take 1 tablet by mouth Daily. 30 tablet 11   • Cholecalciferol (VITAMIN D3 PO) Take 1,000 Units by mouth Daily.     • enoxaparin (LOVENOX) 80 MG/0.8ML solution syringe Stop coumadin 5d prior to surgery, inj Lovenox BID once coumadin is stopped, cont until INR is therapeutic. Get INR checked after back on coumadin for 1wk. 24 syringe 0   • ferrous sulfate 325 (65 FE) MG tablet Take 325 mg by mouth 2 (Two) Times a Day.     • furosemide (LASIX) 40 MG tablet Take 1 tablet by mouth Daily. 30 tablet 6   • nitroglycerin (NITROSTAT) 0.4 MG SL tablet Place 1 tablet under the tongue Every 5 (Five) Minutes As Needed for Chest Pain. May take maximum of 3 tabs in 15 minutes. 25 tablet 3   • nortriptyline (PAMELOR) 50 MG capsule Take 100 mg by mouth Every Night.     • OXcarbazepine (TRILEPTAL) 600 MG tablet Take 600 mg by mouth 2 (Two) Times a Day.     • pantoprazole (PROTONIX) 40 MG EC tablet Take 1 tablet by mouth Daily. 90 tablet 3   • potassium chloride (K-DUR) 10 MEQ CR tablet Take 2 tablets by mouth Daily. 30 tablet 11   • topiramate (TOPAMAX) 100 MG tablet Take 100 mg by mouth 2 (Two) Times a Day.      • vitamin B-12 (CYANOCOBALAMIN) 100 MCG tablet Take 100 mcg by mouth.     • warfarin (COUMADIN) 5 MG tablet Take 5 mg by mouth Daily.       Current Facility-Administered Medications on File Prior to Visit   Medication Dose Route Frequency Provider Last Rate Last Dose   • Chlorhexidine Gluconate Cloth 2 % pads 1 application  1 application Topical Q12H PRN Cristobal Lozano PA       • midazolam (VERSED) injection   Intravenous PRN Krishna Blank MD   2 mg at 03/02/18 0655       ALLERGIES  Azithromycin and Corticosteroids    HISTORY  Past Medical History:   Diagnosis Date   • Anemia    • Aortic regurgitation    • Arthritis    • Back pain    • Carotid bruit    • ISREAL (cerebral atherosclerosis) 12/2014    nonobstructive   • Chest pain    • Coronary artery disease    • D-dimer, elevated    • Diastolic congestive heart failure (CMS/HCC) 7/25/2019   • Dizziness    • Edema    • Epilepsy (CMS/[facility])    • Fatigue    • Heart murmur    • History of blood transfusion 08/2019   • History of transfusion    • Hyperlipidemia    • Hypertension    • Kidney stones    • Migraines    • Mitral regurgitation    • Mitral stenosis     mild   • Palpitations    • Pulmonary edema    • Sleep apnea 09/2019   • Sleeping difficulties    • SOB (shortness of breath)    • Supraventricular aortic stenosis    • Syncope    • Tachycardia    • Tobacco abuse    • VHD (valvular heart disease)    • Wears dentures    • Wears eyeglasses        Social History     Socioeconomic History   • Marital status:      Spouse name: Not on file   • Number of children: 2   • Years of education: Not on file   • Highest education level: Not on file   Occupational History     Employer: DISABLED   Tobacco Use   • Smoking status: Current Every Day Smoker     Packs/day: 0.50     Years: 36.00     Pack years: 18.00     Types: Cigarettes   • Smokeless tobacco: Never Used   • Tobacco comment: 5 daily   Substance and Sexual Activity   • Alcohol use: No   • Drug use: No   •  "Sexual activity: Defer   Social History Narrative    Lives in [place], [place] with spouse, children, and grandchildren       Family History   Problem Relation Age of Onset   • Cancer Mother    • Cancer Father    • Hypertension Father    • Other Sister         ACUTE MYOCARDIAL INFARCTION,CABG   • Heart failure Sister    • Hypertension Sister    • Stroke Other        Review of Systems   Constitutional: Positive for fatigue (tires easily ). Negative for chills and fever.   HENT: Negative.    Eyes: Positive for visual disturbance (glasses daily ).   Respiratory: Positive for apnea (uses cpap nightly ), cough (dry cough ) and shortness of breath (SOA with exertion ). Negative for chest tightness.    Cardiovascular: Negative.  Negative for chest pain, palpitations and leg swelling.   Gastrointestinal: Positive for abdominal pain (pain all over abdomen, seeing GI ) and blood in stool.   Endocrine: Negative.  Negative for cold intolerance and heat intolerance.   Genitourinary: Positive for difficulty urinating.   Musculoskeletal: Positive for arthralgias (joints ) and back pain (low back pain ).   Skin: Negative.  Negative for rash and wound.   Allergic/Immunologic: Negative.  Negative for environmental allergies and food allergies.   Neurological: Positive for light-headedness (occasional at rest ). Negative for dizziness, syncope and weakness.   Hematological: Bruises/bleeds easily (bruises easily ).   Psychiatric/Behavioral: Negative.  Negative for sleep disturbance (denies waking with smothering ).       Objective     VITALS: /65 (BP Location: Left arm, Patient Position: Sitting)   Pulse 78   Ht 154.9 cm (61\")   Wt 81.6 kg (180 lb)   SpO2 97%   BMI 34.01 kg/m²     LABS:   Lab Results (most recent)     None          IMAGING:   No Images in the past 120 days found..    EXAM:  Physical Exam   Constitutional: She is oriented to person, place, and time. She appears well-developed and well-nourished.   HENT:   Head: " Normocephalic and atraumatic.   Eyes: Pupils are equal, round, and reactive to light. EOM are normal.   Neck: Trachea normal and phonation normal. Neck supple. No JVD present. Carotid bruit is present (left). No thyroid mass present.   Cardiovascular: Normal rate, regular rhythm and intact distal pulses. Exam reveals no gallop and no friction rub.   Murmur heard.  Pulses:       Carotid pulses are 2+ on the right side, and 2+ on the left side.       Radial pulses are 2+ on the right side, and 2+ on the left side.        Dorsalis pedis pulses are 2+ on the right side, and 2+ on the left side.        Posterior tibial pulses are 2+ on the right side, and 2+ on the left side.   Pulmonary/Chest: Effort normal and breath sounds normal. No respiratory distress. She has no wheezes. She has no rales.   Abdominal: Soft. Bowel sounds are normal. She exhibits no distension and no abdominal bruit. There is no tenderness.   Musculoskeletal: Normal range of motion.   Neurological: She is alert and oriented to person, place, and time. She has normal strength. No cranial nerve deficit or sensory deficit.   Skin: Skin is warm, dry and intact. Capillary refill takes less than 2 seconds. No rash noted.   Psychiatric: She has a normal mood and affect. Her speech is normal and behavior is normal. Judgment and thought content normal. Cognition and memory are normal.   Nursing note and vitals reviewed.      Procedure   Procedures       Assessment/Plan    Diagnosis Plan   1. Carotid artery stenosis, symptomatic, left  Ambulatory Referral to Neurosurgery   2. Light headed  Ambulatory Referral to Neurosurgery   3. Bruit of left carotid artery     4. Essential hypertension     5. SOB (shortness of breath)     1.  Patient was found to have left carotid artery stenosis is evident by a CTA.  CTA report reads 60 to 70% distal left common carotid artery stenosis.  Patient does have symptoms of lightheadedness.  Due to this I will refer patient to  Dr. Marmolejo for evaluation and potential intervention.  2.  Patient's blood pressure is well controlled on current blood pressure medication regimen.  No medication changes are warranted at this time.  Patient advised to monitor blood pressure on a daily basis and report any persistent highs or lows.  Set goal blood pressure for patient at 130/80 or below.  3.  Informed of signs and symptoms of ACS and advised to seek emergent treatment for any new worsening symptoms.  Patient also advised sooner follow-up as needed.  Also advised to follow-up with family doctor as needed  4.  Patient seems to be doing relatively well from a cardiovascular standpoint other than the shortness of air and fatigue.  However there is been no significant change in this.  No further interventions are warranted at this time.  No medication changes are warranted either.  No follow-ups on file.    Mara was seen today for coronary artery disease and congestive heart failure.    Diagnoses and all orders for this visit:    Carotid artery stenosis, symptomatic, left  -     Ambulatory Referral to Neurosurgery    Light headed  -     Ambulatory Referral to Neurosurgery    Bruit of left carotid artery    Essential hypertension    SOB (shortness of breath)        Mara Martínez  reports that she has been smoking cigarettes. She has a 18.00 pack-year smoking history. She has never used smokeless tobacco.. I have educated her on the risk of diseases from using tobacco products such as cancer, COPD and heart diease.     I advised her to quit and she is not willing to quit.      Patient's Body mass index is 34.01 kg/m². BMI is above normal parameters. Recommendations include: educational material and referral to primary care.           MEDS ORDERED DURING VISIT:  No orders of the defined types were placed in this encounter.          This document has been electronically signed by Sp Carmona Jr., CHARLIE  March 6, 2020 12:03 AM

## 2020-03-12 ENCOUNTER — PREP FOR SURGERY (OUTPATIENT)
Dept: OTHER | Facility: HOSPITAL | Age: 50
End: 2020-03-12

## 2020-03-12 DIAGNOSIS — G89.4 CHRONIC PAIN SYNDROME: Primary | ICD-10-CM

## 2020-03-12 NOTE — H&P
[id number]  Mara Martínez  female  1970  Krishna OrdonezDO  3/12/2020  PATIENT NAME: Mara Martínez           Past Medical History:   Diagnosis Date   • Anemia    • Aortic regurgitation    • Arthritis    • Back pain    • Carotid bruit    • ISREAL (cerebral atherosclerosis) 2014    nonobstructive   • Chest pain    • Coronary artery disease    • D-dimer, elevated    • Diastolic congestive heart failure (CMS/HCC) 2019   • Dizziness    • Edema    • Epilepsy (CMS/[facility])    • Fatigue    • Heart murmur    • History of blood transfusion 2019   • History of transfusion    • Hyperlipidemia    • Hypertension    • Kidney stones    • Migraines    • Mitral regurgitation    • Mitral stenosis     mild   • Palpitations    • Pulmonary edema    • Sleep apnea 2019   • Sleeping difficulties    • SOB (shortness of breath)    • Supraventricular aortic stenosis    • Syncope    • Tachycardia    • Tobacco abuse    • VHD (valvular heart disease)    • Wears dentures    • Wears eyeglasses        Past Surgical History:   Procedure Laterality Date   • APPENDECTOMY     • CARDIAC CATHETERIZATION     • CARDIAC SURGERY      2 valves replaced   •  SECTION      x 2   • CHOLECYSTECTOMY     • COLONOSCOPY     • COLONOSCOPY N/A 2019    Procedure: COLONOSCOPY;  Surgeon: Kurt Gaines MD;  Location: [facility];  Service: Gastroenterology   • CORONARY ARTERY BYPASS GRAFT  2018   • EXPLORATORY LAPAROTOMY     • HERNIA REPAIR     • HYSTERECTOMY     • JOINT REPLACEMENT Right     knee replacement   • PORTACATH PLACEMENT     • UPPER GASTROINTESTINAL ENDOSCOPY         Social History     Socioeconomic History   • Marital status:      Spouse name: Not on file   • Number of children: 2   • Years of education: Not on file   • Highest education level: Not on file   Occupational History     Employer: DISABLED   Tobacco Use   • Smoking status: Current Every Day Smoker     Packs/day: 0.50     Years: 36.00     Pack years: 18.00      Types: Cigarettes   • Smokeless tobacco: Never Used   • Tobacco comment: 5 daily   Substance and Sexual Activity   • Alcohol use: No   • Drug use: No   • Sexual activity: Defer   Social History Narrative    Lives in [place], [place] with spouse, children, and grandchildren       Family History   Problem Relation Age of Onset   • Cancer Mother    • Cancer Father    • Hypertension Father    • Other Sister         ACUTE MYOCARDIAL INFARCTION,CABG   • Heart failure Sister    • Hypertension Sister    • Stroke Other          (Not in a hospital admission)      Review of Systems/Physical Exam:   Within Normal Limits Abnormal   Mental Status [x]    []     Head, Neck, and Throat [x]   []     Chest/Lungs [x]   []     Cardiovascular [x]   []     Abdomen [x]   []     Extremities [x]   []     Neurologic [x]   []     Other         There were no vitals filed for this visit.    Review of Systems - as previously noted      Diagnosis: G89.4    Plan: PAIN PUMP TRIAL Intrathecal (N/A)       STATEMENT AS TO REASON OF PLANNED OPERATION:  [x]   H&P revealed no contraindication to planned surgery. (Check if correct).    [x]   Labs (if ordered) have been reviewed and revealed no contraindications to planned surgery. (Check if correct).      Krishna Ordonez,   3/12/2020

## 2020-03-18 ENCOUNTER — HOSPITAL ENCOUNTER (OUTPATIENT)
Facility: HOSPITAL | Age: 50
Setting detail: HOSPITAL OUTPATIENT SURGERY
Discharge: HOME | End: 2020-03-18
Attending: ANESTHESIOLOGY | Admitting: ANESTHESIOLOGY

## 2020-03-18 ENCOUNTER — APPOINTMENT (OUTPATIENT)
Dept: GENERAL RADIOLOGY | Facility: HOSPITAL | Age: 50
End: 2020-03-18

## 2020-03-18 PROCEDURE — G0463 HOSPITAL OUTPT CLINIC VISIT: HCPCS | Performed by: ANESTHESIOLOGY

## 2020-03-23 RX ORDER — FUROSEMIDE 40 MG/1
40 TABLET ORAL DAILY
Qty: 30 TABLET | Refills: 6 | Status: SHIPPED | OUTPATIENT
Start: 2020-03-23 | End: 2020-11-06

## 2020-04-14 ENCOUNTER — TELEPHONE (OUTPATIENT)
Dept: CARDIOLOGY | Facility: CLINIC | Age: 50
End: 2020-04-14

## 2020-04-14 NOTE — TELEPHONE ENCOUNTER
Received cardiac clearance from [facility]. Patient is scheduled for Antegrade Balloon Enteroscopy on 05/13/20 w/ Dr. Ernst.    Clearance given to Rowan.     Phone: [phone] option [phone]  Fax: [phone]

## 2020-04-15 NOTE — TELEPHONE ENCOUNTER
Per Tavares: Pt is cleared for surgery, must bridge w/ Lovenox while off coumadin and continue Lovenox until therapeutic on coumadin due to double mechanical valve replacement. Off coumadin 5 days prior to procedure. Pt will need INR check w/ PCP 1 week after procedure.       Faxed clearance to [phone]

## 2020-05-13 DIAGNOSIS — K21.9 GASTROESOPHAGEAL REFLUX DISEASE, ESOPHAGITIS PRESENCE NOT SPECIFIED: ICD-10-CM

## 2020-05-13 RX ORDER — PANTOPRAZOLE SODIUM 40 MG/1
40 TABLET, DELAYED RELEASE ORAL DAILY
Qty: 90 TABLET | Refills: 3 | Status: SHIPPED | OUTPATIENT
Start: 2020-05-13 | End: 2021-05-10

## 2020-05-13 NOTE — TELEPHONE ENCOUNTER
Rec'd a faxed request from [facility]'s for a refill on Protonix. This was refilled for patient in the past by Tavares Yeh, so refill sent again today.  DEBORAH JOHNSTON

## 2020-06-18 ENCOUNTER — OFFICE VISIT (OUTPATIENT)
Dept: CARDIOLOGY | Facility: CLINIC | Age: 50
End: 2020-06-18

## 2020-06-18 VITALS
HEART RATE: 73 BPM | OXYGEN SATURATION: 99 % | BODY MASS INDEX: 32.81 KG/M2 | TEMPERATURE: 97.5 F | SYSTOLIC BLOOD PRESSURE: 112 MMHG | DIASTOLIC BLOOD PRESSURE: 71 MMHG | HEIGHT: 61 IN | WEIGHT: 173.8 LBS

## 2020-06-18 DIAGNOSIS — I25.10 CORONARY ARTERY DISEASE INVOLVING NATIVE CORONARY ARTERY OF NATIVE HEART WITHOUT ANGINA PECTORIS: Primary | ICD-10-CM

## 2020-06-18 DIAGNOSIS — R06.02 SOB (SHORTNESS OF BREATH): ICD-10-CM

## 2020-06-18 DIAGNOSIS — I65.23 BILATERAL CAROTID ARTERY STENOSIS: ICD-10-CM

## 2020-06-18 DIAGNOSIS — I38 VHD (VALVULAR HEART DISEASE): ICD-10-CM

## 2020-06-18 PROCEDURE — 99214 OFFICE O/P EST MOD 30 MIN: CPT | Performed by: PHYSICIAN ASSISTANT

## 2020-06-18 NOTE — PATIENT INSTRUCTIONS

## 2020-06-18 NOTE — PROGRESS NOTES
Problem list     Subjective   Mara Martínez is a 49 y.o. female     Chief Complaint   Patient presents with   • Palpitations     presents for hospital f/u   • Shortness of Breath        PROBLEM LIST:      1.Valvular heart disease  a. Moderate Aortic Stenosis with GUEVARA of 1.22cm^2, mean gradient of 24mmHg with moderate to severe AI  b. Moderate to severe MR with no evidence of Mitral Stenosis  c. Repeat echo on December 22 2014 showed worsening GUEVARA at 1cm^2, Moderate MR with Mild MS with MVA at 1.69cm^2 and mean gradient of 5mmHg. Grade 2 DD  d. LHC and RHC revealed normal coronaries, mod-severe AI with GUEVARA at 1.2 with moderate AS, Moderate Mitral Stenosis with mild PTHN arguing against severe mitral disease. Medical management indicated at this point.  E. mild to moderate aortic stenosis with moderate aortic regurgitation, mean gradient of 29 and aortic valve area 1.5 cm². Moderate mitral regurgitation with mild to moderate mitral stenosis with mitral valve area 1.Meter squared   F.  Moderate to severe aortic stenosis, severe aortic insufficiency, severe mitral stenosis with mild to moderate mitral regurgitation by echocardiogram November 2017  G. Cardiac cath 12/18/17 demonstrated potentially hemodynamically significant mitral regurgitation associated with mildly to moderately elevated PA pressures. moderate aortic stenosis. The study excluded angiographically significant epicardial coronary disease as a contributing factor to the patient's symptoms. Double valve surgery replacement recommended  H.  Status post aortic and mitral valve replacement by Dr. Cavazos with mechanical valves July 2018  I.  Echocardiogram August 2018 demonstrates normal seated valves with trace aortic insufficiency and mitral insufficiency.  J.  Echo 4/24/19-mild LVH, diastolic dysfunction 2, mild to moderate left atrial enlargement, mechanical mitral valve, mechanical aortic valve, trivial to mild AI, mild TR, EF 61 to 65%  2)Normal systolic  fxn   3)Hypertension  3.1) Stress test 8/3/15 - no ischemia, low risk  3.2 stress test 1/2020- for ischemia post-rest ejection fraction 53%  4)Dyslipidemia  5)Epilepsy  6) Nonobstructive ISREAL by carotid duplex on December 2014  6.1 carotid duple 1/28/2020 right common carotid artery patent, 50 to 75% stenosis in the distal left common carotid artery, 16 to 49% in the bulb bilaterally, antegrade flow in the vertebrals.  6.2 CTA of neck with and without contrast 2/27/2020 40% stenosis at the origin of the left common carotid artery and 70% stenosis of the distal left common carotid artery, 30% stenosis of the right common carotid artery, 0.4 cm right MCA bifurcation aneurysm  7) Tobacco Habituation, smokes pack a day  8) Migraines        9)  History of rheumatic fever      HPI    Patient is a 49-year-old female that presents to the office for follow-up.  Patient has done remarkably well.  She does not describe significant chest discomfort.  She does have moderate levels of dyspnea but relates to having underlying lung issues.  She does not describe PND orthopnea.  She does have chronic lower extremity edema that apparently is for the most part controlled    She does have occasional palpitations..  No dizziness presyncope or syncope.  She has had issues with anticoagulation.  She was admitted at [facility] in the setting of GI blood loss.  She apparently had upper GI bleed that required cautery.  She is doing well and she is being monitored closely by primary.      She recently was evaluated by a local neurosurgeon because of carotid disease with recommendations to consider referral and evaluation in [place].  Patient is to see neurosurgery tomorrow.  Otherwise she is doing well      Current Outpatient Medications on File Prior to Visit   Medication Sig Dispense Refill   • Ascorbic Acid (VITAMIN C) 500 MG capsule Take 1 tablet by mouth 2 (two) times a day.     • aspirin 81 MG EC tablet Take 81 mg by  mouth Daily.     • atorvastatin (LIPITOR) 80 MG tablet Take 1 tablet by mouth Daily. 30 tablet 11   • Cholecalciferol (VITAMIN D3 PO) Take 1,000 Units by mouth Daily.     • ferrous sulfate 325 (65 FE) MG tablet Take 325 mg by mouth 2 (Two) Times a Day.     • furosemide (LASIX) 40 MG tablet Take 1 tablet by mouth Daily. 30 tablet 6   • nitroglycerin (NITROSTAT) 0.4 MG SL tablet Place 1 tablet under the tongue Every 5 (Five) Minutes As Needed for Chest Pain. May take maximum of 3 tabs in 15 minutes. 25 tablet 3   • nortriptyline (PAMELOR) 50 MG capsule Take 100 mg by mouth Every Night.     • OXcarbazepine (TRILEPTAL) 600 MG tablet Take 600 mg by mouth 2 (Two) Times a Day.     • pantoprazole (PROTONIX) 40 MG EC tablet Take 1 tablet by mouth Daily. 90 tablet 3   • potassium chloride (K-DUR) 10 MEQ CR tablet Take 2 tablets by mouth Daily. (Patient taking differently: Take 40 mEq by mouth Daily.) 30 tablet 11   • topiramate (TOPAMAX) 100 MG tablet Take 100 mg by mouth 2 (Two) Times a Day.     • vitamin B-12 (CYANOCOBALAMIN) 100 MCG tablet Take 100 mcg by mouth.     • warfarin (COUMADIN) 5 MG tablet Take 5 mg by mouth Daily.     • [DISCONTINUED] enoxaparin (Lovenox) 80 MG/0.8ML solution syringe Inject 80mg BID starting 5 days prior to procedure, continue until therapeutic on coumadin. Get INR checked by PCP 1 week after procedure. 24 syringe 0     Current Facility-Administered Medications on File Prior to Visit   Medication Dose Route Frequency Provider Last Rate Last Dose   • Chlorhexidine Gluconate Cloth 2 % pads 1 application  1 application Topical Q12H PRN Cristobal Lozano PA       • midazolam (VERSED) injection   Intravenous PRN Krishna Blank MD   2 mg at 03/02/18 0655       Azithromycin and Corticosteroids    Past Medical History:   Diagnosis Date   • Anemia    • Aortic regurgitation    • Arthritis    • Back pain    • Carotid bruit    • ISREAL (cerebral atherosclerosis) 12/2014    nonobstructive   • Chest pain    •  Coronary artery disease    • D-dimer, elevated    • Diastolic congestive heart failure (CMS/HCC) 2019   • Dizziness    • Edema    • Epilepsy (CMS/HCC)    • Fatigue    • Heart murmur    • History of blood transfusion 2019   • History of transfusion    • Hyperlipidemia    • Hypertension    • Kidney stones    • Migraines    • Mitral regurgitation    • Mitral stenosis     mild   • Palpitations    • Pulmonary edema    • Sleep apnea 2019   • Sleeping difficulties    • SOB (shortness of breath)    • Supraventricular aortic stenosis    • Syncope    • Tachycardia    • Tobacco abuse    • VHD (valvular heart disease)    • Wears dentures    • Wears eyeglasses        Social History     Socioeconomic History   • Marital status:      Spouse name: Not on file   • Number of children: 2   • Years of education: Not on file   • Highest education level: Not on file   Occupational History     Employer: DISABLED   Tobacco Use   • Smoking status: Former Smoker     Packs/day: 0.50     Years: 36.00     Pack years: 18.00     Types: Cigarettes     Last attempt to quit: 2020     Years since quittin.0   • Smokeless tobacco: Never Used   Substance and Sexual Activity   • Alcohol use: No   • Drug use: No   • Sexual activity: Defer   Social History Narrative    Lives in [place], [place] with spouse, children, and grandchildren       Family History   Problem Relation Age of Onset   • Cancer Mother    • Cancer Father    • Hypertension Father    • Other Sister         ACUTE MYOCARDIAL INFARCTION,CABG   • Heart failure Sister    • Hypertension Sister    • Stroke Other        Review of Systems   Constitutional: Positive for fatigue.   Eyes: Positive for visual disturbance.   Respiratory: Positive for cough and shortness of breath (with daily activity).    Cardiovascular: Positive for palpitations (flutters) and leg swelling. Negative for chest pain.   Gastrointestinal: Positive for diarrhea.   Endocrine: Negative.    Genitourinary:  "Negative.    Musculoskeletal: Positive for arthralgias and back pain.   Skin: Negative.    Allergic/Immunologic: Negative.    Neurological: Positive for dizziness (constant) and headaches.   Hematological: Bruises/bleeds easily.   Psychiatric/Behavioral: Positive for sleep disturbance (off and on).       Objective   Vitals:    06/18/20 1038   BP: 112/71   BP Location: Left arm   Patient Position: Sitting   Pulse: 73   Temp: 97.5 °F (36.4 °C)   SpO2: 99%   Weight: 78.8 kg (173 lb 12.8 oz)   Height: 154.9 cm (60.98\")      /71 (BP Location: Left arm, Patient Position: Sitting)   Pulse 73   Temp 97.5 °F (36.4 °C)   Ht 154.9 cm (60.98\")   Wt 78.8 kg (173 lb 12.8 oz)   SpO2 99%   BMI 32.86 kg/m²     Lab Results (most recent)     None          Physical Exam   Constitutional: She is oriented to person, place, and time. She appears well-developed and well-nourished. No distress.   HENT:   Head: Normocephalic and atraumatic.   Eyes: Conjunctivae are normal. Right eye exhibits no discharge. Left eye exhibits no discharge. No scleral icterus.   Neck: No JVD present.   Cardiovascular: Normal rate, regular rhythm and normal heart sounds. Exam reveals no gallop and no friction rub.   No murmur heard.  Grade 1/6 to 2/6 systolic ejection murmur right upper sternal border rating to left with mechanical click and auscultated.  Systolic murmur heard at the entirety of the sternal border   Pulmonary/Chest: Effort normal and breath sounds normal. No respiratory distress. She has no wheezes. She has no rales. She exhibits no tenderness.   Musculoskeletal: Normal range of motion. She exhibits edema.   Neurological: She is alert and oriented to person, place, and time. No cranial nerve deficit.   Skin: Skin is warm and dry. No rash noted. No erythema. No pallor.   Psychiatric: She has a normal mood and affect. Her behavior is normal.   Nursing note and vitals reviewed.      Procedure   Procedures       Assessment/Plan "     Problems Addressed this Visit        Cardiovascular and Mediastinum    VHD (valvular heart disease)    Coronary artery disease involving native coronary artery of native heart without angina pectoris - Primary    Stenosis of carotid artery       Respiratory    SOB (shortness of breath)            Recommendation  1.  Patient with coronary artery disease but doing well.  She is on statin therapy and lipids are being monitored by primary  2.  Patient with chronic dyspnea and palpitations.  She is doing well and has underlying lung disease.  Her edema is for the most part controlled and will continue to monitor  3.  Carotid artery stenosis in the patient being referred to the [facility] for evaluation.  She is to see them tomorrow  4.  Valvular heart disease we will continue to be monitored.  For now we will see patient back for follow-up in 4 to 6 months.  She will follow with primary as scheduled       Mara Martínez  reports that she quit smoking about 2 weeks ago. Her smoking use included cigarettes. She has a 18.00 pack-year smoking history. She has never used smokeless tobacco.      Patient's Body mass index is 32.86 kg/m². BMI is above normal parameters. Recommendations include: educational material.       Electronically signed by:

## 2020-11-06 RX ORDER — FUROSEMIDE 40 MG/1
TABLET ORAL
Qty: 30 TABLET | Refills: 5 | Status: SHIPPED | OUTPATIENT
Start: 2020-11-06 | End: 2021-06-10 | Stop reason: DRUGHIGH

## 2020-12-17 ENCOUNTER — OFFICE VISIT (OUTPATIENT)
Dept: CARDIOLOGY | Facility: CLINIC | Age: 50
End: 2020-12-17

## 2020-12-17 VITALS
DIASTOLIC BLOOD PRESSURE: 74 MMHG | HEIGHT: 61 IN | OXYGEN SATURATION: 95 % | BODY MASS INDEX: 33.99 KG/M2 | SYSTOLIC BLOOD PRESSURE: 114 MMHG | WEIGHT: 180 LBS | HEART RATE: 89 BPM

## 2020-12-17 DIAGNOSIS — I65.23 BILATERAL CAROTID ARTERY STENOSIS: Primary | ICD-10-CM

## 2020-12-17 DIAGNOSIS — I38 VHD (VALVULAR HEART DISEASE): ICD-10-CM

## 2020-12-17 DIAGNOSIS — R06.02 SOB (SHORTNESS OF BREATH): ICD-10-CM

## 2020-12-17 DIAGNOSIS — E78.5 DYSLIPIDEMIA: ICD-10-CM

## 2020-12-17 PROCEDURE — 99214 OFFICE O/P EST MOD 30 MIN: CPT | Performed by: PHYSICIAN ASSISTANT

## 2020-12-17 RX ORDER — LEVOTHYROXINE SODIUM 0.05 MG/1
50 TABLET ORAL DAILY
COMMUNITY

## 2020-12-17 RX ORDER — VARENICLINE TARTRATE 1 MG/1
1 TABLET, FILM COATED ORAL 2 TIMES DAILY
COMMUNITY
End: 2021-12-09

## 2020-12-17 NOTE — PATIENT INSTRUCTIONS
Heart-Healthy Eating Plan  Heart-healthy meal planning includes:  · Eating less unhealthy fats.  · Eating more healthy fats.  · Making other changes in your diet.  Talk with your doctor or a diet specialist (dietitian) to create an eating plan that is right for you.  What is my plan?  Your doctor may recommend an eating plan that includes:  · Total fat: ______% or less of total calories a day.  · Saturated fat: ______% or less of total calories a day.  · Cholesterol: less than _________mg a day.  What are tips for following this plan?  Cooking  Avoid frying your food. Try to bake, boil, grill, or broil it instead. You can also reduce fat by:  · Removing the skin from poultry.  · Removing all visible fats from meats.  · Steaming vegetables in water or broth.  Meal planning    · At meals, divide your plate into four equal parts:  ? Fill one-half of your plate with vegetables and green salads.  ? Fill one-fourth of your plate with whole grains.  ? Fill one-fourth of your plate with lean protein foods.  · Eat 4-5 servings of vegetables per day. A serving of vegetables is:  ? 1 cup of raw or cooked vegetables.  ? 2 cups of raw leafy greens.  · Eat 4-5 servings of fruit per day. A serving of fruit is:  ? 1 medium whole fruit.  ? ¼ cup of dried fruit.  ? ½ cup of fresh, frozen, or canned fruit.  ? ½ cup of 100% fruit juice.  · Eat more foods that have soluble fiber. These are apples, broccoli, carrots, beans, peas, and barley. Try to get 20-30 g of fiber per day.  · Eat 4-5 servings of nuts, legumes, and seeds per week:  ? 1 serving of dried beans or legumes equals ½ cup after being cooked.  ? 1 serving of nuts is ¼ cup.  ? 1 serving of seeds equals 1 tablespoon.  General information  · Eat more home-cooked food. Eat less restaurant, buffet, and fast food.  · Limit or avoid alcohol.  · Limit foods that are high in starch and sugar.  · Avoid fried foods.  · Lose weight if you are overweight.  · Keep track of how much salt  (sodium) you eat. This is important if you have high blood pressure. Ask your doctor to tell you more about this.  · Try to add vegetarian meals each week.  Fats  · Choose healthy fats. These include olive oil and canola oil, flaxseeds, walnuts, almonds, and seeds.  · Eat more omega-3 fats. These include salmon, mackerel, sardines, tuna, flaxseed oil, and ground flaxseeds. Try to eat fish at least 2 times each week.  · Check food labels. Avoid foods with trans fats or high amounts of saturated fat.  · Limit saturated fats.  ? These are often found in animal products, such as meats, butter, and cream.  ? These are also found in plant foods, such as palm oil, palm kernel oil, and coconut oil.  · Avoid foods with partially hydrogenated oils in them. These have trans fats. Examples are stick margarine, some tub margarines, cookies, crackers, and other baked goods.  What foods can I eat?  Fruits  All fresh, canned (in natural juice), or frozen fruits.  Vegetables  Fresh or frozen vegetables (raw, steamed, roasted, or grilled). Green salads.  Grains  Most grains. Choose whole wheat and whole grains most of the time. Rice and pasta, including brown rice and pastas made with whole wheat.  Meats and other proteins  Lean, well-trimmed beef, veal, pork, and lamb. Chicken and turkey without skin. All fish and shellfish. Wild duck, rabbit, pheasant, and venison. Egg whites or low-cholesterol egg substitutes. Dried beans, peas, lentils, and tofu. Seeds and most nuts.  Dairy  Low-fat or nonfat cheeses, including ricotta and mozzarella. Skim or 1% milk that is liquid, powdered, or evaporated. Buttermilk that is made with low-fat milk. Nonfat or low-fat yogurt.  Fats and oils  Non-hydrogenated (trans-free) margarines. Vegetable oils, including soybean, sesame, sunflower, olive, peanut, safflower, corn, canola, and cottonseed. Salad dressings or mayonnaise made with a vegetable oil.  Beverages  Mineral water. Coffee and tea. Diet  carbonated beverages.  Sweets and desserts  Sherbet, gelatin, and fruit ice. Small amounts of dark chocolate.  Limit all sweets and desserts.  Seasonings and condiments  All seasonings and condiments.  The items listed above may not be a complete list of foods and drinks you can eat. Contact a dietitian for more options.  What foods should I avoid?  Fruits  Canned fruit in heavy syrup. Fruit in cream or butter sauce. Fried fruit. Limit coconut.  Vegetables  Vegetables cooked in cheese, cream, or butter sauce. Fried vegetables.  Grains  Breads that are made with saturated or trans fats, oils, or whole milk. Croissants. Sweet rolls. Donuts. High-fat crackers, such as cheese crackers.  Meats and other proteins  Fatty meats, such as hot dogs, ribs, sausage, hernandez, rib-eye roast or steak. High-fat deli meats, such as salami and bologna. Caviar. Domestic duck and goose. Organ meats, such as liver.  Dairy  Cream, sour cream, cream cheese, and creamed cottage cheese. Whole-milk cheeses. Whole or 2% milk that is liquid, evaporated, or condensed. Whole buttermilk. Cream sauce or high-fat cheese sauce. Yogurt that is made from whole milk.  Fats and oils  Meat fat, or shortening. Cocoa butter, hydrogenated oils, palm oil, coconut oil, palm kernel oil. Solid fats and shortenings, including hernandez fat, salt pork, lard, and butter. Nondairy cream substitutes. Salad dressings with cheese or sour cream.  Beverages  Regular sodas and juice drinks with added sugar.  Sweets and desserts  Frosting. Pudding. Cookies. Cakes. Pies. Milk chocolate or white chocolate. Buttered syrups. Full-fat ice cream or ice cream drinks.  The items listed above may not be a complete list of foods and drinks to avoid. Contact a dietitian for more information.  Summary  · Heart-healthy meal planning includes eating less unhealthy fats, eating more healthy fats, and making other changes in your diet.  · Eat a balanced diet. This includes fruits and  vegetables, low-fat or nonfat dairy, lean protein, nuts and legumes, whole grains, and heart-healthy oils and fats.  This information is not intended to replace advice given to you by your health care provider. Make sure you discuss any questions you have with your health care provider.  Document Revised: 02/21/2019 Document Reviewed: 01/25/2019  [employer] Patient Education © 2020 [employer]

## 2020-12-17 NOTE — PROGRESS NOTES
Problem list     Subjective   Mara Martínez is a 50 y.o. female     Chief Complaint   Patient presents with   • Follow-up     6 MO FU        PROBLEM LIST:      1.Valvular heart disease  1.1 Cardiac cath 12/18/17 demonstrated potentially hemodynamically significant mitral regurgitation associated with mildly to moderately elevated PA pressures. moderate aortic stenosis. The study excluded angiographically significant epicardial coronary disease as a contributing factor to the patient's symptoms. Double valve surgery replacement recommended  1.2 status post aortic and mitral valve replacement by Dr. Cavazos with mechanical valves July 2018  1.3.  Follow-up echocardiogram April 2019 demonstrating stable valve parameters and EF that is normal   2)Normal systolic fxn   3)Hypertension  4)Dyslipidemia  5)Epilepsy  6) carotid artery stenosis  6.1CTA of neck with and without contrast 2/27/2020 40% stenosis at the origin of the left common carotid artery and 70% stenosis of the distal left common carotid artery, 30% stenosis of the right common carotid artery, 0.4 cm right MCA bifurcation aneurysm  6.2 left carotid stenting by Dr. Mckeon [facility] July 2020  7) MCA aneurysm estimated at 3.5 mm followed by [facility]  8) Migraines        9)  History of rheumatic fever  10.  Tobacco habituation         HPI    Patient is a 50-year-old female that presents back to the office for follow-up.  Patient has done well with the exception of issues from her sternotomy.  Patient underwent double valve surgery in 2018 and had sternal closure with wiring.  She describes the wiring causing her issues.  She describes issues in her right breast.  She even had a mammogram just to have a UA but she describes apparently that the wiring is causing significant discomfort that is giving causing her to have difficulty sleeping.    She has no shortness of breath.  She does not describe PND orthopnea.    She does not describe  palpitations or dysrhythmic symptoms.  Otherwise she is doing well      Current Outpatient Medications on File Prior to Visit   Medication Sig Dispense Refill   • Ascorbic Acid (VITAMIN C) 500 MG capsule Take 1 tablet by mouth 2 (two) times a day.     • aspirin 81 MG EC tablet Take 81 mg by mouth Daily.     • atorvastatin (LIPITOR) 80 MG tablet Take 1 tablet by mouth Daily. 30 tablet 11   • Cholecalciferol (VITAMIN D3 PO) Take 1,000 Units by mouth Daily.     • ferrous sulfate 325 (65 FE) MG tablet Take 325 mg by mouth 2 (Two) Times a Day.     • furosemide (LASIX) 40 MG tablet TAKE 1 TABLET BY MOUTH ONCE DAILY 30 tablet 5   • levothyroxine (SYNTHROID, LEVOTHROID) 25 MCG tablet Take 25 mcg by mouth Daily.     • nitroglycerin (NITROSTAT) 0.4 MG SL tablet Place 1 tablet under the tongue Every 5 (Five) Minutes As Needed for Chest Pain. May take maximum of 3 tabs in 15 minutes. 25 tablet 3   • nortriptyline (PAMELOR) 50 MG capsule Take 100 mg by mouth Every Night.     • OXcarbazepine (TRILEPTAL) 600 MG tablet Take 600 mg by mouth 2 (Two) Times a Day.     • pantoprazole (PROTONIX) 40 MG EC tablet Take 1 tablet by mouth Daily. 90 tablet 3   • potassium chloride (K-DUR) 10 MEQ CR tablet Take 2 tablets by mouth Daily. 30 tablet 11   • topiramate (TOPAMAX) 100 MG tablet Take 100 mg by mouth 2 (Two) Times a Day.     • varenicline (CHANTIX) 1 MG tablet Take 1 mg by mouth 2 (Two) Times a Day.     • warfarin (COUMADIN) 5 MG tablet Take 5 mg by mouth Daily.     • vitamin B-12 (CYANOCOBALAMIN) 100 MCG tablet Take 100 mcg by mouth.       Current Facility-Administered Medications on File Prior to Visit   Medication Dose Route Frequency Provider Last Rate Last Admin   • Chlorhexidine Gluconate Cloth 2 % pads 1 application  1 application Topical Q12H PRN Cristobal Lozano PA       • midazolam (VERSED) injection   Intravenous PRN Krishna Blank MD   2 mg at 03/02/18 0655       Azithromycin and Corticosteroids    Past Medical History:    Diagnosis Date   • Anemia    • Aortic regurgitation    • Arthritis    • Back pain    • Carotid bruit    • ISREAL (cerebral atherosclerosis) 2014    nonobstructive   • Chest pain    • Coronary artery disease    • D-dimer, elevated    • Diastolic congestive heart failure (CMS/HCC) 2019   • Dizziness    • Edema    • Epilepsy (CMS/HCC)    • Fatigue    • Heart murmur    • History of blood transfusion 2019   • History of transfusion    • Hyperlipidemia    • Hypertension    • Kidney stones    • Migraines    • Mitral regurgitation    • Mitral stenosis     mild   • Palpitations    • Pulmonary edema    • Sleep apnea 2019   • Sleeping difficulties    • SOB (shortness of breath)    • Supraventricular aortic stenosis    • Syncope    • Tachycardia    • Tobacco abuse    • VHD (valvular heart disease)    • Wears dentures    • Wears eyeglasses        Social History     Socioeconomic History   • Marital status:      Spouse name: Not on file   • Number of children: 2   • Years of education: Not on file   • Highest education level: Not on file   Occupational History     Employer: DISABLED   Tobacco Use   • Smoking status: Current Every Day Smoker     Packs/day: 0.50     Years: 36.00     Pack years: 18.00     Types: Cigarettes     Last attempt to quit: 2020     Years since quittin.5   • Smokeless tobacco: Never Used   Substance and Sexual Activity   • Alcohol use: No   • Drug use: No   • Sexual activity: Defer   Social History Narrative    Lives in [place], [place] with spouse, children, and grandchildren       Family History   Problem Relation Age of Onset   • Cancer Mother    • Cancer Father    • Hypertension Father    • Other Sister         ACUTE MYOCARDIAL INFARCTION,CABG   • Heart failure Sister    • Hypertension Sister    • Stroke Other        Review of Systems   Constitutional: Negative for chills, fatigue and fever.   HENT: Negative for congestion, rhinorrhea and sore throat.    Eyes: Positive for visual  "disturbance (glasses).   Respiratory: Positive for apnea (uses CPAP). Negative for chest tightness and shortness of breath.    Cardiovascular: Negative.  Negative for chest pain, palpitations and leg swelling.   Endocrine: Negative.  Negative for cold intolerance and heat intolerance.   Musculoskeletal: Positive for back pain. Negative for gait problem, neck pain and neck stiffness.   Skin: Negative.  Negative for rash and wound.   Allergic/Immunologic: Negative.  Negative for environmental allergies and food allergies.   Neurological: Positive for dizziness (w/o position change), weakness (tired very easily), light-headedness and headaches. Negative for numbness.   Hematological: Bruises/bleeds easily.   Psychiatric/Behavioral: Positive for sleep disturbance (difficulty staying asleep).       Objective   Vitals:    12/17/20 1027   BP: 114/74   Pulse: 89   SpO2: 95%   Weight: 81.6 kg (180 lb)   Height: 154.9 cm (61\")      /74   Pulse 89   Ht 154.9 cm (61\")   Wt 81.6 kg (180 lb)   SpO2 95%   BMI 34.01 kg/m²     Lab Results (most recent)     None          Physical Exam  Vitals signs and nursing note reviewed.   Constitutional:       General: She is not in acute distress.     Appearance: Normal appearance. She is well-developed.   HENT:      Head: Normocephalic and atraumatic.   Eyes:      General: No scleral icterus.        Right eye: No discharge.         Left eye: No discharge.      Conjunctiva/sclera: Conjunctivae normal.   Neck:      Vascular: Carotid bruit present.   Cardiovascular:      Rate and Rhythm: Normal rate and regular rhythm.      Heart sounds: Murmur present. Systolic murmur present with a grade of 1/6. No friction rub. No gallop.       Comments: Systolic murmur right upper sternal border rating to left.  Mechanical click auscultated in left sternal border murmur grade 1/6 systolic  Pulmonary:      Effort: Pulmonary effort is normal. No respiratory distress.      Breath sounds: Normal breath " sounds. No wheezing or rales.   Chest:      Chest wall: No tenderness.   Musculoskeletal:      Right lower leg: No edema.      Left lower leg: No edema.   Skin:     General: Skin is warm and dry.      Coloration: Skin is not pale.      Findings: No erythema or rash.   Neurological:      Mental Status: She is alert and oriented to person, place, and time.      Cranial Nerves: No cranial nerve deficit.   Psychiatric:         Behavior: Behavior normal.         Procedure   Procedures       Assessment/Plan     Problems Addressed this Visit        Cardiovascular and Mediastinum    VHD (valvular heart disease)    Relevant Orders    Comprehensive Metabolic Panel    Lipid Panel    Ambulatory Referral to Cardiothoracic Surgery    Stenosis of carotid artery - Primary    Relevant Orders    Comprehensive Metabolic Panel    Lipid Panel    Ambulatory Referral to Cardiothoracic Surgery       Respiratory    SOB (shortness of breath)    Relevant Orders    Comprehensive Metabolic Panel    Lipid Panel    Ambulatory Referral to Cardiothoracic Surgery       Other    Dyslipidemia      Diagnoses       Codes Comments    Bilateral carotid artery stenosis    -  Primary ICD-10-CM: I65.23  ICD-9-CM: 433.10, 433.30     SOB (shortness of breath)     ICD-10-CM: R06.02  ICD-9-CM: 786.05     VHD (valvular heart disease)     ICD-10-CM: I38  ICD-9-CM: 424.90     Dyslipidemia     ICD-10-CM: E78.5  ICD-9-CM: 272.4           Recommendation  1.  Patient with valvular heart disease with double valve surgery do remarkably well.  She is status post valvular surgery and is doing well.  She is doing well on anticoagulation at this time we will continue to monitor serially echocardiograms    2.  I am making referral to CT surgery.  She is having issue with sternal closure and she would like evaluation    3.  Patient with carotid stenosis undergoing intervention in MCA aneurysm followed by neurosurgery at [facility]    4.  Otherwise patient doing well.   I am rechecking laboratories to check lipid status.  Otherwise we will see her back for follow-up in 6 months.  She is to follow with primary as scheduled           Mara Martínez  reports that she has been smoking cigarettes. She has a 18.00 pack-year smoking history. She has never used smokeless tobacco.. I have educated her on the risk of diseases from using tobacco products such as cancer, COPD and heart disease.     I advised her to quit and she is willing to quit. We have discussed the following method/s for tobacco cessation:  Education Material.    I spent 3  minutes counseling the patient.          Patient's Body mass index is 34.01 kg/m². BMI is above normal parameters. Recommendations include: educational material.       Electronically signed by:

## 2021-03-03 NOTE — TELEPHONE ENCOUNTER
Patient cleared. Increased risk.    Hold coumadin 3 days prior. Day after holding coumadin, start Lovenox SQ BID. Restart coumadin day after procedure. Continue Lovenox until therapeutic. Recheck INR 5 days after procedure.     Faxed to [phone]    Instructed surgical office to instruct patient on Lovenox use.

## 2021-03-04 ENCOUNTER — TELEPHONE (OUTPATIENT)
Dept: CARDIOLOGY | Facility: CLINIC | Age: 51
End: 2021-03-04

## 2021-03-04 NOTE — TELEPHONE ENCOUNTER
Pharmacy called on RX Lovenox - needed to know the frequency and for how long patient needs to use .     Melissa told pharm. Frequency is BID and usage is 10-14 days patient is having INR check 5 days after procedure. Pharmacy verbalized understanding AT [facility] [address] [facility]

## 2021-03-09 RX ORDER — ATORVASTATIN CALCIUM 80 MG/1
TABLET, FILM COATED ORAL
Qty: 30 TABLET | Refills: 10 | Status: SHIPPED | OUTPATIENT
Start: 2021-03-09 | End: 2021-10-05 | Stop reason: SDUPTHER

## 2021-03-12 ENCOUNTER — TELEPHONE (OUTPATIENT)
Dept: CARDIOLOGY | Facility: CLINIC | Age: 51
End: 2021-03-12

## 2021-03-16 NOTE — TELEPHONE ENCOUNTER
Patient cleared. Increased risk.     Hold coumadin 3 days prior. Day after holding coumadin, start Lovenox SQ BID. Restart coumadin day after procedure. Continue Lovenox until therapeutic. Recheck INR 5 days after procedure.      Faxed to [phone]     Instructed surgical office to instruct patient on Lovenox use.

## 2021-03-18 ENCOUNTER — OFFICE VISIT (OUTPATIENT)
Dept: CARDIOLOGY | Facility: CLINIC | Age: 51
End: 2021-03-18

## 2021-03-18 VITALS
OXYGEN SATURATION: 100 % | HEART RATE: 85 BPM | WEIGHT: 186.2 LBS | DIASTOLIC BLOOD PRESSURE: 69 MMHG | HEIGHT: 60 IN | BODY MASS INDEX: 36.56 KG/M2 | SYSTOLIC BLOOD PRESSURE: 129 MMHG

## 2021-03-18 DIAGNOSIS — R06.02 SOB (SHORTNESS OF BREATH): ICD-10-CM

## 2021-03-18 DIAGNOSIS — I25.10 CORONARY ARTERY DISEASE INVOLVING NATIVE CORONARY ARTERY OF NATIVE HEART WITHOUT ANGINA PECTORIS: Primary | ICD-10-CM

## 2021-03-18 DIAGNOSIS — I38 VHD (VALVULAR HEART DISEASE): ICD-10-CM

## 2021-03-18 DIAGNOSIS — I65.23 BILATERAL CAROTID ARTERY STENOSIS: ICD-10-CM

## 2021-03-18 PROCEDURE — 99214 OFFICE O/P EST MOD 30 MIN: CPT | Performed by: PHYSICIAN ASSISTANT

## 2021-03-18 NOTE — PATIENT INSTRUCTIONS

## 2021-03-18 NOTE — PROGRESS NOTES
Problem list     Subjective   Mara Martínez is a 50 y.o. female     Chief Complaint   Patient presents with   • Follow-up        PROBLEM LIST:      1.Valvular heart disease  1.1 Cardiac cath 12/18/17 demonstrated potentially hemodynamically significant mitral regurgitation associated with mildly to moderately elevated PA pressures. moderate aortic stenosis. The study excluded angiographically significant epicardial coronary disease as a contributing factor to the patient's symptoms. Double valve surgery replacement recommended  1.2 status post aortic and mitral valve replacement by Dr. Cavazos with mechanical valves July 2018  1.3.  Follow-up echocardiogram April 2019 demonstrating stable valve parameters and EF that is normal   2)Normal systolic fxn   3)Hypertension  4)Dyslipidemia  5)Epilepsy  6) carotid artery stenosis  6.1CTA of neck with and without contrast 2/27/2020 40% stenosis at the origin of the left common carotid artery and 70% stenosis of the distal left common carotid artery, 30% stenosis of the right common carotid artery, 0.4 cm right MCA bifurcation aneurysm  6.2 left carotid stenting by Dr. Mckeon [facility] July 2020  7) MCA aneurysm estimated at 3.5 mm followed by [facility]  8) Migraines        9)  History of rheumatic fever  10.  Tobacco habituation      HPI    Patient is a 50-year-old female that presents back to the office for follow-up.  She recently seen CT surgery because of issues involving the sternotomy closure with the wires.  Apparently patient had repair and had a plate placed.  I currently do not have report.    She is doing better.  From a heart standpoint she describes feeling well.  She does describe some discomfort from recent surgery but she does not describe any pressure or heaviness or any type of ischemic chest pain.  She has mild levels of dyspnea.  She has smoked for many years and continues to have levels of shortness of breath.  She does not describe  PND orthopnea.    She does not describe any palpitations or dysrhythmic symptoms.  She otherwise feels well.  She is followed with [facility] also in regards to her carotid disease and underwent left carotid stenting in July of last year.  Otherwise she is stable          Current Outpatient Medications on File Prior to Visit   Medication Sig Dispense Refill   • Ascorbic Acid (VITAMIN C) 500 MG capsule Take 1 tablet by mouth 2 (two) times a day.     • aspirin 81 MG EC tablet Take 81 mg by mouth Daily.     • atorvastatin (LIPITOR) 80 MG tablet TAKE 1 TABLET BY MOUTH ONCE DAILY 30 tablet 10   • Cholecalciferol (VITAMIN D3 PO) Take 1,000 Units by mouth Daily.     • ferrous sulfate 325 (65 FE) MG tablet Take 325 mg by mouth 2 (Two) Times a Day.     • furosemide (LASIX) 40 MG tablet TAKE 1 TABLET BY MOUTH ONCE DAILY 30 tablet 5   • levothyroxine (SYNTHROID, LEVOTHROID) 25 MCG tablet Take 25 mcg by mouth Daily.     • nitroglycerin (NITROSTAT) 0.4 MG SL tablet Place 1 tablet under the tongue Every 5 (Five) Minutes As Needed for Chest Pain. May take maximum of 3 tabs in 15 minutes. 25 tablet 3   • nortriptyline (PAMELOR) 50 MG capsule Take 100 mg by mouth Every Night.     • OXcarbazepine (TRILEPTAL) 600 MG tablet Take 600 mg by mouth 2 (Two) Times a Day.     • pantoprazole (PROTONIX) 40 MG EC tablet Take 1 tablet by mouth Daily. 90 tablet 3   • potassium chloride (K-DUR) 10 MEQ CR tablet Take 2 tablets by mouth Daily. 30 tablet 11   • topiramate (TOPAMAX) 100 MG tablet Take 100 mg by mouth 2 (Two) Times a Day.     • varenicline (CHANTIX) 1 MG tablet Take 1 mg by mouth 2 (Two) Times a Day.     • vitamin B-12 (CYANOCOBALAMIN) 100 MCG tablet Take 100 mcg by mouth.     • warfarin (COUMADIN) 5 MG tablet Take 5 mg by mouth Daily.     • enoxaparin (Lovenox) 80 MG/0.8ML solution syringe 1mg/kg SQ- weight 81.6kg. SubQ BID.   Recheck INR 5 days after procedure. Continue until therapeutic 24 each 0     Current  Facility-Administered Medications on File Prior to Visit   Medication Dose Route Frequency Provider Last Rate Last Admin   • Chlorhexidine Gluconate Cloth 2 % pads 1 application  1 application Topical Q12H PRN Cristobal Lozano PA       • midazolam (VERSED) injection   Intravenous PRN Krishna Blank MD   2 mg at 18 0655       Azithromycin and Corticosteroids    Past Medical History:   Diagnosis Date   • Anemia    • Aortic regurgitation    • Arthritis    • Back pain    • Carotid bruit    • ISREAL (cerebral atherosclerosis) 2014    nonobstructive   • Chest pain    • Coronary artery disease    • D-dimer, elevated    • Diastolic congestive heart failure (CMS/HCC) 2019   • Dizziness    • Edema    • Epilepsy (CMS/[facility])    • Fatigue    • Heart murmur    • History of blood transfusion 2019   • History of transfusion    • Hyperlipidemia    • Hypertension    • Kidney stones    • Migraines    • Mitral regurgitation    • Mitral stenosis     mild   • Palpitations    • Pulmonary edema    • Sleep apnea 2019   • Sleeping difficulties    • SOB (shortness of breath)    • Supraventricular aortic stenosis    • Syncope    • Tachycardia    • Tobacco abuse    • VHD (valvular heart disease)    • Wears dentures    • Wears eyeglasses        Social History     Socioeconomic History   • Marital status:      Spouse name: Not on file   • Number of children: 2   • Years of education: Not on file   • Highest education level: Not on file   Tobacco Use   • Smoking status: Current Every Day Smoker     Packs/day: 0.50     Years: 36.00     Pack years: 18.00     Types: Cigarettes     Last attempt to quit: 2020     Years since quittin.7   • Smokeless tobacco: Never Used   Substance and Sexual Activity   • Alcohol use: No   • Drug use: No   • Sexual activity: Defer       Family History   Problem Relation Age of Onset   • Cancer Mother    • Cancer Father    • Hypertension Father    • Other Sister         ACUTE MYOCARDIAL  "INFARCTION,CABG   • Heart failure Sister    • Hypertension Sister    • Stroke Other        Review of Systems   Constitutional: Negative.  Negative for chills, fatigue and fever.   HENT: Negative for congestion, rhinorrhea and sore throat.    Eyes: Positive for visual disturbance (glasses).   Respiratory: Positive for apnea and shortness of breath. Negative for chest tightness and wheezing.    Cardiovascular: Positive for chest pain. Negative for palpitations and leg swelling.   Gastrointestinal: Negative.    Endocrine: Negative.  Negative for cold intolerance.   Genitourinary: Negative.    Musculoskeletal: Positive for back pain. Negative for arthralgias and neck pain.   Skin: Negative.  Negative for rash and wound.   Allergic/Immunologic: Negative for environmental allergies.   Neurological: Positive for dizziness and headaches. Negative for weakness and numbness.   Hematological: Bruises/bleeds easily (bruises/ bleeds).   Psychiatric/Behavioral: Positive for sleep disturbance (c-pap).       Objective   Vitals:    03/18/21 1040   BP: 129/69   BP Location: Left arm   Patient Position: Sitting   Pulse: 85   SpO2: 100%   Weight: 84.5 kg (186 lb 3.2 oz)   Height: 152.4 cm (60\")      /69 (BP Location: Left arm, Patient Position: Sitting)   Pulse 85   Ht 152.4 cm (60\")   Wt 84.5 kg (186 lb 3.2 oz)   SpO2 100%   BMI 36.36 kg/m²     Lab Results (most recent)     None          Physical Exam  Vitals and nursing note reviewed.   Constitutional:       General: She is not in acute distress.     Appearance: Normal appearance. She is well-developed.   HENT:      Head: Normocephalic and atraumatic.   Eyes:      General: No scleral icterus.        Right eye: No discharge.         Left eye: No discharge.      Conjunctiva/sclera: Conjunctivae normal.   Neck:      Vascular: Carotid bruit present.   Cardiovascular:      Rate and Rhythm: Normal rate and regular rhythm.      Heart sounds: Normal heart sounds. No murmur heard. "   No friction rub. No gallop.       Comments: Mechanical click auscultated with grade 1/6 to 2/6 systolic murmur heard  Pulmonary:      Effort: Pulmonary effort is normal. No respiratory distress.      Breath sounds: Normal breath sounds. No wheezing or rales.   Chest:      Chest wall: No tenderness.   Musculoskeletal:      Right lower leg: No edema.      Left lower leg: No edema.   Skin:     General: Skin is warm and dry.      Coloration: Skin is not pale.      Findings: No erythema or rash.   Neurological:      Mental Status: She is alert and oriented to person, place, and time.      Cranial Nerves: No cranial nerve deficit.   Psychiatric:         Behavior: Behavior normal.         Procedure   Procedures       Assessment/Plan     Problems Addressed this Visit        Cardiac and Vasculature    VHD (valvular heart disease)    Relevant Orders    Lipid Panel    Comprehensive Metabolic Panel    Coronary artery disease involving native coronary artery of native heart without angina pectoris - Primary    Relevant Orders    Lipid Panel    Comprehensive Metabolic Panel    Bilateral carotid artery stenosis    Relevant Orders    Lipid Panel    Comprehensive Metabolic Panel       Pulmonary and Pneumonias    SOB (shortness of breath)    Relevant Orders    Lipid Panel    Comprehensive Metabolic Panel      Diagnoses       Codes Comments    Coronary artery disease involving native coronary artery of native heart without angina pectoris    -  Primary ICD-10-CM: I25.10  ICD-9-CM: 414.01     Bilateral carotid artery stenosis     ICD-10-CM: I65.23  ICD-9-CM: 433.10, 433.30     SOB (shortness of breath)     ICD-10-CM: R06.02  ICD-9-CM: 786.05     VHD (valvular heart disease)     ICD-10-CM: I38  ICD-9-CM: 424.90         Recommendation  1.  Patient with coronary artery disease doing remarkably well at this time.  Patient has no ischemic symptoms and for now we will continue to monitor.    2.  Patient with valvular heart disease and we  will continue to monitor with echocardiograms.  She is doing well at this time    3.  Bilateral carotid artery stenosis with carotid bruit on examination.  Patient is followed for [facility] and is underwent stenting    4.  Therefore, patient is doing well on current medical regimen.  I would like to check lipid parameters for risk factor modification.  We will see her back for follow-up in 6 months.  Follow-up with primary as scheduled             Mara Martínez  reports that she has been smoking cigarettes. She has a 18.00 pack-year smoking history. She has never used smokeless tobacco.. I have educated her on the risk of diseases from using tobacco products such as cancer, COPD and heart disease.     I advised her to quit and she is willing to quit. We have discussed the following method/s for tobacco cessation:  Education Material.  Together we have set a quit date for 1 month from today.  She will follow up with me in 1 month or sooner to check on her progress.    I spent 3  minutes counseling the patient.          Patient's Body mass index is 36.36 kg/m². BMI is above normal parameters. Recommendations include: educational material.       Electronically signed by:

## 2021-03-23 ENCOUNTER — BULK ORDERING (OUTPATIENT)
Dept: CASE MANAGEMENT | Facility: OTHER | Age: 51
End: 2021-03-23

## 2021-03-23 DIAGNOSIS — Z23 IMMUNIZATION DUE: ICD-10-CM

## 2021-03-24 ENCOUNTER — TELEPHONE (OUTPATIENT)
Dept: CARDIOLOGY | Facility: CLINIC | Age: 51
End: 2021-03-24

## 2021-03-24 NOTE — TELEPHONE ENCOUNTER
Pt LVM stating she's calling from another doctor's office and to call back. No additional info was left.       Called pt, asked what we can assist her with. She stated she's going to have a procedure done and that the office says they never received the clearance. Stated she needs it done ASAP as surgery is Monday. Stated she needs to start   Lovenox ASAP.   I informed pt that clearance was sent, but that I will re-fax it ASAP for her. Lovenox was sent to [facility] on 3/16.      Re-faxed clearance.

## 2021-05-10 DIAGNOSIS — K21.9 GASTROESOPHAGEAL REFLUX DISEASE: ICD-10-CM

## 2021-05-10 RX ORDER — PANTOPRAZOLE SODIUM 40 MG/1
TABLET, DELAYED RELEASE ORAL
Qty: 90 TABLET | Refills: 3 | Status: SHIPPED | OUTPATIENT
Start: 2021-05-10 | End: 2022-05-06 | Stop reason: SDUPTHER

## 2021-05-25 ENCOUNTER — TELEPHONE (OUTPATIENT)
Dept: CARDIOLOGY | Facility: CLINIC | Age: 51
End: 2021-05-25

## 2021-06-01 NOTE — TELEPHONE ENCOUNTER
Attempted to call patient on below note- Number is not working     [facility]                 Per Matthew TURNER- We may increase Lasix to 40 mg twice daily with appropriate potassium supplementation if needed.  The patient does not have potassium supplementation let me know.

## 2021-06-10 ENCOUNTER — TELEPHONE (OUTPATIENT)
Dept: CARDIOLOGY | Facility: CLINIC | Age: 51
End: 2021-06-10

## 2021-06-10 DIAGNOSIS — I25.10 CORONARY ARTERY DISEASE INVOLVING NATIVE CORONARY ARTERY OF NATIVE HEART WITHOUT ANGINA PECTORIS: Primary | ICD-10-CM

## 2021-06-10 DIAGNOSIS — R60.9 EDEMA, UNSPECIFIED TYPE: ICD-10-CM

## 2021-06-10 RX ORDER — FUROSEMIDE 40 MG/1
40 TABLET ORAL 2 TIMES DAILY
COMMUNITY
End: 2021-06-14 | Stop reason: SDUPTHER

## 2021-06-10 RX ORDER — FUROSEMIDE 40 MG/1
40 TABLET ORAL 2 TIMES DAILY
Qty: 60 TABLET | Refills: 3 | Status: CANCELLED | OUTPATIENT
Start: 2021-06-10

## 2021-06-10 NOTE — TELEPHONE ENCOUNTER
Patient left message she is having edema in hands and feet reports blood pressure 117/64. Per Tavares TURNER patient to increase Lasix to 40 mg po bid, continue potassium 20 meq daily. Patient to have BMP and Magnesium checked in one week. Patient verbalized understanding, requested lab orders be mailed to her. Afshan Mahan LPN

## 2021-06-14 RX ORDER — FUROSEMIDE 40 MG/1
40 TABLET ORAL 2 TIMES DAILY
Qty: 60 TABLET | Refills: 11 | Status: SHIPPED | OUTPATIENT
Start: 2021-06-14 | End: 2021-09-09 | Stop reason: SDUPTHER

## 2021-06-14 NOTE — TELEPHONE ENCOUNTER
"Pt LVM stating that her px never received increased dose of fluid pill.        Per chart review, encounter from 6/10/21 states:  \"Per Tavares TURNER patient to increase Lasix to 40 mg po bid, continue potassium 20 meq daily.\"      Pended updated rx for pt.   "

## 2021-09-09 ENCOUNTER — OFFICE VISIT (OUTPATIENT)
Dept: CARDIOLOGY | Facility: CLINIC | Age: 51
End: 2021-09-09

## 2021-09-09 VITALS
OXYGEN SATURATION: 98 % | HEART RATE: 82 BPM | WEIGHT: 177 LBS | HEIGHT: 60 IN | SYSTOLIC BLOOD PRESSURE: 89 MMHG | BODY MASS INDEX: 34.75 KG/M2 | DIASTOLIC BLOOD PRESSURE: 52 MMHG

## 2021-09-09 DIAGNOSIS — I38 VHD (VALVULAR HEART DISEASE): ICD-10-CM

## 2021-09-09 DIAGNOSIS — I65.23 BILATERAL CAROTID ARTERY STENOSIS: Primary | ICD-10-CM

## 2021-09-09 DIAGNOSIS — I50.30 DIASTOLIC CONGESTIVE HEART FAILURE, UNSPECIFIED HF CHRONICITY (HCC): ICD-10-CM

## 2021-09-09 DIAGNOSIS — R07.9 CHEST PAIN, UNSPECIFIED TYPE: ICD-10-CM

## 2021-09-09 DIAGNOSIS — R06.02 SOB (SHORTNESS OF BREATH): ICD-10-CM

## 2021-09-09 PROCEDURE — 99214 OFFICE O/P EST MOD 30 MIN: CPT | Performed by: PHYSICIAN ASSISTANT

## 2021-09-09 PROCEDURE — 93000 ELECTROCARDIOGRAM COMPLETE: CPT | Performed by: PHYSICIAN ASSISTANT

## 2021-09-09 RX ORDER — FUROSEMIDE 40 MG/1
40 TABLET ORAL DAILY
Qty: 30 TABLET | Refills: 5 | Status: SHIPPED | OUTPATIENT
Start: 2021-09-09 | End: 2021-10-11

## 2021-09-09 RX ORDER — FLUDROCORTISONE ACETATE 0.1 MG/1
0.1 TABLET ORAL DAILY
Qty: 30 TABLET | Refills: 5 | Status: SHIPPED | OUTPATIENT
Start: 2021-09-09 | End: 2021-10-11

## 2021-09-09 NOTE — PROGRESS NOTES
Problem list     Subjective   Mara Martínez is a 51 y.o. female     Chief Complaint   Patient presents with   • Chest Pain     x 2 wks   • Dizziness   Problem list  1.  Valvular heart disease  1.1 status post aortic and mitral valve replacement by Dr. Cavazos with mechanical valves July 2018  1.2 follow-up echocardiogram 2019 demonstrating stable valve parameters  2.  Preserved systolic function  3.  Cardiac catheterization 2017 with no evidence of coronary artery disease  4.  Carotid artery stenosis  4.1 left carotid stenting by Dr. Mckeon [facility] July 2020  5.  MCA aneurysm estimated at 3.5 mm followed by [facility]  5.  Epilepsy  6.  Hypertension  7.  Dyslipidemia  8.  Chronic tobacco use    HPI    Patient is a 51-year-old female who presents back to the office for follow-up.  Patient is followed for the above past medical history and we have monitored valvular disease as well as symptoms.  She has a degree of diastolic failure versus venous insufficiency.  She had been on high-dose diuretics but unfortunately developed severe hypokalemia.  She recently has had increased doses of potassium and is followed by a primary from that standpoint    She has occasional slight pressure in her chest.  This is something that is noticeable and seems to occur at random.  She does not describe it progressing but does have at best, moderate levels of dyspnea.  This is something that is concerning.  Unfortunately she continues to smoke.  She does not describe PND orthopnea.    She has palpitated in the past.  She does not describe any presyncope or syncope.  She is noted to be hypotensive today.  Otherwise a stable    Current Outpatient Medications on File Prior to Visit   Medication Sig Dispense Refill   • Ascorbic Acid (VITAMIN C) 500 MG capsule Take 1 tablet by mouth 2 (two) times a day.     • aspirin 81 MG EC tablet Take 81 mg by mouth Daily.     • atorvastatin (LIPITOR) 80 MG tablet TAKE 1 TABLET BY  MOUTH ONCE DAILY 30 tablet 10   • Cholecalciferol (VITAMIN D3 PO) Take 1,000 Units by mouth Daily.     • ferrous sulfate 325 (65 FE) MG tablet Take 325 mg by mouth 2 (Two) Times a Day.     • levothyroxine (SYNTHROID, LEVOTHROID) 25 MCG tablet Take 25 mcg by mouth Daily.     • nitroglycerin (NITROSTAT) 0.4 MG SL tablet Place 1 tablet under the tongue Every 5 (Five) Minutes As Needed for Chest Pain. May take maximum of 3 tabs in 15 minutes. 25 tablet 3   • nortriptyline (PAMELOR) 50 MG capsule Take 50 mg by mouth Every Night.     • OXcarbazepine (TRILEPTAL) 600 MG tablet Take 600 mg by mouth 2 (Two) Times a Day.     • pain patient supplied pump by Intrathecal route Continuous. Morphine     • pantoprazole (PROTONIX) 40 MG EC tablet TAKE 1 TABLET BY MOUTH ONCE DAILY 90 tablet 3   • POTASSIUM CHLORIDE RANDELL ER PO Take 40 mEq by mouth 2 (Two) Times a Day.     • TOPIRAMATE PO Take 150 mg by mouth 2 (Two) Times a Day.     • vitamin B-12 (CYANOCOBALAMIN) 100 MCG tablet Take 100 mcg by mouth.     • warfarin (COUMADIN) 5 MG tablet Take 5 mg by mouth Daily. 2.5 mg five days  5 mg two days     • [DISCONTINUED] furosemide (LASIX) 40 MG tablet Take 1 tablet by mouth 2 (Two) Times a Day. 60 tablet 11   • enoxaparin (Lovenox) 80 MG/0.8ML solution syringe 1mg/kg SQ- weight 81.6kg. SubQ BID.   Recheck INR 5 days after procedure. Continue until therapeutic 24 each 0   • potassium chloride (K-DUR) 10 MEQ CR tablet Take 2 tablets by mouth Daily. 30 tablet 11   • varenicline (CHANTIX) 1 MG tablet Take 1 mg by mouth 2 (Two) Times a Day.       Current Facility-Administered Medications on File Prior to Visit   Medication Dose Route Frequency Provider Last Rate Last Admin   • Chlorhexidine Gluconate Cloth 2 % pads 1 application  1 application Topical Q12H PRN Cristobal Lozano PA       • midazolam (VERSED) injection   Intravenous PRN Krishna Blank MD   2 mg at 03/02/18 0655       Azithromycin and Gabapentin    Past Medical History:    Diagnosis Date   • Anemia    • Aortic regurgitation    • Arthritis    • Back pain    • Carotid bruit    • ISREAL (cerebral atherosclerosis) 2014    nonobstructive   • Chest pain    • Coronary artery disease    • COVID-19 vaccine administered     phizer   • D-dimer, elevated    • Diastolic congestive heart failure (CMS/HCC) 2019   • Dizziness    • Edema    • Epilepsy (CMS/HCC)    • Fatigue    • Heart murmur    • History of blood transfusion 2019   • History of transfusion    • Hyperlipidemia    • Hypertension    • Kidney stones    • Migraines    • Mitral regurgitation    • Mitral stenosis     mild   • Palpitations    • Pulmonary edema    • Sleep apnea 2019   • Sleeping difficulties    • SOB (shortness of breath)    • Supraventricular aortic stenosis    • Syncope    • Tachycardia    • Tobacco abuse    • VHD (valvular heart disease)    • Wears dentures    • Wears eyeglasses        Social History     Socioeconomic History   • Marital status:      Spouse name: Not on file   • Number of children: 2   • Years of education: Not on file   • Highest education level: Not on file   Tobacco Use   • Smoking status: Current Every Day Smoker     Packs/day: 0.50     Years: 36.00     Pack years: 18.00     Types: Cigarettes     Last attempt to quit: 2020     Years since quittin.2   • Smokeless tobacco: Never Used   Substance and Sexual Activity   • Alcohol use: No   • Drug use: No   • Sexual activity: Defer       Family History   Problem Relation Age of Onset   • Cancer Mother    • Cancer Father    • Hypertension Father    • Other Sister         ACUTE MYOCARDIAL INFARCTION,CABG   • Heart failure Sister    • Hypertension Sister    • Stroke Other        Review of Systems   Constitutional: Negative for chills and fever.   HENT: Negative for congestion, sinus pressure and sore throat.    Eyes: Positive for visual disturbance (glasses).   Respiratory: Positive for shortness of breath. Negative for chest  "tightness.    Cardiovascular: Positive for chest pain (x 2 wks), palpitations and leg swelling.   Gastrointestinal: Positive for constipation. Negative for abdominal pain, blood in stool, diarrhea, nausea and vomiting.   Endocrine: Negative.  Negative for cold intolerance and heat intolerance.   Genitourinary: Positive for frequency. Negative for dysuria, hematuria and urgency.   Musculoskeletal: Positive for back pain (chronic). Negative for neck pain.   Skin: Positive for wound.   Allergic/Immunologic: Negative.  Negative for environmental allergies and food allergies.   Neurological: Positive for dizziness. Negative for syncope and light-headedness.   Hematological: Bruises/bleeds easily.   Psychiatric/Behavioral: Negative for sleep disturbance (denies waking with soa or cp).       Objective   Vitals:    09/09/21 0847   BP: (!) 89/52   BP Location: Left arm   Patient Position: Sitting   Pulse: 82   SpO2: 98%   Weight: 80.3 kg (177 lb)   Height: 152.4 cm (60\")      BP (!) 89/52 (BP Location: Left arm, Patient Position: Sitting)   Pulse 82   Ht 152.4 cm (60\")   Wt 80.3 kg (177 lb)   SpO2 98%   BMI 34.57 kg/m²     Lab Results (most recent)     None          Physical Exam  Vitals and nursing note reviewed.   Constitutional:       General: She is not in acute distress.     Appearance: Normal appearance. She is well-developed.   HENT:      Head: Normocephalic and atraumatic.   Eyes:      General: No scleral icterus.        Right eye: No discharge.         Left eye: No discharge.      Conjunctiva/sclera: Conjunctivae normal.   Neck:      Vascular: Carotid bruit present.   Cardiovascular:      Rate and Rhythm: Normal rate and regular rhythm.      Heart sounds: Normal heart sounds. No murmur heard.   No friction rub. No gallop.       Comments: Mechanical click auscultated and grade 2 systolic murmur heard at the right upper and left upper sternal border  Pulmonary:      Effort: Pulmonary effort is normal. No " respiratory distress.      Breath sounds: Normal breath sounds. No wheezing or rales.   Chest:      Chest wall: No tenderness.   Musculoskeletal:      Right lower leg: No edema.      Left lower leg: No edema.   Skin:     General: Skin is warm and dry.      Coloration: Skin is not pale.      Findings: No erythema or rash.   Neurological:      Mental Status: She is alert and oriented to person, place, and time.      Cranial Nerves: No cranial nerve deficit.   Psychiatric:         Behavior: Behavior normal.         Procedure     ECG 12 Lead    Date/Time: 9/9/2021 8:56 AM  Performed by: Tavares Yeh PA  Authorized by: Tavares Yeh PA   Comparison: compared with previous ECG from 4/28/2020  Comments: EKG demonstrates sinus rhythm at 84 bpm with right bundle branch block and no acute ST changes               Assessment/Plan     Problems Addressed this Visit        Cardiac and Vasculature    Chest pain    VHD (valvular heart disease)    Diastolic congestive heart failure (CMS/HCC)    Bilateral carotid artery stenosis - Primary       Pulmonary and Pneumonias    SOB (shortness of breath)      Diagnoses       Codes Comments    Bilateral carotid artery stenosis    -  Primary ICD-10-CM: I65.23  ICD-9-CM: 433.10, 433.30     Diastolic congestive heart failure, unspecified HF chronicity (CMS/HCC)     ICD-10-CM: I50.30  ICD-9-CM: 428.30, 428.0     VHD (valvular heart disease)     ICD-10-CM: I38  ICD-9-CM: 424.90     SOB (shortness of breath)     ICD-10-CM: R06.02  ICD-9-CM: 786.05     Chest pain, unspecified type     ICD-10-CM: R07.9  ICD-9-CM: 786.50         Recommendation  1.  Patient is a 51-year-old female with valvular heart disease with echocardiogram recently performed in the hospital as she was admitted for significant hypokalemia that I would like to obtain results.  Otherwise her valvular disease in the past has been stable.  We will await results from that    2.  She has a degree of diastolic heart failure  and has been on diuretic therapy but has had significant metabolic discrepancies including severe hypokalemia.  I would like to try to cut her Lasix to once daily dosing to see if we can control her failure symptoms and hopefully avoid any significant potassium abnormalities.  She is going to have this rechecked with her primary next week.  I recommended her cutting her potassium supplement to once daily but to let her primary know that she is doing this to better adjust therapy.    3.  We discussed her chest discomfort and dyspnea.  I think there could be a multifactorial component regards to her dyspnea but her chest pain is concerning we discussed testing but she is not interested.  If symptoms were to worsen, she was instructed to contact her office    4.  She may have some orthostatic symptoms and is noted to be hypotensive.  I am putting her on fludrocortisone.  Hopefully this will exacerbate edema but can hopefully help her hypotensive episodes.  We may need to consider midodrine as an alternative therapy but we will start with Florinef and see how she responds.    5.  Carotid stenosis and MCA aneurysm.  She is followed by [facility].  I instructed her to follow back up with them as she has not heard a follow-up appointment.  We recommend her contacting them.  Otherwise I want to see her back for follow-up in 3 to 4 months.  If symptoms were to worsen, she was instructed to contact her office.  She is not interested in any additional testing at this time.  She has nitroglycerin available for chest pain.  We will see her back for follow-up on testing.  Follow-up with primary as scheduled    Patient did not bring med list or medicine bottles to appointment, med list has been reviewed and updated based on patient's knowledge of their meds.          Electronically signed by:

## 2021-09-09 NOTE — PATIENT INSTRUCTIONS

## 2021-10-05 RX ORDER — ATORVASTATIN CALCIUM 80 MG/1
80 TABLET, FILM COATED ORAL DAILY
Qty: 30 TABLET | Refills: 3 | Status: SHIPPED | OUTPATIENT
Start: 2021-10-05 | End: 2022-04-20 | Stop reason: SDUPTHER

## 2021-10-11 ENCOUNTER — TELEPHONE (OUTPATIENT)
Dept: CARDIOLOGY | Facility: CLINIC | Age: 51
End: 2021-10-11

## 2021-10-11 DIAGNOSIS — I35.1 AORTIC VALVE INSUFFICIENCY, ETIOLOGY OF CARDIAC VALVE DISEASE UNSPECIFIED: ICD-10-CM

## 2021-10-11 DIAGNOSIS — R06.02 SOB (SHORTNESS OF BREATH): Primary | ICD-10-CM

## 2021-10-11 DIAGNOSIS — R07.9 CHEST PAIN, UNSPECIFIED TYPE: ICD-10-CM

## 2021-10-11 DIAGNOSIS — E78.5 HYPERLIPIDEMIA, UNSPECIFIED HYPERLIPIDEMIA TYPE: ICD-10-CM

## 2021-10-11 DIAGNOSIS — I10 PRIMARY HYPERTENSION: ICD-10-CM

## 2021-10-11 RX ORDER — MIDODRINE HYDROCHLORIDE 5 MG/1
5 TABLET ORAL 2 TIMES DAILY
Qty: 60 TABLET | Refills: 3 | Status: SHIPPED | OUTPATIENT
Start: 2021-10-11 | End: 2022-02-25 | Stop reason: SDUPTHER

## 2021-10-11 RX ORDER — BUMETANIDE 2 MG/1
2 TABLET ORAL DAILY
Qty: 30 TABLET | Refills: 3 | Status: SHIPPED | OUTPATIENT
Start: 2021-10-11 | End: 2021-10-21

## 2021-10-11 NOTE — TELEPHONE ENCOUNTER
Have her stop the Lasix.  Also, have her stop the fludrocortisone and switch her to Midodrine 5 mg twice a day.    Change her Lasix to Bumex 2 mg once daily and check a basic metabolic panel, magnesium, and proBNP in 1 week after being on new diuretic.  Continue potassium

## 2021-10-11 NOTE — TELEPHONE ENCOUNTER
Discussed medication change with pt/ Blood work in x1 week. Pt verbally understood.Sent to Pharmacy.

## 2021-10-11 NOTE — TELEPHONE ENCOUNTER
Pt called c/o Swelling in Kb legs, thinks she needs a higher dose water pill. She is taking it once daily, but doubled the dose and it has gotten no better,    Swelling goes down @night    No redness-Pn with walking    No sob more than normal, previosuly had covid, states could be why some SOB pertains.    VIKASH PT- Had a blood transfusion September 27,2021

## 2021-10-20 ENCOUNTER — TELEPHONE (OUTPATIENT)
Dept: CARDIOLOGY | Facility: CLINIC | Age: 51
End: 2021-10-20

## 2021-10-20 DIAGNOSIS — E87.6 HYPOKALEMIA: Primary | ICD-10-CM

## 2021-10-20 NOTE — TELEPHONE ENCOUNTER
Patient informed of labs, patient stated she is taking Potassium 40 meq po bid. Patient instructed to repeat BMP in one week. Patient verbalized understanding, requested lab orders faxed to Rowan GUERRA's office. Afshan Mahan LPN

## 2021-10-20 NOTE — TELEPHONE ENCOUNTER
----- Message from YUE Melara sent at 10/19/2021  1:07 PM EDT -----  Can we double the patient's potassium and check a basic metabolic panel in 1 week

## 2021-10-21 ENCOUNTER — TELEPHONE (OUTPATIENT)
Dept: CARDIOLOGY | Facility: CLINIC | Age: 51
End: 2021-10-21

## 2021-10-21 RX ORDER — FUROSEMIDE 40 MG/1
40 TABLET ORAL 2 TIMES DAILY
Qty: 30 TABLET | Refills: 5 | Status: SHIPPED | OUTPATIENT
Start: 2021-10-21 | End: 2022-03-08 | Stop reason: SDUPTHER

## 2021-10-21 NOTE — TELEPHONE ENCOUNTER
Patient called office stated, Bumex not helping with edema. Per Tavares TURNER patient to stop Bumex, start Lasix 40 mg po bid. Patient verbalized understanding. Afshan Mahan LPN

## 2021-12-09 ENCOUNTER — OFFICE VISIT (OUTPATIENT)
Dept: CARDIOLOGY | Facility: CLINIC | Age: 51
End: 2021-12-09

## 2021-12-09 VITALS
OXYGEN SATURATION: 98 % | WEIGHT: 171 LBS | DIASTOLIC BLOOD PRESSURE: 68 MMHG | HEIGHT: 60 IN | HEART RATE: 72 BPM | BODY MASS INDEX: 33.57 KG/M2 | SYSTOLIC BLOOD PRESSURE: 119 MMHG

## 2021-12-09 DIAGNOSIS — I50.30 DIASTOLIC CONGESTIVE HEART FAILURE, UNSPECIFIED HF CHRONICITY (HCC): ICD-10-CM

## 2021-12-09 DIAGNOSIS — R06.02 SOB (SHORTNESS OF BREATH): Primary | ICD-10-CM

## 2021-12-09 DIAGNOSIS — I25.10 CORONARY ARTERY DISEASE INVOLVING NATIVE CORONARY ARTERY OF NATIVE HEART WITHOUT ANGINA PECTORIS: ICD-10-CM

## 2021-12-09 PROBLEM — M51.369 DEGENERATION OF LUMBAR INTERVERTEBRAL DISC: Status: ACTIVE | Noted: 2021-04-27

## 2021-12-09 PROBLEM — M51.36 DEGENERATION OF LUMBAR INTERVERTEBRAL DISC: Status: ACTIVE | Noted: 2021-04-27

## 2021-12-09 PROBLEM — M54.50 LOW BACK PAIN: Status: ACTIVE | Noted: 2021-03-02

## 2021-12-09 PROBLEM — J84.115 RESPIRATORY BRONCHIOLITIS ASSOCIATED INTERSTITIAL LUNG DISEASE: Status: ACTIVE | Noted: 2021-06-15

## 2021-12-09 PROBLEM — M54.17 LUMBOSACRAL RADICULOPATHY: Status: ACTIVE | Noted: 2021-04-27

## 2021-12-09 PROCEDURE — 99213 OFFICE O/P EST LOW 20 MIN: CPT | Performed by: PHYSICIAN ASSISTANT

## 2021-12-09 NOTE — PROGRESS NOTES
Problem list     Subjective   Mara Martínez is a 51 y.o. female     Chief Complaint   Patient presents with   • Carotid artery stenosis   • Diastolic congestive heart failure   • Valvular heart disease   Problem list  1.  Valvular heart disease  1.1 status post aortic and mitral valve replacement by Dr. Cavazos with mechanical valves July 2018  1.2 follow-up echocardiogram 2019 demonstrating stable valve parameters  2.  Preserved systolic function  3.  Cardiac catheterization 2017 with no evidence of coronary artery disease  4.  Carotid artery stenosis  4.1 left carotid stenting by Dr. Mckeon [facility] July 2020  5.  MCA aneurysm estimated at 3.5 mm followed by [facility]  5.  Epilepsy  6.  Hypertension  7.  Dyslipidemia  8.  Chronic tobacco use    HPI    Patient is a 51-year-old female who presents to the office for evaluation.  Patient has no chest pain or pressure but has moderate, at best, levels of dyspnea.  She is on oxygen and is undergoing evaluation of pulmonology to determine source of dyspnea.    She does not describe PND but does have a degree of orthopnea but has had that for quite some time.    She does not describe palpitating.  She does not describe dizziness, presyncope or syncope.  Otherwise is doing well    Current Outpatient Medications on File Prior to Visit   Medication Sig Dispense Refill   • Ascorbic Acid (VITAMIN C) 500 MG capsule Take 1 tablet by mouth 2 (two) times a day.     • aspirin 81 MG EC tablet Take 81 mg by mouth Daily.     • atorvastatin (LIPITOR) 80 MG tablet Take 1 tablet by mouth Daily. 30 tablet 3   • Cholecalciferol (VITAMIN D3 PO) Take 1,000 Units by mouth Daily.     • ferrous sulfate 325 (65 FE) MG tablet Take 325 mg by mouth 2 (Two) Times a Day.     • furosemide (LASIX) 40 MG tablet Take 1 tablet by mouth 2 (Two) Times a Day. 30 tablet 5   • levothyroxine (SYNTHROID, LEVOTHROID) 25 MCG tablet Take 25 mcg by mouth Daily.     • midodrine (PROAMATINE) 5 MG  tablet Take 1 tablet by mouth 2 (Two) Times a Day. 60 tablet 3   • nitroglycerin (NITROSTAT) 0.4 MG SL tablet Place 1 tablet under the tongue Every 5 (Five) Minutes As Needed for Chest Pain. May take maximum of 3 tabs in 15 minutes. 25 tablet 3   • nortriptyline (PAMELOR) 50 MG capsule Take 50 mg by mouth Every Night.     • O2 (OXYGEN) Inhale 2 L/min 1 (One) Time.     • OXcarbazepine (TRILEPTAL) 600 MG tablet Take 600 mg by mouth 2 (Two) Times a Day.     • pain patient supplied pump by Intrathecal route Continuous. Morphine     • pantoprazole (PROTONIX) 40 MG EC tablet TAKE 1 TABLET BY MOUTH ONCE DAILY 90 tablet 3   • POTASSIUM CHLORIDE RANDELL ER PO Take 40 mEq by mouth 2 (Two) Times a Day.     • TOPIRAMATE PO Take 150 mg by mouth 2 (Two) Times a Day.     • vitamin B-12 (CYANOCOBALAMIN) 100 MCG tablet Take 100 mcg by mouth.     • warfarin (COUMADIN) 4 MG tablet Take 4 mg by mouth Daily.     • [DISCONTINUED] enoxaparin (Lovenox) 80 MG/0.8ML solution syringe 1mg/kg SQ- weight 81.6kg. SubQ BID.   Recheck INR 5 days after procedure. Continue until therapeutic 24 each 0   • [DISCONTINUED] potassium chloride (K-DUR) 10 MEQ CR tablet Take 2 tablets by mouth Daily. 30 tablet 11   • [DISCONTINUED] varenicline (CHANTIX) 1 MG tablet Take 1 mg by mouth 2 (Two) Times a Day.       Current Facility-Administered Medications on File Prior to Visit   Medication Dose Route Frequency Provider Last Rate Last Admin   • Chlorhexidine Gluconate Cloth 2 % pads 1 application  1 application Topical Q12H PRN Cristobal Lozano PA       • midazolam (VERSED) injection   Intravenous PRN Krishna Blank MD   2 mg at 03/02/18 0655       Azithromycin and Gabapentin    Past Medical History:   Diagnosis Date   • Anemia    • Aortic regurgitation    • Arthritis    • Back pain    • Carotid bruit    • ISREAL (cerebral atherosclerosis) 12/2014    nonobstructive   • Chest pain    • Coronary artery disease    • COVID-19 vaccine administered     phizer   •  D-dimer, elevated    • Diastolic congestive heart failure (HCC) 2019   • Dizziness    • Edema    • Epilepsy (HCC)    • Fatigue    • Heart murmur    • History of blood transfusion 2019   • History of recent blood transfusion 2021   • History of transfusion    • Hyperlipidemia    • Hypertension    • Kidney stones    • Migraines    • Mitral regurgitation    • Mitral stenosis     mild   • Neuropathy    • Palpitations    • Pulmonary edema    • Sleep apnea 2019   • Sleeping difficulties    • SOB (shortness of breath)    • Supraventricular aortic stenosis    • Syncope    • Tachycardia    • Tobacco abuse    • VHD (valvular heart disease)    • Wears dentures    • Wears eyeglasses        Social History     Socioeconomic History   • Marital status:    • Number of children: 2   Tobacco Use   • Smoking status: Former Smoker     Packs/day: 0.50     Years: 36.00     Pack years: 18.00     Types: Cigarettes     Quit date: 2020     Years since quittin.5   • Smokeless tobacco: Never Used   Substance and Sexual Activity   • Alcohol use: No   • Drug use: No   • Sexual activity: Defer       Family History   Problem Relation Age of Onset   • Cancer Mother    • Cancer Father    • Hypertension Father    • Other Sister         ACUTE MYOCARDIAL INFARCTION,CABG   • Heart failure Sister    • Hypertension Sister    • Stroke Other        Review of Systems   Constitutional: Positive for fatigue. Negative for chills and fever.   Respiratory: Positive for apnea and shortness of breath (now using oxygen daily). Negative for chest tightness.    Cardiovascular: Positive for leg swelling. Negative for chest pain and palpitations.   Gastrointestinal: Negative.  Negative for abdominal pain, blood in stool, constipation, diarrhea, nausea and vomiting.   Genitourinary: Positive for difficulty urinating (seeing Urologist next week). Negative for dysuria, frequency, hematuria and urgency.   Musculoskeletal: Negative.  Negative for  "back pain and neck pain.   Neurological: Positive for dizziness. Negative for speech difficulty and light-headedness.   Psychiatric/Behavioral: Positive for sleep disturbance (cpap and oxygen, denies waking with soa or cp).       Objective   Vitals:    12/09/21 1115   BP: 119/68   BP Location: Left arm   Patient Position: Sitting   Pulse: 72   SpO2: 98%   Weight: 77.6 kg (171 lb)   Height: 152.4 cm (60\")      /68 (BP Location: Left arm, Patient Position: Sitting)   Pulse 72   Ht 152.4 cm (60\")   Wt 77.6 kg (171 lb)   SpO2 98% Comment: O2 @ 2 LPM pulsate  BMI 33.40 kg/m²     Lab Results (most recent)     None          Physical Exam  Vitals and nursing note reviewed.   Constitutional:       General: She is not in acute distress.     Appearance: Normal appearance. She is well-developed.   HENT:      Head: Normocephalic and atraumatic.   Eyes:      General: No scleral icterus.        Right eye: No discharge.         Left eye: No discharge.      Conjunctiva/sclera: Conjunctivae normal.   Neck:      Vascular: No carotid bruit.   Cardiovascular:      Rate and Rhythm: Normal rate and regular rhythm.      Heart sounds: Normal heart sounds. No murmur heard.  No friction rub. No gallop.       Comments: Mechanical click auscultated with grade 1-2/6 systolic murmur heard  Pulmonary:      Effort: Pulmonary effort is normal. No respiratory distress.      Breath sounds: Normal breath sounds. No wheezing or rales.   Chest:      Chest wall: No tenderness.   Musculoskeletal:      Right lower leg: No edema.      Left lower leg: No edema.   Skin:     General: Skin is warm and dry.      Coloration: Skin is not pale.      Findings: No erythema or rash.   Neurological:      Mental Status: She is alert and oriented to person, place, and time.      Cranial Nerves: No cranial nerve deficit.   Psychiatric:         Behavior: Behavior normal.         Procedure   Procedures       Assessment/Plan     Problems Addressed this Visit        " Cardiac and Vasculature    Coronary artery disease involving native coronary artery of native heart without angina pectoris    Diastolic congestive heart failure (HCC)       Pulmonary and Pneumonias    SOB (shortness of breath) - Primary      Diagnoses       Codes Comments    SOB (shortness of breath)    -  Primary ICD-10-CM: R06.02  ICD-9-CM: 786.05     Diastolic congestive heart failure, unspecified HF chronicity (HCC)     ICD-10-CM: I50.30  ICD-9-CM: 428.30, 428.0     Coronary artery disease involving native coronary artery of native heart without angina pectoris     ICD-10-CM: I25.10  ICD-9-CM: 414.01           Recommendation  1.  Patient is a 51-year-old female who presents to the office for evaluation that does remarkably well but continues have significant exertional dyspnea.  We will await pulmonology findings.  Recent echo shows good LV function and stable valve parameters.    2.  Patient with diastolic heart failure doing well at this point.  She appears euvolemic and will continue to monitor    3.  Otherwise she is stable.  We will make no changes and see her back for follow-up in 6 months.  Follow-up with primary as scheduled      Patient did not bring med list or medicine bottles to appointment, med list has been reviewed and updated based on patient's knowledge of their meds.       Electronically signed by:

## 2022-02-25 RX ORDER — MIDODRINE HYDROCHLORIDE 5 MG/1
5 TABLET ORAL 2 TIMES DAILY
Qty: 60 TABLET | Refills: 3 | Status: SHIPPED | OUTPATIENT
Start: 2022-02-25 | End: 2022-09-07 | Stop reason: ALTCHOICE

## 2022-03-08 DIAGNOSIS — R06.02 SOB (SHORTNESS OF BREATH): Primary | ICD-10-CM

## 2022-03-08 DIAGNOSIS — I50.30 DIASTOLIC CONGESTIVE HEART FAILURE, UNSPECIFIED HF CHRONICITY: ICD-10-CM

## 2022-03-08 DIAGNOSIS — R60.9 EDEMA, UNSPECIFIED TYPE: ICD-10-CM

## 2022-03-08 RX ORDER — FUROSEMIDE 40 MG/1
40 TABLET ORAL 2 TIMES DAILY
Qty: 60 TABLET | Refills: 5 | Status: SHIPPED | OUTPATIENT
Start: 2022-03-08 | End: 2022-12-19

## 2022-04-20 RX ORDER — ATORVASTATIN CALCIUM 80 MG/1
80 TABLET, FILM COATED ORAL DAILY
Qty: 90 TABLET | Refills: 3 | Status: SHIPPED | OUTPATIENT
Start: 2022-04-20

## 2022-05-06 DIAGNOSIS — K21.9 GASTROESOPHAGEAL REFLUX DISEASE: ICD-10-CM

## 2022-05-06 RX ORDER — PANTOPRAZOLE SODIUM 40 MG/1
40 TABLET, DELAYED RELEASE ORAL DAILY
Qty: 90 TABLET | Refills: 2 | Status: SHIPPED | OUTPATIENT
Start: 2022-05-06 | End: 2023-02-02

## 2022-06-16 ENCOUNTER — OFFICE VISIT (OUTPATIENT)
Dept: CARDIOLOGY | Facility: CLINIC | Age: 52
End: 2022-06-16

## 2022-06-16 VITALS
WEIGHT: 167 LBS | HEIGHT: 60 IN | OXYGEN SATURATION: 99 % | DIASTOLIC BLOOD PRESSURE: 59 MMHG | HEART RATE: 68 BPM | BODY MASS INDEX: 32.79 KG/M2 | SYSTOLIC BLOOD PRESSURE: 104 MMHG

## 2022-06-16 DIAGNOSIS — R06.02 SOB (SHORTNESS OF BREATH): ICD-10-CM

## 2022-06-16 DIAGNOSIS — I50.30 DIASTOLIC CONGESTIVE HEART FAILURE, UNSPECIFIED HF CHRONICITY: Primary | ICD-10-CM

## 2022-06-16 DIAGNOSIS — I38 VALVULAR HEART DISEASE: ICD-10-CM

## 2022-06-16 DIAGNOSIS — I25.119 CORONARY ARTERY DISEASE INVOLVING NATIVE CORONARY ARTERY OF NATIVE HEART WITH ANGINA PECTORIS: ICD-10-CM

## 2022-06-16 PROBLEM — G43.001 MIGRAINE WITHOUT AURA AND WITH STATUS MIGRAINOSUS, NOT INTRACTABLE: Status: ACTIVE | Noted: 2022-02-12

## 2022-06-16 PROBLEM — G40.209 COMPLEX PARTIAL SEIZURES WITH CONSCIOUSNESS IMPAIRED (HCC): Status: ACTIVE | Noted: 2022-02-12

## 2022-06-16 PROBLEM — I67.1 CEREBRAL ARTERIAL ANEURYSM: Status: ACTIVE | Noted: 2022-05-27

## 2022-06-16 PROCEDURE — 99214 OFFICE O/P EST MOD 30 MIN: CPT | Performed by: PHYSICIAN ASSISTANT

## 2022-06-16 RX ORDER — METHOCARBAMOL 750 MG/1
TABLET, FILM COATED ORAL
COMMUNITY
End: 2023-03-09 | Stop reason: ALTCHOICE

## 2022-06-16 RX ORDER — DEXAMETHASONE 4 MG/1
TABLET ORAL
COMMUNITY
End: 2022-06-16 | Stop reason: SDUPTHER

## 2022-06-16 RX ORDER — ENOXAPARIN SODIUM 100 MG/ML
INJECTION SUBCUTANEOUS
COMMUNITY
End: 2022-06-16

## 2022-06-16 RX ORDER — CEPHALEXIN 500 MG/1
CAPSULE ORAL
COMMUNITY
End: 2022-06-16

## 2022-06-16 RX ORDER — FOLIC ACID 1 MG/1
TABLET ORAL DAILY
COMMUNITY

## 2022-06-16 RX ORDER — GALCANEZUMAB 120 MG/ML
INJECTION, SOLUTION SUBCUTANEOUS
COMMUNITY
Start: 2022-06-15

## 2022-06-16 RX ORDER — CLARITHROMYCIN 500 MG/1
TABLET, COATED ORAL
COMMUNITY
End: 2022-06-16

## 2022-06-16 RX ORDER — BENZONATATE 100 MG/1
CAPSULE ORAL
COMMUNITY
End: 2022-06-16

## 2022-06-16 RX ORDER — METHYLPREDNISOLONE 4 MG/1
TABLET ORAL
COMMUNITY
End: 2022-09-07 | Stop reason: ALTCHOICE

## 2022-06-16 NOTE — PROGRESS NOTES
Problem list     Subjective   Mara Martínez is a 51 y.o. female     Chief Complaint   Patient presents with   • Diastolic congestive heart failure, unspecified HF chronici   • Coronary Artery Disease   Problem list  1.  Valvular heart disease  1.1 status post aortic and mitral valve replacement by Dr. Cavazos with mechanical valves July 2018  1.2 follow-up echocardiogram 2019 demonstrating stable valve parameters  2.  Preserved systolic function  3.  Cardiac catheterization 2017 with minimal atherosclerosis noted  4.  Carotid artery stenosis  4.1 left carotid stenting by Dr. Mckeon [facility] July 2020  5.  MCA aneurysm estimated at 3.5 mm followed by [facility]  5.  Epilepsy  6.  Hypertension  7.  Dyslipidemia  8.  Chronic tobacco use  9.  GI bleed  9.1.  Patient with history of AVMs    HPI    Patient is a 51-year-old female who presents back to the office.  She has been feeling poorly.    She describes to me that she has had issues with anemia.  She follows with oncology locally and apparently work-up has been largely benign.  Patient has been evaluated at [place] and other tertiary centers.    She apparently was diagnosed with AVMs and has underwent treatment but continues to have significant issues in regards to anemia.  She describes her last hemoglobin being approximately 7.9    She has felt discomfort in her chest in the substernal right side of her chest.  She also severely dyspneic but is on oxygen continuously because of her underlying lung disease as well.  She does not describe PND or orthopnea.    She does not describe palpitating or having dysrhythmic symptoms.  She otherwise is stable    Current Outpatient Medications on File Prior to Visit   Medication Sig Dispense Refill   • Ascorbic Acid (VITAMIN C) 500 MG capsule Take 1 tablet by mouth 2 (two) times a day.     • aspirin 81 MG EC tablet Take 81 mg by mouth Daily.     • atorvastatin (LIPITOR) 80 MG tablet Take 1 tablet by mouth  Daily. 90 tablet 3   • ferrous sulfate 325 (65 FE) MG tablet Take 325 mg by mouth 2 (Two) Times a Day.     • folic acid (FOLVITE) 1 MG tablet Take  by mouth Daily.     • furosemide (LASIX) 40 MG tablet Take 1 tablet by mouth 2 (Two) Times a Day. 60 tablet 5   • galcanezumab-gnlm (Emgality) 120 MG/ML auto-injector pen Emgality Pen 120 mg/mL subcutaneous pen injector     • levothyroxine (SYNTHROID, LEVOTHROID) 25 MCG tablet Take 25 mcg by mouth Daily.     • methocarbamol (ROBAXIN) 750 MG tablet methocarbamol 750 mg tablet   TAKE ONE TABLET BY MOUTH EVERY 6 HOURS FOR MUSCLE TENSION/PAIN     • methylPREDNISolone (MEDROL) 4 MG tablet methylprednisolone 4 mg tablets in a dose pack   TAKE AS DIRECTED ON PACKAGE     • midodrine (PROAMATINE) 5 MG tablet Take 1 tablet by mouth 2 (Two) Times a Day. 60 tablet 3   • nitroglycerin (NITROSTAT) 0.4 MG SL tablet Place 1 tablet under the tongue Every 5 (Five) Minutes As Needed for Chest Pain. May take maximum of 3 tabs in 15 minutes. 25 tablet 3   • nortriptyline (PAMELOR) 50 MG capsule Take 50 mg by mouth Every Night. Two nightly     • O2 (OXYGEN) Inhale 2 L/min 1 (One) Time.     • OXcarbazepine (TRILEPTAL) 600 MG tablet Take 600 mg by mouth 2 (Two) Times a Day.     • pain patient supplied pump by Intrathecal route Continuous. Morphine     • pantoprazole (PROTONIX) 40 MG EC tablet Take 1 tablet by mouth Daily. 90 tablet 2   • POTASSIUM CHLORIDE RANDELL ER PO Take 40 mEq by mouth Daily.     • TOPIRAMATE PO Take 150 mg by mouth 2 (Two) Times a Day.     • ubrogepant (UBRELVY) 100 MG tablet Ubrelvy 100 mg tablet     • vitamin B-12 (CYANOCOBALAMIN) 100 MCG tablet Take 100 mcg by mouth.     • warfarin (COUMADIN) 4 MG tablet Take 4 mg by mouth Daily.     • [DISCONTINUED] benzonatate (TESSALON) 100 MG capsule benzonatate 100 mg capsule   TAKE ONE CAPSULE BY MOUTH THREE TIMES DAILY WITH PLENTY OF WATER AS NEEDED FOR COUGH     • [DISCONTINUED] cephalexin (KEFLEX) 500 MG capsule cephalexin 500 mg  capsule   TAKE ONE CAPSULE BY MOUTH TWICE A DAY UNTIL GONE     • [DISCONTINUED] Cholecalciferol (VITAMIN D3 PO) Take 1,000 Units by mouth Daily.     • [DISCONTINUED] clarithromycin (BIAXIN) 500 MG tablet clarithromycin 500 mg tablet   TAKE 1 TABLET BY MOUTH EVERY 12 HOURS WITH FOOD UNTIL GONE     • [DISCONTINUED] dexamethasone (DECADRON) 4 MG tablet dexamethasone 4 mg tablet   TAKE 1 AND 1/2 TABLETS BY MOUTH DAILY     • [DISCONTINUED] Enoxaparin Sodium (LOVENOX) 80 MG/0.8ML solution prefilled syringe syringe enoxaparin 80 mg/0.8 mL subcutaneous syringe   INJECT 80 MG TWICE A DAY UNTIL YOUR INR IS 2.5 TO 3.5       Current Facility-Administered Medications on File Prior to Visit   Medication Dose Route Frequency Provider Last Rate Last Admin   • Chlorhexidine Gluconate Cloth 2 % pads 1 application  1 application Topical Q12H PRN Cristobal Lozano PA       • midazolam (VERSED) injection   Intravenous PRN Krishna Blank MD   2 mg at 03/02/18 0655       Azithromycin and Gabapentin    Past Medical History:   Diagnosis Date   • Anemia    • Aortic regurgitation    • Arthritis    • Back pain    • Carotid bruit    • ISREAL (cerebral atherosclerosis) 12/2014    nonobstructive   • Chest pain    • Coronary artery disease    • COVID-19 vaccine administered     phizer   • D-dimer, elevated    • Diastolic congestive heart failure (HCC) 07/25/2019   • Dizziness    • Edema    • Epilepsy (HCC)    • Fatigue    • Heart murmur    • History of blood transfusion 08/2019   • History of blood transfusion 2022   • History of recent blood transfusion 12/2021   • History of transfusion    • Hyperlipidemia    • Hypertension    • Kidney stones    • Migraines    • Mitral regurgitation    • Mitral stenosis     mild   • Neuropathy    • Neuropathy    • Palpitations    • Pulmonary edema    • Sleep apnea 09/2019   • Sleeping difficulties    • SOB (shortness of breath)    • Supraventricular aortic stenosis    • Syncope    • Tachycardia    • Tobacco  "abuse    • VHD (valvular heart disease)    • Wears dentures    • Wears eyeglasses        Social History     Socioeconomic History   • Marital status:    • Number of children: 2   Tobacco Use   • Smoking status: Current Every Day Smoker     Packs/day: 0.50     Years: 36.00     Pack years: 18.00     Types: Cigarettes     Last attempt to quit: 2020     Years since quittin.0   • Smokeless tobacco: Never Used   Substance and Sexual Activity   • Alcohol use: No   • Drug use: No   • Sexual activity: Defer       Family History   Problem Relation Age of Onset   • Cancer Mother    • Cancer Father    • Hypertension Father    • Other Sister         ACUTE MYOCARDIAL INFARCTION,CABG   • Heart failure Sister    • Hypertension Sister    • Stroke Other        Review of Systems   Constitutional: Negative.  Negative for chills and fever.   HENT: Negative.  Negative for congestion and sinus pressure.    Respiratory: Positive for apnea and shortness of breath. Negative for chest tightness.    Cardiovascular: Positive for chest pain and leg swelling.   Gastrointestinal: Positive for abdominal pain and constipation. Negative for blood in stool, diarrhea, nausea and vomiting.   Endocrine: Negative.  Negative for cold intolerance and heat intolerance.   Genitourinary: Negative.  Negative for dysuria, frequency, hematuria and urgency.   Neurological: Positive for dizziness (sitting, standing). Negative for syncope and light-headedness.   Psychiatric/Behavioral: Positive for sleep disturbance (NOT using cpap, using oxygen denies waking with soa or cp).       Objective   Vitals:    22 1113   BP: 104/59   BP Location: Left arm   Patient Position: Sitting   Pulse: 68   SpO2: 99%   Weight: 75.8 kg (167 lb)   Height: 152.4 cm (60\")      /59 (BP Location: Left arm, Patient Position: Sitting)   Pulse 68   Ht 152.4 cm (60\")   Wt 75.8 kg (167 lb)   SpO2 99% Comment: O2 @ 2lpm pulsate  BMI 32.61 kg/m²     Lab Results " (most recent)     None          Physical Exam  Vitals and nursing note reviewed.   Constitutional:       General: She is not in acute distress.     Appearance: Normal appearance. She is well-developed.   HENT:      Head: Normocephalic and atraumatic.   Eyes:      General: No scleral icterus.        Right eye: No discharge.         Left eye: No discharge.      Conjunctiva/sclera: Conjunctivae normal.   Neck:      Vascular: No carotid bruit.   Cardiovascular:      Rate and Rhythm: Normal rate and regular rhythm.      Heart sounds: Normal heart sounds. No murmur heard.    No friction rub. No gallop.      Comments: Mechanical click auscultated with grade 1-2/6 systolic murmur heard  Pulmonary:      Effort: Pulmonary effort is normal. No respiratory distress.      Breath sounds: Normal breath sounds. No wheezing or rales.   Chest:      Chest wall: No tenderness.   Musculoskeletal:      Right lower leg: No edema.      Left lower leg: No edema.   Skin:     General: Skin is warm and dry.      Coloration: Skin is not pale.      Findings: No erythema or rash.   Neurological:      Mental Status: She is alert and oriented to person, place, and time.      Cranial Nerves: No cranial nerve deficit.   Psychiatric:         Behavior: Behavior normal.         Procedure   Procedures       Assessment & Plan     Problems Addressed this Visit        Cardiac and Vasculature    Valvular heart disease    Relevant Orders    Adult Transthoracic Echo Complete W/ Cont if Necessary Per Protocol    Stress Test With Myocardial Perfusion One Day    Coronary artery disease involving native coronary artery of native heart with angina pectoris (HCC)    Relevant Orders    Adult Transthoracic Echo Complete W/ Cont if Necessary Per Protocol    Stress Test With Myocardial Perfusion One Day    Diastolic congestive heart failure (HCC) - Primary    Relevant Orders    Adult Transthoracic Echo Complete W/ Cont if Necessary Per Protocol    Stress Test With  Myocardial Perfusion One Day       Pulmonary and Pneumonias    SOB (shortness of breath)    Relevant Orders    Adult Transthoracic Echo Complete W/ Cont if Necessary Per Protocol    Stress Test With Myocardial Perfusion One Day      Diagnoses       Codes Comments    Diastolic congestive heart failure, unspecified HF chronicity (HCC)    -  Primary ICD-10-CM: I50.30  ICD-9-CM: 428.30, 428.0     SOB (shortness of breath)     ICD-10-CM: R06.02  ICD-9-CM: 786.05     Coronary artery disease involving native coronary artery of native heart with angina pectoris (HCC)     ICD-10-CM: I25.119  ICD-9-CM: 414.01, 413.9     Valvular heart disease     ICD-10-CM: I38  ICD-9-CM: 424.90         Recommendation  1.  Patient is a 51-year-old female who presents to the office for evaluation that has chest pain and dyspnea.  I feel much of her symptoms are related to her underlying lung disease and likely anemia.  I discussed with anemia that she can experience worsening dyspnea and also started develop chest pain if anemia becomes severe.  However, I would like to repeat test.  With mitral and aortic valve replacements, history of heart failure, and minimal atherosclerosis, we will schedule for testing to include stress test.  Because of oxygen dependency, we will schedule for chemical testing    2.  With history of valvular disease, heart failure etc. and worsening symptoms, would like to schedule for echocardiogram    3.  She has nitroglycerin available for chest pain.  Any chest pain, not resolved by nitroglycerin, recommend ER evaluation.  We will see her back for follow-up after testing.  She is to follow with primary as scheduled             Mara Martínez  reports that she has been smoking cigarettes. She has a 18.00 pack-year smoking history. She has never used smokeless tobacco.. I have educated her on the risk of diseases from using tobacco products such as cancer, COPD and heart disease.     I advised her to quit and she is  willing to quit. We have discussed the following method/s for tobacco cessation:  Counseling.   I spent 3  minutes counseling the patient.          Patient did not bring med list or medicine bottles to appointment, med list has been reviewed and updated based on patient's knowledge of their meds.          Electronically signed by:

## 2022-06-20 RX ORDER — MIDODRINE HYDROCHLORIDE 5 MG/1
TABLET ORAL
Qty: 60 TABLET | Refills: 3 | OUTPATIENT
Start: 2022-06-20

## 2022-08-03 ENCOUNTER — HOSPITAL ENCOUNTER (OUTPATIENT)
Dept: CARDIOLOGY | Facility: HOSPITAL | Age: 52
Discharge: HOME | End: 2022-08-03

## 2022-08-03 VITALS — BODY MASS INDEX: 32.81 KG/M2 | HEIGHT: 60 IN | WEIGHT: 167.11 LBS

## 2022-08-03 DIAGNOSIS — I38 VALVULAR HEART DISEASE: ICD-10-CM

## 2022-08-03 DIAGNOSIS — I25.119 CORONARY ARTERY DISEASE INVOLVING NATIVE CORONARY ARTERY OF NATIVE HEART WITH ANGINA PECTORIS: ICD-10-CM

## 2022-08-03 DIAGNOSIS — R06.02 SOB (SHORTNESS OF BREATH): ICD-10-CM

## 2022-08-03 DIAGNOSIS — I50.30 DIASTOLIC CONGESTIVE HEART FAILURE, UNSPECIFIED HF CHRONICITY: ICD-10-CM

## 2022-08-03 LAB
BH CV REST NUCLEAR ISOTOPE DOSE: 10 MCI
BH CV STRESS COMMENTS STAGE 1: NORMAL
BH CV STRESS DOSE REGADENOSON STAGE 1: 0.4
BH CV STRESS DURATION MIN STAGE 1: 0
BH CV STRESS DURATION SEC STAGE 1: 10
BH CV STRESS NUCLEAR ISOTOPE DOSE: 30 MCI
BH CV STRESS PROTOCOL 1: NORMAL
BH CV STRESS RECOVERY BP: NORMAL MMHG
BH CV STRESS RECOVERY HR: 70 BPM
BH CV STRESS STAGE 1: 1
MAXIMAL PREDICTED HEART RATE: 169 BPM
PERCENT MAX PREDICTED HR: 38.46 %
STRESS BASELINE BP: NORMAL MMHG
STRESS BASELINE HR: 66 BPM
STRESS PERCENT HR: 45 %
STRESS POST PEAK BP: NORMAL MMHG
STRESS POST PEAK HR: 65 BPM
STRESS TARGET HR: 144 BPM

## 2022-08-03 PROCEDURE — 93306 TTE W/DOPPLER COMPLETE: CPT | Performed by: INTERNAL MEDICINE

## 2022-08-03 PROCEDURE — 25010000002 REGADENOSON 0.4 MG/5ML SOLUTION: Performed by: INTERNAL MEDICINE

## 2022-08-03 PROCEDURE — 0 TECHNETIUM SESTAMIBI: Performed by: INTERNAL MEDICINE

## 2022-08-03 PROCEDURE — 93017 CV STRESS TEST TRACING ONLY: CPT

## 2022-08-03 PROCEDURE — 78452 HT MUSCLE IMAGE SPECT MULT: CPT | Performed by: INTERNAL MEDICINE

## 2022-08-03 PROCEDURE — 93306 TTE W/DOPPLER COMPLETE: CPT

## 2022-08-03 PROCEDURE — A9500 TC99M SESTAMIBI: HCPCS | Performed by: INTERNAL MEDICINE

## 2022-08-03 PROCEDURE — 78452 HT MUSCLE IMAGE SPECT MULT: CPT

## 2022-08-03 PROCEDURE — 93018 CV STRESS TEST I&R ONLY: CPT | Performed by: INTERNAL MEDICINE

## 2022-08-03 RX ADMIN — REGADENOSON 0.4 MG: 0.08 INJECTION, SOLUTION INTRAVENOUS at 09:55

## 2022-08-03 RX ADMIN — TECHNETIUM TC 99M SESTAMIBI 1 DOSE: 1 INJECTION INTRAVENOUS at 08:06

## 2022-08-03 RX ADMIN — TECHNETIUM TC 99M SESTAMIBI 1 DOSE: 1 INJECTION INTRAVENOUS at 09:55

## 2022-08-09 ENCOUNTER — TELEPHONE (OUTPATIENT)
Dept: CARDIOLOGY | Facility: CLINIC | Age: 52
End: 2022-08-09

## 2022-08-09 NOTE — TELEPHONE ENCOUNTER
STRESS  Pt notified of no acute findings. Provider will discuss results at f/u. Pt reminded of appt date and time.  ----- Message from YUE Melara sent at 8/9/2022  4:01 PM EDT -----  Routine follow-up

## 2022-08-22 LAB
AORTIC DIMENSIONLESS INDEX: 0.74 (DI)
BH CV ECHO MEAS - AO MAX PG: 31.8 MMHG
BH CV ECHO MEAS - AO MEAN PG: 14.9 MMHG
BH CV ECHO MEAS - AO ROOT DIAM: 2.8 CM
BH CV ECHO MEAS - AO V2 MAX: 282.1 CM/SEC
BH CV ECHO MEAS - AO V2 VTI: 57.6 CM
BH CV ECHO MEAS - AVA(I,D): 2.26 CM2
BH CV ECHO MEAS - EDV(CUBED): 154.8 ML
BH CV ECHO MEAS - EF(MOD-BP): 63 %
BH CV ECHO MEAS - ESV(CUBED): 43.7 ML
BH CV ECHO MEAS - FS: 34.4 %
BH CV ECHO MEAS - IVS/LVPW: 0.82 CM
BH CV ECHO MEAS - IVSD: 0.97 CM
BH CV ECHO MEAS - LA DIMENSION: 4 CM
BH CV ECHO MEAS - LAT PEAK E' VEL: 7.3 CM/SEC
BH CV ECHO MEAS - LV MASS(C)D: 225.1 GRAMS
BH CV ECHO MEAS - LV MAX PG: 17.5 MMHG
BH CV ECHO MEAS - LV MEAN PG: 8.4 MMHG
BH CV ECHO MEAS - LV V1 MAX: 209.4 CM/SEC
BH CV ECHO MEAS - LV V1 VTI: 46.6 CM
BH CV ECHO MEAS - LVIDD: 5.4 CM
BH CV ECHO MEAS - LVIDS: 3.5 CM
BH CV ECHO MEAS - LVOT AREA: 2.8 CM2
BH CV ECHO MEAS - LVOT DIAM: 1.89 CM
BH CV ECHO MEAS - LVPWD: 1.18 CM
BH CV ECHO MEAS - MED PEAK E' VEL: 7.5 CM/SEC
BH CV ECHO MEAS - MV A MAX VEL: 77.4 CM/SEC
BH CV ECHO MEAS - MV DEC SLOPE: 763.1 CM/SEC2
BH CV ECHO MEAS - MV DEC TIME: 0.18 MSEC
BH CV ECHO MEAS - MV E MAX VEL: 189 CM/SEC
BH CV ECHO MEAS - MV E/A: 2.44
BH CV ECHO MEAS - MV MAX PG: 19.2 MMHG
BH CV ECHO MEAS - MV MEAN PG: 6.9 MMHG
BH CV ECHO MEAS - MV P1/2T: 86.8 MSEC
BH CV ECHO MEAS - MV V2 VTI: 58.2 CM
BH CV ECHO MEAS - MVA(P1/2T): 2.5 CM2
BH CV ECHO MEAS - MVA(VTI): 2.23 CM2
BH CV ECHO MEAS - PA V2 MAX: 120.8 CM/SEC
BH CV ECHO MEAS - PI END-D VEL: 130.4 CM/SEC
BH CV ECHO MEAS - RAP SYSTOLE: 10 MMHG
BH CV ECHO MEAS - RV MAX PG: 4.1 MMHG
BH CV ECHO MEAS - RV V1 MAX: 101.5 CM/SEC
BH CV ECHO MEAS - RV V1 VTI: 22.3 CM
BH CV ECHO MEAS - RVDD: 2.7 CM
BH CV ECHO MEAS - RVSP: 34.5 MMHG
BH CV ECHO MEAS - SV(LVOT): 130.2 ML
BH CV ECHO MEAS - TAPSE (>1.6): 2.09 CM
BH CV ECHO MEAS - TR MAX PG: 24.5 MMHG
BH CV ECHO MEAS - TR MAX VEL: 247.3 CM/SEC
BH CV ECHO MEASUREMENTS AVERAGE E/E' RATIO: 25.54
BH CV XLRA - TDI S': 8.2 CM/SEC
IVRT: 98 MSEC
MAXIMAL PREDICTED HEART RATE: 169 BPM
STRESS TARGET HR: 144 BPM

## 2022-08-24 ENCOUNTER — TELEPHONE (OUTPATIENT)
Dept: CARDIOLOGY | Facility: CLINIC | Age: 52
End: 2022-08-24

## 2022-08-24 NOTE — TELEPHONE ENCOUNTER
ECHO  Pt notified of no acute findings. Provider will discuss results at f/u. Pt reminded of appt date and time.  ----- Message from YUE Melara sent at 8/23/2022 11:34 AM EDT -----  Routine follow-up

## 2022-09-07 ENCOUNTER — OFFICE VISIT (OUTPATIENT)
Dept: CARDIOLOGY | Facility: CLINIC | Age: 52
End: 2022-09-07

## 2022-09-07 VITALS
WEIGHT: 165 LBS | HEART RATE: 71 BPM | BODY MASS INDEX: 32.39 KG/M2 | OXYGEN SATURATION: 98 % | DIASTOLIC BLOOD PRESSURE: 57 MMHG | SYSTOLIC BLOOD PRESSURE: 108 MMHG | HEIGHT: 60 IN

## 2022-09-07 DIAGNOSIS — I38 VALVULAR HEART DISEASE: ICD-10-CM

## 2022-09-07 DIAGNOSIS — I10 PRIMARY HYPERTENSION: ICD-10-CM

## 2022-09-07 DIAGNOSIS — R06.02 SOB (SHORTNESS OF BREATH): ICD-10-CM

## 2022-09-07 DIAGNOSIS — I25.10 CORONARY ARTERY DISEASE INVOLVING NATIVE CORONARY ARTERY OF NATIVE HEART WITHOUT ANGINA PECTORIS: Primary | ICD-10-CM

## 2022-09-07 PROBLEM — G25.0 ESSENTIAL TREMOR: Status: ACTIVE | Noted: 2022-09-07

## 2022-09-07 PROBLEM — R39.11 HESITANCY OF MICTURITION: Status: ACTIVE | Noted: 2022-09-07

## 2022-09-07 PROBLEM — J44.9 CHRONIC OBSTRUCTIVE PULMONARY DISEASE: Status: ACTIVE | Noted: 2022-09-07

## 2022-09-07 PROCEDURE — 99214 OFFICE O/P EST MOD 30 MIN: CPT | Performed by: PHYSICIAN ASSISTANT

## 2022-09-07 RX ORDER — NICOTINE 21 MG/24HR
1 PATCH, TRANSDERMAL 24 HOURS TRANSDERMAL
COMMUNITY
Start: 2022-08-22 | End: 2023-03-09 | Stop reason: ALTCHOICE

## 2022-09-07 RX ORDER — POLYETHYLENE GLYCOL 3350 17 G
4 POWDER IN PACKET (EA) ORAL
COMMUNITY
Start: 2022-08-22 | End: 2023-03-09 | Stop reason: ALTCHOICE

## 2022-09-07 RX ORDER — LEVETIRACETAM 750 MG/1
750 TABLET ORAL 2 TIMES DAILY
COMMUNITY

## 2022-09-07 NOTE — PROGRESS NOTES
Problem list     Subjective   Mara Martínez is a 52 y.o. female     Chief Complaint   Patient presents with   • Stress and echo follow up   Problem list  1.  Valvular heart disease  1.1 status post aortic and mitral valve replacement by Dr. Cavazos with mechanical valves July 2018  1.2 follow-up echocardiogram 2019 demonstrating stable valve parameters  1.3 echocardiogram August 2022 with stable valve parameters  2.  Preserved systolic function  3.  Cardiac catheterization 2017 with minimal atherosclerosis noted  3.1 repeat stress test August 2022 with no evidence of ischemia preserved LV function  4.  Carotid artery stenosis  4.1 left carotid stenting by Dr. Mckeon [facility] July 2020  5.  MCA aneurysm estimated at 3.5 mm followed by [facility]  5.  Epilepsy  6.  Hypertension  7.  Dyslipidemia  8.  Chronic tobacco use  9.  GI bleed  9.1.  Patient with history of AVMs    HPI    Patient is a 52-year-old female who presents back to the office for follow-up.  Patient recently has had issues in regards to chronic anemia requiring blood transfusions with history of AVMs.  Recently, she underwent an iron infusion and she has been evaluated in the emergency of [place] and even [place] in regards to her AVMs.    Thankfully, her anemia has improved and most recent hemoglobin is 12.  The complication is because the patient has 2 mechanical valves requiring anticoagulation.    However, she does not describe any recent bleeding and is feeling remarkably better.  She does not describe any chest pain or pressure and her dyspnea is now mild.  She does not describe PND or orthopnea    Patient does not describe palpitations or dysrhythmic symptoms.  Otherwise patient is doing well                    Current Outpatient Medications on File Prior to Visit   Medication Sig Dispense Refill   • Ascorbic Acid (VITAMIN C) 500 MG capsule Take 1 tablet by mouth 2 (two) times a day.     • aspirin 81 MG EC tablet Take  81 mg by mouth Daily.     • atorvastatin (LIPITOR) 80 MG tablet Take 1 tablet by mouth Daily. 90 tablet 3   • ferrous sulfate 325 (65 FE) MG tablet Take 325 mg by mouth 2 (Two) Times a Day.     • folic acid (FOLVITE) 1 MG tablet Take  by mouth Daily.     • furosemide (LASIX) 40 MG tablet Take 1 tablet by mouth 2 (Two) Times a Day. 60 tablet 5   • galcanezumab-gnlm (Emgality) 120 MG/ML auto-injector pen Emgality Pen 120 mg/mL subcutaneous pen injector     • levETIRAcetam (KEPPRA) 500 MG tablet Take 500 mg by mouth 2 (Two) Times a Day.     • levothyroxine (SYNTHROID, LEVOTHROID) 25 MCG tablet Take 25 mcg by mouth Daily.     • methocarbamol (ROBAXIN) 750 MG tablet methocarbamol 750 mg tablet   TAKE ONE TABLET BY MOUTH EVERY 6 HOURS FOR MUSCLE TENSION/PAIN     • nitroglycerin (NITROSTAT) 0.4 MG SL tablet Place 1 tablet under the tongue Every 5 (Five) Minutes As Needed for Chest Pain. May take maximum of 3 tabs in 15 minutes. 25 tablet 3   • nortriptyline (PAMELOR) 50 MG capsule Take 50 mg by mouth Every Night. Two nightly     • O2 (OXYGEN) Inhale 2 L/min 1 (One) Time.     • OXcarbazepine (TRILEPTAL) 600 MG tablet Take 600 mg by mouth 2 (Two) Times a Day.     • pain patient supplied pump by Intrathecal route Continuous. Morphine     • pantoprazole (PROTONIX) 40 MG EC tablet Take 1 tablet by mouth Daily. 90 tablet 2   • POTASSIUM CHLORIDE RANDELL ER PO Take 40 mEq by mouth Daily.     • TOPIRAMATE PO Take 150 mg by mouth 2 (Two) Times a Day.     • ubrogepant (UBRELVY) 100 MG tablet Ubrelvy 100 mg tablet     • vitamin B-12 (CYANOCOBALAMIN) 100 MCG tablet Take 100 mcg by mouth.     • warfarin (COUMADIN) 4 MG tablet Take 4 mg by mouth Daily.     • nicotine (NICODERM CQ) 21 MG/24HR patch Place 1 patch on the skin as directed by provider.     • nicotine polacrilex (COMMIT) 4 MG lozenge Dissolve 4 mg in the mouth.       Current Facility-Administered Medications on File Prior to Visit   Medication Dose Route Frequency Provider Last  Rate Last Admin   • Chlorhexidine Gluconate Cloth 2 % pads 1 application  1 application Topical Q12H PRN Cristobal Lozano PA       • midazolam (VERSED) injection   Intravenous PRN Krishna Blank MD   2 mg at 03/02/18 0655       Azithromycin and Gabapentin    Past Medical History:   Diagnosis Date   • Anemia    • Aortic regurgitation    • Arthritis    • Back pain    • Carotid bruit    • ISREAL (cerebral atherosclerosis) 12/2014    nonobstructive   • Chest pain    • Chronic obstructive pulmonary disease ([facility]) 09/07/2022   • Coronary artery disease    • COVID-19 vaccine administered     phizer   • D-dimer, elevated    • Diastolic congestive heart failure ([facility]) 07/25/2019   • Dizziness    • Edema    • Epilepsy ([facility])    • Fatigue    • Heart murmur    • History of blood transfusion 08/2019   • History of blood transfusion 2022   • History of recent blood transfusion 12/2021   • History of transfusion    • Hyperlipidemia    • Hypertension    • Kidney stones    • Migraines    • Mitral regurgitation    • Mitral stenosis     mild   • Neuropathy    • Neuropathy    • Palpitations    • Pulmonary edema    • Seizure ([facility])    • Sleep apnea 09/2019   • Sleeping difficulties    • SOB (shortness of breath)    • Supraventricular aortic stenosis    • Syncope    • Tachycardia    • Tobacco abuse    • VHD (valvular heart disease)    • Wears dentures    • Wears eyeglasses        Social History     Socioeconomic History   • Marital status:    • Number of children: 2   Tobacco Use   • Smoking status: Current Every Day Smoker     Packs/day: 0.50     Years: 36.00     Pack years: 18.00     Types: Cigarettes   • Smokeless tobacco: Never Used   Substance and Sexual Activity   • Alcohol use: No   • Drug use: No   • Sexual activity: Defer       Family History   Problem Relation Age of Onset   • Cancer Mother    • Cancer Father    • Hypertension Father    • Other Sister         ACUTE MYOCARDIAL INFARCTION,CABG   • Heart failure Sister    •  "Hypertension Sister    • Stroke Other        Review of Systems   Constitutional: Negative.  Negative for chills and fever.   HENT: Negative.  Negative for congestion and sinus pressure.    Respiratory: Positive for apnea and shortness of breath. Negative for chest tightness.    Cardiovascular: Negative.  Negative for chest pain, palpitations and leg swelling.   Gastrointestinal: Positive for abdominal pain (seeing GI in October). Negative for blood in stool, constipation, diarrhea, nausea and vomiting.   Endocrine: Negative.  Negative for cold intolerance and heat intolerance.   Genitourinary: Negative.  Negative for dysuria, frequency, hematuria and urgency.   Neurological: Positive for dizziness (with position change, moving around). Negative for syncope and light-headedness.   Psychiatric/Behavioral: Positive for sleep disturbance (NOT using cpap, denies waking with soa or cp).       Objective   Vitals:    09/07/22 1431   BP: 108/57   BP Location: Left arm   Patient Position: Sitting   Pulse: 71   SpO2: 98%   Weight: 74.8 kg (165 lb)   Height: 152.4 cm (60\")      /57 (BP Location: Left arm, Patient Position: Sitting)   Pulse 71   Ht 152.4 cm (60\")   Wt 74.8 kg (165 lb)   SpO2 98%   BMI 32.22 kg/m²     Lab Results (most recent)     None          Physical Exam  Vitals and nursing note reviewed.   Constitutional:       General: She is not in acute distress.     Appearance: Normal appearance. She is well-developed.   HENT:      Head: Normocephalic and atraumatic.   Eyes:      General: No scleral icterus.        Right eye: No discharge.         Left eye: No discharge.      Conjunctiva/sclera: Conjunctivae normal.   Neck:      Vascular: No carotid bruit.   Cardiovascular:      Rate and Rhythm: Normal rate and regular rhythm.      Heart sounds: Normal heart sounds. No murmur heard.    No friction rub. No gallop.      Comments: Mechanical click auscultated with grade 1-2/6 systolic murmur heard  Pulmonary:     "  Effort: Pulmonary effort is normal. No respiratory distress.      Breath sounds: Normal breath sounds. No wheezing or rales.   Chest:      Chest wall: No tenderness.   Musculoskeletal:      Right lower leg: No edema.      Left lower leg: No edema.   Skin:     General: Skin is warm and dry.      Coloration: Skin is not pale.      Findings: No erythema or rash.   Neurological:      Mental Status: She is alert and oriented to person, place, and time.      Cranial Nerves: No cranial nerve deficit.   Psychiatric:         Behavior: Behavior normal.         Procedure   Procedures       Assessment & Plan     Problems Addressed this Visit        Cardiac and Vasculature    Primary hypertension    Valvular heart disease    Coronary artery disease involving native coronary artery of native heart without angina pectoris - Primary       Pulmonary and Pneumonias    SOB (shortness of breath)      Diagnoses       Codes Comments    Coronary artery disease involving native coronary artery of native heart without angina pectoris    -  Primary ICD-10-CM: I25.10  ICD-9-CM: 414.01     SOB (shortness of breath)     ICD-10-CM: R06.02  ICD-9-CM: 786.05     Primary hypertension     ICD-10-CM: I10  ICD-9-CM: 401.9     Valvular heart disease     ICD-10-CM: I38  ICD-9-CM: 424.90       Recommendation  1.  Patient is a 52-year-old female with coronary disease do remarkably well without angina.  Her anemia has improved.  Her symptoms have improved and recent cardiac testing shows stable parameters.  No evidence of ischemia.  For now, we will continue medical management    2.  Patient with baseline hypertension doing well on current medical therapy.  For now, we will monitor    3.  Patient with valvular heart disease doing well and stable by recent echocardiogram.  We will continue to monitor    4.  Patient's dyspnea is mild.  We will see patient back for follow-up in 6 months or sooner as symptoms discussed.  Follow-up with primary as  liane Martínez  reports that she has been smoking cigarettes. She has a 18.00 pack-year smoking history. She has never used smokeless tobacco.. I have educated her on the risk of diseases from using tobacco products such as cancer, COPD and heart disease.     I advised her to quit and she is willing to quit. We have discussed the following method/s for tobacco cessation:  Counseling.    I spent 3  minutes counseling the patient.          Patient did not bring med list or medicine bottles to appointment, med list has been reviewed and updated based on patient's knowledge of their meds.          Electronically signed by:

## 2022-12-16 DIAGNOSIS — R06.02 SOB (SHORTNESS OF BREATH): ICD-10-CM

## 2022-12-16 DIAGNOSIS — R60.9 EDEMA, UNSPECIFIED TYPE: ICD-10-CM

## 2022-12-16 DIAGNOSIS — I50.30 DIASTOLIC CONGESTIVE HEART FAILURE, UNSPECIFIED HF CHRONICITY: ICD-10-CM

## 2022-12-16 NOTE — TELEPHONE ENCOUNTER
Name from pharmacy: furosemide 40 mg tablet          Will file in chart as: furosemide (LASIX) 40 MG tablet    Sig: TAKE ONE TABLET BY MOUTH TWICE A DAY    Disp:  60 tablet    Refills:  5    Start: 12/16/2022    Class: Normal    Non-formulary For: SOB (shortness of breath); Diastolic congestive heart failure, unspecified HF chronicity (HCC); Edema, unspecified type    To pharmacy: This prescription was filled on 9/27/2022. Any refills authorized will be placed on file.    Last ordered: 9 months ago by YUE Melara Last refill: 9/27/2022    Rx #: [id number]    Diuretics Protocol Failed 12/16/2022 08:01 AM   Protocol Details  Normal serum potassium in past 12 months    Normal serum sodium in past 12 months    GFR> 30 ml/min in past year    No active pregnancy on record    No positive pregnancy test in past 12 months    Recent or future visit with authorizing provider    Blood pressure on record

## 2022-12-19 RX ORDER — FUROSEMIDE 40 MG/1
TABLET ORAL
Qty: 60 TABLET | Refills: 5 | Status: SHIPPED | OUTPATIENT
Start: 2022-12-19

## 2023-02-02 DIAGNOSIS — K21.9 GASTROESOPHAGEAL REFLUX DISEASE: ICD-10-CM

## 2023-02-02 RX ORDER — PANTOPRAZOLE SODIUM 40 MG/1
TABLET, DELAYED RELEASE ORAL
Qty: 90 TABLET | Refills: 2 | Status: SHIPPED | OUTPATIENT
Start: 2023-02-02

## 2023-03-09 ENCOUNTER — OFFICE VISIT (OUTPATIENT)
Dept: CARDIOLOGY | Facility: CLINIC | Age: 53
End: 2023-03-09
Payer: MEDICARE

## 2023-03-09 VITALS
OXYGEN SATURATION: 98 % | DIASTOLIC BLOOD PRESSURE: 78 MMHG | WEIGHT: 166 LBS | BODY MASS INDEX: 32.59 KG/M2 | SYSTOLIC BLOOD PRESSURE: 120 MMHG | HEIGHT: 60 IN | HEART RATE: 68 BPM

## 2023-03-09 DIAGNOSIS — I25.10 CORONARY ARTERY DISEASE INVOLVING NATIVE CORONARY ARTERY OF NATIVE HEART WITHOUT ANGINA PECTORIS: Primary | ICD-10-CM

## 2023-03-09 DIAGNOSIS — I38 VALVULAR HEART DISEASE: ICD-10-CM

## 2023-03-09 DIAGNOSIS — I10 PRIMARY HYPERTENSION: ICD-10-CM

## 2023-03-09 PROBLEM — G40.219 COMPLEX PARTIAL EPILEPSY WITH GENERALIZATION AND WITH INTRACTABLE EPILEPSY: Status: ACTIVE | Noted: 2018-10-01

## 2023-03-09 PROBLEM — M54.81 OCCIPITAL NEURALGIA: Status: ACTIVE | Noted: 2019-01-16

## 2023-03-09 PROBLEM — G25.81 RESTLESS LEGS SYNDROME: Status: ACTIVE | Noted: 2022-09-19

## 2023-03-09 PROBLEM — G40.109: Status: ACTIVE | Noted: 2022-09-19

## 2023-03-09 PROBLEM — R20.2 PARESTHESIA: Status: ACTIVE | Noted: 2023-03-09

## 2023-03-09 PROBLEM — H35.369 RETINAL DRUSEN: Status: ACTIVE | Noted: 2023-03-09

## 2023-03-09 PROCEDURE — 99213 OFFICE O/P EST LOW 20 MIN: CPT | Performed by: PHYSICIAN ASSISTANT

## 2023-03-09 PROCEDURE — 93000 ELECTROCARDIOGRAM COMPLETE: CPT | Performed by: PHYSICIAN ASSISTANT

## 2023-03-09 PROCEDURE — 3078F DIAST BP <80 MM HG: CPT | Performed by: PHYSICIAN ASSISTANT

## 2023-03-09 PROCEDURE — 3074F SYST BP LT 130 MM HG: CPT | Performed by: PHYSICIAN ASSISTANT

## 2023-03-09 RX ORDER — ROPINIROLE 0.5 MG/1
0.5 TABLET, FILM COATED ORAL NIGHTLY
COMMUNITY

## 2023-03-09 RX ORDER — MELATONIN
1000 DAILY
COMMUNITY

## 2023-03-09 RX ORDER — OMEPRAZOLE 20 MG/1
20 CAPSULE, DELAYED RELEASE ORAL DAILY
COMMUNITY

## 2023-03-09 RX ORDER — TIZANIDINE 4 MG/1
4 TABLET ORAL 3 TIMES DAILY
COMMUNITY

## 2023-03-09 RX ORDER — VARENICLINE TARTRATE 1 MG/1
1 TABLET, FILM COATED ORAL 2 TIMES DAILY
COMMUNITY

## 2023-03-09 NOTE — PROGRESS NOTES
Subjective   Mara Martínez is a 52 y.o. female     Chief Complaint   Patient presents with   • Coronary Artery Disease            Problem list   1.  Valvular heart disease  1.1 status post aortic and mitral valve replacement by Dr. Cavazos with mechanical valves July 2018  1.2 follow-up echocardiogram 2019 demonstrating stable valve parameters  1.3 echocardiogram August 2022 with stable valve parameters  2.  Preserved systolic function  3.  Cardiac catheterization 2017 with minimal atherosclerosis noted  3.1 repeat stress test August 2022 with no evidence of ischemia preserved LV function  4.  Carotid artery stenosis  4.1 left carotid stenting by Dr. Mckeon [facility] July 2020  5.  MCA aneurysm estimated at 3.5 mm followed by [facility]  5.  Epilepsy  6.  Hypertension  7.  Dyslipidemia  8.  Chronic tobacco use  9.  GI bleed  9.1.  Patient with history of AVMs      HPI    Patient is a 52-year-old female who presents to the office for evaluation.  Patient has done well.  She does complain of occasional chest pain as a right-sided pain that she will get at times near the parasternal region.  Otherwise, she has done well.  Patient does not complain of dyspnea.  No PND orthopnea.    Patient does not describe palpitating nor does she complain of dysrhythmic symptoms.  Otherwise, patient is stable.                            Current Outpatient Medications on File Prior to Visit   Medication Sig Dispense Refill   • Ascorbic Acid (VITAMIN C) 500 MG capsule Take 1 tablet by mouth 2 (two) times a day.     • aspirin 81 MG EC tablet Take 1 tablet by mouth Daily.     • atorvastatin (LIPITOR) 80 MG tablet Take 1 tablet by mouth Daily. 90 tablet 3   • Cholecalciferol 25 MCG (1000 UT) tablet Take 1 tablet by mouth Daily.     • ferrous sulfate 325 (65 FE) MG tablet Take 1 tablet by mouth 2 (Two) Times a Day.     • folic acid (FOLVITE) 1 MG tablet Take  by mouth Daily.     • furosemide (LASIX) 40 MG tablet TAKE  ONE TABLET BY MOUTH TWICE A DAY 60 tablet 5   • galcanezumab-gnlm (Emgality) 120 MG/ML auto-injector pen Emgality Pen 120 mg/mL subcutaneous pen injector     • levETIRAcetam (KEPPRA) 750 MG tablet Take 1 tablet by mouth 2 (Two) Times a Day.     • levothyroxine (SYNTHROID, LEVOTHROID) 50 MCG tablet Take 1 tablet by mouth Daily.     • nitroglycerin (NITROSTAT) 0.4 MG SL tablet Place 1 tablet under the tongue Every 5 (Five) Minutes As Needed for Chest Pain. May take maximum of 3 tabs in 15 minutes. 25 tablet 3   • nortriptyline (PAMELOR) 50 MG capsule Take 1 capsule by mouth Every Night. Two nightly     • omeprazole (priLOSEC) 20 MG capsule Take 1 capsule by mouth Daily.     • OXcarbazepine (TRILEPTAL) 150 MG tablet Take 4 tablets by mouth Every Night.     • pain patient supplied pump by Intrathecal route Continuous. Morphine     • pantoprazole (PROTONIX) 40 MG EC tablet TAKE 1 TABLET BY MOUTH ONCE DAILY 90 tablet 2   • POTASSIUM CHLORIDE RANDELL ER PO Take 20 mEq by mouth 2 (Two) Times a Day.     • rOPINIRole (REQUIP) 0.5 MG tablet Take 1 tablet by mouth Every Night. Take 1 hour before bedtime.     • tiZANidine (ZANAFLEX) 4 MG tablet Take 1 tablet by mouth 3 (Three) Times a Day.     • topiramate (TOPAMAX) 200 MG tablet Take 1 tablet by mouth 2 (Two) Times a Day.     • ubrogepant (UBRELVY) 100 MG tablet Ubrelvy 100 mg tablet     • varenicline (Chantix) 1 MG tablet Take 1 tablet by mouth 2 (Two) Times a Day.     • vitamin B-12 (CYANOCOBALAMIN) 100 MCG tablet Take 1 tablet by mouth.     • warfarin (COUMADIN) 4 MG tablet Take 1 tablet by mouth Daily.     • [DISCONTINUED] methocarbamol (ROBAXIN) 750 MG tablet methocarbamol 750 mg tablet   TAKE ONE TABLET BY MOUTH EVERY 6 HOURS FOR MUSCLE TENSION/PAIN     • [DISCONTINUED] nicotine (NICODERM CQ) 21 MG/24HR patch Place 1 patch on the skin as directed by provider.     • [DISCONTINUED] nicotine polacrilex (COMMIT) 4 MG lozenge Dissolve 1 lozenge in the mouth.     • [DISCONTINUED]  O2 (OXYGEN) Inhale 2 L/min 1 (One) Time.       Current Facility-Administered Medications on File Prior to Visit   Medication Dose Route Frequency Provider Last Rate Last Admin   • Chlorhexidine Gluconate Cloth 2 % pads 1 application  1 application Topical Q12H PRN Cristobal Lozano PA       • midazolam (VERSED) injection   Intravenous PRN Krishna Blank MD   2 mg at 18 0655       Azithromycin and Gabapentin    Past Medical History:   Diagnosis Date   • Anemia    • Aortic regurgitation    • Arthritis    • Back pain    • Carotid bruit    • ISREAL (cerebral atherosclerosis) 2014    nonobstructive   • Chest pain    • Chronic obstructive pulmonary disease (HCC) 2022   • Coronary artery disease    • COVID-19 vaccine administered     phizer   • D-dimer, elevated    • Diastolic congestive heart failure ([facility]) 2019   • Dizziness    • Edema    • Epilepsy ([facility])    • Fatigue    • Heart murmur    • History of blood transfusion 2019   • History of blood transfusion    • History of blood transfusion 2022   • History of recent blood transfusion 2021   • History of transfusion    • Hyperlipidemia    • Hypertension    • Kidney stones    • Migraines    • Mitral regurgitation    • Mitral stenosis     mild   • Neuropathy    • Neuropathy    • Palpitations    • Pulmonary edema    • Retinal drusen    • Seizure ([facility])    • Sleep apnea 2019   • Sleeping difficulties    • SOB (shortness of breath)    • Supraventricular aortic stenosis    • Syncope    • Tachycardia    • Tobacco abuse    • VHD (valvular heart disease)    • Wears dentures    • Wears eyeglasses        Social History     Socioeconomic History   • Marital status:    • Number of children: 2   Tobacco Use   • Smoking status: Former     Packs/day: 0.50     Years: 36.00     Pack years: 18.00     Types: Cigarettes     Quit date: 2023     Years since quittin.1   • Smokeless tobacco: Never   Substance and Sexual Activity   • Alcohol use:  "No   • Drug use: No   • Sexual activity: Defer       Family History   Problem Relation Age of Onset   • Cancer Mother    • Cancer Father    • Hypertension Father    • Other Sister         ACUTE MYOCARDIAL INFARCTION,CABG   • Heart failure Sister    • Hypertension Sister    • Stroke Other        Review of Systems   Constitutional: Negative.  Negative for chills and fever.   HENT: Negative.  Negative for congestion and sinus pressure.    Respiratory: Positive for apnea. Negative for chest tightness and shortness of breath.    Cardiovascular: Positive for chest pain (off and on). Negative for palpitations and leg swelling.   Gastrointestinal: Negative.  Negative for abdominal pain, blood in stool, constipation, diarrhea, nausea and vomiting.   Genitourinary: Negative.  Negative for dysuria, frequency, hematuria and urgency.   Neurological: Positive for dizziness (with position change at times) and light-headedness. Negative for syncope.   Psychiatric/Behavioral: Positive for sleep disturbance (has not used cpap in 2 months, denies waking with soa or cp).       Objective   Vitals:    03/09/23 1335   BP: 120/78   BP Location: Left arm   Patient Position: Sitting   Pulse: 68   SpO2: 98%   Weight: 75.3 kg (166 lb)   Height: 152.4 cm (60\")      /78 (BP Location: Left arm, Patient Position: Sitting)   Pulse 68   Ht 152.4 cm (60\")   Wt 75.3 kg (166 lb)   SpO2 98%   BMI 32.42 kg/m²     Lab Results (most recent)     None          Physical Exam  Vitals and nursing note reviewed.   Constitutional:       General: She is not in acute distress.     Appearance: Normal appearance. She is well-developed.   HENT:      Head: Normocephalic and atraumatic.   Eyes:      General: No scleral icterus.        Right eye: No discharge.         Left eye: No discharge.      Conjunctiva/sclera: Conjunctivae normal.   Neck:      Vascular: No carotid bruit.   Cardiovascular:      Rate and Rhythm: Normal rate and regular rhythm.      Heart " sounds: Normal heart sounds. No murmur heard.    No friction rub. No gallop.      Comments: Mechanical click auscultated with grade 1-2/6 systolic murmur heard  Pulmonary:      Effort: Pulmonary effort is normal. No respiratory distress.      Breath sounds: Normal breath sounds. No wheezing or rales.   Chest:      Chest wall: No tenderness.   Musculoskeletal:      Right lower leg: No edema.      Left lower leg: No edema.   Skin:     General: Skin is warm and dry.      Coloration: Skin is not pale.      Findings: No erythema or rash.   Neurological:      Mental Status: She is alert and oriented to person, place, and time.      Cranial Nerves: No cranial nerve deficit.   Psychiatric:         Behavior: Behavior normal.         Procedure     ECG 12 Lead    Date/Time: 3/9/2023 1:47 PM  Performed by: Tavares Yeh PA  Authorized by: Tavares Yeh PA   Comparison: compared with previous ECG from 9/9/2021  Comments: EKG demonstrates sinus rhythm at 84 bpm, first-degree AV block and right bundle branch block, no acute ST changes               Assessment & Plan     Problems Addressed this Visit        Cardiac and Vasculature    Primary hypertension    Valvular heart disease    Coronary artery disease involving native coronary artery of native heart without angina pectoris - Primary   Diagnoses       Codes Comments    Coronary artery disease involving native coronary artery of native heart without angina pectoris    -  Primary ICD-10-CM: I25.10  ICD-9-CM: 414.01     Valvular heart disease     ICD-10-CM: I38  ICD-9-CM: 424.90     Primary hypertension     ICD-10-CM: I10  ICD-9-CM: 401.9         Recommendation  1.  Patient is a 52-year-old female with history of coronary disease doing well without angina.  For now, she is doing well and has atypical right-sided chest pain.  Nothing further from my standpoint.  We will monitor for now.    2.  Patient with valvular heart disease currently doing well.  She is on Coumadin  for anticoagulation and recently her hemoglobin was approximately 12 per her report.  She is doing much better.  We will monitor.    3.  Patient with baseline hypertension doing well medical therapy.  For now, we will see her back for follow-up in 6 months or sooner as symptoms discussed.  Follow-up with primary as scheduled.             Mara Martínez  reports that she quit smoking about 2 months ago. Her smoking use included cigarettes. She has a 18.00 pack-year smoking history. She has never used smokeless tobacco..       Patient brought in medicine list to appointment, it's been reviewed with patient and med list was updated in the chart.     Electronically signed by:

## 2023-04-26 ENCOUNTER — TELEPHONE (OUTPATIENT)
Dept: CARDIOLOGY | Facility: CLINIC | Age: 53
End: 2023-04-26
Payer: MEDICARE

## 2023-04-26 NOTE — TELEPHONE ENCOUNTER
Received cardiac clearance request from  stating pt has neurostimular trial/implant to be scheduled and is requiring a cardiac clearance. Placed cardiac clearance request in Geoff's inbox to review and address with provider.

## 2023-06-05 RX ORDER — ATORVASTATIN CALCIUM 80 MG/1
TABLET, FILM COATED ORAL
Qty: 90 TABLET | Refills: 3 | Status: SHIPPED | OUTPATIENT
Start: 2023-06-05

## 2023-09-14 ENCOUNTER — OFFICE VISIT (OUTPATIENT)
Dept: CARDIOLOGY | Facility: CLINIC | Age: 53
End: 2023-09-14
Payer: MEDICARE

## 2023-09-14 VITALS
DIASTOLIC BLOOD PRESSURE: 55 MMHG | OXYGEN SATURATION: 95 % | BODY MASS INDEX: 27.88 KG/M2 | HEART RATE: 66 BPM | SYSTOLIC BLOOD PRESSURE: 87 MMHG | WEIGHT: 142 LBS | HEIGHT: 60 IN

## 2023-09-14 DIAGNOSIS — I50.30 DIASTOLIC CONGESTIVE HEART FAILURE, UNSPECIFIED HF CHRONICITY: ICD-10-CM

## 2023-09-14 DIAGNOSIS — I38 VALVULAR HEART DISEASE: Primary | ICD-10-CM

## 2023-09-14 DIAGNOSIS — I25.10 CORONARY ARTERY DISEASE INVOLVING NATIVE CORONARY ARTERY OF NATIVE HEART WITHOUT ANGINA PECTORIS: ICD-10-CM

## 2023-09-14 PROCEDURE — 3078F DIAST BP <80 MM HG: CPT | Performed by: PHYSICIAN ASSISTANT

## 2023-09-14 PROCEDURE — 3074F SYST BP LT 130 MM HG: CPT | Performed by: PHYSICIAN ASSISTANT

## 2023-09-14 PROCEDURE — 99214 OFFICE O/P EST MOD 30 MIN: CPT | Performed by: PHYSICIAN ASSISTANT

## 2023-09-14 RX ORDER — MIDODRINE HYDROCHLORIDE 5 MG/1
5 TABLET ORAL
Qty: 90 TABLET | Refills: 3 | Status: SHIPPED | OUTPATIENT
Start: 2023-09-14

## 2023-09-14 RX ORDER — TIRZEPATIDE 7.5 MG/.5ML
INJECTION, SOLUTION SUBCUTANEOUS
COMMUNITY
Start: 2023-08-28

## 2023-09-14 NOTE — LETTER
September 14, 2023       No Recipients    Patient: Mara Martínez   YOB: 1970   Date of Visit: 9/14/2023       Dear CHARLIE Robison    Mara Martínez was in my office today. Below is a copy of my note.    If you have questions, please do not hesitate to call me. I look forward to following Mara along with you.         Sincerely,        YUE Krishna        CC:   No Recipients    Problem list     Subjective  Mara Martínez is a 53 y.o. female     Chief Complaint   Patient presents with   • Follow-up     6 months     Problem list   1.  Valvular heart disease  1.1 status post aortic and mitral valve replacement by Dr. Cavazos with mechanical valves July 2018  1.2 follow-up echocardiogram 2019 demonstrating stable valve parameters  1.3 echocardiogram August 2022 with stable valve parameters  2.  Preserved systolic function  3.  Cardiac catheterization 2017 with minimal atherosclerosis noted  3.1 repeat stress test August 2022 with no evidence of ischemia preserved LV function  4.  Carotid artery stenosis  4.1 left carotid stenting by Dr. Mckeon [facility] July 2020  5.  MCA aneurysm estimated at 3.5 mm followed by [facility]  5.  Epilepsy  6.  Hypertension  7.  Dyslipidemia  8.  Chronic tobacco use  9.  GI bleed  9.1.  Patient with history of AVMs  HPI    Patient is a 53 female who presents to the office to be evaluated.  We will follow the patient routinely as she has had mitral and aortic valve replacement at [facility] with mechanical valve.  She has history of carotid stenting.    Unfortunately, she was diagnosed with AVMs and because of chronic anticoagulation, anemia has been a recurrent issue with multiple transfusions and evaluations by multiple gastroenterologist.    Last time I saw her, she had done well on hemoglobin seem to have normalized and she felt improvement.  She started having bleeding again and has underwent multiple endoscopies.  She  underwent a small bowel endoscopy in [place] on underwent cautery of AVMs.    She described her last hemoglobin being 7.8.  She still has some weakness.  She does not describe chest pain or shortness of breath.  No complaints of PND or orthopnea.    She does not describe palpitating.  Otherwise, patient appears stable.      Current Outpatient Medications on File Prior to Visit   Medication Sig Dispense Refill   • ascorbic acid (VITAMIN C) 1000 MG tablet Take 1 tablet by mouth Daily.     • aspirin 81 MG EC tablet Take 1 tablet by mouth Daily.     • atorvastatin (LIPITOR) 80 MG tablet TAKE 1 TABLET BY MOUTH ONCE DAILY 90 tablet 3   • cholecalciferol (VITAMIN D3) 25 MCG (1000 UT) tablet Take 1 tablet by mouth Daily.     • ferrous sulfate 325 (65 FE) MG tablet Take 1 tablet by mouth 2 (Two) Times a Day.     • folic acid (FOLVITE) 1 MG tablet Take  by mouth Daily.     • furosemide (LASIX) 40 MG tablet TAKE 1 TABLET BY MOUTH TWICE A DAY 60 tablet 5   • galcanezumab-gnlm (Emgality) 120 MG/ML auto-injector pen Emgality Pen 120 mg/mL subcutaneous pen injector     • levETIRAcetam (KEPPRA) 1000 MG tablet Take 1 tablet by mouth 2 (Two) Times a Day.     • levothyroxine (SYNTHROID, LEVOTHROID) 50 MCG tablet Take 1 tablet by mouth Daily.     • Mounjaro 7.5 MG/0.5ML solution pen-injector INJECT 7.5MG ONCE A WEEK     • nitroglycerin (NITROSTAT) 0.4 MG SL tablet Place 1 tablet under the tongue Every 5 (Five) Minutes As Needed for Chest Pain. May take maximum of 3 tabs in 15 minutes. 25 tablet 3   • nortriptyline (PAMELOR) 50 MG capsule Take 1 capsule by mouth Every Night. Two nightly     • pain patient supplied pump by Intrathecal route Continuous. Morphine     • pantoprazole (PROTONIX) 40 MG EC tablet TAKE 1 TABLET BY MOUTH ONCE DAILY 90 tablet 2   • POTASSIUM CHLORIDE RANDELL ER PO Take 20 mEq by mouth 2 (Two) Times a Day.     • rOPINIRole (REQUIP) 0.5 MG tablet Take 1 tablet by mouth Every Night. Take 1 hour before bedtime.     •  tiZANidine (ZANAFLEX) 4 MG tablet Take 1 tablet by mouth 3 (Three) Times a Day As Needed.     • topiramate (TOPAMAX) 200 MG tablet Take 1 tablet by mouth 2 (Two) Times a Day.     • ubrogepant (UBRELVY) 100 MG tablet 1 (One) Time As Needed.     • vitamin B-12 (CYANOCOBALAMIN) 100 MCG tablet Take 1 tablet by mouth.     • warfarin (COUMADIN) 4 MG tablet Take 1 tablet by mouth Daily.     • [DISCONTINUED] omeprazole (priLOSEC) 20 MG capsule Take 1 capsule by mouth Daily.     • [DISCONTINUED] OXcarbazepine (TRILEPTAL) 150 MG tablet Take 4 tablets by mouth Every Night.     • [DISCONTINUED] varenicline (Chantix) 1 MG tablet Take 1 tablet by mouth 2 (Two) Times a Day.       Current Facility-Administered Medications on File Prior to Visit   Medication Dose Route Frequency Provider Last Rate Last Admin   • Chlorhexidine Gluconate Cloth 2 % pads 1 application  1 application  Topical Q12H PRN Cristobal Lozano PA       • midazolam (VERSED) injection   Intravenous PRN Krishna Blank MD   2 mg at 03/02/18 0655       Azithromycin, Metformin, and Gabapentin    Past Medical History:   Diagnosis Date   • Anemia    • Aortic regurgitation    • Arthritis    • Back pain    • Carotid bruit    • ISREAL (cerebral atherosclerosis) 12/2014    nonobstructive   • Chest pain    • Chronic obstructive pulmonary disease 09/07/2022   • Coronary artery disease    • COVID-19 vaccine administered     phizer   • D-dimer, elevated    • Diastolic congestive heart failure 07/25/2019   • Dizziness    • Edema    • Epilepsy    • Fatigue    • Heart murmur    • History of blood transfusion 08/2019   • History of blood transfusion 2022   • History of blood transfusion 11/2022   • History of blood transfusion     August 2023 x2   • History of recent blood transfusion 12/2021   • History of transfusion    • Hyperlipidemia    • Hypertension    • Kidney stones    • Migraines    • Mitral regurgitation    • Mitral stenosis     mild   • Neuropathy    • Neuropathy    •  Palpitations    • Pulmonary edema    • Retinal drusen    • Seizure    • Sleep apnea 2019   • Sleeping difficulties    • SOB (shortness of breath)    • Supraventricular aortic stenosis    • Syncope    • Tachycardia    • Tobacco abuse    • VHD (valvular heart disease)    • Wears dentures    • Wears eyeglasses        Social History     Socioeconomic History   • Marital status:    • Number of children: 2   Tobacco Use   • Smoking status: Every Day     Packs/day: 0.50     Years: 36.00     Pack years: 18.00     Types: Cigarettes     Last attempt to quit: 2023     Years since quittin.7   • Smokeless tobacco: Never   Substance and Sexual Activity   • Alcohol use: No   • Drug use: No   • Sexual activity: Defer       Family History   Problem Relation Age of Onset   • Cancer Mother    • Cancer Father    • Hypertension Father    • Other Sister         ACUTE MYOCARDIAL INFARCTION,CABG   • Heart failure Sister    • Hypertension Sister    • Stroke Other        Review of Systems   Constitutional: Negative.    HENT: Negative.     Eyes: Negative.  Negative for visual disturbance.   Respiratory:  Positive for apnea. Negative for cough, chest tightness, shortness of breath and wheezing.    Cardiovascular:  Positive for leg swelling. Negative for chest pain and palpitations.   Gastrointestinal: Negative.  Negative for blood in stool.   Endocrine: Negative.    Genitourinary: Negative.  Negative for hematuria.   Musculoskeletal: Negative.    Skin: Negative.  Negative for color change, rash and wound.   Allergic/Immunologic: Negative.    Neurological:  Positive for dizziness. Negative for syncope, weakness, light-headedness, numbness and headaches.   Hematological: Negative.  Does not bruise/bleed easily.   Psychiatric/Behavioral:  Positive for sleep disturbance.      Objective  Vitals:    23 0949   BP: (!) 87/55   BP Location: Left arm   Patient Position: Sitting   Cuff Size: Adult   Pulse: 66   SpO2: 95%   Weight:  "64.4 kg (142 lb)   Height: 152.4 cm (60\")      BP (!) 87/55 (BP Location: Left arm, Patient Position: Sitting, Cuff Size: Adult)   Pulse 66   Ht 152.4 cm (60\")   Wt 64.4 kg (142 lb)   SpO2 95%   BMI 27.73 kg/m²     Lab Results (most recent)       None            Physical Exam  Vitals and nursing note reviewed.   Constitutional:       General: She is not in acute distress.     Appearance: Normal appearance. She is well-developed.   HENT:      Head: Normocephalic and atraumatic.   Eyes:      General: No scleral icterus.        Right eye: No discharge.         Left eye: No discharge.      Conjunctiva/sclera: Conjunctivae normal.   Neck:      Vascular: No carotid bruit.   Cardiovascular:      Rate and Rhythm: Normal rate and regular rhythm.      Heart sounds: Normal heart sounds. No murmur heard.    No friction rub. No gallop.      Comments: Mechanical click auscultated with grade 1-2/6 systolic murmur heard  Pulmonary:      Effort: Pulmonary effort is normal. No respiratory distress.      Breath sounds: Normal breath sounds. No wheezing or rales.   Chest:      Chest wall: No tenderness.   Musculoskeletal:      Right lower leg: No edema.      Left lower leg: No edema.   Skin:     General: Skin is warm and dry.      Coloration: Skin is not pale.      Findings: No erythema or rash.   Neurological:      Mental Status: She is alert and oriented to person, place, and time.      Cranial Nerves: No cranial nerve deficit.   Psychiatric:         Behavior: Behavior normal.       Procedure  Procedures       Assessment & Plan    Problems Addressed this Visit          Cardiac and Vasculature    Valvular heart disease - Primary    Relevant Medications    midodrine (PROAMATINE) 5 MG tablet    Coronary artery disease involving native coronary artery of native heart without angina pectoris    Relevant Medications    midodrine (PROAMATINE) 5 MG tablet    Diastolic congestive heart failure    Relevant Medications    midodrine " (PROAMATINE) 5 MG tablet     Diagnoses         Codes Comments    Valvular heart disease    -  Primary ICD-10-CM: I38  ICD-9-CM: 424.90     Coronary artery disease involving native coronary artery of native heart without angina pectoris     ICD-10-CM: I25.10  ICD-9-CM: 414.01     Diastolic congestive heart failure, unspecified HF chronicity     ICD-10-CM: I50.30  ICD-9-CM: 428.30, 428.0             Recommendation  1.  Patient is a 53-year-old female with history of coronary disease with minor nonobstructive.  She appears to be doing well.  We will continue to monitor for now.  She has no angina or failure symptoms.    2.  Last evaluation of her mitral and aortic valve demonstrated stable valve parameters.  We can continue to monitor.  Obviously, her AVMs are complicated by her chronic anticoagulation from 2 mechanical valves.  However, she is following close with gastroenterology.  We can continue to monitor for now.  Her hemoglobin is being monitored closely by primary.    3.  Patient with diastolic heart failure doing well on Lasix.  We will continue at this point.    4.  Midodrine has been added to patient's regimen.  I want her to monitor blood pressure and symptoms closely.  I would like for her to come back in 1 to 2 weeks as we can make adjustments telephonically if needed.    5.  We will see her back for follow-up in 6 months or sooner as symptoms discussed.  Follow-up with primary as scheduled.           Mara Martínez  reports that she has been smoking cigarettes. She has a 18.00 pack-year smoking history. She has never used smokeless tobacco.. I have educated her on the risk of diseases from using tobacco products such as cancer, COPD, and heart disease.     I advised her to quit and she is not willing to quit.    I spent 3  minutes counseling the patient.        Patient did not bring med list or medicine bottles to appointment, med list has been reviewed and updated based on patient's knowledge of their  meds.      Electronically signed by:

## 2023-09-14 NOTE — PROGRESS NOTES
Problem list     Subjective   Mara Martínez is a 53 y.o. female     Chief Complaint   Patient presents with    Follow-up     6 months     Problem list   1.  Valvular heart disease  1.1 status post aortic and mitral valve replacement by Dr. Cavazos with mechanical valves July 2018  1.2 follow-up echocardiogram 2019 demonstrating stable valve parameters  1.3 echocardiogram August 2022 with stable valve parameters  2.  Preserved systolic function  3.  Cardiac catheterization 2017 with minimal atherosclerosis noted  3.1 repeat stress test August 2022 with no evidence of ischemia preserved LV function  4.  Carotid artery stenosis  4.1 left carotid stenting by Dr. Mckeon [facility] July 2020  5.  MCA aneurysm estimated at 3.5 mm followed by [facility]  5.  Epilepsy  6.  Hypertension  7.  Dyslipidemia  8.  Chronic tobacco use  9.  GI bleed  9.1.  Patient with history of AVMs  HPI    Patient is a 53 female who presents to the office to be evaluated.  We will follow the patient routinely as she has had mitral and aortic valve replacement at [facility] with mechanical valve.  She has history of carotid stenting.    Unfortunately, she was diagnosed with AVMs and because of chronic anticoagulation, anemia has been a recurrent issue with multiple transfusions and evaluations by multiple gastroenterologist.    Last time I saw her, she had done well on hemoglobin seem to have normalized and she felt improvement.  She started having bleeding again and has underwent multiple endoscopies.  She underwent a small bowel endoscopy in [place] on underwent cautery of AVMs.    She described her last hemoglobin being 7.8.  She still has some weakness.  She does not describe chest pain or shortness of breath.  No complaints of PND or orthopnea.    She does not describe palpitating.  Otherwise, patient appears stable.      Current Outpatient Medications on File Prior to Visit   Medication Sig Dispense Refill     ascorbic acid (VITAMIN C) 1000 MG tablet Take 1 tablet by mouth Daily.      aspirin 81 MG EC tablet Take 1 tablet by mouth Daily.      atorvastatin (LIPITOR) 80 MG tablet TAKE 1 TABLET BY MOUTH ONCE DAILY 90 tablet 3    cholecalciferol (VITAMIN D3) 25 MCG (1000 UT) tablet Take 1 tablet by mouth Daily.      ferrous sulfate 325 (65 FE) MG tablet Take 1 tablet by mouth 2 (Two) Times a Day.      folic acid (FOLVITE) 1 MG tablet Take  by mouth Daily.      furosemide (LASIX) 40 MG tablet TAKE 1 TABLET BY MOUTH TWICE A DAY 60 tablet 5    galcanezumab-gnlm (Emgality) 120 MG/ML auto-injector pen Emgality Pen 120 mg/mL subcutaneous pen injector      levETIRAcetam (KEPPRA) 1000 MG tablet Take 1 tablet by mouth 2 (Two) Times a Day.      levothyroxine (SYNTHROID, LEVOTHROID) 50 MCG tablet Take 1 tablet by mouth Daily.      Mounjaro 7.5 MG/0.5ML solution pen-injector INJECT 7.5MG ONCE A WEEK      nitroglycerin (NITROSTAT) 0.4 MG SL tablet Place 1 tablet under the tongue Every 5 (Five) Minutes As Needed for Chest Pain. May take maximum of 3 tabs in 15 minutes. 25 tablet 3    nortriptyline (PAMELOR) 50 MG capsule Take 1 capsule by mouth Every Night. Two nightly      pain patient supplied pump by Intrathecal route Continuous. Morphine      pantoprazole (PROTONIX) 40 MG EC tablet TAKE 1 TABLET BY MOUTH ONCE DAILY 90 tablet 2    POTASSIUM CHLORIDE RANDELL ER PO Take 20 mEq by mouth 2 (Two) Times a Day.      rOPINIRole (REQUIP) 0.5 MG tablet Take 1 tablet by mouth Every Night. Take 1 hour before bedtime.      tiZANidine (ZANAFLEX) 4 MG tablet Take 1 tablet by mouth 3 (Three) Times a Day As Needed.      topiramate (TOPAMAX) 200 MG tablet Take 1 tablet by mouth 2 (Two) Times a Day.      ubrogepant (UBRELVY) 100 MG tablet 1 (One) Time As Needed.      vitamin B-12 (CYANOCOBALAMIN) 100 MCG tablet Take 1 tablet by mouth.      warfarin (COUMADIN) 4 MG tablet Take 1 tablet by mouth Daily.      [DISCONTINUED] omeprazole (priLOSEC) 20 MG capsule  Take 1 capsule by mouth Daily.      [DISCONTINUED] OXcarbazepine (TRILEPTAL) 150 MG tablet Take 4 tablets by mouth Every Night.      [DISCONTINUED] varenicline (Chantix) 1 MG tablet Take 1 tablet by mouth 2 (Two) Times a Day.       Current Facility-Administered Medications on File Prior to Visit   Medication Dose Route Frequency Provider Last Rate Last Admin    Chlorhexidine Gluconate Cloth 2 % pads 1 application  1 application  Topical Q12H PRN Cristobal Loznao PA        midazolam (VERSED) injection   Intravenous PRN Krishna Blank MD   2 mg at 03/02/18 0655       Azithromycin, Metformin, and Gabapentin    Past Medical History:   Diagnosis Date    Anemia     Aortic regurgitation     Arthritis     Back pain     Carotid bruit     ISREAL (cerebral atherosclerosis) 12/2014    nonobstructive    Chest pain     Chronic obstructive pulmonary disease 09/07/2022    Coronary artery disease     COVID-19 vaccine administered     phizer    D-dimer, elevated     Diastolic congestive heart failure 07/25/2019    Dizziness     Edema     Epilepsy     Fatigue     Heart murmur     History of blood transfusion 08/2019    History of blood transfusion 2022    History of blood transfusion 11/2022    History of blood transfusion     August 2023 x2    History of recent blood transfusion 12/2021    History of transfusion     Hyperlipidemia     Hypertension     Kidney stones     Migraines     Mitral regurgitation     Mitral stenosis     mild    Neuropathy     Neuropathy     Palpitations     Pulmonary edema     Retinal drusen     Seizure     Sleep apnea 09/2019    Sleeping difficulties     SOB (shortness of breath)     Supraventricular aortic stenosis     Syncope     Tachycardia     Tobacco abuse     VHD (valvular heart disease)     Wears dentures     Wears eyeglasses        Social History     Socioeconomic History    Marital status:     Number of children: 2   Tobacco Use    Smoking status: Every Day     Packs/day: 0.50     Years:  "36.00     Pack years: 18.00     Types: Cigarettes     Last attempt to quit: 2023     Years since quittin.7    Smokeless tobacco: Never   Substance and Sexual Activity    Alcohol use: No    Drug use: No    Sexual activity: Defer       Family History   Problem Relation Age of Onset    Cancer Mother     Cancer Father     Hypertension Father     Other Sister         ACUTE MYOCARDIAL INFARCTION,CABG    Heart failure Sister     Hypertension Sister     Stroke Other        Review of Systems   Constitutional: Negative.    HENT: Negative.     Eyes: Negative.  Negative for visual disturbance.   Respiratory:  Positive for apnea. Negative for cough, chest tightness, shortness of breath and wheezing.    Cardiovascular:  Positive for leg swelling. Negative for chest pain and palpitations.   Gastrointestinal: Negative.  Negative for blood in stool.   Endocrine: Negative.    Genitourinary: Negative.  Negative for hematuria.   Musculoskeletal: Negative.    Skin: Negative.  Negative for color change, rash and wound.   Allergic/Immunologic: Negative.    Neurological:  Positive for dizziness. Negative for syncope, weakness, light-headedness, numbness and headaches.   Hematological: Negative.  Does not bruise/bleed easily.   Psychiatric/Behavioral:  Positive for sleep disturbance.      Objective   Vitals:    23 0949   BP: (!) 87/55   BP Location: Left arm   Patient Position: Sitting   Cuff Size: Adult   Pulse: 66   SpO2: 95%   Weight: 64.4 kg (142 lb)   Height: 152.4 cm (60\")      BP (!) 87/55 (BP Location: Left arm, Patient Position: Sitting, Cuff Size: Adult)   Pulse 66   Ht 152.4 cm (60\")   Wt 64.4 kg (142 lb)   SpO2 95%   BMI 27.73 kg/m²     Lab Results (most recent)       None            Physical Exam  Vitals and nursing note reviewed.   Constitutional:       General: She is not in acute distress.     Appearance: Normal appearance. She is well-developed.   HENT:      Head: Normocephalic and atraumatic.   Eyes:     "  General: No scleral icterus.        Right eye: No discharge.         Left eye: No discharge.      Conjunctiva/sclera: Conjunctivae normal.   Neck:      Vascular: No carotid bruit.   Cardiovascular:      Rate and Rhythm: Normal rate and regular rhythm.      Heart sounds: Normal heart sounds. No murmur heard.    No friction rub. No gallop.      Comments: Mechanical click auscultated with grade 1-2/6 systolic murmur heard  Pulmonary:      Effort: Pulmonary effort is normal. No respiratory distress.      Breath sounds: Normal breath sounds. No wheezing or rales.   Chest:      Chest wall: No tenderness.   Musculoskeletal:      Right lower leg: No edema.      Left lower leg: No edema.   Skin:     General: Skin is warm and dry.      Coloration: Skin is not pale.      Findings: No erythema or rash.   Neurological:      Mental Status: She is alert and oriented to person, place, and time.      Cranial Nerves: No cranial nerve deficit.   Psychiatric:         Behavior: Behavior normal.       Procedure   Procedures       Assessment & Plan     Problems Addressed this Visit          Cardiac and Vasculature    Valvular heart disease - Primary    Relevant Medications    midodrine (PROAMATINE) 5 MG tablet    Coronary artery disease involving native coronary artery of native heart without angina pectoris    Relevant Medications    midodrine (PROAMATINE) 5 MG tablet    Diastolic congestive heart failure    Relevant Medications    midodrine (PROAMATINE) 5 MG tablet     Diagnoses         Codes Comments    Valvular heart disease    -  Primary ICD-10-CM: I38  ICD-9-CM: 424.90     Coronary artery disease involving native coronary artery of native heart without angina pectoris     ICD-10-CM: I25.10  ICD-9-CM: 414.01     Diastolic congestive heart failure, unspecified HF chronicity     ICD-10-CM: I50.30  ICD-9-CM: 428.30, 428.0             Recommendation  1.  Patient is a 53-year-old female with history of coronary disease with minor  nonobstructive.  She appears to be doing well.  We will continue to monitor for now.  She has no angina or failure symptoms.    2.  Last evaluation of her mitral and aortic valve demonstrated stable valve parameters.  We can continue to monitor.  Obviously, her AVMs are complicated by her chronic anticoagulation from 2 mechanical valves.  However, she is following close with gastroenterology.  We can continue to monitor for now.  Her hemoglobin is being monitored closely by primary.    3.  Patient with diastolic heart failure doing well on Lasix.  We will continue at this point.    4.  Midodrine has been added to patient's regimen.  I want her to monitor blood pressure and symptoms closely.  I would like for her to come back in 1 to 2 weeks as we can make adjustments telephonically if needed.    5.  We will see her back for follow-up in 6 months or sooner as symptoms discussed.  Follow-up with primary as scheduled.           Mara Martínez  reports that she has been smoking cigarettes. She has a 18.00 pack-year smoking history. She has never used smokeless tobacco.. I have educated her on the risk of diseases from using tobacco products such as cancer, COPD, and heart disease.     I advised her to quit and she is not willing to quit.    I spent 3  minutes counseling the patient.        Patient did not bring med list or medicine bottles to appointment, med list has been reviewed and updated based on patient's knowledge of their meds.      Electronically signed by:

## 2023-10-31 ENCOUNTER — HOSPITAL ENCOUNTER (INPATIENT)
Facility: HOSPITAL | Age: 53
LOS: 10 days | Discharge: HOME HEALTH | DRG: 024 | End: 2023-11-10
Attending: EMERGENCY MEDICINE | Admitting: INTERNAL MEDICINE
Payer: MEDICARE

## 2023-10-31 ENCOUNTER — APPOINTMENT (OUTPATIENT)
Dept: CT IMAGING | Facility: HOSPITAL | Age: 53
DRG: 024 | End: 2023-10-31
Payer: MEDICARE

## 2023-10-31 ENCOUNTER — APPOINTMENT (OUTPATIENT)
Dept: GENERAL RADIOLOGY | Facility: HOSPITAL | Age: 53
DRG: 024 | End: 2023-10-31
Payer: MEDICARE

## 2023-10-31 DIAGNOSIS — I63.9 ACUTE CVA (CEREBROVASCULAR ACCIDENT): Primary | ICD-10-CM

## 2023-10-31 DIAGNOSIS — D64.9 ANEMIA, UNSPECIFIED TYPE: ICD-10-CM

## 2023-10-31 DIAGNOSIS — N39.0 ACUTE UTI: ICD-10-CM

## 2023-10-31 DIAGNOSIS — F80.9 COMMUNICATION DEFICIT: ICD-10-CM

## 2023-10-31 DIAGNOSIS — R13.10 DYSPHAGIA, UNSPECIFIED TYPE: ICD-10-CM

## 2023-10-31 LAB
ABO GROUP BLD: NORMAL
ALT SERPL W P-5'-P-CCNC: <5 U/L (ref 1–33)
AMMONIA BLD-SCNC: 16 UMOL/L (ref 11–51)
AMPHET+METHAMPHET UR QL: NEGATIVE
AMPHETAMINES UR QL: NEGATIVE
ANION GAP SERPL CALCULATED.3IONS-SCNC: 11 MMOL/L (ref 5–15)
APAP SERPL-MCNC: <5 MCG/ML (ref 0–30)
APTT PPP: 38.7 SECONDS (ref 22–39)
AST SERPL-CCNC: 6 U/L (ref 1–32)
BACTERIA UR QL AUTO: ABNORMAL /HPF
BARBITURATES UR QL SCN: NEGATIVE
BASOPHILS # BLD AUTO: 0.03 10*3/MM3 (ref 0–0.2)
BASOPHILS NFR BLD AUTO: 0.2 % (ref 0–1.5)
BENZODIAZ UR QL SCN: NEGATIVE
BILIRUB UR QL STRIP: NEGATIVE
BLD GP AB SCN SERPL QL: POSITIVE
BUN SERPL-MCNC: 9 MG/DL (ref 6–20)
BUN/CREAT SERPL: 10.8 (ref 7–25)
BUPRENORPHINE SERPL-MCNC: NEGATIVE NG/ML
CALCIUM SPEC-SCNC: 8.9 MG/DL (ref 8.6–10.5)
CANNABINOIDS SERPL QL: NEGATIVE
CHLORIDE SERPL-SCNC: 101 MMOL/L (ref 98–107)
CK SERPL-CCNC: 22 U/L (ref 20–180)
CLARITY UR: CLEAR
CO2 SERPL-SCNC: 23 MMOL/L (ref 22–29)
COCAINE UR QL: NEGATIVE
COLOR UR: YELLOW
CREAT SERPL-MCNC: 0.83 MG/DL (ref 0.57–1)
D-LACTATE SERPL-SCNC: 2.1 MMOL/L (ref 0.5–2)
DEPRECATED RDW RBC AUTO: 62.7 FL (ref 37–54)
EGFRCR SERPLBLD CKD-EPI 2021: 84.4 ML/MIN/1.73
EOSINOPHIL # BLD AUTO: 0.11 10*3/MM3 (ref 0–0.4)
EOSINOPHIL NFR BLD AUTO: 0.8 % (ref 0.3–6.2)
ERYTHROCYTE [DISTWIDTH] IN BLOOD BY AUTOMATED COUNT: 16.8 % (ref 12.3–15.4)
ETHANOL BLD-MCNC: <10 MG/DL (ref 0–10)
FENTANYL UR-MCNC: NEGATIVE NG/ML
GLUCOSE SERPL-MCNC: 105 MG/DL (ref 65–99)
GLUCOSE UR STRIP-MCNC: NEGATIVE MG/DL
HCT VFR BLD AUTO: 23 % (ref 34–46.6)
HGB BLD-MCNC: 6.7 G/DL (ref 12–15.9)
HGB UR QL STRIP.AUTO: NEGATIVE
HOLD SPECIMEN: NORMAL
HOLD SPECIMEN: NORMAL
HYALINE CASTS UR QL AUTO: ABNORMAL /LPF
IMM GRANULOCYTES # BLD AUTO: 0.1 10*3/MM3 (ref 0–0.05)
IMM GRANULOCYTES NFR BLD AUTO: 0.7 % (ref 0–0.5)
INR PPP: 1.73 (ref 0.89–1.12)
KETONES UR QL STRIP: NEGATIVE
LEUKOCYTE ESTERASE UR QL STRIP.AUTO: ABNORMAL
LYMPHOCYTES # BLD AUTO: 0.68 10*3/MM3 (ref 0.7–3.1)
LYMPHOCYTES NFR BLD AUTO: 4.9 % (ref 19.6–45.3)
MAGNESIUM SERPL-MCNC: 1.9 MG/DL (ref 1.6–2.6)
MCH RBC QN AUTO: 31 PG (ref 26.6–33)
MCHC RBC AUTO-ENTMCNC: 29.1 G/DL (ref 31.5–35.7)
MCV RBC AUTO: 106.5 FL (ref 79–97)
METHADONE UR QL SCN: NEGATIVE
MONOCYTES # BLD AUTO: 0.68 10*3/MM3 (ref 0.1–0.9)
MONOCYTES NFR BLD AUTO: 4.9 % (ref 5–12)
NEUTROPHILS NFR BLD AUTO: 12.28 10*3/MM3 (ref 1.7–7)
NEUTROPHILS NFR BLD AUTO: 88.5 % (ref 42.7–76)
NITRITE UR QL STRIP: POSITIVE
NRBC BLD AUTO-RTO: 0 /100 WBC (ref 0–0.2)
OPIATES UR QL: POSITIVE
OXYCODONE UR QL SCN: NEGATIVE
PCP UR QL SCN: NEGATIVE
PH UR STRIP.AUTO: 6.5 [PH] (ref 5–8)
PLATELET # BLD AUTO: 297 10*3/MM3 (ref 140–450)
PMV BLD AUTO: 10.2 FL (ref 6–12)
POTASSIUM SERPL-SCNC: 3.4 MMOL/L (ref 3.5–5.2)
PROPOXYPH UR QL: NEGATIVE
PROT UR QL STRIP: NEGATIVE
PROTHROMBIN TIME: 20.4 SECONDS (ref 12.2–14.5)
RBC # BLD AUTO: 2.16 10*6/MM3 (ref 3.77–5.28)
RBC # UR STRIP: ABNORMAL /HPF
REF LAB TEST METHOD: ABNORMAL
RH BLD: POSITIVE
SALICYLATES SERPL-MCNC: <0.3 MG/DL
SODIUM SERPL-SCNC: 135 MMOL/L (ref 136–145)
SP GR UR STRIP: 1.02 (ref 1–1.03)
SQUAMOUS #/AREA URNS HPF: ABNORMAL /HPF
T&S EXPIRATION DATE: NORMAL
TRICYCLICS UR QL SCN: POSITIVE
TROPONIN T SERPL HS-MCNC: 17 NG/L
TSH SERPL DL<=0.05 MIU/L-ACNC: 1.69 UIU/ML (ref 0.27–4.2)
UROBILINOGEN UR QL STRIP: ABNORMAL
WBC # UR STRIP: ABNORMAL /HPF
WBC NRBC COR # BLD: 13.88 10*3/MM3 (ref 3.4–10.8)
WHOLE BLOOD HOLD COAG: NORMAL
WHOLE BLOOD HOLD SPECIMEN: NORMAL

## 2023-10-31 PROCEDURE — 84450 TRANSFERASE (AST) (SGOT): CPT | Performed by: EMERGENCY MEDICINE

## 2023-10-31 PROCEDURE — 25010000002 FENTANYL CITRATE (PF) 50 MCG/ML SOLUTION: Performed by: RADIOLOGY

## 2023-10-31 PROCEDURE — C2628 CATHETER, OCCLUSION: HCPCS | Performed by: RADIOLOGY

## 2023-10-31 PROCEDURE — 86920 COMPATIBILITY TEST SPIN: CPT

## 2023-10-31 PROCEDURE — 99291 CRITICAL CARE FIRST HOUR: CPT | Performed by: INTERNAL MEDICINE

## 2023-10-31 PROCEDURE — 61645 PERQ ART M-THROMBECT &/NFS: CPT | Performed by: RADIOLOGY

## 2023-10-31 PROCEDURE — C1769 GUIDE WIRE: HCPCS | Performed by: RADIOLOGY

## 2023-10-31 PROCEDURE — 83735 ASSAY OF MAGNESIUM: CPT | Performed by: EMERGENCY MEDICINE

## 2023-10-31 PROCEDURE — 85610 PROTHROMBIN TIME: CPT | Performed by: EMERGENCY MEDICINE

## 2023-10-31 PROCEDURE — 86900 BLOOD TYPING SEROLOGIC ABO: CPT | Performed by: EMERGENCY MEDICINE

## 2023-10-31 PROCEDURE — 84443 ASSAY THYROID STIM HORMONE: CPT | Performed by: EMERGENCY MEDICINE

## 2023-10-31 PROCEDURE — 25810000003 LACTATED RINGERS SOLUTION: Performed by: EMERGENCY MEDICINE

## 2023-10-31 PROCEDURE — 80307 DRUG TEST PRSMV CHEM ANLYZR: CPT | Performed by: EMERGENCY MEDICINE

## 2023-10-31 PROCEDURE — 99223 1ST HOSP IP/OBS HIGH 75: CPT

## 2023-10-31 PROCEDURE — 82077 ASSAY SPEC XCP UR&BREATH IA: CPT | Performed by: EMERGENCY MEDICINE

## 2023-10-31 PROCEDURE — 25010000002 MIDAZOLAM PER 1 MG: Performed by: RADIOLOGY

## 2023-10-31 PROCEDURE — 87186 SC STD MICRODIL/AGAR DIL: CPT | Performed by: EMERGENCY MEDICINE

## 2023-10-31 PROCEDURE — 99285 EMERGENCY DEPT VISIT HI MDM: CPT

## 2023-10-31 PROCEDURE — 85025 COMPLETE CBC W/AUTO DIFF WBC: CPT | Performed by: EMERGENCY MEDICINE

## 2023-10-31 PROCEDURE — 25510000001 IOPAMIDOL PER 1 ML: Performed by: EMERGENCY MEDICINE

## 2023-10-31 PROCEDURE — C1760 CLOSURE DEV, VASC: HCPCS | Performed by: RADIOLOGY

## 2023-10-31 PROCEDURE — 88304 TISSUE EXAM BY PATHOLOGIST: CPT | Performed by: RADIOLOGY

## 2023-10-31 PROCEDURE — 80048 BASIC METABOLIC PNL TOTAL CA: CPT | Performed by: EMERGENCY MEDICINE

## 2023-10-31 PROCEDURE — 70496 CT ANGIOGRAPHY HEAD: CPT

## 2023-10-31 PROCEDURE — 99153 MOD SED SAME PHYS/QHP EA: CPT | Performed by: RADIOLOGY

## 2023-10-31 PROCEDURE — 86850 RBC ANTIBODY SCREEN: CPT | Performed by: EMERGENCY MEDICINE

## 2023-10-31 PROCEDURE — 87086 URINE CULTURE/COLONY COUNT: CPT | Performed by: EMERGENCY MEDICINE

## 2023-10-31 PROCEDURE — C1894 INTRO/SHEATH, NON-LASER: HCPCS | Performed by: RADIOLOGY

## 2023-10-31 PROCEDURE — 99152 MOD SED SAME PHYS/QHP 5/>YRS: CPT | Performed by: RADIOLOGY

## 2023-10-31 PROCEDURE — 70450 CT HEAD/BRAIN W/O DYE: CPT

## 2023-10-31 PROCEDURE — 83605 ASSAY OF LACTIC ACID: CPT | Performed by: EMERGENCY MEDICINE

## 2023-10-31 PROCEDURE — 80143 DRUG ASSAY ACETAMINOPHEN: CPT | Performed by: EMERGENCY MEDICINE

## 2023-10-31 PROCEDURE — 70498 CT ANGIOGRAPHY NECK: CPT

## 2023-10-31 PROCEDURE — C1887 CATHETER, GUIDING: HCPCS | Performed by: RADIOLOGY

## 2023-10-31 PROCEDURE — 82550 ASSAY OF CK (CPK): CPT | Performed by: EMERGENCY MEDICINE

## 2023-10-31 PROCEDURE — 86870 RBC ANTIBODY IDENTIFICATION: CPT | Performed by: EMERGENCY MEDICINE

## 2023-10-31 PROCEDURE — C1757 CATH, THROMBECTOMY/EMBOLECT: HCPCS | Performed by: RADIOLOGY

## 2023-10-31 PROCEDURE — 86922 COMPATIBILITY TEST ANTIGLOB: CPT

## 2023-10-31 PROCEDURE — 81001 URINALYSIS AUTO W/SCOPE: CPT | Performed by: EMERGENCY MEDICINE

## 2023-10-31 PROCEDURE — 85730 THROMBOPLASTIN TIME PARTIAL: CPT | Performed by: EMERGENCY MEDICINE

## 2023-10-31 PROCEDURE — 0 IODIXANOL PER 1 ML: Performed by: RADIOLOGY

## 2023-10-31 PROCEDURE — 0042T HC CT CEREBRAL PERFUSION W/WO CONTRAST: CPT

## 2023-10-31 PROCEDURE — 71045 X-RAY EXAM CHEST 1 VIEW: CPT

## 2023-10-31 PROCEDURE — 84460 ALANINE AMINO (ALT) (SGPT): CPT | Performed by: EMERGENCY MEDICINE

## 2023-10-31 PROCEDURE — 86901 BLOOD TYPING SEROLOGIC RH(D): CPT | Performed by: EMERGENCY MEDICINE

## 2023-10-31 PROCEDURE — 84484 ASSAY OF TROPONIN QUANT: CPT | Performed by: EMERGENCY MEDICINE

## 2023-10-31 PROCEDURE — 03CG3Z7 EXTIRPATION OF MATTER FROM INTRACRANIAL ARTERY USING STENT RETRIEVER, PERCUTANEOUS APPROACH: ICD-10-PCS | Performed by: RADIOLOGY

## 2023-10-31 PROCEDURE — 82140 ASSAY OF AMMONIA: CPT | Performed by: EMERGENCY MEDICINE

## 2023-10-31 PROCEDURE — 80179 DRUG ASSAY SALICYLATE: CPT | Performed by: EMERGENCY MEDICINE

## 2023-10-31 RX ORDER — SODIUM CHLORIDE 0.9 % (FLUSH) 0.9 %
10 SYRINGE (ML) INJECTION AS NEEDED
Status: DISCONTINUED | OUTPATIENT
Start: 2023-10-31 | End: 2023-11-01

## 2023-10-31 RX ORDER — MIDAZOLAM HYDROCHLORIDE 1 MG/ML
INJECTION INTRAMUSCULAR; INTRAVENOUS
Status: DISCONTINUED | OUTPATIENT
Start: 2023-10-31 | End: 2023-11-01 | Stop reason: HOSPADM

## 2023-10-31 RX ORDER — FENTANYL CITRATE 50 UG/ML
INJECTION, SOLUTION INTRAMUSCULAR; INTRAVENOUS
Status: DISCONTINUED | OUTPATIENT
Start: 2023-10-31 | End: 2023-11-01 | Stop reason: HOSPADM

## 2023-10-31 RX ORDER — LIDOCAINE HYDROCHLORIDE 10 MG/ML
INJECTION, SOLUTION EPIDURAL; INFILTRATION; INTRACAUDAL; PERINEURAL
Status: DISCONTINUED | OUTPATIENT
Start: 2023-10-31 | End: 2023-11-01 | Stop reason: HOSPADM

## 2023-10-31 RX ADMIN — IOPAMIDOL 115 ML: 755 INJECTION, SOLUTION INTRAVENOUS at 21:34

## 2023-10-31 RX ADMIN — SODIUM CHLORIDE, POTASSIUM CHLORIDE, SODIUM LACTATE AND CALCIUM CHLORIDE 1000 ML: 600; 310; 30; 20 INJECTION, SOLUTION INTRAVENOUS at 22:14

## 2023-11-01 ENCOUNTER — APPOINTMENT (OUTPATIENT)
Dept: CARDIOLOGY | Facility: HOSPITAL | Age: 53
DRG: 024 | End: 2023-11-01
Payer: MEDICARE

## 2023-11-01 ENCOUNTER — APPOINTMENT (OUTPATIENT)
Dept: NEUROLOGY | Facility: HOSPITAL | Age: 53
DRG: 024 | End: 2023-11-01
Payer: MEDICARE

## 2023-11-01 ENCOUNTER — APPOINTMENT (OUTPATIENT)
Dept: CT IMAGING | Facility: HOSPITAL | Age: 53
DRG: 024 | End: 2023-11-01
Payer: MEDICARE

## 2023-11-01 ENCOUNTER — APPOINTMENT (OUTPATIENT)
Dept: GENERAL RADIOLOGY | Facility: HOSPITAL | Age: 53
DRG: 024 | End: 2023-11-01
Payer: MEDICARE

## 2023-11-01 PROBLEM — I63.512 ACUTE ISCHEMIC LEFT MCA STROKE: Status: ACTIVE | Noted: 2023-11-01

## 2023-11-01 PROBLEM — Z87.19 HISTORY OF GI BLEED: Status: ACTIVE | Noted: 2023-11-01

## 2023-11-01 PROBLEM — G89.29 CHRONIC PAIN: Chronic | Status: ACTIVE | Noted: 2020-03-12

## 2023-11-01 PROBLEM — I50.30 (HFPEF) HEART FAILURE WITH PRESERVED EJECTION FRACTION: Chronic | Status: ACTIVE | Noted: 2019-07-25

## 2023-11-01 PROBLEM — N39.0 UTI (URINARY TRACT INFECTION): Status: ACTIVE | Noted: 2023-11-01

## 2023-11-01 PROBLEM — G89.29 CHRONIC PAIN: Status: ACTIVE | Noted: 2020-03-12

## 2023-11-01 PROBLEM — E78.5 DYSLIPIDEMIA: Chronic | Status: ACTIVE | Noted: 2020-12-17

## 2023-11-01 PROBLEM — I95.89 CHRONIC HYPOTENSION: Chronic | Status: ACTIVE | Noted: 2023-11-01

## 2023-11-01 LAB
ANTI-K: NORMAL
ARTERIAL PATENCY WRIST A: ABNORMAL
ASCENDING AORTA: 2.9 CM
ATMOSPHERIC PRESS: ABNORMAL MM[HG]
BASE EXCESS BLDA CALC-SCNC: -3.2 MMOL/L (ref 0–2)
BDY SITE: ABNORMAL
BH CV ECHO MEAS - AO MAX PG: 35.6 MMHG
BH CV ECHO MEAS - AO MEAN PG: 19 MMHG
BH CV ECHO MEAS - AO ROOT DIAM: 2.7 CM
BH CV ECHO MEAS - AO V2 MAX: 298 CM/SEC
BH CV ECHO MEAS - AO V2 VTI: 65.2 CM
BH CV ECHO MEAS - AVA(I,D): 2.4 CM2
BH CV ECHO MEAS - EDV(CUBED): 110.6 ML
BH CV ECHO MEAS - EDV(MOD-SP2): 113 ML
BH CV ECHO MEAS - EDV(MOD-SP4): 169 ML
BH CV ECHO MEAS - EF(MOD-BP): 62.1 %
BH CV ECHO MEAS - EF(MOD-SP2): 61.5 %
BH CV ECHO MEAS - EF(MOD-SP4): 62.2 %
BH CV ECHO MEAS - ESV(CUBED): 39.3 ML
BH CV ECHO MEAS - ESV(MOD-SP2): 43.5 ML
BH CV ECHO MEAS - ESV(MOD-SP4): 63.8 ML
BH CV ECHO MEAS - FS: 29.2 %
BH CV ECHO MEAS - IVS/LVPW: 1 CM
BH CV ECHO MEAS - IVSD: 1.1 CM
BH CV ECHO MEAS - LAT PEAK E' VEL: 9 CM/SEC
BH CV ECHO MEAS - LV MASS(C)D: 194 GRAMS
BH CV ECHO MEAS - LV MAX PG: 18.7 MMHG
BH CV ECHO MEAS - LV MEAN PG: 11 MMHG
BH CV ECHO MEAS - LV V1 MAX: 216 CM/SEC
BH CV ECHO MEAS - LV V1 VTI: 49.9 CM
BH CV ECHO MEAS - LVIDD: 4.8 CM
BH CV ECHO MEAS - LVIDS: 3.4 CM
BH CV ECHO MEAS - LVOT AREA: 3.1 CM2
BH CV ECHO MEAS - LVOT DIAM: 2 CM
BH CV ECHO MEAS - LVPWD: 1.1 CM
BH CV ECHO MEAS - MED PEAK E' VEL: 11.4 CM/SEC
BH CV ECHO MEAS - MV A MAX VEL: 82.6 CM/SEC
BH CV ECHO MEAS - MV DEC SLOPE: 710 CM/SEC2
BH CV ECHO MEAS - MV DEC TIME: 0.29 SEC
BH CV ECHO MEAS - MV E MAX VEL: 187 CM/SEC
BH CV ECHO MEAS - MV E/A: 2.26
BH CV ECHO MEAS - MV MAX PG: 16.2 MMHG
BH CV ECHO MEAS - MV MEAN PG: 5 MMHG
BH CV ECHO MEAS - MV P1/2T: 80.9 MSEC
BH CV ECHO MEAS - MV V2 VTI: 49.5 CM
BH CV ECHO MEAS - MVA(P1/2T): 2.7 CM2
BH CV ECHO MEAS - MVA(VTI): 3.2 CM2
BH CV ECHO MEAS - PA ACC TIME: 0.15 SEC
BH CV ECHO MEAS - RAP SYSTOLE: 3 MMHG
BH CV ECHO MEAS - RVSP: 33 MMHG
BH CV ECHO MEAS - SV(LVOT): 156.8 ML
BH CV ECHO MEAS - SV(MOD-SP2): 69.5 ML
BH CV ECHO MEAS - SV(MOD-SP4): 105.2 ML
BH CV ECHO MEAS - TAPSE (>1.6): 1.63 CM
BH CV ECHO MEAS - TR MAX PG: 29.6 MMHG
BH CV ECHO MEAS - TR MAX VEL: 272 CM/SEC
BH CV ECHO MEASUREMENTS AVERAGE E/E' RATIO: 18.33
BH CV VAS BP LEFT ARM: NORMAL MMHG
BH CV XLRA - RV BASE: 3.9 CM
BH CV XLRA - RV LENGTH: 7.3 CM
BH CV XLRA - RV MID: 2.8 CM
BH CV XLRA - TDI S': 9.1 CM/SEC
BODY TEMPERATURE: 37 C
CHOLEST SERPL-MCNC: 88 MG/DL (ref 0–200)
CO2 BLDA-SCNC: 23.4 MMOL/L (ref 22–33)
COHGB MFR BLD: 3.5 % (ref 0–2)
COLD SCREEN: POSITIVE
CRP SERPL-MCNC: 2.62 MG/DL (ref 0–0.5)
D-LACTATE SERPL-SCNC: 1.7 MMOL/L (ref 0.5–2)
EPAP: 0
ERYTHROCYTE [SEDIMENTATION RATE] IN BLOOD: 25 MM/HR (ref 0–30)
GLUCOSE BLDC GLUCOMTR-MCNC: 74 MG/DL (ref 70–130)
GLUCOSE BLDC GLUCOMTR-MCNC: 85 MG/DL (ref 70–130)
GLUCOSE BLDC GLUCOMTR-MCNC: 93 MG/DL (ref 70–130)
HBA1C MFR BLD: <4.2 % (ref 4.8–5.6)
HCO3 BLDA-SCNC: 22.1 MMOL/L (ref 20–26)
HCT VFR BLD AUTO: 29.5 % (ref 34–46.6)
HCT VFR BLD CALC: 17.7 % (ref 38–51)
HDLC SERPL-MCNC: 30 MG/DL (ref 40–60)
HGB BLD-MCNC: 9.2 G/DL (ref 12–15.9)
HGB BLDA-MCNC: 5.8 G/DL (ref 14–18)
HOLD SPECIMEN: NORMAL
INHALED O2 CONCENTRATION: 28 %
IPAP: 0
LDLC SERPL CALC-MCNC: 40 MG/DL (ref 0–100)
LDLC/HDLC SERPL: 1.35 {RATIO}
Lab: ABNORMAL
METHGB BLD QL: 0.1 % (ref 0–1.5)
MODALITY: ABNORMAL
NONSPECIFIC GEL REACTION: NORMAL
NOTIFIED BY: ABNORMAL
NOTIFIED WHO: ABNORMAL
OXYHGB MFR BLDV: 93.3 % (ref 94–99)
PAW @ PEAK INSP FLOW SETTING VENT: 0 CMH2O
PCO2 BLDA: 40.5 MM HG (ref 35–45)
PCO2 TEMP ADJ BLD: 40.5 MM HG (ref 35–45)
PH BLDA: 7.35 PH UNITS (ref 7.35–7.45)
PH, TEMP CORRECTED: 7.35 PH UNITS
PO2 BLDA: 80.8 MM HG (ref 83–108)
PO2 TEMP ADJ BLD: 80.8 MM HG (ref 83–108)
QT INTERVAL: 480 MS
QTC INTERVAL: 543 MS
TOTAL RATE: 0 BREATHS/MINUTE
TRIGL SERPL-MCNC: 88 MG/DL (ref 0–150)
VLDLC SERPL-MCNC: 18 MG/DL (ref 5–40)

## 2023-11-01 PROCEDURE — 95819 EEG AWAKE AND ASLEEP: CPT

## 2023-11-01 PROCEDURE — 93010 ELECTROCARDIOGRAM REPORT: CPT | Performed by: INTERNAL MEDICINE

## 2023-11-01 PROCEDURE — 85018 HEMOGLOBIN: CPT | Performed by: INTERNAL MEDICINE

## 2023-11-01 PROCEDURE — 93306 TTE W/DOPPLER COMPLETE: CPT | Performed by: INTERNAL MEDICINE

## 2023-11-01 PROCEDURE — 87040 BLOOD CULTURE FOR BACTERIA: CPT | Performed by: EMERGENCY MEDICINE

## 2023-11-01 PROCEDURE — 85652 RBC SED RATE AUTOMATED: CPT | Performed by: STUDENT IN AN ORGANIZED HEALTH CARE EDUCATION/TRAINING PROGRAM

## 2023-11-01 PROCEDURE — 25810000003 SODIUM CHLORIDE 0.9 % SOLUTION 250 ML FLEX CONT

## 2023-11-01 PROCEDURE — 80061 LIPID PANEL: CPT

## 2023-11-01 PROCEDURE — 82805 BLOOD GASES W/O2 SATURATION: CPT

## 2023-11-01 PROCEDURE — 97162 PT EVAL MOD COMPLEX 30 MIN: CPT

## 2023-11-01 PROCEDURE — 25010000002 CEFTRIAXONE PER 250 MG: Performed by: NURSE PRACTITIONER

## 2023-11-01 PROCEDURE — 25010000002 LORAZEPAM PER 2 MG: Performed by: PSYCHIATRY & NEUROLOGY

## 2023-11-01 PROCEDURE — 86140 C-REACTIVE PROTEIN: CPT | Performed by: STUDENT IN AN ORGANIZED HEALTH CARE EDUCATION/TRAINING PROGRAM

## 2023-11-01 PROCEDURE — 36600 WITHDRAWAL OF ARTERIAL BLOOD: CPT

## 2023-11-01 PROCEDURE — 25010000002 PHENYLEPHRINE 10 MG/ML SOLUTION 5 ML VIAL

## 2023-11-01 PROCEDURE — 25010000002 CEFTRIAXONE PER 250 MG: Performed by: EMERGENCY MEDICINE

## 2023-11-01 PROCEDURE — 74018 RADEX ABDOMEN 1 VIEW: CPT

## 2023-11-01 PROCEDURE — 93306 TTE W/DOPPLER COMPLETE: CPT

## 2023-11-01 PROCEDURE — 70450 CT HEAD/BRAIN W/O DYE: CPT

## 2023-11-01 PROCEDURE — 0 IODIXANOL PER 1 ML: Performed by: RADIOLOGY

## 2023-11-01 PROCEDURE — P9016 RBC LEUKOCYTES REDUCED: HCPCS

## 2023-11-01 PROCEDURE — 95819 EEG AWAKE AND ASLEEP: CPT | Performed by: PSYCHIATRY & NEUROLOGY

## 2023-11-01 PROCEDURE — 82375 ASSAY CARBOXYHB QUANT: CPT

## 2023-11-01 PROCEDURE — 25010000002 LEVETRIRACETAM PER 10 MG

## 2023-11-01 PROCEDURE — 83605 ASSAY OF LACTIC ACID: CPT | Performed by: EMERGENCY MEDICINE

## 2023-11-01 PROCEDURE — 83036 HEMOGLOBIN GLYCOSYLATED A1C: CPT

## 2023-11-01 PROCEDURE — 86900 BLOOD TYPING SEROLOGIC ABO: CPT

## 2023-11-01 PROCEDURE — 25810000003 SODIUM CHLORIDE 0.9 % SOLUTION

## 2023-11-01 PROCEDURE — 25010000002 SULFUR HEXAFLUORIDE MICROSPH 60.7-25 MG RECONSTITUTED SUSPENSION

## 2023-11-01 PROCEDURE — 97165 OT EVAL LOW COMPLEX 30 MIN: CPT

## 2023-11-01 PROCEDURE — 99291 CRITICAL CARE FIRST HOUR: CPT | Performed by: STUDENT IN AN ORGANIZED HEALTH CARE EDUCATION/TRAINING PROGRAM

## 2023-11-01 PROCEDURE — 25010000002 HEPARIN (PORCINE) PER 1000 UNITS: Performed by: INTERNAL MEDICINE

## 2023-11-01 PROCEDURE — 99291 CRITICAL CARE FIRST HOUR: CPT | Performed by: INTERNAL MEDICINE

## 2023-11-01 PROCEDURE — 85014 HEMATOCRIT: CPT | Performed by: INTERNAL MEDICINE

## 2023-11-01 PROCEDURE — 83050 HGB METHEMOGLOBIN QUAN: CPT

## 2023-11-01 PROCEDURE — 25810000003 DEXTROSE-NACL PER 500 ML

## 2023-11-01 PROCEDURE — 82948 REAGENT STRIP/BLOOD GLUCOSE: CPT

## 2023-11-01 PROCEDURE — 86902 BLOOD TYPE ANTIGEN DONOR EA: CPT

## 2023-11-01 PROCEDURE — 93005 ELECTROCARDIOGRAM TRACING: CPT | Performed by: INTERNAL MEDICINE

## 2023-11-01 PROCEDURE — 36430 TRANSFUSION BLD/BLD COMPNT: CPT

## 2023-11-01 RX ORDER — DEXTROSE AND SODIUM CHLORIDE 5; .9 G/100ML; G/100ML
50 INJECTION, SOLUTION INTRAVENOUS CONTINUOUS
Status: DISCONTINUED | OUTPATIENT
Start: 2023-11-01 | End: 2023-11-02

## 2023-11-01 RX ORDER — HEPARIN SODIUM 5000 [USP'U]/ML
5000 INJECTION, SOLUTION INTRAVENOUS; SUBCUTANEOUS EVERY 8 HOURS SCHEDULED
Status: DISCONTINUED | OUTPATIENT
Start: 2023-11-01 | End: 2023-11-02

## 2023-11-01 RX ORDER — SODIUM CHLORIDE 9 MG/ML
40 INJECTION, SOLUTION INTRAVENOUS AS NEEDED
Status: DISCONTINUED | OUTPATIENT
Start: 2023-11-01 | End: 2023-11-10 | Stop reason: HOSPADM

## 2023-11-01 RX ORDER — ATORVASTATIN CALCIUM 40 MG/1
80 TABLET, FILM COATED ORAL NIGHTLY
Status: DISCONTINUED | OUTPATIENT
Start: 2023-11-01 | End: 2023-11-06

## 2023-11-01 RX ORDER — LEVETIRACETAM 500 MG/5ML
1000 INJECTION, SOLUTION, CONCENTRATE INTRAVENOUS EVERY 12 HOURS SCHEDULED
Status: DISCONTINUED | OUTPATIENT
Start: 2023-11-01 | End: 2023-11-03

## 2023-11-01 RX ORDER — IODIXANOL 320 MG/ML
INJECTION, SOLUTION INTRAVASCULAR
Status: DISCONTINUED | OUTPATIENT
Start: 2023-11-01 | End: 2023-11-01 | Stop reason: HOSPADM

## 2023-11-01 RX ORDER — PANTOPRAZOLE SODIUM 40 MG/10ML
40 INJECTION, POWDER, LYOPHILIZED, FOR SOLUTION INTRAVENOUS
Status: DISCONTINUED | OUTPATIENT
Start: 2023-11-01 | End: 2023-11-05

## 2023-11-01 RX ORDER — ATORVASTATIN CALCIUM 40 MG/1
80 TABLET, FILM COATED ORAL NIGHTLY
Status: DISCONTINUED | OUTPATIENT
Start: 2023-11-01 | End: 2023-11-01

## 2023-11-01 RX ORDER — SODIUM CHLORIDE 0.9 % (FLUSH) 0.9 %
10 SYRINGE (ML) INJECTION EVERY 12 HOURS SCHEDULED
Status: DISCONTINUED | OUTPATIENT
Start: 2023-11-01 | End: 2023-11-10 | Stop reason: HOSPADM

## 2023-11-01 RX ORDER — LORAZEPAM 2 MG/ML
1 INJECTION INTRAMUSCULAR ONCE
Status: COMPLETED | OUTPATIENT
Start: 2023-11-01 | End: 2023-11-01

## 2023-11-01 RX ORDER — DEXTROSE MONOHYDRATE 25 G/50ML
50 INJECTION, SOLUTION INTRAVENOUS
Status: DISCONTINUED | OUTPATIENT
Start: 2023-11-01 | End: 2023-11-10 | Stop reason: HOSPADM

## 2023-11-01 RX ORDER — ONDANSETRON 2 MG/ML
4 INJECTION INTRAMUSCULAR; INTRAVENOUS EVERY 6 HOURS PRN
Status: DISCONTINUED | OUTPATIENT
Start: 2023-11-01 | End: 2023-11-10 | Stop reason: HOSPADM

## 2023-11-01 RX ORDER — ASPIRIN 81 MG/1
81 TABLET, CHEWABLE ORAL DAILY
Status: DISCONTINUED | OUTPATIENT
Start: 2023-11-01 | End: 2023-11-01

## 2023-11-01 RX ORDER — ASPIRIN 300 MG/1
300 SUPPOSITORY RECTAL DAILY
Status: DISCONTINUED | OUTPATIENT
Start: 2023-11-01 | End: 2023-11-01

## 2023-11-01 RX ORDER — SODIUM CHLORIDE 0.9 % (FLUSH) 0.9 %
10 SYRINGE (ML) INJECTION AS NEEDED
Status: DISCONTINUED | OUTPATIENT
Start: 2023-11-01 | End: 2023-11-10 | Stop reason: HOSPADM

## 2023-11-01 RX ORDER — ASPIRIN 81 MG/1
81 TABLET, CHEWABLE ORAL DAILY
Status: DISCONTINUED | OUTPATIENT
Start: 2023-11-02 | End: 2023-11-06

## 2023-11-01 RX ORDER — FUROSEMIDE 40 MG/1
40 TABLET ORAL 2 TIMES DAILY
COMMUNITY
End: 2023-11-10 | Stop reason: HOSPADM

## 2023-11-01 RX ORDER — ASPIRIN 300 MG/1
300 SUPPOSITORY RECTAL DAILY
Status: DISCONTINUED | OUTPATIENT
Start: 2023-11-02 | End: 2023-11-06

## 2023-11-01 RX ORDER — MIDODRINE HYDROCHLORIDE 5 MG/1
5 TABLET ORAL EVERY 8 HOURS
Status: DISCONTINUED | OUTPATIENT
Start: 2023-11-01 | End: 2023-11-06

## 2023-11-01 RX ADMIN — CEFTRIAXONE 2000 MG: 2 INJECTION, POWDER, FOR SOLUTION INTRAMUSCULAR; INTRAVENOUS at 01:58

## 2023-11-01 RX ADMIN — LORAZEPAM 1 MG: 2 INJECTION INTRAMUSCULAR; INTRAVENOUS at 01:13

## 2023-11-01 RX ADMIN — SODIUM CHLORIDE 1000 MG: 900 INJECTION INTRAVENOUS at 20:17

## 2023-11-01 RX ADMIN — DEXTROSE MONOHYDRATE 50 ML: 25 INJECTION, SOLUTION INTRAVENOUS at 17:54

## 2023-11-01 RX ADMIN — MIDODRINE HYDROCHLORIDE 5 MG: 5 TABLET ORAL at 12:34

## 2023-11-01 RX ADMIN — LEVETIRACETAM 1000 MG: 100 INJECTION, SOLUTION INTRAVENOUS at 10:19

## 2023-11-01 RX ADMIN — SODIUM CHLORIDE 500 ML: 9 INJECTION, SOLUTION INTRAVENOUS at 10:55

## 2023-11-01 RX ADMIN — PANTOPRAZOLE SODIUM 40 MG: 40 INJECTION, POWDER, LYOPHILIZED, FOR SOLUTION INTRAVENOUS at 12:35

## 2023-11-01 RX ADMIN — ATORVASTATIN CALCIUM 80 MG: 40 TABLET, FILM COATED ORAL at 20:17

## 2023-11-01 RX ADMIN — ASPIRIN 300 MG: 300 SUPPOSITORY RECTAL at 10:11

## 2023-11-01 RX ADMIN — MIDODRINE HYDROCHLORIDE 5 MG: 5 TABLET ORAL at 20:17

## 2023-11-01 RX ADMIN — HEPARIN SODIUM 5000 UNITS: 5000 INJECTION INTRAVENOUS; SUBCUTANEOUS at 15:29

## 2023-11-01 RX ADMIN — PHENYLEPHRINE HYDROCHLORIDE 0.5 MCG/KG/MIN: 10 INJECTION INTRAVENOUS at 14:13

## 2023-11-01 RX ADMIN — HEPARIN SODIUM 5000 UNITS: 5000 INJECTION INTRAVENOUS; SUBCUTANEOUS at 21:53

## 2023-11-01 RX ADMIN — DEXTROSE AND SODIUM CHLORIDE 50 ML/HR: 5; 900 INJECTION, SOLUTION INTRAVENOUS at 18:02

## 2023-11-01 RX ADMIN — Medication 10 ML: at 01:14

## 2023-11-01 RX ADMIN — LEVETIRACETAM 1000 MG: 100 INJECTION, SOLUTION INTRAVENOUS at 01:06

## 2023-11-01 RX ADMIN — LEVETIRACETAM 1000 MG: 100 INJECTION, SOLUTION INTRAVENOUS at 20:17

## 2023-11-01 RX ADMIN — Medication 10 ML: at 10:21

## 2023-11-01 RX ADMIN — SULFUR HEXAFLUORIDE 5 ML: KIT at 16:43

## 2023-11-01 RX ADMIN — HEPARIN SODIUM 5000 UNITS: 5000 INJECTION INTRAVENOUS; SUBCUTANEOUS at 06:28

## 2023-11-01 NOTE — BRIEF OP NOTE
"CV CAROTID CEREBRAL ANGIOGRAM BILATERAL  Progress Note    Mara Martínez  10/31/2023 - 11/1/2023    Pre-op Diagnosis:   Acute CVA, left MCA thromboembolic occlusion       Post-Op Diagnosis Codes:  Cute CVA, left MCA thromboembolic occlusion    Procedure/CPT® Codes:        Procedure(s):  Mechanical thrombectomy              Surgeon(s):  Cayetano Mckeon MD    Anesthesia: Local    Staff:   Circulator: Rowan Alvarez RN; Lauren Martinez RN  Scrub Person: Tavo Bagley Elizabeth  Documenter: Maria C Villalpando  Invasive Nurse: John Crockett RN         Estimated Blood Loss: minimal    Urine Voided: * No values recorded between 10/31/2023 11:09 PM and 11/1/2023 12:14 AM *    Specimens:                None          Drains: * No LDAs found *    Findings: There is a saddle embolus within the superior division of the left MCA bifurcation.  This responded well to mechanical thrombectomy ([facility]), with successful extraction of a large embolus and restoration of normal (TICI 3) flow.    The patient has undergone prior carotid stent placement at an outside facility, but there is no clear carotid \"culprit\" lesion.  The embolus appeared to be more soft tissue, and given the patient's history of valvular heart disease, the embolus was sent for surgical pathologic analysis.        Complications: None apparent.          Cayetano Mckeon MD     Date: 11/1/2023  Time: 00:23 EDT        "

## 2023-11-01 NOTE — PROGRESS NOTES
"Critical Care Note     LOS: 1 day   Patient Care Team:  Rowan Nolasco APRN as PCP - General (Nurse Practitioner)  Alfonzo Garcia MD as Consulting Physician (Cardiology)    Chief Complaint/Reason for visit:    Chief Complaint   Patient presents with    Stroke   Acute left middle cerebral artery CVA  Anemia  UTI  Hypertension  Dyslipidemia  History of mechanical aortic and mitral valve replacement 2018  Chronic pain with intrathecal pain pump    Subjective     Interval History:     Patient underwent mechanical thrombectomy last night.  Her NIH stroke score is a 24.  She has a history of chronic anemia and chronic GI blood loss.  Hemoglobin is down to 5.8.  She does have some antibodies as well.  She is afebrile.  Blood pressure is drifted down to 82 systolic.  She does have a history of chronic hypotension and takes midodrine.  On 2 L nasal cannula her saturation is 100%.  She did have 700 mils of urine output overnight.    Review of Systems:    All systems were reviewed and negative except as noted in subjective.    Medical history, surgical history, social history, family history reviewed    Objective     Intake/Output:    Intake/Output Summary (Last 24 hours) at 11/1/2023 1059  Last data filed at 11/1/2023 0400  Gross per 24 hour   Intake --   Output 700 ml   Net -700 ml       Nutrition:  NPO Diet NPO Type: Strict NPO    Infusions:       Mechanical Ventilator Settings:                                                Telemetry: [facility]             Vital Signs  Blood pressure (!) 82/44, pulse 71, temperature 97 °F (36.1 °C), temperature source Axillary, resp. rate 16, height 152.4 cm (60\"), weight 64.7 kg (142 lb 10.2 oz), SpO2 100%, not currently breastfeeding.    Physical Exam:  General Appearance:  Middle-aged woman somnolent   Head:  No visible trauma   Eyes:          Pupils are small, equal   Ears:     Throat: Oral mucosa moist   Neck: Trachea midline, no carotid bruit   Back:      Lungs:   Scattered " expiratory rhonchi, wheezing    Heart:  Irregular rhythm, mechanical S1, mechanical S2   Abdomen:   Hypoactive bowel sounds, nondistended   Rectal:   Deferred   Extremities: Left foot feels cooler than the right.  Right femoral site without hematoma   Pulses:    Skin: No rash   Lymph nodes: No cervical adenopathy   Neurologic:     Interval: baseline (arived to unit)  1a. Level of Consciousness: 1-->Not alert, but arousable by minor stimulation to obey, answer, or respond  1b. LOC Questions: 2-->Answers neither question correctly  1c. LOC Commands: 2-->Performs neither task correctly  2. Best Gaze: 1-->Partial gaze palsy, gaze is abnormal in one or both eyes, but forced deviation or total gaze paresis is not present  3. Visual: 0-->No visual loss  4. Facial Palsy: 1-->Minor paralysis (flattened nasolabial fold, asymmetry on smiling)  5a. Motor Arm, Left: 3-->No effort against gravity, limb falls  5b. Motor Arm, Right: 3-->No effort against gravity, limb falls  6a. Motor Leg, Left: 3-->No effort against gravity, leg falls to bed immediately  6b. Motor Leg, Right: 3-->No effort against gravity, leg falls to bed immediately  7. Limb Ataxia: 0-->Absent  8. Sensory: 0-->Normal, no sensory loss  9. Best Language: 3-->Mute, global aphasia, no usable speech or auditory comprehension  10. Dysarthria: 2-->Severe dysarthria, patients speech is so slurred as to be unintelligible in the absence of or out of proportion to any dysphasia, or is mute/anarthric  11. Extinction and Inattention (formerly Neglect): 0-->No abnormality    Total (NIH Stroke Scale): 24   Results Review:     I reviewed the patient's new clinical results.   Results from last 7 days   Lab Units 10/31/23  2126   SODIUM mmol/L 135*   POTASSIUM mmol/L 3.4*   CHLORIDE mmol/L 101   CO2 mmol/L 23.0   BUN mg/dL 9   CREATININE mg/dL 0.83   CALCIUM mg/dL 8.9   ALT (SGPT) U/L <5   AST (SGOT) U/L 6   GLUCOSE mg/dL 105*     Results from last 7 days   Lab Units  "10/31/23  2126   WBC 10*3/mm3 13.88*   HEMOGLOBIN g/dL 6.7*   HEMATOCRIT % 23.0*   PLATELETS 10*3/mm3 297     Results from last 7 days   Lab Units 11/01/23  0247   PH, ARTERIAL pH units 7.346*   PO2 ART mm Hg 80.8*   PCO2, ARTERIAL mm Hg 40.5   HCO3 ART mmol/L 22.1     No results found for: \"BLOODCX\"  No results found for: \"URINECX\"    I reviewed the patient's new imaging including images and reports.  CT HEAD WO CONTRAST    Date of Exam: 11/1/2023 5:33 AM EDT    Indication: Stroke, follow up.    Comparison: 10/31/2023.    Technique: Axial CT images were obtained of the head without contrast administration.  Automated exposure control and iterative construction methods were used.      Findings:  There is an evolving area of hypoattenuation in the left MCA territory, primarily in the insula. There is associated cortical contrast staining. There is no acute intracranial hemorrhage. No mass effect or midline shift. No abnormal extra-axial  collection. There is atelectasis and mucosal thickening in the right maxillary sinus. There is mild ethmoid sinus mucosal disease. Mastoids are clear. Calvarium is intact. Orbits are unremarkable.   Impression:     Impression:  Evolving left MCA territory infarct with associated cortical contrast staining. No acute hemorrhage.        Electronically Signed: Geraldo Cueva MD   11/1/2023 5:44 AM EDT     XR CHEST 1 VW    Date of Exam: 10/31/2023 8:57 PM CDT    Indication: Acute Stroke Protocol (onset < 12 hrs)    Comparison: 3/1/2018    Findings:  Cardiomediastinal silhouette is enlarged. There is a left subclavian port with tip in the distal SVC. There is generalized interstitial prominence. No airspace disease, pneumothorax, nor pleural effusion.No acute osseous abnormality identified.   Impression:     Impression:  Generalized interstitial prominence without acute airspace disease.      Electronically Signed: Saman Lopez MD   10/31/2023 9:40 PM CDT         All medications reviewed. "   aspirin, 81 mg, Oral, Daily   Or  aspirin, 300 mg, Rectal, Daily  atorvastatin, 80 mg, Oral, Nightly  cefTRIAXone, 1,000 mg, Intravenous, Q24H  heparin (porcine), 5,000 Units, Subcutaneous, Q8H  levETIRAcetam, 1,000 mg, Intravenous, Q12H  pharmacy consult - MTM, , Does not apply, Daily  sodium chloride, 500 mL, Intravenous, Once  sodium chloride, 10 mL, Intravenous, Q12H          Assessment & Plan       Acute ischemic left MCA stroke    Anemia    Tobacco use    S/P mechanical aortic and mitral valve replacement (2018)    (HFpEF) heart failure with preserved ejection fraction    Chronic pain with intrathecal pump in place    Dyslipidemia    Cerebral aneurysm    H/O recurrent GIB 2* AVMs    UTI (urinary tract infection)    Chronic hypotension on midodrine    53-year-old woman, active smoker with history of mechanical aortic and mitral valve replacements, hypertension, dyslipidemia, left internal carotid artery stent in 2020, seizure disorder on Keppra and Topamax, chronic anemia and chronic GI loss from AVMs, chronic pain with a pain pump, RB ILD followed at  presenting with an acute left middle cerebral artery stroke, embolic event.  Patient was outside of the window for thrombolysis and underwent a salvage mechanical thrombectomy for a saddle embolus in the left MCA bifurcation.  This morning her NIH stroke score is a 24.    She has a history of seizure disorder for which she takes Topamax and Keppra.    On admission urinalysis had 4+ bacteria and she was placed on Rocephin.  She remains afebrile.  Cultures are pending.  White blood cell count is 13.88.    She had mechanical aortic and mitral valve placement in 2017.  She is currently in [facility].  Echocardiogram in 2022 revealed a left ventricular ejection fraction of 63% with diastolic dysfunction and well-seated aortic and mitral valves.  She usually takes Coumadin.  INR was only 1.73.    She has a history of chronic hypotension for which she takes midodrine.   Systolic blood pressure has drifted down into the 80s.    She has a longstanding history of chronic anemia and AVMs with intermittent GI blood loss.  Hemoglobin is 6.7.  She has cold agglutinins positive and anti-K antibody.  She is not having bright red blood or melena.    She is an active smoker with RB ILD followed at .  She was last seen by pulmonary in June, 2023.  She had quit smoking and her CT scan showed improvement in her groundglass opacities.  Spirometry and March 2023 revealed mild diffusion impairment, no obstruction and a normal TLC.  She did have an elevated RV consistent with air trapping.  She also wears CPAP for sleep apnea.    Patient has a history of chronic pain and has an intrathecal pain pump with morphine since February 2020.  She is currently receiving 0.9 mg/day of morphine and bupivacaine.  Her pump was last filled September 14 and will last for 3 months.  She also takes baclofen 10 mg 3 times daily.      PLAN:    Transfuse 2 units of packed red cells  Discussed with neurology possibly starting a heparin drip without a bolus  Monitor for GI blood loss  Continue Rocephin for urinary tract infection  Restart midodrine  Echocardiogram  Aspirin, statin  Monitor NIH stroke score  Continue thyroid replacement  Restart Keppra, Topamax  Hold baclofen secondary to altered mentation  Start Protonix  PT, OT, speech therapy    VTE Prophylaxis:SCDS    Stress Ulcer Prophylaxis:none    Kamala Small MD  11/01/23  10:59 EDT      Time: Critical care 35 min  I personally provided care to this critically ill patient as documented above.  Critical care time does not include time spent on separately billed procedures.  None of my critical care time was concurrent with other critical care providers.

## 2023-11-01 NOTE — PLAN OF CARE
Neurointerventional Metrics    Last Known Well:  1430 on 10/31/23    [facility] Arrival (ED/transfer):  2115    Code Stroke Initiated (Inpatient):  n/a    Initial NIHSS: 22    Baseline MRS:  1    IV thrombolytic:  No    CT Head: 2116    CT Perfusion: 2132    Arrival to Cath Lab:  2309    Groin Access: 2331    Initial Thrombectomy/Intervention (stent retriever deployment/aspiration/IA tPA):  0002 on 11/1/23    Reperfusion: 0005    Final Angiogram:  0008    End of Procedure:  0014    Pre-Procedure TICI flow:  2A    Post-Procedure TICI flow:  3    Emboli to New Vascular Territory: No

## 2023-11-01 NOTE — THERAPY EVALUATION
Patient Name: Mara Martínez  : 1970    MRN: [id number]                              Today's Date: 2023       Admit Date: 10/31/2023    Visit Dx:     ICD-10-CM ICD-9-CM   1. Acute CVA (cerebrovascular accident)  I63.9 434.91     Patient Active Problem List   Diagnosis    Anemia    Aortic valve insufficiency    Bruit of left carotid artery    Chest pain    Positive D-dimer    Dizziness    Edema    Fatigue    HTN (hypertension)    Heart murmur    Hyperlipidemia    Mitral valve stenosis    Mitral valve insufficiency    Palpitations    Pulmonary edema    Seizure disorder    Shortness of breath    Difficulty sleeping    Snoring    Supravalvular aortic stenosis    Syncope    Tachycardia    Mitral stenosis    Rheumatic aortic stenosis    Valvular heart disease    Epilepsy    ISREAL (cerebral atherosclerosis)    Tobacco use    Migraines    Aortic regurgitation    Rheumatic mitral regurgitation    SOB (shortness of breath)    Supraventricular aortic stenosis    Aortic valve stenosis    Rheumatic aortic valve insufficiency    Moderate aortic stenosis    Aortic valve disorder    Coronary artery disease involving native coronary artery of native heart without angina pectoris    S/P mitral valve replacement    S/P mechanical aortic and mitral valve replacement (2018)    Stenosis of carotid artery    Generalized abdominal pain    Rectal bleeding    Functional diarrhea    Lower extremity edema    Rectal bleed    (HFpEF) heart failure with preserved ejection fraction    Chronic pain with intrathecal pump in place    Dyslipidemia    Bilateral carotid artery stenosis    Low back pain    Degeneration of lumbar intervertebral disc    Lumbosacral radiculopathy    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    Cerebral aneurysm    Complex partial seizures with consciousness impaired    Migraine without aura and with status migrainosus, not intractable    Chronic obstructive pulmonary disease    Essential tremor     Hesitancy of micturition    Complex partial epilepsy with generalization and with intractable epilepsy    Focal (motor) epilepsy    Occipital neuralgia    Paresthesia    Restless legs syndrome    Retinal drusen    Acute ischemic left MCA stroke    H/O recurrent GIB 2* AVMs    UTI (urinary tract infection)    Chronic hypotension on midodrine     Past Medical History:   Diagnosis Date    Anemia     Aortic regurgitation     Arthritis     Back pain     Carotid bruit     ISREAL (cerebral atherosclerosis) 2014    nonobstructive    Chest pain     Chronic hypotension on midodrine 2023    Chronic obstructive pulmonary disease 2022    Coronary artery disease     COVID-19 vaccine administered     phizer    D-dimer, elevated     Diastolic congestive heart failure 2019    Dizziness     Edema     Epilepsy     Fatigue     Heart murmur     History of blood transfusion 2019    History of blood transfusion     History of blood transfusion 2022    History of blood transfusion     August 2023 x2    History of recent blood transfusion 2021    History of transfusion     Hyperlipidemia     Hypertension     Kidney stones     Migraines     Mitral regurgitation     Mitral stenosis     mild    Neuropathy     Neuropathy     Palpitations     Pulmonary edema     Retinal drusen     Seizure     Sleep apnea 2019    Sleeping difficulties     SOB (shortness of breath)     Supraventricular aortic stenosis     Syncope     Tachycardia     Tobacco abuse     VHD (valvular heart disease)     Wears dentures     Wears eyeglasses      Past Surgical History:   Procedure Laterality Date    APPENDECTOMY      CARDIAC CATHETERIZATION      CARDIAC SURGERY      2 valves replaced     SECTION      x 2    CHOLECYSTECTOMY      COLONOSCOPY      COLONOSCOPY N/A 2019    Procedure: COLONOSCOPY;  Surgeon: Kurt Gaines MD;  Location: [facility];  Service: Gastroenterology    CORONARY ARTERY BYPASS GRAFT  2018     EXPLORATORY LAPAROTOMY      HERNIA REPAIR      HYSTERECTOMY      INTERVENTIONAL RADIOLOGY PROCEDURE Bilateral 10/31/2023    Procedure: Carotid Cerebral Angiogram;  Surgeon: Cayetano Mckeon MD;  Location: [facility] INVASIVE LOCATION;  Service: Interventional Radiology;  Laterality: Bilateral;    JOINT REPLACEMENT Right     knee replacement    PAIN PUMP INSERTION/REVISION      morphine    PORTACATH PLACEMENT      UPPER GASTROINTESTINAL ENDOSCOPY        General Information       Row Name 11/01/23 1357          Physical Therapy Time and Intention    Document Type evaluation  -AY     Mode of Treatment physical therapy  -AY       Row Name 11/01/23 2085          General Information    Patient Profile Reviewed yes  -AY     Prior Level of Function independent:;all household mobility;gait;transfer;bed mobility;ADL's;dependent:;community mobility;w/c or scooter  amb short HH distances; uses dep w/c for community mobility.  -AY     Existing Precautions/Restrictions fall;oxygen therapy device and L/min;other (see comments)  -AY     Barriers to Rehab medically complex;visual deficit;cognitive status;previous functional deficit  -AY       Row Name 11/01/23 1352          Living Environment    People in Home spouse  -AY       Row Name 11/01/23 1359          Home Main Entrance    Number of Stairs, Main Entrance one  -AY       Row Name 11/01/23 1356          Stairs Within Home, Primary    Stairs, Within Home, Primary stairs to basement; but can stay on main level  -AY     Number of Stairs, Within Home, Primary none  -AY       Row Name 11/01/23 1350          Cognition    Orientation Status (Cognition) unable/difficult to assess  alerts to name  -AY       Row Name 11/01/23 1355          Safety Issues, Functional Mobility    Safety Issues Affecting Function (Mobility) ability to follow commands  -AY     Impairments Affecting Function (Mobility) balance;cognition;coordination;endurance/activity tolerance;motor  planning;strength;visual/perceptual;postural/trunk control  -AY     Cognitive Impairments, Mobility Safety/Performance attention;awareness, need for assistance;insight into deficits/self-awareness;safety precaution follow-through;safety precaution awareness  -AY               User Key  (r) = Recorded By, (t) = Taken By, (c) = Cosigned By      Initials Name Provider Type    AY Nidhi Blank PT Physical Therapist                   Mobility       Row Name 11/01/23 1411          Bed Mobility    Bed Mobility supine-sit  -AY     Supine-Sit [place] (Bed Mobility) maximum assist (25% patient effort);2 person assist;verbal cues  -AY     Assistive Device (Bed Mobility) bed rails;draw sheet;head of bed elevated  -AY       Row Name 11/01/23 1411          Sit-Stand Transfer    Sit-Stand [place] (Transfers) minimum assist (75% patient effort);2 person assist;verbal cues  -AY       Row Name 11/01/23 1411          Gait/Stairs (Locomotion)    [place] Level (Gait) moderate assist (50% patient effort);2 person assist;verbal cues  -AY     Assistive Device (Gait) other (see comments)  BUE support  -AY     Patient was able to Ambulate yes  -AY     Distance in Feet (Gait) 3  -AY     Deviations/Abnormal Patterns (Gait) bilateral deviations;gait speed decreased;festinating/shuffling;base of support, narrow;mela decreased;stride length decreased;weight shifting decreased  -AY     Bilateral Gait Deviations heel strike decreased;forward flexed posture  -AY     Comment, (Gait/Stairs) pt took shuffling side steps towards HOB with cueing for seqeuncing and weight shifting. pt responded best to tactile cuieng and movement initation  -AY               User Key  (r) = Recorded By, (t) = Taken By, (c) = Cosigned By      Initials Name Provider Type    Nidhi Ball PT Physical Therapist                   Obj/Interventions       Row Name 11/01/23 1413          Range of Motion Comprehensive    General Range of Motion bilateral  lower extremity ROM WFL  -AY       Row Name 11/01/23 1413          Strength Comprehensive (MMT)    General Manual Muscle Testing (MMT) Assessment lower extremity strength deficits identified  -AY     Comment, General Manual Muscle Testing (MMT) Assessment formal MMT deferred d/t cognitive status; observed BLE grosly >3-/5  -AY       Row Name 11/01/23 1413          Motor Skills    Motor Skills coordination  -AY     Coordination --  unable to asses  -AY       Row Name 11/01/23 1413          Balance    Balance Assessment sitting static balance;sitting dynamic balance;sit to stand dynamic balance;standing static balance;standing dynamic balance  -AY     Static Sitting Balance contact guard  -AY     Dynamic Sitting Balance minimal assist  -AY     Position, Sitting Balance unsupported;sitting edge of bed  -AY     Static Standing Balance minimal assist  -AY     Dynamic Standing Balance moderate assist  -AY     Position/Device Used, Standing Balance supported  -AY       Row Name 11/01/23 1413          Sensory Assessment (Somatosensory)    Sensory Assessment (Somatosensory) unable/difficult to assess  -AY               User Key  (r) = Recorded By, (t) = Taken By, (c) = Cosigned By      Initials Name Provider Type    AY Nidhi Blank, PT Physical Therapist                   Goals/Plan       Row Name 11/01/23 1416          Bed Mobility Goal 1 (PT)    Activity/Assistive Device (Bed Mobility Goal 1, PT) sit to supine/supine to sit  -AY     [place] Level/Cues Needed (Bed Mobility Goal 1, PT) minimum assist (75% or more patient effort)  -AY     Time Frame (Bed Mobility Goal 1, PT) long term goal (LTG);10 days  -AY     Progress/Outcomes (Bed Mobility Goal 1, PT) goal ongoing  -AY       Row Name 11/01/23 1416          Transfer Goal 1 (PT)    Activity/Assistive Device (Transfer Goal 1, PT) sit-to-stand/stand-to-sit;bed-to-chair/chair-to-bed;walker, rolling  -AY     [place] Level/Cues Needed (Transfer Goal 1, PT) contact  guard required  -AY     Time Frame (Transfer Goal 1, PT) long term goal (LTG);10 days  -AY     Progress/Outcome (Transfer Goal 1, PT) goal ongoing  -AY       Row Name 11/01/23 1416          Gait Training Goal 1 (PT)    Activity/Assistive Device (Gait Training Goal 1, PT) gait (walking locomotion);assistive device use;walker, rolling  -AY     [place] Level (Gait Training Goal 1, PT) minimum assist (75% or more patient effort)  -AY     Distance (Gait Training Goal 1, PT) 150  -AY     Time Frame (Gait Training Goal 1, PT) long term goal (LTG);10 days  -AY     Progress/Outcome (Gait Training Goal 1, PT) goal ongoing  -AY       Row Name 11/01/23 1416          Therapy Assessment/Plan (PT)    Planned Therapy Interventions (PT) balance training;bed mobility training;gait training;home exercise program;postural re-education;transfer training;patient/family education;strengthening;stair training;neuromuscular re-education  -AY               User Key  (r) = Recorded By, (t) = Taken By, (c) = Cosigned By      Initials Name Provider Type    AY Nidhi Blank, PT Physical Therapist                   Clinical Impression       Row Name 11/01/23 1414          Pain    Pain Intervention(s) Ambulation/increased activity;Repositioned  -AY     Additional Documentation Pain Scale: FACES Pre/Post-Treatment (Group)  -AY       Row Name 11/01/23 1414          Pain Scale: FACES Pre/Post-Treatment    Pain: FACES Scale, Pretreatment 0-->no hurt  -AY     Posttreatment Pain Rating 0-->no hurt  -AY       Row Name 11/01/23 1414          Plan of Care Review    Plan of Care Reviewed With patient;spouse;son  -AY     Progress improving  -AY     Outcome Evaluation Pt limited by decreased functional endurance, balance deficit, generalized weakness, R vision deficit, and impaired cognition compared to baseline. Pt took lateral steps towards HOB with mod Ax2. Rec continued skilled PT to increase indep with mobility. d/c rec for IRF.  -AY       Row Name  11/01/23 1414          Therapy Assessment/Plan (PT)    Rehab Potential (PT) good, to achieve stated therapy goals  -AY     Criteria for Skilled Interventions Met (PT) yes;meets criteria;skilled treatment is necessary  -AY     Therapy Frequency (PT) daily  -AY       Row Name 11/01/23 1414          Vital Signs    Pre Systolic BP Rehab 104  -AY     Pre Treatment Diastolic BP 54  -AY     Post Systolic BP Rehab 112  -AY     Post Treatment Diastolic BP 55  -AY     Pretreatment Heart Rate (beats/min) 69  -AY     Posttreatment Heart Rate (beats/min) 71  -AY     Pre SpO2 (%) 100  -AY     O2 Delivery Pre Treatment nasal cannula  -AY     O2 Delivery Intra Treatment nasal cannula  -AY     Post SpO2 (%) 100  -AY     O2 Delivery Post Treatment nasal cannula  -AY     Pre Patient Position Supine  -AY     Intra Patient Position Standing  -AY     Post Patient Position Sitting  -AY       Row Name 11/01/23 1414          Positioning and Restraints    Pre-Treatment Position in bed  -AY     Post Treatment Position bed  -AY     In Bed sitting EOB;call light within reach;with OT  -AY               User Key  (r) = Recorded By, (t) = Taken By, (c) = Cosigned By      Initials Name Provider Type    Nidhi Ball, PT Physical Therapist                   Outcome Measures       Row Name 11/01/23 1416          How much help from another person do you currently need...    Turning from your back to your side while in flat bed without using bedrails? 3  -AY     Moving from lying on back to sitting on the side of a flat bed without bedrails? 2  -AY     Moving to and from a bed to a chair (including a wheelchair)? 2  -AY     Standing up from a chair using your arms (e.g., wheelchair, bedside chair)? 3  -AY     Climbing 3-5 steps with a railing? 1  -AY     To walk in hospital room? 2  -AY     AM-PAC 6 Clicks Score (PT) 13  -AY     Highest level of mobility 4 --> Transferred to chair/commode  -AY       Row Name 11/01/23 1416 11/01/23 8464        Modified [place] Scale    Pre-Stroke Modified [place] Scale 6 - Unable to determine (UTD) from the medical record documentation  -AY 6 - Unable to determine (UTD) from the medical record documentation  -CS    Modified [place] Scale 4 - Moderately severe disability.  Unable to walk without assistance, and unable to attend to own bodily needs without assistance.  -AY 4 - Moderately severe disability.  Unable to walk without assistance, and unable to attend to own bodily needs without assistance.  -CS      Row Name 11/01/23 1416 11/01/23 1159       Functional Assessment    Outcome Measure Options AM-PAC 6 Clicks Basic Mobility (PT);Modified [place]  -AY AM-PAC 6 Clicks Daily Activity (OT);Modified [place]  -CS              User Key  (r) = Recorded By, (t) = Taken By, (c) = Cosigned By      Initials Name Provider Type    CS Aurea Ernst, OT Occupational Therapist    Nidhi Ball, PT Physical Therapist                                 Physical Therapy Education       Title: PT OT SLP Therapies (In Progress)       Topic: Physical Therapy (In Progress)       Point: Mobility training (Done)       Learning Progress Summary             Patient Acceptance, E,TB, VU,NR by AY at 11/1/2023 1417                         Point: Home exercise program (Not Started)       Learner Progress:  Not documented in this visit.              Point: Body mechanics (Done)       Learning Progress Summary             Patient Acceptance, E,TB, VU,NR by AY at 11/1/2023 1417                         Point: Precautions (Done)       Learning Progress Summary             Patient Acceptance, E,TB, VU,NR by AY at 11/1/2023 1417                                         User Key       Initials Effective Dates Name Provider Type Discipline    AY 11/10/20 -  Nidhi Blank, PT Physical Therapist PT                  PT Recommendation and Plan  Planned Therapy Interventions (PT): balance training, bed mobility training, gait training, home exercise program,  postural re-education, transfer training, patient/family education, strengthening, stair training, neuromuscular re-education  Plan of Care Reviewed With: patient, spouse, son  Progress: improving  Outcome Evaluation: Pt limited by decreased functional endurance, balance deficit, generalized weakness, R vision deficit, and impaired cognition compared to baseline. Pt took lateral steps towards HOB with mod Ax2. Rec continued skilled PT to increase indep with mobility. d/c rec for IRF.     Time Calculation:   PT Evaluation Complexity  History, PT Evaluation Complexity: 1-2 personal factors and/or comorbidities  Examination of Body Systems (PT Eval Complexity): total of 3 or more elements  Clinical Presentation (PT Evaluation Complexity): evolving  Clinical Decision Making (PT Evaluation Complexity): moderate complexity  Overall Complexity (PT Evaluation Complexity): moderate complexity     PT Charges       Row Name 11/01/23 1417             Time Calculation    Start Time 0910  -AY      PT Received On 11/01/23  -AY      PT Goal Re-Cert Due Date 11/11/23  -AY         Untimed Charges    PT Eval/Re-eval Minutes 48  -AY         Total Minutes    Untimed Charges Total Minutes 48  -AY       Total Minutes 48  -AY                User Key  (r) = Recorded By, (t) = Taken By, (c) = Cosigned By      Initials Name Provider Type    AY Nidhi Blank PT Physical Therapist                  Therapy Charges for Today       Code Description Service Date Service Provider Modifiers Qty    [id number] HC PT EVAL MOD COMPLEXITY 4 11/1/2023 Nidhi Blank, PT GP 1            PT G-Codes  Outcome Measure Options: AM-PAC 6 Clicks Basic Mobility (PT), Modified [place]  AM-PAC 6 Clicks Score (PT): 13  AM-PAC 6 Clicks Score (OT): 10  Modified [place] Scale: 4 - Moderately severe disability.  Unable to walk without assistance, and unable to attend to own bodily needs without assistance.  PT Discharge Summary  Anticipated Discharge Disposition (PT):  inpatient rehabilitation facility    Nidhi Blank, [employer]GR[employer]  11/1/2023

## 2023-11-01 NOTE — CONSULTS
Stroke Consult Note    Patient Name: Mara Martínez   MRN: [id number]  Age: 53 y.o.  Sex: female  : 1970    Primary Care Physician: Roawn Nolasco APRN  Referring Physician:  Dr. Rojas    TIME STROKE TEAM CALLED:  EST     TIME PATIENT SEEN: 2115 EST    Handedness: Right  Race: White     Chief Complaint/Reason for Consultation: Aphasia and generalized weakness    HPI: Mrs. Martínez is a 53-year-old female with PMH of HTN, HLD, T2DM, CHF, aortic and mitral valve replacement (on Coumadin), CAD and seizure disorder.  She presents as a scene flight by air methods for aphasia and generalized weakness.  Flight crew reports patient was last seen in her usual state of health by her  at 7 AM this morning.  Video surveillance reported by family shows Mrs. Martínez stumbling into her house at approximately 5:30 PM.   came home from work and found patient in her altered state.  EMS was notified and patient brought to [facility] ED for further evaluation and stroke work-up.    On arrival to [facility] ED NIH 22.  Blood pressure 93/62.  On exam patient is unable to answer questions appropriately or follow one-step commands.  Slight right facial droop with expressive/receptive aphasia noted.  No reported falls or recent trauma.  Strength in all extremities 2/5.  Patient is able to squeeze left hand on command.  Neuro exam severely limited due to aphasia and acuity of condition.  Current everyday smoker and no EtOH use.  Takes prescribed medications routinely.    Last Known Normal Date/Time: 10/31/2023 at 0700 EST     Review of Systems   Unable to perform ROS: Mental status change      Past Medical History:   Diagnosis Date    Anemia     Aortic regurgitation     Arthritis     Back pain     Carotid bruit     ISREAL (cerebral atherosclerosis) 2014    nonobstructive    Chest pain     Chronic obstructive pulmonary disease 2022    Coronary artery disease     COVID-19 vaccine administered     phizer    D-dimer, elevated      Diastolic congestive heart failure 2019    Dizziness     Edema     Epilepsy     Fatigue     Heart murmur     History of blood transfusion 2019    History of blood transfusion     History of blood transfusion 2022    History of blood transfusion     August 2023 x2    History of recent blood transfusion 2021    History of transfusion     Hyperlipidemia     Hypertension     Kidney stones     Migraines     Mitral regurgitation     Mitral stenosis     mild    Neuropathy     Neuropathy     Palpitations     Pulmonary edema     Retinal drusen     Seizure     Sleep apnea 2019    Sleeping difficulties     SOB (shortness of breath)     Supraventricular aortic stenosis     Syncope     Tachycardia     Tobacco abuse     VHD (valvular heart disease)     Wears dentures     Wears eyeglasses      Past Surgical History:   Procedure Laterality Date    APPENDECTOMY      CARDIAC CATHETERIZATION      CARDIAC SURGERY      2 valves replaced     SECTION      x 2    CHOLECYSTECTOMY      COLONOSCOPY      COLONOSCOPY N/A 2019    Procedure: COLONOSCOPY;  Surgeon: Kurt Gaines MD;  Location: [facility];  Service: Gastroenterology    CORONARY ARTERY BYPASS GRAFT  2018    EXPLORATORY LAPAROTOMY      HERNIA REPAIR      HYSTERECTOMY      JOINT REPLACEMENT Right     knee replacement    PAIN PUMP INSERTION/REVISION      morphine    PORTACATH PLACEMENT      UPPER GASTROINTESTINAL ENDOSCOPY       Family History   Problem Relation Age of Onset    Cancer Mother     Cancer Father     Hypertension Father     Other Sister         ACUTE MYOCARDIAL INFARCTION,CABG    Heart failure Sister     Hypertension Sister     Stroke Other      Social History     Socioeconomic History    Marital status:     Number of children: 2   Tobacco Use    Smoking status: Every Day     Packs/day: 0.50     Years: 36.00     Additional pack years: 0.00     Total pack years: 18.00     Types: Cigarettes     Last attempt to quit: 2023      Years since quittin.8    Smokeless tobacco: Never   Substance and Sexual Activity    Alcohol use: No    Drug use: No    Sexual activity: Defer     Allergies   Allergen Reactions    Azithromycin Shortness Of Breath     Rash, itching    Metformin Itching     Vomiting    Gabapentin Other (See Comments) and Unknown - High Severity     dizziness     Prior to Admission medications    Medication Sig Start Date End Date Taking? Authorizing Provider   ascorbic acid (VITAMIN C) 1000 MG tablet Take 1 tablet by mouth Daily. 14   Vidhya Phillips MD   aspirin 81 MG EC tablet Take 1 tablet by mouth Daily.    Vidhya Phillips MD   atorvastatin (LIPITOR) 80 MG tablet TAKE 1 TABLET BY MOUTH ONCE DAILY 23   Tavares Yeh PA   cholecalciferol (VITAMIN D3) 25 MCG (1000 UT) tablet Take 1 tablet by mouth Daily.    Vidhya Phillips MD   ferrous sulfate 325 (65 FE) MG tablet Take 1 tablet by mouth 2 (Two) Times a Day.    Vidhya Phillips MD   folic acid (FOLVITE) 1 MG tablet Take  by mouth Daily.    Vidhya Phillips MD   furosemide (LASIX) 40 MG tablet TAKE 1 TABLET BY MOUTH TWICE A DAY 23   Tavares Yeh PA   galcanezumab-gnlm (Emgality) 120 MG/ML auto-injector pen Emgality Pen 120 mg/mL subcutaneous pen injector 6/15/22   Vidhya Phillips MD   levETIRAcetam (KEPPRA) 1000 MG tablet Take 1 tablet by mouth 2 (Two) Times a Day.    Vidhya Phillips MD   levothyroxine (SYNTHROID, LEVOTHROID) 50 MCG tablet Take 1 tablet by mouth Daily.    Vidhya Phillips MD   midodrine (PROAMATINE) 5 MG tablet Take 1 tablet by mouth 3 (Three) Times a Day Before Meals. 23   Tavares Yeh PA   Mounjaro 7.5 MG/0.5ML solution pen-injector INJECT 7.5MG ONCE A WEEK 23   Vidhya Phillips MD   nitroglycerin (NITROSTAT) 0.4 MG SL tablet Place 1 tablet under the tongue Every 5 (Five) Minutes As Needed for Chest Pain. May take maximum of 3 tabs in 15 minutes. 19   Janki  CHARLIE Lovelace   nortriptyline (PAMELOR) 50 MG capsule Take 1 capsule by mouth Every Night. Two nightly 7/16/14   Vidhya Phillips MD   pain patient supplied pump by Intrathecal route Continuous. Morphine    Vidhya Phillips MD   pantoprazole (PROTONIX) 40 MG EC tablet TAKE 1 TABLET BY MOUTH ONCE DAILY 2/2/23   Tavares Yeh PA   POTASSIUM CHLORIDE RANDELL ER PO Take 20 mEq by mouth 2 (Two) Times a Day.    Vidhya Phillips MD   rOPINIRole (REQUIP) 0.5 MG tablet Take 1 tablet by mouth Every Night. Take 1 hour before bedtime.    Vidhya Phillips MD   tiZANidine (ZANAFLEX) 4 MG tablet Take 1 tablet by mouth 3 (Three) Times a Day As Needed.    Vidhya Phillips MD   topiramate (TOPAMAX) 200 MG tablet Take 1 tablet by mouth 2 (Two) Times a Day. 9/24/19   Vidhya Phillips MD   ubrogepant (UBRELVY) 100 MG tablet 1 (One) Time As Needed. 5/13/22   Vidhya Phillips MD   vitamin B-12 (CYANOCOBALAMIN) 100 MCG tablet Take 1 tablet by mouth.    Vidhya Phillips MD   warfarin (COUMADIN) 4 MG tablet Take 1 tablet by mouth Daily.    Vidhya Phillips MD            Neurological Exam  Mental Status  Arousable to verbal stimuli. Oriented only to person. Patient is nonverbal. Expressive aphasia and receptive aphasia present.    Cranial Nerves  CN II: Blinks to threat bilaterally, will track around room.  CN III, IV, VI: Extraocular movements intact bilaterally. Pupils equal round and reactive to light bilaterally.  CN VII:  Right: There is central facial weakness.  Neuro exam severely limited due to aphasia and acuity of condition..    Motor  Normal muscle bulk throughout. Normal muscle tone.  Strength in all extremities 2/5.  Patient will squeeze her left hand on command..    Sensory  Light touch abnormality: Sensation: Moves all extremities to noxious stimuli..     Coordination    Unable to assess.    Gait    Unable to assess.      Physical Exam  Constitutional:       Appearance: She is  ill-appearing.   HENT:      Head: Normocephalic and atraumatic.      Nose: Nose normal.      Mouth/Throat:      Mouth: Mucous membranes are dry.   Eyes:      Extraocular Movements: Extraocular movements intact.      Pupils: Pupils are equal, round, and reactive to light.   Cardiovascular:      Pulses: Normal pulses.   Pulmonary:      Effort: Pulmonary effort is normal.   Abdominal:      General: Abdomen is flat.   Musculoskeletal:         General: Normal range of motion.      Cervical back: Normal range of motion.   Skin:     General: Skin is warm and dry.   Neurological:      Mental Status: She is disoriented.      Cranial Nerves: Cranial nerve deficit present.      Motor: Weakness present.   Psychiatric:         Attention and Perception: She is inattentive.         Speech: She is noncommunicative.         Cognition and Memory: Cognition is impaired.         Acute Stroke Data    Thrombolytic Inclusion / Exclusion Criteria    Time: 21:29 EDT  Person Administering Scale: CHARLIE Zamora    Inclusion Criteria  [x]   18 years of age or greater   []   Onset of symptoms < 4.5 hours before beginning treatment (stroke onset = time patient was last seen well or without symptoms).   []   Diagnosis of acute ischemic stroke causing measurable disabling deficit (Complete Hemianopia, Any Aphasia, Visual or Sensory Extinction, Any weakness limiting sustained effort against gravity)   []   Any remaining deficit considered potentially disabling in view of patient and practitioner   Exclusion criteria (Do not proceed with Alteplase if any are checked under exclusion criteria)  [x]   Onset unknown or GREATER than 4.5 hours   []   ICH on CT/MRI   []   CT demonstrates hypodensity representing acute or subacute infarct   []   Significant head trauma or prior stroke in the previous 3 months   []   Symptoms suggestive of subarachnoid hemorrhage   []   History of un-ruptured intracranial aneurysm GREATER than 10 mm   []   Recent  intracranial or intraspinal surgery within the last 3 months   []   Arterial puncture at a non-compressible site in the previous 7 days   []   Active internal bleeding   []   Acute bleeding tendency   []   Platelet count LESS than 100,000 for known hematological diseases such as leukemia, thrombocytopenia or chronic cirrhosis   [x]   Current use of anticoagulant with INR GREATER than 1.7 or PT GREATER than 15 seconds, aPTT GREATER than 40 seconds   []   Heparin received within 48 hours, resulting in abnormally elevated aPTT GREATER than upper limit of normal   []   Current use of direct thrombin inhibitors or direct factor Xa inhibitors in the past 48 hours   []   Elevated blood pressure refractory to treatment (systolic GREATER than 185 mm/Hg or diastolic  GREATER than 110 mm/Hg   []   Suspected infective endocarditis and aortic arch dissection   []   Current use of therapeutic treatment dose of low-molecular-weight heparin (LMWH) within the previous 24 hours   []   Structural GI malignancy or bleed   Relative exclusion for all patients  []   Only minor non-disabling symptoms   []   Pregnancy   []   Seizure at onset with postictal residual neurological impairments   []   Major surgery or previous trauma within past 14 days   []   History of previous spontaneous ICH, intracranial neoplasm, or AV malformation   []   Postpartum (within previous 14 days)   []   Recent GI or urinary tract hemorrhage (within previous 21 days)   []   Recent acute MI (within previous 3 months)   []   History of un-ruptured intracranial aneurysm LESS than 10 mm   []   History of ruptured intracranial aneurysm   []   Blood glucose LESS than 50 mg/dL (2.7 mmol/L)   []   Dural puncture within the last 7 days   []   Known GREATER than 10 cerebral microbleeds   Additional exclusions for patients with symptoms onset between 3 and 4.5 hours.  []   Age > 80.   []   On any anticoagulants regardless of INR  >>> Warfarin (Coumadin), Heparin,  Enoxaparin (Lovenox), fondaparinux (Arixtra), bivalirudin (Angiomax), Argatroban, dabigatran (Pradaxa), rivaroxaban (Xarelto), or apixaban (Eliquis)   []   Severe stroke (NIHSS > 25).   []   History of BOTH diabetes and previous ischemic stroke.   []   The risks and benefits have been discussed with the patient or family related to the administration of IV thrombolytic therapy for stroke symptoms.   []   I have discussed and reviewed the patient's case and imaging with the attending prior to IV thrombolytic therapy.   NA Time IV thrombolytic administered       Hospital Meds:  Scheduled- iopamidol, 115 mL, Intravenous, Once in imaging      Infusions-     PRNs-   sodium chloride    Functional Status Prior to Current Stroke/Sevier Score:   MODIFIED YESICA SCALE (to be assessed for each patient having history of stroke) []Stroke history but not assessed  [x]0: No symptoms at all  []1: No significant disability despite symptoms  []2: Slight disability  []3: Moderate disability  []4: Moderately severe disability  []5: Severe disability  []6: Death        NIH Stroke Scale  Time: 21:29 EDT  Person Administering Scale: CHARLIE Zamora  Interval: baseline  1a. Level of Consciousness: 1-->Not alert, but arousable by minor stimulation to obey, answer, or respond  1b. LOC Questions: 2-->Answers neither question correctly  1c. LOC Commands: 1-->Performs one task correctly  2. Best Gaze: 0-->Normal  3. Visual: 0-->No visual loss  4. Facial Palsy: 1-->Minor paralysis (flattened nasolabial fold, asymmetry on smiling)  5a. Motor Arm, Left: 3-->No effort against gravity, limb falls  5b. Motor Arm, Right: 3-->No effort against gravity, limb falls  6a. Motor Leg, Left: 3-->No effort against gravity, leg falls to bed immediately  6b. Motor Leg, Right: 3-->No effort against gravity, leg falls to bed immediately  7. Limb Ataxia: 0-->Absent  8. Sensory: 0-->Normal, no sensory loss  9. Best Language: 3-->Mute, global aphasia, no usable  speech or auditory comprehension  10. Dysarthria: 2-->Severe dysarthria, patients speech is so slurred as to be unintelligible in the absence of or out of proportion to any dysphasia, or is mute/anarthric  11. Extinction and Inattention (formerly Neglect): 0-->No abnormality    Total (NIH Stroke Scale): 22       Results Reviewed:  I have personally reviewed current lab, radiology, and data and agree with results.    Results for orders placed during the hospital encounter of 08/03/22    Adult Transthoracic Echo Complete W/ Cont if Necessary Per Protocol    Interpretation Summary  1.  LV size, function, wall motion, and wall thickness are normal.  Visually estimated ejection fraction is 60 to 65%.  3D ejection fraction is 63%.  Grade 3 diastolic dysfunction.  Moderate left atrial enlargement.  Right heart chambers are normal.  No septal defect or intracavitary mass or thrombus.    2.  There is a mechanical mitral valve prosthesis in place with no unusual sewing ring motion.  Mitral valve area by pressure half-time is 2.5 cm² by time velocity integral 2.2 cm² and there is trivial to mild MR.  There is mild TR from a morphologically normal valve.  There is a mechanical aortic valve prosthesis in place with no unusual sewing ring motion.  Aortic valve parameters include a peak instantaneous gradient of 32, mean gradient of 15, and an aortic valve area of 2.3 cm².  Dimensionless index is 0.74.  There is trivial AI.    3.  No pericardial or great vessel pathology.    4.  Pulmonary artery systolic pressures are estimated in the low to mid 30s.     WBC   Date Value Ref Range Status   10/31/2023 13.88 (H) 3.40 - 10.80 10*3/mm3 Final     RBC   Date Value Ref Range Status   10/31/2023 2.16 (L) 3.77 - 5.28 10*6/mm3 Final     Hemoglobin   Date Value Ref Range Status   10/31/2023 6.7 (C) 12.0 - 15.9 g/dL Final     Hematocrit   Date Value Ref Range Status   10/31/2023 23.0 (L) 34.0 - 46.6 % Final     MCV   Date Value Ref Range  Status   10/31/2023 106.5 (H) 79.0 - 97.0 fL Final     MCH   Date Value Ref Range Status   10/31/2023 31.0 26.6 - 33.0 pg Final     MCHC   Date Value Ref Range Status   10/31/2023 29.1 (L) 31.5 - 35.7 g/dL Final     RDW   Date Value Ref Range Status   10/31/2023 16.8 (H) 12.3 - 15.4 % Final     RDW-SD   Date Value Ref Range Status   10/31/2023 62.7 (H) 37.0 - 54.0 fl Final     MPV   Date Value Ref Range Status   10/31/2023 10.2 6.0 - 12.0 fL Final     Platelets   Date Value Ref Range Status   10/31/2023 297 140 - 450 10*3/mm3 Final     Neutrophil %   Date Value Ref Range Status   10/31/2023 88.5 (H) 42.7 - 76.0 % Final     Lymphocyte %   Date Value Ref Range Status   10/31/2023 4.9 (L) 19.6 - 45.3 % Final     Monocyte %   Date Value Ref Range Status   10/31/2023 4.9 (L) 5.0 - 12.0 % Final     Eosinophil %   Date Value Ref Range Status   10/31/2023 0.8 0.3 - 6.2 % Final     Basophil %   Date Value Ref Range Status   10/31/2023 0.2 0.0 - 1.5 % Final     Immature Grans %   Date Value Ref Range Status   10/31/2023 0.7 (H) 0.0 - 0.5 % Final     Neutrophils, Absolute   Date Value Ref Range Status   10/31/2023 12.28 (H) 1.70 - 7.00 10*3/mm3 Final     Lymphocytes, Absolute   Date Value Ref Range Status   10/31/2023 0.68 (L) 0.70 - 3.10 10*3/mm3 Final     Monocytes, Absolute   Date Value Ref Range Status   10/31/2023 0.68 0.10 - 0.90 10*3/mm3 Final     Eosinophils, Absolute   Date Value Ref Range Status   10/31/2023 0.11 0.00 - 0.40 10*3/mm3 Final     Basophils, Absolute   Date Value Ref Range Status   10/31/2023 0.03 0.00 - 0.20 10*3/mm3 Final     Immature Grans, Absolute   Date Value Ref Range Status   10/31/2023 0.10 (H) 0.00 - 0.05 10*3/mm3 Final     nRBC   Date Value Ref Range Status   10/31/2023 0.0 0.0 - 0.2 /100 WBC Final      Lab Results   Component Value Date    GLUCOSE 105 (H) 10/31/2023    BUN 9 10/31/2023    CREATININE 0.83 10/31/2023    EGFR 84.4 10/31/2023    BCR 10.8 10/31/2023    K 3.4 (L) 10/31/2023    CO2  23.0 10/31/2023    CALCIUM 8.9 10/31/2023    ALBUMIN 3.95 02/18/2020    BILITOT 0.2 02/18/2020    AST 6 10/31/2023    ALT <5 10/31/2023    XR Chest 1 View    Result Date: 10/31/2023  Impression: Generalized interstitial prominence without acute airspace disease. Electronically Signed: Saman Lopez MD  10/31/2023 9:40 PM CDT  Workstation ID: [id number]    CT Angiogram Head w AI Analysis of LVO    Result Date: 10/31/2023  1.Left M2 superior division segmental occlusion. 2.Right distal M1 saccular aneurysm. Electronically Signed: Saman Lopez MD  10/31/2023 9:15 PM CDT  Workstation ID: [id number]    CT Angiogram Neck    Result Date: 10/31/2023  1.Left M2 superior division segmental occlusion. 2.Right distal M1 saccular aneurysm. Electronically Signed: Saman Lopez MD  10/31/2023 9:15 PM CDT  Workstation ID: [id number]    CT CEREBRAL PERFUSION WITH & WITHOUT CONTRAST    Result Date: 10/31/2023   1. Imaging features are consistent with a left MCA territory core infarct with surrounding ischemic brain at risk. Electronically Signed: Saman Lopez MD  10/31/2023 8:59 PM CDT  Workstation ID: [id number]    CT Head Without Contrast Stroke Protocol    Result Date: 10/31/2023  Impression: No acute intracranial process identified. Electronically Signed: Saman Lopez MD  10/31/2023 8:26 PM CDT  Workstation ID: [id number]      Assessment/Plan:  Mrs. Martínez is a 53-year-old female with PMH of HTN, HLD, T2DM, CHF, aortic and mitral valve replacement (on Coumadin), CAD and seizure disorder.  She presents as a scene flight by air methods for aphasia and generalized weakness.  Flight crew reports patient was last seen in her usual state of health by her  at 7 AM this morning.  Video surveillance reported by family shows Mrs. Martínez stumbling into her house at approximately 5:30 PM.   came home from work and found patient in her altered state.  EMS was notified and patient brought to [facility] ED for further evaluation and stroke work-up.  Patient is not a candidate for IV thrombolytic therapy due to consistent use of Coumadin and last known well greater than 4.5 hours.  Patient was found to be a candidate for endovascular therapy and taken to Cath Lab for mechanical thrombectomy.    Antiplatelet PTA: Aspirin  Anticoagulant PTA: Warfarin        Saddle embolus within superior division of left MCA bifurcation, s/p thrombectomy with TICI 3 recanalization  Initiate TIA/CVA without IV thrombolytic therapy order set  Initiate postprocedural order set  N.p.o. until bedside dysphagia screening complete by RN  Bedrest x2 hours s/p procedure then activity as tolerated  Aspirin 81 mg p.o. daily after dual-energy CT head in a.m.  Dual-energy CT head without contrast in a.m.  Atorvastatin 80 mg p.o. nightly  MRI brain without contrast routine  TTE routine  Ativan 1 mg IV x1 now  Keppra 1 g IV every 12 hours  EEG x1 in a.m.  Blood pressure goals, SBP less than 140  A1C, lipid panel routine  Hold Coumadin for now until MRI brain without contrast is completed to evaluate stroke burden  Will need to further investigate possible cause of saddle embolus  Diabetes educator to see if appropriate  PT/OT/SLP eval and treat  Case management to follow      Plan of care discussed with Dr. Rojas, Dr. Mckeon, family at bedside and RN.  Stroke neurology will continue to follow.  Thank you for this consult.  Call with any questions or concerns.    Faisal Chowdhury, CHARLIE  October 31, 2023  21:29 EDT

## 2023-11-01 NOTE — ED PROVIDER NOTES
EMERGENCY DEPARTMENT ENCOUNTER    Pt Name: Mara Martínez  MRN: [id number]  Pt :   1970  Room Number:    Date of encounter:  10/31/2023  PCP: Rowan Nolasco APRN  ED Provider: Jeyson Rojas MD    Historian: EMS flight crew      HPI:  Chief Complaint: Altered mental status, aphasia        Context: Mara Martínez is a 53 y.o. female who presents to the ED c/o symptoms with last known well at 7 AM.  The patient was found in a poorly responsive state and EMS was contacted.  Patient was transferred to our facility in the helicopter transport.  Little information was able to be ascertained initially given patient's altered mental status and inability to answer any questions reliably.      PAST MEDICAL HISTORY  Past Medical History:   Diagnosis Date    Anemia     Aortic regurgitation     Arthritis     Back pain     Carotid bruit     ISREAL (cerebral atherosclerosis) 2014    nonobstructive    Chest pain     Chronic obstructive pulmonary disease 2022    Coronary artery disease     COVID-19 vaccine administered     phizer    D-dimer, elevated     Diastolic congestive heart failure 2019    Dizziness     Edema     Epilepsy     Fatigue     Heart murmur     History of blood transfusion 2019    History of blood transfusion     History of blood transfusion 2022    History of blood transfusion     August 2023 x2    History of recent blood transfusion 2021    History of transfusion     Hyperlipidemia     Hypertension     Kidney stones     Migraines     Mitral regurgitation     Mitral stenosis     mild    Neuropathy     Neuropathy     Palpitations     Pulmonary edema     Retinal drusen     Seizure     Sleep apnea 2019    Sleeping difficulties     SOB (shortness of breath)     Supraventricular aortic stenosis     Syncope     Tachycardia     Tobacco abuse     VHD (valvular heart disease)     Wears dentures     Wears eyeglasses          PAST SURGICAL HISTORY  Past Surgical History:    Procedure Laterality Date    APPENDECTOMY      CARDIAC CATHETERIZATION      CARDIAC SURGERY      2 valves replaced     SECTION      x 2    CHOLECYSTECTOMY      COLONOSCOPY      COLONOSCOPY N/A 2019    Procedure: COLONOSCOPY;  Surgeon: Kurt Gaines MD;  Location: [facility];  Service: Gastroenterology    CORONARY ARTERY BYPASS GRAFT  2018    EXPLORATORY LAPAROTOMY      HERNIA REPAIR      HYSTERECTOMY      JOINT REPLACEMENT Right     knee replacement    PAIN PUMP INSERTION/REVISION      morphine    PORTACATH PLACEMENT      UPPER GASTROINTESTINAL ENDOSCOPY           FAMILY HISTORY  Family History   Problem Relation Age of Onset    Cancer Mother     Cancer Father     Hypertension Father     Other Sister         ACUTE MYOCARDIAL INFARCTION,CABG    Heart failure Sister     Hypertension Sister     Stroke Other          SOCIAL HISTORY  Social History     Socioeconomic History    Marital status:     Number of children: 2   Tobacco Use    Smoking status: Every Day     Packs/day: 0.50     Years: 36.00     Additional pack years: 0.00     Total pack years: 18.00     Types: Cigarettes     Last attempt to quit: 2023     Years since quittin.8    Smokeless tobacco: Never   Substance and Sexual Activity    Alcohol use: No    Drug use: No    Sexual activity: Defer         ALLERGIES  Azithromycin, Metformin, and Gabapentin        REVIEW OF SYSTEMS  Review of Systems     Unable secondary to altered mental status    PHYSICAL EXAM    I have reviewed the triage vital signs and nursing notes.    ED Triage Vitals   Temp Pulse Resp BP SpO2   -- -- -- -- --      Temp src Heart Rate Source Patient Position BP Location FiO2 (%)   -- -- -- -- --       Physical Exam  GENERAL:   Appears in significant distress.  Almost completely unable to communicate  HENT: Nares patent.  No definite facial asymmetry  EYES: No scleral icterus.  CV: Regular rhythm, regular rate.  No murmurs gallops rubs.  No carotid  bruits.  RESPIRATORY: Normal effort.  No audible wheezes, rales or rhonchi.  Clear to auscultation  ABDOMEN: Soft, nontender  MUSCULOSKELETAL: No deformities.   NEURO: Somnolent.  Not following commands for the most part.  She does nod yes and no to some questions but is inconsistent and trustworthiness of answers.  Bilateral upper and lower extremity drift/minimal exertion/effort against gravity but no definite asymmetry.  SKIN: Warm, dry, no rash visualized.      LAB RESULTS  No results found for this or any previous visit (from the past 24 hour(s)).    If labs were ordered, I independently reviewed the results and considered them in treating the patient.        RADIOLOGY  No Radiology Exams Resulted Within Past 24 Hours    I ordered and independently reviewed the above noted radiographic studies.      I viewed images of CT head which showed no evidence of intracranial hemorrhage per my independent interpretation.    See radiologist's dictation for official interpretation.        PROCEDURES    Critical Care    Performed by: Jeyson Rojas MD  Authorized by: Jeyson Rojas MD    Critical care provider statement:     Critical care time (minutes):  85    Critical care time was exclusive of:  Separately billable procedures and treating other patients    Critical care was necessary to treat or prevent imminent or life-threatening deterioration of the following conditions:  Circulatory failure, CNS failure or compromise, dehydration and sepsis    Critical care was time spent personally by me on the following activities:  Ordering and performing treatments and interventions, ordering and review of laboratory studies, ordering and review of radiographic studies, pulse oximetry, re-evaluation of patient's condition, review of old charts, obtaining history from patient or surrogate, examination of patient, evaluation of patient's response to treatment, discussions with consultants and development of treatment plan with  patient or surrogate      No orders to display       MEDICATIONS GIVEN IN ER    Medications - No data to display      MEDICAL DECISION MAKING, PROGRESS, and CONSULTS    All labs, if obtained, have been independently reviewed by me.  All radiology studies, if obtained, have been reviewed by me and the radiologist dictating the report.  All EKG's, if obtained, have been independently viewed and interpreted by me/my attending physician.      Discussion below represents my analysis of pertinent findings related to patient's condition, differential diagnosis, treatment plan and final disposition.                         Differential diagnosis:    Acute CVA versus sepsis versus GI bleed versus toxicological emergency, etc.      Additional sources:    - Discussed/ obtained information from independent historians: Flight crew gave report to me at the CT scanner during initial arrival evaluation.    - External (non-ED) record review: I reviewed multiple records on this patient to include a progress note dated 9/14/2023 from Tavares Yeh PA-C of cardiology.  1.  Valvular heart disease  1.1 status post aortic and mitral valve replacement by Dr. Cavazos with mechanical valves July 2018  1.2 follow-up echocardiogram 2019 demonstrating stable valve parameters  1.3 echocardiogram August 2022 with stable valve parameters  2.  Preserved systolic function  3.  Cardiac catheterization 2017 with minimal atherosclerosis noted  3.1 repeat stress test August 2022 with no evidence of ischemia preserved LV function  4.  Carotid artery stenosis  4.1 left carotid stenting by Dr. Mckeon [facility] July 2020  5.  MCA aneurysm estimated at 3.5 mm followed by [facility]  5.  Epilepsy  6.  Hypertension  7.  Dyslipidemia  8.  Chronic tobacco use  9.  GI bleed  9.1.  Patient with history of AVMs    I reviewed a discharge summary dated 11/15/2019 when the patient was admitted to an outside hospital secondary to acute blood loss  anemia along with INR of 4.0.    - Chronic or social conditions impacting care: Smoker    - Shared decision making: Patient unable to give consent given altered mental status.      Orders placed during this visit:  Orders Placed This Encounter   Procedures    CT Head Without Contrast Stroke Protocol         Additional orders considered but not ordered:  MRI brain    ED Course:    Consultants: Stroke team.  ICU team.    ED Course as of 10/31/23 2347   Tue Oct 31, 2023   2216 I spoke with Dr. Mckeon who is already aware of this patient's cerebral large vessel occlusion.  He is on his way to the hospital to perform thrombectomy if possible.  I spoke with him about the patient's hemoglobin and he agrees with transfusion.  I have called our blood bank and ensured we can quickly administer the blood, within the hour.  We will hold off on uncrossed matched blood and administer crossmatched blood when ready.  Our technician is already drawn the sample and is in the process of moving it to the lab. [MS]   2217 I contacted Dr. Guevara, intensivist for admission to the ICU after Cath Lab intervention. [MS]   2219 WBC, UA(!): Too Numerous to Count [MS]   2219 Bacteria, UA(!): 3+ [MS]   2242 Culture the patient's urine and blood.  Have ordered Rocephin IV. [MS]   2256 INR still pending. [MS]      ED Course User Index  [MS] Jeyson Rojas MD              Shared Decision Making:  After my consideration of clinical presentation and any laboratory/radiology studies obtained, I discussed the findings with the patient/patient representative who is in agreement with the treatment plan and the final disposition.   Risks and benefits of discharge and/or observation/admission were discussed.       AS OF 21:16 EDT VITALS:    BP -    HR -    TEMP -    O2 SATS -                    DIAGNOSIS  Final diagnoses:   Acute CVA (cerebrovascular accident)   Anemia, unspecified type   Acute UTI         DISPOSITION  Admission to ICU      Please note  that portions of this document were completed with voice recognition software.        Jeyson Rojas MD  11/03/23 [id number]

## 2023-11-01 NOTE — THERAPY EVALUATION
Patient Name: Mara Martínez  : 1970    MRN: [id number]                              Today's Date: 2023       Admit Date: 10/31/2023    Visit Dx:     ICD-10-CM ICD-9-CM   1. Acute CVA (cerebrovascular accident)  I63.9 434.91     Patient Active Problem List   Diagnosis    Anemia    Aortic valve insufficiency    Bruit of left carotid artery    Chest pain    Positive D-dimer    Dizziness    Edema    Fatigue    HTN (hypertension)    Heart murmur    Hyperlipidemia    Mitral valve stenosis    Mitral valve insufficiency    Palpitations    Pulmonary edema    Seizure disorder    Shortness of breath    Difficulty sleeping    Snoring    Supravalvular aortic stenosis    Syncope    Tachycardia    Mitral stenosis    Rheumatic aortic stenosis    Valvular heart disease    Epilepsy    ISREAL (cerebral atherosclerosis)    Tobacco use    Migraines    Aortic regurgitation    Rheumatic mitral regurgitation    SOB (shortness of breath)    Supraventricular aortic stenosis    Aortic valve stenosis    Rheumatic aortic valve insufficiency    Moderate aortic stenosis    Aortic valve disorder    Coronary artery disease involving native coronary artery of native heart without angina pectoris    S/P mitral valve replacement    S/P mechanical aortic and mitral valve replacement (2018)    Stenosis of carotid artery    Generalized abdominal pain    Rectal bleeding    Functional diarrhea    Lower extremity edema    Rectal bleed    (HFpEF) heart failure with preserved ejection fraction    Chronic pain with intrathecal pump in place    Dyslipidemia    Bilateral carotid artery stenosis    Low back pain    Degeneration of lumbar intervertebral disc    Lumbosacral radiculopathy    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    Cerebral aneurysm    Complex partial seizures with consciousness impaired    Migraine without aura and with status migrainosus, not intractable    Chronic obstructive pulmonary disease    Essential tremor     Hesitancy of micturition    Complex partial epilepsy with generalization and with intractable epilepsy    Focal (motor) epilepsy    Occipital neuralgia    Paresthesia    Restless legs syndrome    Retinal drusen    Acute ischemic left MCA stroke    H/O recurrent GIB 2* AVMs    UTI (urinary tract infection)    Chronic hypotension on midodrine     Past Medical History:   Diagnosis Date    Anemia     Aortic regurgitation     Arthritis     Back pain     Carotid bruit     ISREAL (cerebral atherosclerosis) 2014    nonobstructive    Chest pain     Chronic hypotension on midodrine 2023    Chronic obstructive pulmonary disease 2022    Coronary artery disease     COVID-19 vaccine administered     phizer    D-dimer, elevated     Diastolic congestive heart failure 2019    Dizziness     Edema     Epilepsy     Fatigue     Heart murmur     History of blood transfusion 2019    History of blood transfusion     History of blood transfusion 2022    History of blood transfusion     August 2023 x2    History of recent blood transfusion 2021    History of transfusion     Hyperlipidemia     Hypertension     Kidney stones     Migraines     Mitral regurgitation     Mitral stenosis     mild    Neuropathy     Neuropathy     Palpitations     Pulmonary edema     Retinal drusen     Seizure     Sleep apnea 2019    Sleeping difficulties     SOB (shortness of breath)     Supraventricular aortic stenosis     Syncope     Tachycardia     Tobacco abuse     VHD (valvular heart disease)     Wears dentures     Wears eyeglasses      Past Surgical History:   Procedure Laterality Date    APPENDECTOMY      CARDIAC CATHETERIZATION      CARDIAC SURGERY      2 valves replaced     SECTION      x 2    CHOLECYSTECTOMY      COLONOSCOPY      COLONOSCOPY N/A 2019    Procedure: COLONOSCOPY;  Surgeon: Kurt Gaines MD;  Location: [facility];  Service: Gastroenterology    CORONARY ARTERY BYPASS GRAFT  2018     EXPLORATORY LAPAROTOMY      HERNIA REPAIR      HYSTERECTOMY      INTERVENTIONAL RADIOLOGY PROCEDURE Bilateral 10/31/2023    Procedure: Carotid Cerebral Angiogram;  Surgeon: Cayetano Mckeon MD;  Location: [facility] INVASIVE LOC[facility];  Service: Interventional Radiology;  Laterality: Bilateral;    JOINT REPLACEMENT Right     knee replacement    PAIN PUMP INSERTION/REVISION      morphine    PORTACATH PLACEMENT      UPPER GASTROINTESTINAL ENDOSCOPY        General Information       Row Name 11/01/23 1153          OT Time and Intention    Document Type evaluation  -CS     Mode of Treatment occupational therapy  -CS       Row Name 11/01/23 1153          General Information    Patient Profile Reviewed yes  -CS     Prior Level of Function independent:;all household mobility;ADL's  ambulates household distance w/ no AD, w/c for community mobility  -CS     Existing Precautions/Restrictions fall;oxygen therapy device and L/min;other (see comments)  R sided inattention, aphasia  -CS     Barriers to Rehab medically complex;visual deficit;cognitive status;previous functional deficit  -CS       Row Name 11/01/23 1153          Living Environment    People in Home spouse  -CS       Row Name 11/01/23 1153          Home Main Entrance    Number of Stairs, Main Entrance one  -CS       Row Name 11/01/23 1153          Cognition    Orientation Status (Cognition) unable/difficult to assess  -CS       Row Name 11/01/23 1153          Safety Issues, Functional Mobility    Safety Issues Affecting Function (Mobility) ability to follow commands  -CS     Impairments Affecting Function (Mobility) balance;cognition;coordination;endurance/activity tolerance;motor planning;strength;visual/perceptual;postural/trunk control  -CS     Cognitive Impairments, Mobility Safety/Performance attention;insight into deficits/self-awareness;sequencing abilities  -CS               User Key  (r) = Recorded By, (t) = Taken By, (c) = Cosigned By      Initials Name  Provider Type     Aurea Ernst OT Occupational Therapist                     Mobility/ADL's       Row Name 11/01/23 1155          Bed Mobility    Bed Mobility sit-supine  -     Sit-Supine [place] (Bed Mobility) maximum assist (25% patient effort);2 person assist;verbal cues  -     Assistive Device (Bed Mobility) bed rails;draw sheet;head of bed elevated  -       Row Name 11/01/23 1155          Transfers    Transfers sit-stand transfer;stand-sit transfer  -     Comment, (Transfers) BUE support  -       Row Name 11/01/23 1155          Sit-Stand Transfer    Sit-Stand [place] (Transfers) minimum assist (75% patient effort);2 person assist;verbal cues  -       Row Name 11/01/23 1155          Stand-Sit Transfer    Stand-Sit [place] (Transfers) minimum assist (75% patient effort);2 person assist;verbal cues  -       Row Name 11/01/23 1155          Activities of Daily Living    BADL Assessment/Intervention lower body dressing;grooming  -       Row Name 11/01/23 1155          Lower Body Dressing Assessment/Training    [place] Level (Lower Body Dressing) don;socks;dependent (less than 25% patient effort)  -     Position (Lower Body Dressing) supported sitting  -       Row Name 11/01/23 1155          Grooming Assessment/Training    [place] Level (Grooming) wash face, hands;maximum assist (25% patient effort)  -     Assistive Devices (Grooming) hand over hand  -     Position (Grooming) sitting up in bed  -               User Key  (r) = Recorded By, (t) = Taken By, (c) = Cosigned By      Initials Name Provider Type    Aurea Whitt OT Occupational Therapist                   Obj/Interventions       Row Name 11/01/23 1155          Sensory Assessment (Somatosensory)    Sensory Assessment (Somatosensory) unable/difficult to assess  -       Row Name 11/01/23 1155          Vision Assessment/Intervention    Visual Impairment/Limitations peripheral vision impaired right   -CS     Visual Motor Impairment visual tracking, right  -CS     Visual Processing Deficit visual attention, right  -CS       Row Name 11/01/23 1155          Range of Motion Comprehensive    General Range of Motion bilateral upper extremity ROM WFL  -CS       Row Name 11/01/23 1155          Strength Comprehensive (MMT)    Comment, General Manual Muscle Testing (MMT) Assessment Limited formal assessment d/t cognitive status, observed BUE grossly 3/5  -CS       Row Name 11/01/23 1155          Balance    Balance Assessment sitting static balance;sitting dynamic balance;standing static balance  -CS     Static Sitting Balance contact guard  -CS     Dynamic Sitting Balance minimal assist  -CS     Position, Sitting Balance sitting edge of bed  -CS     Static Standing Balance minimal assist;2-person assist  -CS     Position/Device Used, Standing Balance supported  -CS     Balance Interventions sitting;static;dynamic;dynamic reaching;weight shifting activity;occupation based/functional task  -CS               User Key  (r) = Recorded By, (t) = Taken By, (c) = Cosigned By      Initials Name Provider Type    CS Aurea Ernst, OT Occupational Therapist                   Goals/Plan       Row Name 11/01/23 1150          Transfer Goal 1 (OT)    Activity/Assistive Device (Transfer Goal 1, OT) sit-to-stand/stand-to-sit;toilet  -CS     [place] Level/Cues Needed (Transfer Goal 1, OT) contact guard required  -CS     Time Frame (Transfer Goal 1, OT) long term goal (LTG);10 days  -CS     Progress/Outcome (Transfer Goal 1, OT) goal ongoing  -       Row Name 11/01/23 1159          Grooming Goal 1 (OT)    Activity/Device (Grooming Goal 1, OT) hair care;wash face, hands  -CS     [place] (Grooming Goal 1, OT) minimum assist (75% or more patient effort)  -CS     Time Frame (Grooming Goal 1, OT) long term goal (LTG);10 days  -CS     Progress/Outcome (Grooming Goal 1, OT) goal ongoing  -       Row Name 11/01/23 1158           Problem Specific Goal 1 (OT)    Problem Specific Goal 1 (OT) Pt will visually scan and retrieve 3/3 ADL items from R visual field.  -CS     Time Frame (Problem Specific Goal 1, OT) long term goal (LTG);10 days  -CS     Progress/Outcome (Problem Specific Goal 1, OT) goal ongoing  -CS       Row Name 11/01/23 115          Therapy Assessment/Plan (OT)    Planned Therapy Interventions (OT) activity tolerance training;BADL retraining;adaptive equipment training;functional balance retraining;occupation/activity based interventions;ROM/therapeutic exercise;strengthening exercise;transfer/mobility retraining  -CS               User Key  (r) = Recorded By, (t) = Taken By, (c) = Cosigned By      Initials Name Provider Type    CS Aurea Ernst OT Occupational Therapist                   Clinical Impression       Row Name 11/01/23 1156          Pain Assessment    Additional Documentation Pain Scale: FACES Pre/Post-Treatment (Group)  -CS       Row Name 11/01/23 1156          Pain Scale: FACES Pre/Post-Treatment    Pain: FACES Scale, Pretreatment 0-->no hurt  -CS     Posttreatment Pain Rating 0-->no hurt  -CS       Row Name 11/01/23 1154          Plan of Care Review    Plan of Care Reviewed With patient;family  -CS     Progress improving  -CS     Outcome Evaluation OT eval complete. Pt presents w/ cognitive deficits, balance deficits, and R visual deficits warranting cont skilled IPOT POC to promote return to baseline. Recommend pt DC to IP rehab.  -CS       Row Name 11/01/23 115          Therapy Assessment/Plan (OT)    Patient/Family Therapy Goal Statement (OT) Return to PLOF  -CS     Rehab Potential (OT) good, to achieve stated therapy goals  -CS     Criteria for Skilled Therapeutic Interventions Met (OT) yes;skilled treatment is necessary  -CS     Therapy Frequency (OT) daily  -CS       Row Name 11/01/23 1157          Therapy Plan Review/Discharge Plan (OT)    Anticipated Discharge Disposition (OT) inpatient rehabilitation  facility  -CS       Row Name 11/01/23 1156          Vital Signs    Pre Systolic BP Rehab 104  -CS     Pre Treatment Diastolic BP 54  -CS     Post Systolic BP Rehab 112  -CS     Post Treatment Diastolic BP 55  -CS     Pretreatment Heart Rate (beats/min) 77  -CS     Posttreatment Heart Rate (beats/min) 79  -CS     Pre SpO2 (%) 100  -CS     O2 Delivery Pre Treatment nasal cannula  -CS     Post SpO2 (%) 100  -CS     O2 Delivery Post Treatment nasal cannula  -CS     Pre Patient Position Sitting  -CS     Intra Patient Position Standing  -CS     Post Patient Position Supine  -CS       Row Name 11/01/23 1156          Positioning and Restraints    Pre-Treatment Position in bed  -CS     Post Treatment Position bed  -CS     In Bed notified nsg;fowlers;call light within reach;encouraged to call for assist;exit alarm on;side rails up x3;RUE elevated;LUE elevated;legs elevated;heels elevated;with family/caregiver;with nsg  -CS               User Key  (r) = Recorded By, (t) = Taken By, (c) = Cosigned By      Initials Name Provider Type    CS Aurea Ernst, OT Occupational Therapist                   Outcome Measures       Row Name 11/01/23 1159          How much help from another is currently needed...    Putting on and taking off regular lower body clothing? 1  -CS     Bathing (including washing, rinsing, and drying) 2  -CS     Toileting (which includes using toilet bed pan or urinal) 1  -CS     Putting on and taking off regular upper body clothing 2  -CS     Taking care of personal grooming (such as brushing teeth) 2  -CS     Eating meals 2  -CS     AM-PAC 6 Clicks Score (OT) 10  -CS       Row Name 11/01/23 0148          How much help from another person do you currently need...    Turning from your back to your side while in flat bed without using bedrails? 1  -JG     Moving from lying on back to sitting on the side of a flat bed without bedrails? 1  -JG     Moving to and from a bed to a chair (including a wheelchair)? 1  -JG      Standing up from a chair using your arms (e.g., wheelchair, bedside chair)? 1  -JG     Climbing 3-5 steps with a railing? 1  -JG     To walk in hospital room? 1  -JG     AM-PAC 6 Clicks Score (PT) 6  -JG     Highest level of mobility 2 --> Bed activities/dependent transfer  -JG       Row Name 11/01/23 1159          Modified [place] Scale    Pre-Stroke Modified [place] Scale 6 - Unable to determine (UTD) from the medical record documentation  -     Modified [place] Scale 4 - Moderately severe disability.  Unable to walk without assistance, and unable to attend to own bodily needs without assistance.  -       Row Name 11/01/23 1159          Functional Assessment    Outcome Measure Options AM-PAC 6 Clicks Daily Activity (OT);Modified [place]  -CS               User Key  (r) = Recorded By, (t) = Taken By, (c) = Cosigned By      Initials Name Provider Type    Aurea Whitt OT Occupational Therapist    Karina Tirado RN Registered Nurse                    Occupational Therapy Education       Title: PT OT SLP Therapies (In Progress)       Topic: Occupational Therapy (In Progress)       Point: ADL training (In Progress)       Description:   Instruct learner(s) on proper safety adaptation and remediation techniques during self care or transfers.   Instruct in proper use of assistive devices.                  Learning Progress Summary             Patient Acceptance, E, NR by  at 11/1/2023 1159   Family Acceptance, E, NR by  at 11/1/2023 1159                         Point: Home exercise program (Not Started)       Description:   Instruct learner(s) on appropriate technique for monitoring, assisting and/or progressing therapeutic exercises/activities.                  Learner Progress:  Not documented in this visit.              Point: Precautions (In Progress)       Description:   Instruct learner(s) on prescribed precautions during self-care and functional transfers.                  Learning Progress  Summary             Patient Acceptance, E, NR by  at 11/1/2023 1159   Family Acceptance, E, NR by CS at 11/1/2023 1159                         Point: Body mechanics (In Progress)       Description:   Instruct learner(s) on proper positioning and spine alignment during self-care, functional mobility activities and/or exercises.                  Learning Progress Summary             Patient Acceptance, E, NR by CS at 11/1/2023 1159   Family Acceptance, E, NR by CS at 11/1/2023 1159                                         User Key       Initials Effective Dates Name Provider Type Discipline     09/02/21 -  Aurea Ernst, OT Occupational Therapist OT                  OT Recommendation and Plan  Planned Therapy Interventions (OT): activity tolerance training, BADL retraining, adaptive equipment training, functional balance retraining, occupation/activity based interventions, ROM/therapeutic exercise, strengthening exercise, transfer/mobility retraining  Therapy Frequency (OT): daily  Plan of Care Review  Plan of Care Reviewed With: patient, family  Progress: improving  Outcome Evaluation: OT eval complete. Pt presents w/ cognitive deficits, balance deficits, and R visual deficits warranting cont skilled IPOT POC to promote return to baseline. Recommend pt DC to IP rehab.     Time Calculation:   Evaluation Complexity (OT)  Review Occupational Profile/Medical/Therapy History Complexity: brief/low complexity  Assessment, Occupational Performance/Identification of Deficit Complexity: 5 or more performance deficits  Clinical Decision Making Complexity (OT): detailed assessment/moderate complexity  Overall Complexity of Evaluation (OT): low complexity     Time Calculation- OT       Row Name 11/01/23 1200             Time Calculation- OT    OT Start Time 0925  -CS      OT Received On 11/01/23  -      OT Goal Re-Cert Due Date 11/11/23  -CS         Untimed Charges    OT Eval/Re-eval Minutes 46  -CS         Total Minutes     Untimed Charges Total Minutes 46  -CS       Total Minutes 46  -CS                User Key  (r) = Recorded By, (t) = Taken By, (c) = Cosigned By      Initials Name Provider Type    CS Aurea Ernst OT Occupational Therapist                  Therapy Charges for Today       Code Description Service Date Service Provider Modifiers Qty    [id number]  OT EVAL LOW COMPLEXITY 4 11/1/2023 Aurea Ernst OT GO 1                 Aurea Ernst OT  11/1/2023

## 2023-11-01 NOTE — PLAN OF CARE
Goal Outcome Evaluation:  Plan of Care Reviewed With: patient, family        Progress: improving  Outcome Evaluation: OT eval complete. Pt presents w/ cognitive deficits, balance deficits, and R visual deficits warranting cont skilled IPOT POC to promote return to baseline. Recommend pt DC to IP rehab.      Anticipated Discharge Disposition (OT): inpatient rehabilitation facility

## 2023-11-01 NOTE — PROGRESS NOTES
Stroke Progress Note       Chief Complaint:  right sided weakness    Subjective    Subjective     Subjective:  Persistent deficits   Does not follow commands      Review of Systems   UTO    Objective      Temp:  [96.8 °F (36 °C)-97.5 °F (36.4 °C)] 97.4 °F (36.3 °C)  Heart Rate:  [64-97] 70  Resp:  [14-18] 18  BP: ()/(40-80) 89/46            Left hemineglect, left facial droop  Flaccid left upper and left lower  Right gaze deviation  Does not follow any commands  Withdrawal in the right upper and right lower extremity    Results Review:    I reviewed the patient's new clinical results.    Lab Results (last 24 hours)       Procedure Component Value Units Date/Time    Urine Culture - Urine, Straight Cath [[id number]]  (Abnormal) Collected: 10/31/23 2204    Specimen: Urine from Straight Cath Updated: 11/01/23 1423     Urine Culture >100,000 CFU/mL Gram Negative Bacilli    Narrative:      Colonization of the urinary tract without infection is common. Treatment is discouraged unless the patient is symptomatic, pregnant, or undergoing an invasive urologic procedure.    POC Glucose Once [[id number]]  (Normal) Collected: 11/01/23 1157    Specimen: Blood Updated: 11/01/23 1200     Glucose 85 mg/dL     Tissue Pathology Exam [[id number]] Collected: 10/31/23 0846    Specimen: Tissue from Artery Updated: 11/01/23 0849    POC Glucose Once [[id number]]  (Normal) Collected: 11/01/23 0559    Specimen: Blood Updated: 11/01/23 0601     Glucose 93 mg/dL     Hemoglobin A1c [[id number]]  (Abnormal) Collected: 11/01/23 0155    Specimen: Blood Updated: 11/01/23 0424     Hemoglobin A1C <4.20 %     Narrative:      Hemoglobin A1C Ranges:    Increased Risk for Diabetes  5.7% to 6.4%  Diabetes                     >= 6.5%  Diabetic Goal                < 7.0%    Blood Culture - Blood, Hand, Right [[id number]] Collected: 11/01/23 0312    Specimen: Blood from Hand, Right Updated: 11/01/23 0331    Blood Gas, Arterial With Co-Ox [[id number]]   (Abnormal) Collected: 11/01/23 0247    Specimen: Arterial Blood Updated: 11/01/23 0248     Site Left Radial     Juan Francisco's Test N/A     pH, Arterial 7.346 pH units      Comment: 84 Value below reference range        pCO2, Arterial 40.5 mm Hg      pO2, Arterial 80.8 mm Hg      Comment: 84 Value below reference range        HCO3, Arterial 22.1 mmol/L      Base Excess, Arterial -3.2 mmol/L      Hemoglobin, Blood Gas 5.8 g/dL      Comment: 85 Value below critical limit        Hematocrit, Blood Gas 17.7 %      Oxyhemoglobin 93.3 %      Comment: 84 Value below reference range        Methemoglobin 0.10 %      Carboxyhemoglobin 3.5 %      Comment: 83 Value above reference range        CO2 Content 23.4 mmol/L      Temperature 37.0 C      Barometric Pressure for Blood Gas --     Comment: N/A        Modality Nasal Cannula     FIO2 28 %      Rate 0 Breaths/minute      PIP 0 cmH2O      Comment: Meter: [id number]     :  [id number]        IPAP 0     EPAP 0     Notified CHARLIE Torres     Notified By [id number]     Notified Time 11/01/2023 02:56     pH, Temp Corrected 7.346 pH Units      pCO2, Temperature Corrected 40.5 mm Hg      pO2, Temperature Corrected 80.8 mm Hg     Lipid Panel [[id number]]  (Abnormal) Collected: 11/01/23 0155    Specimen: Blood Updated: 11/01/23 0234     Total Cholesterol 88 mg/dL      Triglycerides 88 mg/dL      HDL Cholesterol 30 mg/dL      LDL Cholesterol  40 mg/dL      VLDL Cholesterol 18 mg/dL      LDL/HDL Ratio 1.35    Narrative:      Cholesterol Reference Ranges  (U.S. Department of Health and Human Services ATP III Classifications)    Desirable          <200 mg/dL  Borderline High    200-239 mg/dL  High Risk          >240 mg/dL      Triglyceride Reference Ranges  (U.S. Department of Health and Human Services ATP III Classifications)    Normal           <150 mg/dL  Borderline High  150-199 mg/dL  High             200-499 mg/dL  Very High        >500 mg/dL    HDL Reference Ranges  (U.S.  Department of Health and Human Services ATP III Classifications)    Low     <40 mg/dl (major risk factor for CHD)  High    >60 mg/dl ('negative' risk factor for CHD)        LDL Reference Ranges  (U.S. Department of Health and Human Services ATP III Classifications)    Optimal          <100 mg/dL  Near Optimal     100-129 mg/dL  Borderline High  130-159 mg/dL  High             160-189 mg/dL  Very High        >189 mg/dL    STAT Lactic Acid, Reflex [[id number]]  (Normal) Collected: 11/01/23 0155    Specimen: Blood Updated: 11/01/23 0231     Lactate 1.7 mmol/L      Comment: Falsely depressed results may occur on samples drawn from patients receiving N-Acetylcysteine (NAC) or Metamizole.       Blood Culture - Blood, Hand, Left [[id number]] Collected: 11/01/23 0151    Specimen: Blood from Hand, Left Updated: 11/01/23 0209    [place] Draw [[id number]] Collected: 10/31/23 2126    Specimen: Blood Updated: 11/01/23 0130    Narrative:      The following orders were created for panel order [place] Draw.  Procedure                               Abnormality         Status                     ---------                               -----------         ------                     Green Top (Gel)[[id number]]                                  Final result               Lavender Top[[id number]]                                     Final result               Gold Top - SST[[id number]]                                   Final result               Gray Top[[id number]]                                         Final result               Light Blue Top[[id number]]                                   Final result                 Please view results for these tests on the individual orders.    Gray Top [[id number]] Collected: 10/31/23 2126    Specimen: Blood Updated: 11/01/23 0130     Extra Tube Hold for add-ons.     Comment: Auto resulted.       Ammonia [[id number]]  (Normal) Collected: 10/31/23 2217    Specimen: Blood Updated: 10/31/23 2305     Ammonia 16  umol/L     Protime-INR [[id number]]  (Abnormal) Collected: 10/31/23 2126    Specimen: Blood Updated: 10/31/23 2303     Protime 20.4 Seconds      INR 1.73    Lactic Acid, Plasma [[id number]]  (Abnormal) Collected: 10/31/23 2126    Specimen: Blood Updated: 10/31/23 2303     Lactate 2.1 mmol/L      Comment: Falsely depressed results may occur on samples drawn from patients receiving N-Acetylcysteine (NAC) or Metamizole.       Basic Metabolic Panel [[id number]]  (Abnormal) Collected: 10/31/23 2126    Specimen: Blood Updated: 10/31/23 2241     Glucose 105 mg/dL      BUN 9 mg/dL      Creatinine 0.83 mg/dL      Sodium 135 mmol/L      Potassium 3.4 mmol/L      Chloride 101 mmol/L      CO2 23.0 mmol/L      Calcium 8.9 mg/dL      BUN/Creatinine Ratio 10.8     Anion Gap 11.0 mmol/L      eGFR 84.4 mL/min/1.73     Narrative:      GFR Normal >60  Chronic Kidney Disease <60  Kidney Failure <15      Fentanyl, Urine - Urine, Clean Catch [[id number]]  (Normal) Collected: 10/31/23 2205    Specimen: Urine, Clean Catch Updated: 10/31/23 2238     Fentanyl, Urine Negative    Narrative:      Negative Threshold:      Fentanyl 5 ng/mL     The normal value for the drug tested is negative. This report includes final unconfirmed screening results to be used for medical treatment purposes only. Unconfirmed results must not be used for non-medical purposes such as employment or legal testing. Clinical consideration should be applied to any drug of abuse test, particularly when unconfirmed results are used.           Green Top (Gel) [[id number]] Collected: 10/31/23 2126    Specimen: Blood Updated: 10/31/23 2230     Extra Tube Hold for add-ons.     Comment: Auto resulted.       Lavender Top [[id number]] Collected: 10/31/23 2126    Specimen: Blood Updated: 10/31/23 2230     Extra Tube hold for add-on     Comment: Auto resulted       Gold Top - SST [[id number]] Collected: 10/31/23 2126    Specimen: Blood Updated: 10/31/23 2230     Extra Tube Hold for  add-ons.     Comment: Auto resulted.       Light Blue Top [[id number]] Collected: 10/31/23 2126    Specimen: Blood Updated: 10/31/23 2230     Extra Tube Hold for add-ons.     Comment: Auto resulted       Urine Drug Screen - Urine, Clean Catch [[id number]]  (Abnormal) Collected: 10/31/23 2205    Specimen: Urine, Clean Catch Updated: 10/31/23 2225     THC, Screen, Urine Negative     Phencyclidine (PCP), Urine Negative     Cocaine Screen, Urine Negative     Methamphetamine, Ur Negative     Opiate Screen Positive     Amphetamine Screen, Urine Negative     Benzodiazepine Screen, Urine Negative     Tricyclic Antidepressants Screen Positive     Methadone Screen, Urine Negative     Barbiturates Screen, Urine Negative     Oxycodone Screen, Urine Negative     Propoxyphene Screen Negative     Buprenorphine, Screen, Urine Negative    Narrative:      Cutoff For Drugs Screened:    Amphetamines               500 ng/ml  Barbiturates               200 ng/ml  Benzodiazepines            150 ng/ml  Cocaine                    150 ng/ml  Methadone                  200 ng/ml  Opiates                    100 ng/ml  Phencyclidine               25 ng/ml  THC                            50 ng/ml  Methamphetamine            500 ng/ml  Tricyclic Antidepressants  300 ng/ml  Oxycodone                  100 ng/ml  Propoxyphene               300 ng/ml  Buprenorphine               10 ng/ml    The normal value for all drugs tested is negative. This report includes unconfirmed screening results, with the cutoff values listed, to be used for medical treatment purposes only.  Unconfirmed results must not be used for non-medical purposes such as employment or legal testing.  Clinical consideration should be applied to any drug of abuse test, particularly when unconfirmed results are used.      TSH [[id number]]  (Normal) Collected: 10/31/23 2126    Specimen: Blood Updated: 10/31/23 2221     TSH 1.690 uIU/mL     Urinalysis With Microscopic If Indicated (No  Culture) - Urine, Catheter [[id number]]  (Abnormal) Collected: 10/31/23 2204    Specimen: Urine, Catheter Updated: 10/31/23 2219     Color, UA Yellow     Appearance, UA Clear     pH, UA 6.5     Specific Gravity, UA 1.020     Glucose, UA Negative     Ketones, UA Negative     Bilirubin, UA Negative     Blood, UA Negative     Protein, UA Negative     Leuk Esterase, UA Moderate (2+)     Nitrite, UA Positive     Urobilinogen, UA 1.0 E.U./dL    Urinalysis, Microscopic Only - Urine, Catheter [[id number]]  (Abnormal) Collected: 10/31/23 2204    Specimen: Urine, Catheter Updated: 10/31/23 2219     RBC, UA 0-2 /HPF      WBC, UA Too Numerous to Count /HPF      Bacteria, UA 3+ /HPF      Squamous Epithelial Cells, UA 0-2 /HPF      Hyaline Casts, UA 21-30 /LPF      Methodology Automated Microscopy    ALT [[id number]]  (Normal) Collected: 10/31/23 2126    Specimen: Blood Updated: 10/31/23 2216     ALT (SGPT) <5 U/L     Acetaminophen Level [[id number]]  (Normal) Collected: 10/31/23 2126    Specimen: Blood Updated: 10/31/23 2216     Acetaminophen <5.0 mcg/mL     Salicylate Level [[id number]]  (Normal) Collected: 10/31/23 2126    Specimen: Blood Updated: 10/31/23 2216     Salicylate <0.3 mg/dL     Ethanol [[id number]]  (Normal) Collected: 10/31/23 2126    Specimen: Blood Updated: 10/31/23 2214     Ethanol <10 mg/dL     Narrative:      Elevated lactic acid concentration and lactate dehydrogenase(LD) activity may falsely elevate enzymatically determined ethanol levels. Not for legal purposes.     CK [[id number]]  (Normal) Collected: 10/31/23 2126    Specimen: Blood Updated: 10/31/23 2214     Creatine Kinase 22 U/L     Magnesium [[id number]]  (Normal) Collected: 10/31/23 2126    Specimen: Blood Updated: 10/31/23 2214     Magnesium 1.9 mg/dL     Single High Sensitivity Troponin T [[id number]]  (Abnormal) Collected: 10/31/23 2126    Specimen: Blood Updated: 10/31/23 2202     HS Troponin T 17 ng/L     Narrative:      High Sensitive Troponin T  Reference Range:  <10.0 ng/L- Negative Female for AMI  <15.0 ng/L- Negative Male for AMI  >=10 - Abnormal Female indicating possible myocardial injury.  >=15 - Abnormal Male indicating possible myocardial injury.   Clinicians would have to utilize clinical acumen, EKG, Troponin, and serial changes to determine if it is an Acute Myocardial Infarction or myocardial injury due to an underlying chronic condition.         AST [[id number]]  (Normal) Collected: 10/31/23 2126    Specimen: Blood Updated: 10/31/23 2202     AST (SGOT) 6 U/L     aPTT [[id number]]  (Normal) Collected: 10/31/23 2126    Specimen: Blood Updated: 10/31/23 2159     PTT 38.7 seconds     Narrative:      PTT = The equivalent PTT values for the therapeutic range of heparin levels at 0.3 to 0.5 U/ml are 60 to 70 seconds.    CBC & Differential [[id number]]  (Abnormal) Collected: 10/31/23 2126    Specimen: Blood Updated: 10/31/23 2147    Narrative:      The following orders were created for panel order CBC & Differential.  Procedure                               Abnormality         Status                     ---------                               -----------         ------                     CBC Auto Differential[[id number]]        Abnormal            Final result               Scan Slide[[id number]]                                                                    Please view results for these tests on the individual orders.    CBC Auto Differential [[id number]]  (Abnormal) Collected: 10/31/23 2126    Specimen: Blood Updated: 10/31/23 2147     WBC 13.88 10*3/mm3      RBC 2.16 10*6/mm3      Hemoglobin 6.7 g/dL      Hematocrit 23.0 %      .5 fL      MCH 31.0 pg      MCHC 29.1 g/dL      RDW 16.8 %      RDW-SD 62.7 fl      MPV 10.2 fL      Platelets 297 10*3/mm3      Neutrophil % 88.5 %      Lymphocyte % 4.9 %      Monocyte % 4.9 %      Eosinophil % 0.8 %      Basophil % 0.2 %      Immature Grans % 0.7 %      Neutrophils, Absolute 12.28 10*3/mm3       Lymphocytes, Absolute 0.68 10*3/mm3      Monocytes, Absolute 0.68 10*3/mm3      Eosinophils, Absolute 0.11 10*3/mm3      Basophils, Absolute 0.03 10*3/mm3      Immature Grans, Absolute 0.10 10*3/mm3      nRBC 0.0 /100 WBC           XR Abdomen KUB    Result Date: 11/1/2023  Impression: The tip of the feeding tube lies within the gastric antrum. Electronically Signed: Yasir Cr MD  11/1/2023 12:22 PM EDT  Workstation ID: [id number]    EEG    Result Date: 11/1/2023  Diffuse cerebral dysfunction of at least mild degree but nonspecific No ongoing seizures are present This report is transcribed using the Dragon dictation system.      CT Head Without Contrast    Result Date: 11/1/2023  Impression: Evolving left MCA territory infarct with associated cortical contrast staining. No acute hemorrhage. Electronically Signed: Geraldo Cueva MD  11/1/2023 5:44 AM EDT  Workstation ID: [id number]    XR Chest 1 View    Result Date: 10/31/2023  Impression: Generalized interstitial prominence without acute airspace disease. Electronically Signed: Saman Lopez MD  10/31/2023 9:40 PM CDT  Workstation ID: [id number]    CT Angiogram Head w AI Analysis of LVO    Result Date: 10/31/2023  1.Left M2 superior division segmental occlusion. 2.Right distal M1 saccular aneurysm. Electronically Signed: Saman Lopez MD  10/31/2023 9:15 PM CDT  Workstation ID: [id number]    CT Angiogram Neck    Result Date: 10/31/2023  1.Left M2 superior division segmental occlusion. 2.Right distal M1 saccular aneurysm. Electronically Signed: Saman Lopez MD  10/31/2023 9:15 PM CDT  Workstation ID: [id number]    CT CEREBRAL PERFUSION WITH & WITHOUT CONTRAST    Result Date: 10/31/2023   1. Imaging features are consistent with a left MCA territory core infarct with surrounding ischemic brain at risk. Electronically Signed: Saman Lopez MD  10/31/2023 8:59 PM CDT  Workstation ID: [id number]    CT Head Without Contrast Stroke Protocol    Result Date: 10/31/2023  Impression:  No acute intracranial process identified. Electronically Signed: Saman Lopez MD  10/31/2023 8:26 PM CDT  Workstation ID: [id number]   Results for orders placed during the hospital encounter of 08/03/22    Adult Transthoracic Echo Complete W/ Cont if Necessary Per Protocol    Interpretation Summary  1.  LV size, function, wall motion, and wall thickness are normal.  Visually estimated ejection fraction is 60 to 65%.  3D ejection fraction is 63%.  Grade 3 diastolic dysfunction.  Moderate left atrial enlargement.  Right heart chambers are normal.  No septal defect or intracavitary mass or thrombus.    2.  There is a mechanical mitral valve prosthesis in place with no unusual sewing ring motion.  Mitral valve area by pressure half-time is 2.5 cm² by time velocity integral 2.2 cm² and there is trivial to mild MR.  There is mild TR from a morphologically normal valve.  There is a mechanical aortic valve prosthesis in place with no unusual sewing ring motion.  Aortic valve parameters include a peak instantaneous gradient of 32, mean gradient of 15, and an aortic valve area of 2.3 cm².  Dimensionless index is 0.74.  There is trivial AI.    3.  No pericardial or great vessel pathology.    4.  Pulmonary artery systolic pressures are estimated in the low to mid 30s.            Assessment/Plan     Assessment/Plan:  Acute ischemic stroke, left MCA, status post mechanical thrombectomy -TICI 3  -The embolus appeared to be a soft tissue than clot,  it was sent to pathology.  -We will get ESR CRP, blood cultures x 2  -Clinical dilemma regarding restarting anticoagulation in her case, recommend to start low-dose heparin given, patient has mechanical valve-high risk for recurrent thromboembolic event if not started on anticoagulation.  But, nonetheless starting her on AC does not exclude from having increased bleeding events.   Okay to start  aspirin 81    Critically ill, spent more than 35 min reviewing the scans,  discussing the  plan with multidisciplinary team.      Dilip Grullon MD  11/01/23  14:44 EDT

## 2023-11-01 NOTE — PLAN OF CARE
Goal Outcome Evaluation:  Plan of Care Reviewed With: patient, spouse, son        Progress: improving  Outcome Evaluation: Pt limited by decreased functional endurance, balance deficit, generalized weakness, R vision deficit, and impaired cognition compared to baseline. Pt took lateral steps towards HOB with mod Ax2. Rec continued skilled PT to increase indep with mobility. d/c rec for IRF.      Anticipated Discharge Disposition (PT): inpatient rehabilitation facility

## 2023-11-01 NOTE — CASE MANAGEMENT/SOCIAL WORK
Discharge Planning Assessment  [facility]     Patient Name: Mara Martínez  MRN: [id number]  Today's Date: 11/1/2023    Admit Date: 10/31/2023    Plan: Ongoing   Discharge Needs Assessment       Row Name 11/01/23 0834       Living Environment    People in Home spouse    Name(s) of People in Home Mario (spouse) [phone]    Current Living Arrangements home    Potentially Unsafe Housing Conditions none    Primary Care Provided by self    Provides Primary Care For no one    Family Caregiver if Needed spouse    Quality of Family Relationships helpful;involved;supportive    Able to Return to Prior Arrangements no       Resource/Environmental Concerns    Resource/Environmental Concerns none    Transportation Concerns none       Transition Planning    Patient/Family Anticipates Transition to inpatient rehabilitation facility    Patient/Family Anticipated Services at Transition none    Transportation Anticipated family or friend will provide       Discharge Needs Assessment    Readmission Within the Last 30 Days no previous admission in last 30 days    Equipment Currently Used at Home cpap;oxygen;wheelchair    Concerns to be Addressed denies needs/concerns at this time    Anticipated Changes Related to Illness none    Equipment Needed After Discharge none    Outpatient/Agency/Support Group Needs inpatient rehabilitation facility;skilled nursing facility    Discharge Facility/Level of Care Needs nursing facility, skilled;rehabilitation facility                   Discharge Plan       Row Name 11/01/23 0835       Plan    Plan Ongoing    Patient/Family in Agreement with Plan yes    Plan Comments Spoke to spouse at bedside. Lives with Mario (spouse) [phone] in [facility]. Is independent with ADL's. No problems with [employer] Medicare or medications. Has a CPAP, oxygen and wheelcgair at home. No advanced directives. PCP is CHARLIE Fernandez. Plan is ongoing. CM will continue to follow.    Final Discharge Disposition Code 30  - still a patient                  Continued Care and Services - Admitted Since 10/31/2023    Coordination has not been started for this encounter.          Demographic Summary       Row Name 11/01/23 0833       General Information    Admission Type inpatient    Arrived From emergency department    Referral Source admission list    Reason for Consult discharge planning    Preferred Language English       Contact Information    Permission Granted to Share Info With     Contact Information Obtained for                    Functional Status       Row Name 11/01/23 0834       Functional Status    Usual Activity Tolerance fair       Functional Status, IADL    Medications independent    Meal Preparation independent    Housekeeping independent    Laundry independent    Shopping independent       Mental Status    General Appearance WDL WDL       Mental Status Summary    Recent Changes in Mental Status/Cognitive Functioning unable to assess       Employment/    Employment Status disabled                   Psychosocial    No documentation.                  Abuse/Neglect    No documentation.                  Legal    No documentation.                  Substance Abuse    No documentation.                  Patient Forms    No documentation.                     Brett Soler RN

## 2023-11-01 NOTE — H&P
Pulmonary/Critical Care History and Physical Exam     LOS: 1 day   Patient Care Team:  Rowan Nolasco APRN as PCP - General (Nurse Practitioner)  Alfonzo Garcia MD as Consulting Physician (Cardiology)    Chief Complaint:    Chief Complaint   Patient presents with    Stroke     Subjective     HPI:   Mara Martínez is a 53 y.o. female who presents to [facility] ED 11/01/23 with acute left MCA CVA.     Patient has a PMH of chronic cigarette tobacco use, HTN, dyslipidemia, HFpEF, S/P mechanical aortic and mitral valve replacement (Dr. Cavazos, July 2018) on Coumadin, carotid artery stenosis s/p left ICA stent (July 2020), chronic pain with intrathecal pump in place, history of recurrent GIB 2* AVMs, and chronic hypotension on midodrine.     Per report, patient's LKW was ~0700 on 10/31/23 and was found by family this evening with altered mentation and gait disturbance, prompting call to EMS. She was brought to our ED where upon arrival initial NIHSS 22 and CTH was negative for an acute intracranial abnormality. Subsequent CTA H/N revealed 3mm right distal M1 saccular aneurysm and left M2 MCA branch occlusion with subsequent CTP showing corresponding area of ischemic penumbra within the left MCA territory. Her case was discussed with Neurology she was ultimately not a candidate for thrombolytic therapy (2* outside the timing window) but was a candidate for endovascular intervention, and was taken to Cath Lab where successful mechanical thrombectomy of saddle embolus of left MCA bifurcation was performed per Dr. Mckeon. Postprocedure she will be admitted to ICU for further management.  Embolus appeared to be more soft tissue and was sent to pathology given patient's history of valvular heart surgery.    Of note, additional pertinent findings in the ED included patient significantly anemic with H&H 6.7 & 23 and was type and screened with 2 units PRBC to be transfused. UA was also consistent with UTI and she was placed on  empiric Rocephin.     Time spent: 10 minutes  Electronically signed by Mouna Begum DNP, APRN, 11/01/23, 12:44 AM EDT.     I performed an independent history and physical examination. Portions of the history were obtained by APRN and were modified by me according to my findings. The above note reflects my findings, assessment, and plan.    Patient seen in the ICU after arrival from Cath Lab.  She underwent mechanical thrombectomy of saddle embolus of left MCA bifurcation by Dr. Mckeon.  Patient is currently confused and lethargic with some sonorous respiration.  I spoke to the  who reports that she has had some increased fatigue/malaise over the past couple months with poor sleep quality and poor diet, unable to tolerate significant amounts of food.  He denies that she has had any adjustments to her pain pump.  Patient has sleep apnea on NIPPV at home but her  reports that she has been noncompliant with this for the past 4 months.  Patient had apparently not taken any of her medications for the day on 10/31/2023 based on family checking her pill organizer.    Fernando Guevara MD      Past Medical History:   Diagnosis Date    Anemia     Aortic regurgitation     Arthritis     Back pain     Carotid bruit     ISREAL (cerebral atherosclerosis) 12/2014    nonobstructive    Chest pain     Chronic hypotension on midodrine 11/01/2023    Chronic obstructive pulmonary disease 09/07/2022    Coronary artery disease     COVID-19 vaccine administered     phizer    D-dimer, elevated     Diastolic congestive heart failure 07/25/2019    Dizziness     Edema     Epilepsy     Fatigue     Heart murmur     History of blood transfusion 08/2019    History of blood transfusion 2022    History of blood transfusion 11/2022    History of blood transfusion     August 2023 x2    History of recent blood transfusion 12/2021    History of transfusion     Hyperlipidemia     Hypertension     Kidney stones     Migraines     Mitral  regurgitation     Mitral stenosis     mild    Neuropathy     Neuropathy     Palpitations     Pulmonary edema     Retinal drusen     Seizure     Sleep apnea 2019    Sleeping difficulties     SOB (shortness of breath)     Supraventricular aortic stenosis     Syncope     Tachycardia     Tobacco abuse     VHD (valvular heart disease)     Wears dentures     Wears eyeglasses        Past Surgical History:   Procedure Laterality Date    APPENDECTOMY      CARDIAC CATHETERIZATION      CARDIAC SURGERY      2 valves replaced     SECTION      x 2    CHOLECYSTECTOMY      COLONOSCOPY      COLONOSCOPY N/A 2019    Procedure: COLONOSCOPY;  Surgeon: Kurt Gaines MD;  Location: [facility];  Service: Gastroenterology    CORONARY ARTERY BYPASS GRAFT  2018    EXPLORATORY LAPAROTOMY      HERNIA REPAIR      HYSTERECTOMY      JOINT REPLACEMENT Right     knee replacement    PAIN PUMP INSERTION/REVISION      morphine    PORTACATH PLACEMENT      UPPER GASTROINTESTINAL ENDOSCOPY         Family History   Problem Relation Age of Onset    Cancer Mother     Cancer Father     Hypertension Father     Other Sister         ACUTE MYOCARDIAL INFARCTION,CABG    Heart failure Sister     Hypertension Sister     Stroke Other        Social History     Socioeconomic History    Marital status:     Number of children: 2   Tobacco Use    Smoking status: Every Day     Packs/day: 1.00     Years: 40.00     Additional pack years: 0.00     Total pack years: 40.00     Types: Cigarettes     Last attempt to quit: 2023     Years since quittin.8    Smokeless tobacco: Never   Substance and Sexual Activity    Alcohol use: No    Drug use: No    Sexual activity: Yes       Allergies   Allergen Reactions    Azithromycin Shortness Of Breath     Rash, itching    Metformin Itching     Vomiting    Gabapentin Other (See Comments) and Unknown - High Severity     dizziness       Past Medical History/Family History/Social History were reviewed by me  and updates were made.     Review of Systems:    Review of 14 systems was completed with positives and pertinent negatives noted in the subjective section.  All other systems reviewed and are negative.         Objective     Vital Signs  Temp:  [97 °F (36.1 °C)] 97 °F (36.1 °C)  Heart Rate:  [75-97] 79  Resp:  [15-18] 18  BP: ()/(40-80) 106/40  Body mass index is 26.37 kg/m².     No intake or output data in the 24 hours ending 11/01/23 0326   Physical Exam:     General Appearance:    Alert, cooperative, in no acute distress   Head:    Normocephalic, without obvious abnormality, atraumatic   Eyes:            Lids and lashes normal, conjunctivae and sclerae normal,    no icterus, no pallor, corneas clear, PERRL   ENMT:   Ears appear intact with no abnormalities noted      No oral lesions, no thrush, oral mucosa moist   Neck:   No adenopathy, supple, trachea midline, no thyromegaly,      no carotid bruit, no JVD   Lungs/resp:     Normal effort, symmetric chest rise, no crepitus, clear to      auscultation bilaterally, no chest wall tenderness                  Heart/CV:    Regular rhythm and normal rate, normal S1 and S2, no         murmur   Abdomen/GI:     Normal bowel sounds, no masses, no organomegaly, soft,    nontender, nondistended   G/U:     Deferred   Extremities/MSK:   No clubbing, cyanosis or edema.  Normal tone.  No deformities.    Pulses:   Pulses palpable and equal bilaterally   Skin:   No bleeding, bruising or rash   Hem/Lymph:   No cervical or supraclavicular adenopathy.    Neurologic:   Moves all extremities with no obvious focal motor deficit.  Cranial nerves 2 - 12 grossly intact            Psychiatric:  Normal mood and affect, oriented x 3.     The above findings are documentation of my personal physical exam findings from today.   Electronically signed by:  Fernando Guevara MD  11/01/23  03:26 EDT     Results Review:     I reviewed the patient's new clinical results.   Results from last 7 days    Lab Units 10/31/23  2126   SODIUM mmol/L 135*   POTASSIUM mmol/L 3.4*   CHLORIDE mmol/L 101   CO2 mmol/L 23.0   BUN mg/dL 9   CREATININE mg/dL 0.83   CALCIUM mg/dL 8.9   ALT (SGPT) U/L <5   AST (SGOT) U/L 6   GLUCOSE mg/dL 105*     Results from last 7 days   Lab Units 10/31/23  2126   WBC 10*3/mm3 13.88*   HEMOGLOBIN g/dL 6.7*   HEMATOCRIT % 23.0*   PLATELETS 10*3/mm3 297     Results from last 7 days   Lab Units 11/01/23  0247   PH, ARTERIAL pH units 7.346*   PO2 ART mm Hg 80.8*   PCO2, ARTERIAL mm Hg 40.5   HCO3 ART mmol/L 22.1       I reviewed the patient's new imaging including images and reports.    Medication Review:   aspirin, 81 mg, Oral, Daily   Or  aspirin, 300 mg, Rectal, Daily  atorvastatin, 80 mg, Oral, Nightly  cefTRIAXone, 1,000 mg, Intravenous, Q24H  heparin (porcine), 5,000 Units, Subcutaneous, Q8H  levETIRAcetam, 1,000 mg, Intravenous, Q12H  sodium chloride, 10 mL, Intravenous, Q12H      Pharmacy Consult,         Assessment & Plan     Active Hospital Problems    Diagnosis     **Acute ischemic left MCA stroke     H/O recurrent GIB 2* AVMs     UTI (urinary tract infection)     Chronic hypotension on midodrine     Cerebral aneurysm     Dyslipidemia     Chronic pain with intrathecal pump in place     (HFpEF) heart failure with preserved ejection fraction     S/P mechanical aortic and mitral valve replacement (2018)     Tobacco use     HTN (hypertension)     Anemia      53 y.o. active smoker with history of HTN, HLD, HFpEF, history of aortic/mitral valve disease status post mechanical aortic and mitral valve replacement by Dr. Cyr in 2018 on chronic Coumadin, carotid stenosis status post left ICA stent July 2020, seizure disorder on Keppra and Topamax, history of recurrent GI bleed secondary to AVMs, chronic hypotension on midodrine, RB ILD followed by Dr. Arambula pulmonary at [facility], was found by family on the evening of 10/31/2023 with AMS and difficulty walking prompting a call to  EMS.  She was brought to the emergency department where NIHSS was 22 and CT head was negative for acute abnormality.  Stroke imaging was consistent with left MCA M2 branch occlusion.  Patient was outside of window for thrombolytics and was subsequently taken to the Cath Lab and underwent successful mechanical thrombectomy of a saddle embolus of the left MCA bifurcation by Dr. Mckeon.  She was subsequently admitted to the intensive care unit for ongoing management of stroke.    Patient remains confused/obtunded in the intensive care unit.  Currently seems a bit weak on the right but is moving all 4 extremities spontaneously.  Does not follow commands.  She does open eyes to voice and tracks a little bit, better on the left.  Urinalysis is consistent with urinary tract infection and a urine culture was sent.  Patient started on Rocephin.    I am concerned about alternative etiologies to the patient's current altered mental status other than stroke.  I am going to check an arterial blood gas.  Patient is on NIPPV at home and apparently noncompliant.  She has a pain pump and takes oral narcotics.  UTI can cause altered mental status but I doubt it would be this severe.    INR was subtherapeutic and I think the stroke was likely cardioembolic secondary to her mechanical valve.  She is in a difficult spot given the need for anticoagulation and the history of recurrent GI bleeds and current anemia which complicates her care.    Plan:  For acute CVA: Status post left MCA thrombectomy by Dr. Mckeon.  Admit to ICU.  Post neuro intervention protocol.  Stroke work-up.  N.p.o. for now.  PT/OT/speech therapy.  Stroke service to follow.  Aspirin, statin.  Resume anticoagulation as soon as possible when okay with neurology.    For JOHN: Check ABG.  Restart NIPPV.  For anemia: 2 units PRBCs have been ordered.  Monitor for active bleeding.  Patient has known chronic GI bleeding secondary to AVMs that needs anticoagulation secondary to  mechanical valves.  Monitor H&H with transfusions as necessary.  For UTI: Rocephin.  Follow-up cultures.  For AMS/encephalopathy: Check ABG.  Check EEG.  Hold sedating medications for now.  Transfusion of 2 units PRBCs.  For chronic pain: Has intrathecal pain pump (0.9 mg/day morphine/bupivacaine).  Also on baclofen, hydrocodone, per records.  Hold these for now given AMS.  Obtain accurate medication list.  DVT prophylaxis: Subcutaneous heparin.    Patient is critically ill secondary to tenuous neurologic and respiratory status in the setting of acute CVA and altered mental status of undetermined etiology and has high risk of life-threatening decline in condition.  As such, she requires continuous monitoring frequent reassessment for consideration of adjustment management to minimize this risk.  I personally reassessed her multiple times today.    Critical care time : 50 minutes spent by me personally.(This excludes time spent performing separately reportable procedures and services). Critical care time includes high complexity decision making to assess, manipulate, and support vital organ system failure in this individual who has impairment of one or more vital organ systems such that there is a high probability of imminent or life threatening deterioration in the patient’s condition.    Electronically signed by:  Fernando Guevara MD  11/01/23  03:26 EDT      Please note that portions of this note were completed with [employer] - a voice recognition program.

## 2023-11-01 NOTE — CONSULTS
Order noted for diabetes education per stroke protocol. A1c <4.2%. No history of diabetes noted per chart review. Does not qualify for OP diabetes/stroke class.

## 2023-11-01 NOTE — SIGNIFICANT NOTE
11/01/23 0854   SLP Deferred Reason   SLP Deferred Reason Routine  (Consult received for dysphagia & cognitive-communication evals per stroke protocol. Spoke w/ RN- pt not alert enough this AM to participate. Will f/u as appropriate.)

## 2023-11-02 ENCOUNTER — APPOINTMENT (OUTPATIENT)
Dept: MRI IMAGING | Facility: HOSPITAL | Age: 53
DRG: 024 | End: 2023-11-02
Payer: MEDICARE

## 2023-11-02 ENCOUNTER — APPOINTMENT (OUTPATIENT)
Dept: CT IMAGING | Facility: HOSPITAL | Age: 53
DRG: 024 | End: 2023-11-02
Payer: MEDICARE

## 2023-11-02 LAB
ALBUMIN SERPL-MCNC: 2.4 G/DL (ref 3.5–5.2)
ALBUMIN/GLOB SERPL: 0.9 G/DL
ALP SERPL-CCNC: 99 U/L (ref 39–117)
ALT SERPL W P-5'-P-CCNC: <5 U/L (ref 1–33)
ANION GAP SERPL CALCULATED.3IONS-SCNC: 7 MMOL/L (ref 5–15)
ANION GAP SERPL CALCULATED.3IONS-SCNC: 7 MMOL/L (ref 5–15)
ANISOCYTOSIS BLD QL: NORMAL
APTT PPP: 38.6 SECONDS (ref 60–90)
AST SERPL-CCNC: 8 U/L (ref 1–32)
BACTERIA SPEC AEROBE CULT: ABNORMAL
BASOPHILS # BLD AUTO: 0.04 10*3/MM3 (ref 0–0.2)
BASOPHILS NFR BLD AUTO: 0.4 % (ref 0–1.5)
BH BB BLOOD EXPIRATION DATE: NORMAL
BH BB BLOOD EXPIRATION DATE: NORMAL
BH BB BLOOD TYPE BARCODE: 5100
BH BB BLOOD TYPE BARCODE: 5100
BH BB DISPENSE STATUS: NORMAL
BH BB DISPENSE STATUS: NORMAL
BH BB PRODUCT CODE: NORMAL
BH BB PRODUCT CODE: NORMAL
BH BB UNIT NUMBER: NORMAL
BH BB UNIT NUMBER: NORMAL
BILIRUB SERPL-MCNC: 0.3 MG/DL (ref 0–1.2)
BUN SERPL-MCNC: 7 MG/DL (ref 6–20)
BUN SERPL-MCNC: 7 MG/DL (ref 6–20)
BUN/CREAT SERPL: 10.3 (ref 7–25)
BUN/CREAT SERPL: 10.3 (ref 7–25)
CALCIUM SPEC-SCNC: 8 MG/DL (ref 8.6–10.5)
CALCIUM SPEC-SCNC: 8 MG/DL (ref 8.6–10.5)
CHLORIDE SERPL-SCNC: 109 MMOL/L (ref 98–107)
CHLORIDE SERPL-SCNC: 109 MMOL/L (ref 98–107)
CO2 SERPL-SCNC: 23 MMOL/L (ref 22–29)
CO2 SERPL-SCNC: 23 MMOL/L (ref 22–29)
CREAT SERPL-MCNC: 0.68 MG/DL (ref 0.57–1)
CREAT SERPL-MCNC: 0.68 MG/DL (ref 0.57–1)
CROSSMATCH INTERPRETATION: NORMAL
CROSSMATCH INTERPRETATION: NORMAL
CYTO UR: NORMAL
DEPRECATED RDW RBC AUTO: 75.4 FL (ref 37–54)
EGFRCR SERPLBLD CKD-EPI 2021: 104.3 ML/MIN/1.73
EGFRCR SERPLBLD CKD-EPI 2021: 104.3 ML/MIN/1.73
EOSINOPHIL # BLD AUTO: 0.18 10*3/MM3 (ref 0–0.4)
EOSINOPHIL NFR BLD AUTO: 1.7 % (ref 0.3–6.2)
ERYTHROCYTE [DISTWIDTH] IN BLOOD BY AUTOMATED COUNT: 22 % (ref 12.3–15.4)
GLOBULIN UR ELPH-MCNC: 2.6 GM/DL
GLUCOSE BLDC GLUCOMTR-MCNC: 73 MG/DL (ref 70–130)
GLUCOSE BLDC GLUCOMTR-MCNC: 85 MG/DL (ref 70–130)
GLUCOSE BLDC GLUCOMTR-MCNC: 89 MG/DL (ref 70–130)
GLUCOSE BLDC GLUCOMTR-MCNC: 98 MG/DL (ref 70–130)
GLUCOSE BLDC GLUCOMTR-MCNC: 99 MG/DL (ref 70–130)
GLUCOSE SERPL-MCNC: 152 MG/DL (ref 65–99)
GLUCOSE SERPL-MCNC: 152 MG/DL (ref 65–99)
HCT VFR BLD AUTO: 28.6 % (ref 34–46.6)
HGB BLD-MCNC: 8.9 G/DL (ref 12–15.9)
IMM GRANULOCYTES # BLD AUTO: 0.07 10*3/MM3 (ref 0–0.05)
IMM GRANULOCYTES NFR BLD AUTO: 0.7 % (ref 0–0.5)
INR PPP: 1.75 (ref 0.89–1.12)
LAB AP CASE REPORT: NORMAL
LAB AP CLINICAL INFORMATION: NORMAL
LYMPHOCYTES # BLD AUTO: 0.67 10*3/MM3 (ref 0.7–3.1)
LYMPHOCYTES NFR BLD AUTO: 6.2 % (ref 19.6–45.3)
MACROCYTES BLD QL SMEAR: NORMAL
MAGNESIUM SERPL-MCNC: 1.8 MG/DL (ref 1.6–2.6)
MCH RBC QN AUTO: 30.2 PG (ref 26.6–33)
MCHC RBC AUTO-ENTMCNC: 31.1 G/DL (ref 31.5–35.7)
MCV RBC AUTO: 96.9 FL (ref 79–97)
MONOCYTES # BLD AUTO: 0.83 10*3/MM3 (ref 0.1–0.9)
MONOCYTES NFR BLD AUTO: 7.7 % (ref 5–12)
NEUTROPHILS NFR BLD AUTO: 8.95 10*3/MM3 (ref 1.7–7)
NEUTROPHILS NFR BLD AUTO: 83.3 % (ref 42.7–76)
NRBC BLD AUTO-RTO: 0 /100 WBC (ref 0–0.2)
PATH REPORT.FINAL DX SPEC: NORMAL
PATH REPORT.GROSS SPEC: NORMAL
PHOSPHATE SERPL-MCNC: 3.9 MG/DL (ref 2.5–4.5)
PLAT MORPH BLD: NORMAL
PLATELET # BLD AUTO: 250 10*3/MM3 (ref 140–450)
PMV BLD AUTO: 10.6 FL (ref 6–12)
POLYCHROMASIA BLD QL SMEAR: NORMAL
POTASSIUM SERPL-SCNC: 3.7 MMOL/L (ref 3.5–5.2)
POTASSIUM SERPL-SCNC: 3.7 MMOL/L (ref 3.5–5.2)
PROT SERPL-MCNC: 5 G/DL (ref 6–8.5)
PROTHROMBIN TIME: 20.6 SECONDS (ref 12.2–14.5)
RBC # BLD AUTO: 2.95 10*6/MM3 (ref 3.77–5.28)
SODIUM SERPL-SCNC: 139 MMOL/L (ref 136–145)
SODIUM SERPL-SCNC: 139 MMOL/L (ref 136–145)
UFH PPP CHRO-ACNC: 0.1 IU/ML (ref 0.3–0.7)
UFH PPP CHRO-ACNC: 0.1 IU/ML (ref 0.3–0.7)
UNIT  ABO: NORMAL
UNIT  ABO: NORMAL
UNIT  RH: NORMAL
UNIT  RH: NORMAL
WBC MORPH BLD: NORMAL
WBC NRBC COR # BLD: 10.74 10*3/MM3 (ref 3.4–10.8)

## 2023-11-02 PROCEDURE — 25010000002 HEPARIN (PORCINE) 25000-0.45 UT/250ML-% SOLUTION: Performed by: INTERNAL MEDICINE

## 2023-11-02 PROCEDURE — 25010000002 LEVETRIRACETAM PER 10 MG

## 2023-11-02 PROCEDURE — 70450 CT HEAD/BRAIN W/O DYE: CPT

## 2023-11-02 PROCEDURE — 99233 SBSQ HOSP IP/OBS HIGH 50: CPT | Performed by: INTERNAL MEDICINE

## 2023-11-02 PROCEDURE — 85730 THROMBOPLASTIN TIME PARTIAL: CPT | Performed by: INTERNAL MEDICINE

## 2023-11-02 PROCEDURE — 99291 CRITICAL CARE FIRST HOUR: CPT | Performed by: STUDENT IN AN ORGANIZED HEALTH CARE EDUCATION/TRAINING PROGRAM

## 2023-11-02 PROCEDURE — 85520 HEPARIN ASSAY: CPT

## 2023-11-02 PROCEDURE — 25010000002 HEPARIN (PORCINE) PER 1000 UNITS: Performed by: INTERNAL MEDICINE

## 2023-11-02 PROCEDURE — 25810000003 DEXTROSE-NACL PER 500 ML

## 2023-11-02 PROCEDURE — 85610 PROTHROMBIN TIME: CPT | Performed by: INTERNAL MEDICINE

## 2023-11-02 PROCEDURE — 97530 THERAPEUTIC ACTIVITIES: CPT

## 2023-11-02 PROCEDURE — 92523 SPEECH SOUND LANG COMPREHEN: CPT

## 2023-11-02 PROCEDURE — 82948 REAGENT STRIP/BLOOD GLUCOSE: CPT

## 2023-11-02 PROCEDURE — 83735 ASSAY OF MAGNESIUM: CPT | Performed by: NURSE PRACTITIONER

## 2023-11-02 PROCEDURE — 92610 EVALUATE SWALLOWING FUNCTION: CPT

## 2023-11-02 PROCEDURE — 80053 COMPREHEN METABOLIC PANEL: CPT | Performed by: NURSE PRACTITIONER

## 2023-11-02 PROCEDURE — 85520 HEPARIN ASSAY: CPT | Performed by: INTERNAL MEDICINE

## 2023-11-02 PROCEDURE — 84100 ASSAY OF PHOSPHORUS: CPT | Performed by: NURSE PRACTITIONER

## 2023-11-02 PROCEDURE — 85007 BL SMEAR W/DIFF WBC COUNT: CPT | Performed by: INTERNAL MEDICINE

## 2023-11-02 PROCEDURE — 25010000002 CEFTRIAXONE PER 250 MG: Performed by: NURSE PRACTITIONER

## 2023-11-02 PROCEDURE — 85025 COMPLETE CBC W/AUTO DIFF WBC: CPT | Performed by: INTERNAL MEDICINE

## 2023-11-02 RX ORDER — HEPARIN SODIUM 10000 [USP'U]/100ML
22 INJECTION, SOLUTION INTRAVENOUS
Status: DISCONTINUED | OUTPATIENT
Start: 2023-11-02 | End: 2023-11-08

## 2023-11-02 RX ORDER — TOPIRAMATE 100 MG/1
200 TABLET, FILM COATED ORAL 2 TIMES DAILY
Status: DISCONTINUED | OUTPATIENT
Start: 2023-11-02 | End: 2023-11-02

## 2023-11-02 RX ORDER — TOPIRAMATE 100 MG/1
200 TABLET, FILM COATED ORAL 2 TIMES DAILY
Status: DISCONTINUED | OUTPATIENT
Start: 2023-11-02 | End: 2023-11-06

## 2023-11-02 RX ADMIN — Medication 10 ML: at 21:55

## 2023-11-02 RX ADMIN — DEXTROSE MONOHYDRATE 50 ML: 25 INJECTION, SOLUTION INTRAVENOUS at 00:50

## 2023-11-02 RX ADMIN — HEPARIN SODIUM 12 UNITS/KG/HR: 10000 INJECTION, SOLUTION INTRAVENOUS at 14:24

## 2023-11-02 RX ADMIN — Medication 10 ML: at 08:05

## 2023-11-02 RX ADMIN — HEPARIN SODIUM 5000 UNITS: 5000 INJECTION INTRAVENOUS; SUBCUTANEOUS at 05:45

## 2023-11-02 RX ADMIN — PANTOPRAZOLE SODIUM 40 MG: 40 INJECTION, POWDER, LYOPHILIZED, FOR SOLUTION INTRAVENOUS at 05:46

## 2023-11-02 RX ADMIN — LEVETIRACETAM 1000 MG: 100 INJECTION, SOLUTION INTRAVENOUS at 21:36

## 2023-11-02 RX ADMIN — MIDODRINE HYDROCHLORIDE 5 MG: 5 TABLET ORAL at 21:36

## 2023-11-02 RX ADMIN — DEXTROSE AND SODIUM CHLORIDE 50 ML/HR: 5; 900 INJECTION, SOLUTION INTRAVENOUS at 11:44

## 2023-11-02 RX ADMIN — SODIUM CHLORIDE 1000 MG: 900 INJECTION INTRAVENOUS at 21:36

## 2023-11-02 RX ADMIN — MIDODRINE HYDROCHLORIDE 5 MG: 5 TABLET ORAL at 11:44

## 2023-11-02 RX ADMIN — ASPIRIN 81 MG CHEWABLE TABLET 81 MG: 81 TABLET CHEWABLE at 08:04

## 2023-11-02 RX ADMIN — LEVETIRACETAM 1000 MG: 100 INJECTION, SOLUTION INTRAVENOUS at 08:05

## 2023-11-02 RX ADMIN — ATORVASTATIN CALCIUM 80 MG: 40 TABLET, FILM COATED ORAL at 21:36

## 2023-11-02 RX ADMIN — MIDODRINE HYDROCHLORIDE 5 MG: 5 TABLET ORAL at 04:09

## 2023-11-02 RX ADMIN — TOPIRAMATE 200 MG: 100 TABLET, FILM COATED ORAL at 11:44

## 2023-11-02 RX ADMIN — TOPIRAMATE 200 MG: 100 TABLET, FILM COATED ORAL at 21:36

## 2023-11-02 NOTE — PROGRESS NOTES
"Critical Care Note     LOS: 2 days   Patient Care Team:  Rowan Nolasco APRN as PCP - General (Nurse Practitioner)  Alfonzo Garcia MD as Consulting Physician (Cardiology)    Chief Complaint/Reason for visit:    Chief Complaint   Patient presents with    Stroke   Acute left middle cerebral artery CVA  Anemia  UTI  Hypertension  Dyslipidemia  History of mechanical aortic and mitral valve replacement 2018  Chronic pain with intrathecal pain pump    Subjective     Interval History:     .  NIH stroke score is a 13.  She is having difficulty speaking but will nod to questions.  She is afebrile.  Saturation 97% on 2 L.  Urine output 1.2 L.  Hemoglobin drifted down to 8.9.  She was started on heparin without a bolus.    Review of Systems:    All systems were reviewed and negative except as noted in subjective.    Medical history, surgical history, social history, family history reviewed    Objective     Intake/Output:    Intake/Output Summary (Last 24 hours) at 11/2/2023 1027  Last data filed at 11/2/2023 0800  Gross per 24 hour   Intake 1553.5 ml   Output 1250 ml   Net 303.5 ml       Nutrition:  NPO Diet NPO Type: Strict NPO    Infusions:  dextrose 5 % and sodium chloride 0.9 %, 50 mL/hr, Last Rate: 50 mL/hr (11/01/23 1802)  phenylephrine, 0.5-3 mcg/kg/min, Last Rate: Stopped (11/02/23 0546)        Mechanical Ventilator Settings:                                                Telemetry: Sinus rhythm             Vital Signs  Blood pressure 120/68, pulse 76, temperature 98.4 °F (36.9 °C), temperature source Axillary, resp. rate 16, height 152.4 cm (60\"), weight 73.4 kg (161 lb 13.1 oz), SpO2 97%, not currently breastfeeding.    Physical Exam:  General Appearance:  Middle-aged woman awake with expressive aphasia   Head:  No visible trauma   Eyes:          Pupils are small, equal   Ears:     Throat: Oral mucosa moist   Neck: Trachea midline, no carotid bruit   Back:      Lungs:   Scattered expiratory rhonchi, " wheezing    Heart:  regular rhythm, mechanical S1, mechanical S2   Abdomen:   active bowel sounds, nondistended   Rectal:   Deferred   Extremities:   Right femoral site without hematoma   Pulses:    Skin: No rash   Lymph nodes: No cervical adenopathy   Neurologic: Continues to have right hemiparesis, and facial droop.  Severe dysarthria.  She will follow commands on the left.    Interval:  (Handoff)  1a. Level of Consciousness: 1-->Not alert, but arousable by minor stimulation to obey, answer, or respond  1b. LOC Questions: 2-->Answers neither question correctly  1c. LOC Commands: 0-->Performs both tasks correctly  2. Best Gaze: 0-->Normal  3. Visual: 0-->No visual loss  4. Facial Palsy: 1-->Minor paralysis (flattened nasolabial fold, asymmetry on smiling)  5a. Motor Arm, Left: 0-->No drift, limb holds 90 (or 45) degrees for full 10 secs  5b. Motor Arm, Right: 3-->No effort against gravity, limb falls  6a. Motor Leg, Left: 0-->No drift, leg holds 30 degree position for full 5 secs  6b. Motor Leg, Right: 1-->Drift, leg falls by the end of the 5-sec period but does not hit bed  7. Limb Ataxia: 0-->Absent  8. Sensory: 0-->Normal, no sensory loss  9. Best Language: 3-->Mute, global aphasia, no usable speech or auditory comprehension  10. Dysarthria: 2-->Severe dysarthria, patients speech is so slurred as to be unintelligible in the absence of or out of proportion to any dysphasia, or is mute/anarthric  11. Extinction and Inattention (formerly Neglect): 0-->No abnormality    Total (NIH Stroke Scale): 13   Results Review:     I reviewed the patient's new clinical results.   Results from last 7 days   Lab Units 11/02/23  0104 10/31/23  2126   SODIUM mmol/L 139  139 135*   POTASSIUM mmol/L 3.7  3.7 3.4*   CHLORIDE mmol/L 109*  109* 101   CO2 mmol/L 23.0  23.0 23.0   BUN mg/dL 7  7 9   CREATININE mg/dL 0.68  0.68 0.83   CALCIUM mg/dL 8.0*  8.0* 8.9   BILIRUBIN mg/dL 0.3  --    ALK PHOS U/L 99  --    ALT (SGPT) U/L <5  <5   AST (SGOT) U/L 8 6   GLUCOSE mg/dL 152*  152* 105*     Results from last 7 days   Lab Units 11/02/23  0440 11/01/23  1757 10/31/23  2126   WBC 10*3/mm3 10.74  --  13.88*   HEMOGLOBIN g/dL 8.9* 9.2* 6.7*   HEMATOCRIT % 28.6* 29.5* 23.0*   PLATELETS 10*3/mm3 250  --  297     Results from last 7 days   Lab Units 11/01/23  0247   PH, ARTERIAL pH units 7.346*   PO2 ART mm Hg 80.8*   PCO2, ARTERIAL mm Hg 40.5   HCO3 ART mmol/L 22.1     Lab Results   Component Value Date    BLOODCX No growth at 24 hours 11/01/2023     Lab Results   Component Value Date    URINECX (A) 10/31/2023     >100,000 CFU/mL Klebsiella pneumoniae ssp pneumoniae       I reviewed the patient's new imaging including images and reports.  CT HEAD WO CONTRAST    Date of Exam: 11/1/2023 5:33 AM EDT    Indication: Stroke, follow up.    Comparison: 10/31/2023.    Technique: Axial CT images were obtained of the head without contrast administration.  Automated exposure control and iterative construction methods were used.      Findings:  There is an evolving area of hypoattenuation in the left MCA territory, primarily in the insula. There is associated cortical contrast staining. There is no acute intracranial hemorrhage. No mass effect or midline shift. No abnormal extra-axial  collection. There is atelectasis and mucosal thickening in the right maxillary sinus. There is mild ethmoid sinus mucosal disease. Mastoids are clear. Calvarium is intact. Orbits are unremarkable.   Impression:     Impression:  Evolving left MCA territory infarct with associated cortical contrast staining. No acute hemorrhage.        Electronically Signed: Geraldo Cueva MD   11/1/2023 5:44 AM EDT     XR CHEST 1 VW    Date of Exam: 10/31/2023 8:57 PM CDT    Indication: Acute Stroke Protocol (onset < 12 hrs)    Comparison: 3/1/2018    Findings:  Cardiomediastinal silhouette is enlarged. There is a left subclavian port with tip in the distal SVC. There is generalized interstitial  prominence. No airspace disease, pneumothorax, nor pleural effusion.No acute osseous abnormality identified.   Impression:     Impression:  Generalized interstitial prominence without acute airspace disease.      Electronically Signed: Saman Lopez MD   10/31/2023 9:40 PM CDT         All medications reviewed.   aspirin, 81 mg, Nasogastric, Daily   Or  aspirin, 300 mg, Rectal, Daily  atorvastatin, 80 mg, Nasogastric, Nightly  cefTRIAXone, 1,000 mg, Intravenous, Q24H  heparin (porcine), 5,000 Units, Subcutaneous, Q8H  levETIRAcetam, 1,000 mg, Intravenous, Q12H  midodrine, 5 mg, Nasogastric, Q8H  pantoprazole, 40 mg, Intravenous, Q AM  pharmacy consult - MTM, , Does not apply, Daily  sodium chloride, 10 mL, Intravenous, Q12H          Assessment & Plan       Acute ischemic left MCA stroke    Anemia    Tobacco use    S/P mechanical aortic and mitral valve replacement (2018)    (HFpEF) heart failure with preserved ejection fraction    Chronic pain with intrathecal pump in place    Dyslipidemia    Cerebral aneurysm    H/O recurrent GIB 2* AVMs    UTI (urinary tract infection)    Chronic hypotension on midodrine    53-year-old woman, active smoker with history of mechanical aortic and mitral valve replacements, hypertension, dyslipidemia, left internal carotid artery stent in 2020, seizure disorder on Keppra and Topamax, chronic anemia and chronic GI loss from AVMs, chronic pain with a pain pump, RB ILD followed at  presenting with an acute left middle cerebral artery stroke, embolic event.  Patient was outside of the window for thrombolysis and underwent a salvage mechanical thrombectomy for a saddle embolus in the left MCA bifurcation.  This morning her NIH stroke score is a 13 compared to 24 yesterday.  Speech is planning on a fees but with her severe dysarthria I suspect she will fail and will initiate tube feeding.  She does have a Corpak in place in the proximal duodenum.    She has a history of seizure disorder  for which she takes Topamax and Keppra.    On admission urinalysis had 4+ bacteria and she was placed on Rocephin.  She remains afebrile.  Cultures are growing Klebsiella pneumonia.  It is sensitive to Rocephin, resistant to ampicillin.  White blood cell count is improved at 10.7.    She had mechanical aortic and mitral valve placement in 2017.  She is currently in [facility].  Echocardiogram in 2022 revealed a left ventricular ejection fraction of 63% with diastolic dysfunction and well-seated aortic and mitral valves.  She usually takes Coumadin.  INR was only 1.73.    She has a history of chronic hypotension for which she takes midodrine.  Systolic blood pressure had drifted down into the 80s.  Midodrine was restarted and now her systolic blood pressures 120.    She has a longstanding history of chronic anemia and AVMs with intermittent GI blood loss.  Hemoglobin was 6.7.  She has cold agglutinins positive and anti-K antibody.  She is not having bright red blood or melena.  She did receive 2 units of packed red cells and today her hemoglobin is 8.9.    She is an active smoker with RB ILD followed at .  She was last seen by pulmonary in June, 2023.  She had quit smoking and her CT scan showed improvement in her groundglass opacities.  Spirometry and March 2023 revealed mild diffusion impairment, no obstruction and a normal TLC.  She did have an elevated RV consistent with air trapping.  She apparently started smoking again.  She also wears CPAP for sleep apnea.    Patient has a history of chronic pain and has an intrathecal pain pump with morphine since February 2020.  She is currently receiving 0.9 mg/day of morphine and bupivacaine.  Her pump was last filled September 14 and will last for 3 months.  She also takes baclofen 10 mg 3 times daily.  She was somnolent yesterday and baclofen remains on hold.      PLAN:    Monitor H&H  Heparin drip with no bolus  Monitor for GI blood loss  Continue Rocephin for urinary  tract infection  Continue midodrine  JASON today  Aspirin, statin  Monitor NIH stroke score  Continue thyroid replacement   Keppra, Topamax, home medications for known seizure disorder  Hold baclofen secondary to altered mentation  Initiate tube feeding  CPAP at night  PT, OT, speech therapy    VTE Prophylaxis:SCDS    Stress Ulcer Prophylaxis: Protonix    Kamala Small MD  11/02/23  10:27 EDT      Time: Critical care 25 min  I personally provided care to this critically ill patient as documented above.  Critical care time does not include time spent on separately billed procedures.  None of my critical care time was concurrent with other critical care providers.

## 2023-11-02 NOTE — PLAN OF CARE
"Goal Outcome Evaluation:  Plan of Care Reviewed With: patient        Progress: improving  Outcome Evaluation: VSS. Neuro assesments have shown that pt is able to follow commands in LUE/LLE. Pt has previosuly been non-verbal but is now nodding head to yes/no questions & has said \"yes/no\". Pt is not speaking more than 1 word at a time. Speech is very hoarse /slurred. Continuing with plan of care.          "

## 2023-11-02 NOTE — PLAN OF CARE
Goal Outcome Evaluation:  Plan of Care Reviewed With: patient        Progress: no change          SLP evaluation completed. Will address dysphagia and communication. Please see note for further details and recommendations.

## 2023-11-02 NOTE — PLAN OF CARE
Goal Outcome Evaluation:    NIHSS 13. Improving from previous assessments.     Pain pump is NOT compatible with MRI machine. Order discontinued.     Failed speech. Tube feed orders placed. Waiting for dietary.     Heparin drip initiated @ 12 units/kg. Lab to draw level at 2000.

## 2023-11-02 NOTE — PLAN OF CARE
Goal Outcome Evaluation:  Plan of Care Reviewed With: patient        Progress: improving  Outcome Evaluation: pt showing improvement as she increased ambulation distance to 6ft with mod A and HHA. Rec continued skilled PT; limited by balance deficit and generalized weakness. d/c rec for IRF.      Anticipated Discharge Disposition (PT): inpatient rehabilitation facility

## 2023-11-02 NOTE — THERAPY TREATMENT NOTE
Patient Name: Mara Martínez  : 1970    MRN: [id number]                              Today's Date: 2023       Admit Date: 10/31/2023    Visit Dx:     ICD-10-CM ICD-9-CM   1. Acute CVA (cerebrovascular accident)  I63.9 434.91   2. Dysphagia, unspecified type  R13.10 787.20   3. Communication deficit  F80.9 307.9     Patient Active Problem List   Diagnosis    Anemia    Aortic valve insufficiency    Bruit of left carotid artery    Chest pain    Positive D-dimer    Dizziness    Edema    Fatigue    HTN (hypertension)    Heart murmur    Hyperlipidemia    Mitral valve stenosis    Mitral valve insufficiency    Palpitations    Pulmonary edema    Seizure disorder    Shortness of breath    Difficulty sleeping    Snoring    Supravalvular aortic stenosis    Syncope    Tachycardia    Mitral stenosis    Rheumatic aortic stenosis    Valvular heart disease    Epilepsy    ISREAL (cerebral atherosclerosis)    Tobacco use    Migraines    Aortic regurgitation    Rheumatic mitral regurgitation    SOB (shortness of breath)    Supraventricular aortic stenosis    Aortic valve stenosis    Rheumatic aortic valve insufficiency    Moderate aortic stenosis    Aortic valve disorder    Coronary artery disease involving native coronary artery of native heart without angina pectoris    S/P mitral valve replacement    S/P mechanical aortic and mitral valve replacement (2018)    Stenosis of carotid artery    Generalized abdominal pain    Rectal bleeding    Functional diarrhea    Lower extremity edema    Rectal bleed    (HFpEF) heart failure with preserved ejection fraction    Chronic pain with intrathecal pump in place    Dyslipidemia    Bilateral carotid artery stenosis    Low back pain    Degeneration of lumbar intervertebral disc    Lumbosacral radiculopathy    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    Cerebral aneurysm    Complex partial seizures with consciousness impaired    Migraine without aura and with status  migrainosus, not intractable    Chronic obstructive pulmonary disease    Essential tremor    Hesitancy of micturition    Complex partial epilepsy with generalization and with intractable epilepsy    Focal (motor) epilepsy    Occipital neuralgia    Paresthesia    Restless legs syndrome    Retinal drusen    Acute ischemic left MCA stroke    H/O recurrent GIB 2* AVMs    UTI (urinary tract infection)    Chronic hypotension on midodrine     Past Medical History:   Diagnosis Date    Anemia     Aortic regurgitation     Arthritis     Back pain     Carotid bruit     ISREAL (cerebral atherosclerosis) 2014    nonobstructive    Chest pain     Chronic hypotension on midodrine 2023    Chronic obstructive pulmonary disease 2022    Coronary artery disease     COVID-19 vaccine administered     phizer    D-dimer, elevated     Diastolic congestive heart failure 2019    Dizziness     Edema     Epilepsy     Fatigue     Heart murmur     History of blood transfusion 2019    History of blood transfusion     History of blood transfusion 2022    History of blood transfusion     August 2023 x2    History of recent blood transfusion 2021    History of transfusion     Hyperlipidemia     Hypertension     Kidney stones     Migraines     Mitral regurgitation     Mitral stenosis     mild    Neuropathy     Neuropathy     Palpitations     Pulmonary edema     Retinal drusen     Seizure     Sleep apnea 2019    Sleeping difficulties     SOB (shortness of breath)     Supraventricular aortic stenosis     Syncope     Tachycardia     Tobacco abuse     VHD (valvular heart disease)     Wears dentures     Wears eyeglasses      Past Surgical History:   Procedure Laterality Date    APPENDECTOMY      CARDIAC CATHETERIZATION      CARDIAC SURGERY      2 valves replaced     SECTION      x 2    CHOLECYSTECTOMY      COLONOSCOPY      COLONOSCOPY N/A 2019    Procedure: COLONOSCOPY;  Surgeon: Kurt Gaines MD;  Location:   [facility];  Service: Gastroenterology    CORONARY ARTERY BYPASS GRAFT  06/12/2018    EXPLORATORY LAPAROTOMY      HERNIA REPAIR      HYSTERECTOMY      INTERVENTIONAL RADIOLOGY PROCEDURE Bilateral 10/31/2023    Procedure: Carotid Cerebral Angiogram;  Surgeon: Cayetano Mckeon MD;  Location: [facility] INVASIVE LOCATION;  Service: Interventional Radiology;  Laterality: Bilateral;    JOINT REPLACEMENT Right     knee replacement    PAIN PUMP INSERTION/REVISION      morphine    PORTACATH PLACEMENT      UPPER GASTROINTESTINAL ENDOSCOPY        General Information       Row Name 11/02/23 1416          Physical Therapy Time and Intention    Document Type therapy note (daily note)  -AY     Mode of Treatment physical therapy  -AY       Row Name 11/02/23 1416          General Information    Patient Profile Reviewed yes  -AY     Existing Precautions/Restrictions fall;oxygen therapy device and L/min;other (see comments)  NG  -AY     Barriers to Rehab medically complex;previous functional deficit;cognitive status;visual deficit  -AY       Row Name 11/02/23 1416          Cognition    Orientation Status (Cognition) oriented to;person;place;disoriented to;time  -AY       Row Name 11/02/23 1416          Safety Issues, Functional Mobility    Impairments Affecting Function (Mobility) balance;cognition;coordination;endurance/activity tolerance;motor planning;strength;visual/perceptual;postural/trunk control  -AY     Cognitive Impairments, Mobility Safety/Performance insight into deficits/self-awareness;safety precaution follow-through;safety precaution awareness;sequencing abilities  -AY               User Key  (r) = Recorded By, (t) = Taken By, (c) = Cosigned By      Initials Name Provider Type    AY Nidhi Blank PT Physical Therapist                   Mobility       Row Name 11/02/23 1416          Bed Mobility    Bed Mobility supine-sit  -AY     Supine-Sit [place] (Bed Mobility) minimum assist (75% patient effort);1 person  assist;verbal cues  -AY     Assistive Device (Bed Mobility) bed rails;draw sheet;head of bed elevated  -AY     Comment, (Bed Mobility) cueing for sequencing and hand placement.  -AY       Row Name 11/02/23 1416          Bed-Chair Transfer    Assistive Device (Bed-Chair Transfers) --  BUE support  -AY       Row Name 11/02/23 1416          Sit-Stand Transfer    Sit-Stand [place] (Transfers) minimum assist (75% patient effort);1 person assist;verbal cues  -AY     Comment, (Sit-Stand Transfer) performed x3 reps.  -AY       Row Name 11/02/23 1416          Gait/Stairs (Locomotion)    [place] Level (Gait) verbal cues;moderate assist (50% patient effort)  -AY     Assistive Device (Gait) other (see comments)  L HHA  -AY     Patient was able to Ambulate yes  -AY     Distance in Feet (Gait) 6  -AY     Deviations/Abnormal Patterns (Gait) bilateral deviations;gait speed decreased;festinating/shuffling;base of support, narrow;mela decreased;stride length decreased;weight shifting decreased  -AY     Bilateral Gait Deviations heel strike decreased;forward flexed posture  -AY     Comment, (Gait/Stairs) pt amb short distance to chair demonstrating shufflign steps. cueing for positioning, posture, and increased stride length. pt poleitly refused to attempt further ambulation d/t fatigue.  -AY               User Key  (r) = Recorded By, (t) = Taken By, (c) = Cosigned By      Initials Name Provider Type    AY Nidhi Blank PT Physical Therapist                   Obj/Interventions       Row Name 11/02/23 1418          Motor Skills    Therapeutic Exercise other (see comments)  pt verbally educated about BLE HEP performance, but polietly refused to perform as pt fatigued.  -AY       Row Name 11/02/23 1418          Balance    Balance Assessment sitting static balance;sitting dynamic balance;sit to stand dynamic balance;standing static balance;standing dynamic balance  -AY     Static Sitting Balance contact guard  -AY      Dynamic Sitting Balance contact guard  -AY     Position, Sitting Balance unsupported;sitting edge of bed  -AY     Static Standing Balance minimal assist  -AY     Dynamic Standing Balance moderate assist  -AY               User Key  (r) = Recorded By, (t) = Taken By, (c) = Cosigned By      Initials Name Provider Type    AY Nidhi Blank, PT Physical Therapist                   Goals/Plan    No documentation.                  Clinical Impression       Row Name 11/02/23 1419          Pain    Pretreatment Pain Rating 0/10 - no pain  -AY     Posttreatment Pain Rating 0/10 - no pain  -AY     Pain Intervention(s) Ambulation/increased activity  -AY     Additional Documentation Pain Scale: Numbers Pre/Post-Treatment (Group)  -AY       Row Name 11/02/23 1419          Plan of Care Review    Plan of Care Reviewed With patient  -AY     Progress improving  -AY     Outcome Evaluation pt showing improvement as she increased ambulation distance to 6ft with mod A and HHA. Rec continued skilled PT; limited by balance deficit and generalized weakness. d/c rec for IRF.  -AY       Row Name 11/02/23 1419          Vital Signs    Pre Systolic BP Rehab 119  -AY     Pre Treatment Diastolic BP 55  -AY     Post Systolic BP Rehab 119  -AY     Post Treatment Diastolic BP 69  -AY     Pretreatment Heart Rate (beats/min) 78  -AY     Posttreatment Heart Rate (beats/min) 79  -AY     Pre SpO2 (%) 97  -AY     O2 Delivery Pre Treatment nasal cannula  -AY     O2 Delivery Intra Treatment nasal cannula  -AY     Post SpO2 (%) 100  -AY     O2 Delivery Post Treatment nasal cannula  -AY     Pre Patient Position Supine  -AY     Intra Patient Position Standing  -AY     Post Patient Position Sitting  -AY       Row Name 11/02/23 1419          Positioning and Restraints    Pre-Treatment Position in bed  -AY     Post Treatment Position chair  -AY     In Chair notified nsg;reclined;sitting;call light within reach;encouraged to call for assist;exit alarm on;legs  elevated;LUE elevated;RUE elevated;on mechanical lift sling;waffle cushion  -AY               User Key  (r) = Recorded By, (t) = Taken By, (c) = Cosigned By      Initials Name Provider Type    Nidhi Ball PT Physical Therapist                   Outcome Measures       Row Name 11/02/23 1420 11/02/23 0800       How much help from another person do you currently need...    Turning from your back to your side while in flat bed without using bedrails? 3  -AY 2  -SW    Moving from lying on back to sitting on the side of a flat bed without bedrails? 3  -AY 2  -SW    Moving to and from a bed to a chair (including a wheelchair)? 2  -AY 1  -SW    Standing up from a chair using your arms (e.g., wheelchair, bedside chair)? 3  -AY 1  -SW    Climbing 3-5 steps with a railing? 1  -AY 1  -SW    To walk in hospital room? 2  -AY 1  -SW    AM-PAC 6 Clicks Score (PT) 14  -AY 8  -SW    Highest level of mobility 4 --> Transferred to chair/commode  -AY 3 --> Sat at edge of bed  -SW      Row Name 11/02/23 1420          Functional Assessment    Outcome Measure Options AM-PAC 6 Clicks Basic Mobility (PT);Modified [place]  -AY               User Key  (r) = Recorded By, (t) = Taken By, (c) = Cosigned By      Initials Name Provider Type    Patricia Lugo RN Registered Nurse    Nidhi Ball, PT Physical Therapist                                 Physical Therapy Education       Title: PT OT SLP Therapies (In Progress)       Topic: Physical Therapy (In Progress)       Point: Mobility training (In Progress)       Learning Progress Summary             Patient Acceptance, E,TB, NR by SREE at 11/2/2023 1421    Acceptance, E,TB, VU,NR by SREE at 11/1/2023 1417                         Point: Home exercise program (In Progress)       Learning Progress Summary             Patient Acceptance, E,TB, NR by SREE at 11/2/2023 1421                         Point: Body mechanics (In Progress)       Learning Progress Summary             Patient  Acceptance, E,TB, NR by AY at 11/2/2023 1421    Acceptance, E,TB, VU,NR by AY at 11/1/2023 1417                         Point: Precautions (In Progress)       Learning Progress Summary             Patient Acceptance, E,TB, NR by AY at 11/2/2023 1421    Acceptance, E,TB, VU,NR by AY at 11/1/2023 1417                                         User Key       Initials Effective Dates Name Provider Type Discipline    AY 11/10/20 -  Nidhi Blank PT Physical Therapist PT                  PT Recommendation and Plan  Planned Therapy Interventions (PT): balance training, bed mobility training, gait training, home exercise program, postural re-education, transfer training, patient/family education, strengthening, stair training, neuromuscular re-education  Plan of Care Reviewed With: patient  Progress: improving  Outcome Evaluation: pt showing improvement as she increased ambulation distance to 6ft with mod A and HHA. Rec continued skilled PT; limited by balance deficit and generalized weakness. d/c rec for IRF.     Time Calculation:   PT Evaluation Complexity  History, PT Evaluation Complexity: 1-2 personal factors and/or comorbidities  Examination of Body Systems (PT Eval Complexity): total of 3 or more elements  Clinical Presentation (PT Evaluation Complexity): evolving  Clinical Decision Making (PT Evaluation Complexity): moderate complexity  Overall Complexity (PT Evaluation Complexity): moderate complexity     PT Charges       Row Name 11/02/23 1421             Time Calculation    Start Time 1115  -AY      PT Received On 11/02/23  -AY         Timed Charges    00761 - PT Therapeutic Activity Minutes 24  -AY         Total Minutes    Timed Charges Total Minutes 24  -AY       Total Minutes 24  -AY                User Key  (r) = Recorded By, (t) = Taken By, (c) = Cosigned By      Initials Name Provider Type    AY Nidhi Blank PT Physical Therapist                  Therapy Charges for Today       Code Description Service  Date Service Provider Modifiers Qty    [id number]  PT EVAL MOD COMPLEXITY 4 11/1/2023 Nidhi Blank, PT GP 1    [id number]  PT THERAPEUTIC ACT EA 15 MIN 11/2/2023 Nidhi Blank, PT GP 2            PT G-Codes  Outcome Measure Options: AM-PAC 6 Clicks Basic Mobility (PT), Modified [place]  AM-PAC 6 Clicks Score (PT): 14  AM-PAC 6 Clicks Score (OT): 10  Modified [place] Scale: 4 - Moderately severe disability.  Unable to walk without assistance, and unable to attend to own bodily needs without assistance.  PT Discharge Summary  Anticipated Discharge Disposition (PT): inpatient rehabilitation facility    Nidhi Blank PT  11/2/2023

## 2023-11-02 NOTE — PROGRESS NOTES
Stroke Progress Note       Chief Complaint:  right sided weakness    Subjective    Subjective     Subjective:  Persistent deficits   Does not follow commands      Review of Systems   UTO    Objective      Temp:  [97.3 °F (36.3 °C)-98.4 °F (36.9 °C)] 97.3 °F (36.3 °C)  Heart Rate:  [60-80] 71  Resp:  [16-17] 16  BP: ()/(44-95) 109/57            Right enmanuel-neglect, R facial droop  Flaccid R  upper and R lower  Does not follow any commands  Withdrawal in the right upper and right lower extremity    Results Review:    I reviewed the patient's new clinical results.    Lab Results (last 24 hours)       Procedure Component Value Units Date/Time    Heparin Anti-Xa [[id number]]  (Abnormal) Collected: 11/02/23 1341    Specimen: Blood Updated: 11/02/23 1413     Heparin Anti-Xa (UFH) 0.10 IU/ml     Protime-INR [[id number]]  (Abnormal) Collected: 11/02/23 1341    Specimen: Blood Updated: 11/02/23 1413     Protime 20.6 Seconds      INR 1.75    aPTT [[id number]]  (Abnormal) Collected: 11/02/23 1341    Specimen: Blood Updated: 11/02/23 1413     PTT 38.6 seconds     Narrative:      PTT = The equivalent PTT values for the therapeutic range of heparin levels at 0.3 to 0.5 U/ml are 60 to 70 seconds.    POC Glucose Once [[id number]]  (Normal) Collected: 11/02/23 1139    Specimen: Blood Updated: 11/02/23 1140     Glucose 89 mg/dL     Tissue Pathology Exam [[id number]] Collected: 10/31/23 0846    Specimen: Tissue from Artery Updated: 11/02/23 0951     Case Report --     Surgical Pathology Report                         Case: [id number]                                  Authorizing Provider:  Cayetano Mckeon MD        Collected:           10/31/2023 08:46 AM          Ordering Location:     [facility]   Received:            11/01/2023 08:47 AM                                 CATH LAB                                                                     Pathologist:           Jalil Tam MD                                       "                       Specimen:    Artery                                                                                      Clinical Information --     Acute CVA       Final Diagnosis --     LEFT MCA EMBOLUS:  Focally calcified fibrin         Gross Description --     1. Artery.  Received in formalin labeled \"left MCA embolus is a 1.6 x 0.5 x 0.2 cm piece of white tissue, filtered and submitted entirely in block 1A. HDM         Microscopic Description --     The slides are reviewed and demonstrate histopathologic features supporting the above rendered diagnosis.        Urine Culture - Urine, Straight Cath [[id number]]  (Abnormal)  (Susceptibility) Collected: 10/31/23 2204    Specimen: Urine from Straight Cath Updated: 11/02/23 0732     Urine Culture >100,000 CFU/mL Klebsiella pneumoniae ssp pneumoniae    Narrative:      Colonization of the urinary tract without infection is common. Treatment is discouraged unless the patient is symptomatic, pregnant, or undergoing an invasive urologic procedure.    Susceptibility        Klebsiella pneumoniae ssp pneumoniae      [place]      Ampicillin Resistant      Ampicillin + Sulbactam Susceptible      Cefazolin Susceptible      Cefepime Susceptible      Ceftazidime Susceptible      Ceftriaxone Susceptible      Gentamicin Susceptible      Levofloxacin Susceptible      Nitrofurantoin Intermediate      Piperacillin + Tazobactam Susceptible      Trimethoprim + Sulfamethoxazole Susceptible                           CBC & Differential [[id number]]  (Abnormal) Collected: 11/02/23 0440    Specimen: Blood Updated: 11/02/23 0649    Narrative:      The following orders were created for panel order CBC & Differential.  Procedure                               Abnormality         Status                     ---------                               -----------         ------                     CBC Auto Differential[[id number]]        Abnormal            Final result               Scan " Slide[[id number]]                                       Final result                 Please view results for these tests on the individual orders.    CBC Auto Differential [[id number]]  (Abnormal) Collected: 11/02/23 0440    Specimen: Blood Updated: 11/02/23 0649     WBC 10.74 10*3/mm3      RBC 2.95 10*6/mm3      Hemoglobin 8.9 g/dL      Hematocrit 28.6 %      MCV 96.9 fL      MCH 30.2 pg      MCHC 31.1 g/dL      RDW 22.0 %      RDW-SD 75.4 fl      MPV 10.6 fL      Platelets 250 10*3/mm3      Neutrophil % 83.3 %      Lymphocyte % 6.2 %      Monocyte % 7.7 %      Eosinophil % 1.7 %      Basophil % 0.4 %      Immature Grans % 0.7 %      Neutrophils, Absolute 8.95 10*3/mm3      Lymphocytes, Absolute 0.67 10*3/mm3      Monocytes, Absolute 0.83 10*3/mm3      Eosinophils, Absolute 0.18 10*3/mm3      Basophils, Absolute 0.04 10*3/mm3      Immature Grans, Absolute 0.07 10*3/mm3      nRBC 0.0 /100 WBC     Narrative:      Appended report. These results have been appended to a previously verified report.    Scan Slide [[id number]] Collected: 11/02/23 0440    Specimen: Blood Updated: 11/02/23 0649     Anisocytosis Slight/1+     Macrocytes Slight/1+     Polychromasia Slight/1+     WBC Morphology Normal     Platelet Morphology Normal    POC Glucose Once [[id number]]  (Normal) Collected: 11/02/23 0542    Specimen: Blood Updated: 11/02/23 0543     Glucose 85 mg/dL     Blood Culture - Blood, Hand, Right [[id number]]  (Normal) Collected: 11/01/23 0312    Specimen: Blood from Hand, Right Updated: 11/02/23 0345     Blood Culture No growth at 24 hours    Blood Culture - Blood, Hand, Left [[id number]]  (Normal) Collected: 11/01/23 0151    Specimen: Blood from Hand, Left Updated: 11/02/23 0215     Blood Culture No growth at 24 hours    Comprehensive Metabolic Panel [[id number]]  (Abnormal) Collected: 11/02/23 0104    Specimen: Blood Updated: 11/02/23 0140     Glucose 152 mg/dL      BUN 7 mg/dL      Creatinine 0.68 mg/dL      Sodium 139 mmol/L       Potassium 3.7 mmol/L      Comment: Slight hemolysis detected by analyzer. Results may be affected.        Chloride 109 mmol/L      CO2 23.0 mmol/L      Calcium 8.0 mg/dL      Total Protein 5.0 g/dL      Albumin 2.4 g/dL      ALT (SGPT) <5 U/L      AST (SGOT) 8 U/L      Alkaline Phosphatase 99 U/L      Total Bilirubin 0.3 mg/dL      Globulin 2.6 gm/dL      Comment: Calculated Result        A/G Ratio 0.9 g/dL      BUN/Creatinine Ratio 10.3     Anion Gap 7.0 mmol/L      eGFR 104.3 mL/min/1.73     Narrative:      GFR Normal >60  Chronic Kidney Disease <60  Kidney Failure <15      Magnesium [[id number]]  (Normal) Collected: 11/02/23 0104    Specimen: Blood Updated: 11/02/23 0129     Magnesium 1.8 mg/dL     Basic Metabolic Panel [[id number]]  (Abnormal) Collected: 11/02/23 0104    Specimen: Blood Updated: 11/02/23 0129     Glucose 152 mg/dL      BUN 7 mg/dL      Creatinine 0.68 mg/dL      Sodium 139 mmol/L      Potassium 3.7 mmol/L      Comment: Slight hemolysis detected by analyzer. Results may be affected.        Chloride 109 mmol/L      CO2 23.0 mmol/L      Calcium 8.0 mg/dL      BUN/Creatinine Ratio 10.3     Anion Gap 7.0 mmol/L      eGFR 104.3 mL/min/1.73     Narrative:      GFR Normal >60  Chronic Kidney Disease <60  Kidney Failure <15      Phosphorus [[id number]]  (Normal) Collected: 11/02/23 0104    Specimen: Blood Updated: 11/02/23 0129     Phosphorus 3.9 mg/dL     POC Glucose Once [[id number]]  (Normal) Collected: 11/02/23 0002    Specimen: Blood Updated: 11/02/23 0004     Glucose 73 mg/dL     C-reactive Protein [[id number]]  (Abnormal) Collected: 11/01/23 1757    Specimen: Blood Updated: 11/01/23 1917     C-Reactive Protein 2.62 mg/dL     Hemoglobin & Hematocrit, Blood [[id number]]  (Abnormal) Collected: 11/01/23 1757    Specimen: Blood Updated: 11/01/23 1821     Hemoglobin 9.2 g/dL      Hematocrit 29.5 %     Sedimentation Rate [[id number]]  (Normal) Collected: 11/01/23 1757    Specimen: Blood Updated: 11/01/23  1819     Sed Rate 25 mm/hr     POC Glucose Once [[id number]]  (Normal) Collected: 11/01/23 1724    Specimen: Blood Updated: 11/01/23 1735     Glucose 74 mg/dL           XR Abdomen KUB    Result Date: 11/1/2023  Impression: The tip of the feeding tube lies within the gastric antrum. Electronically Signed: Yasir Cr MD  11/1/2023 12:22 PM EDT  Workstation ID: [id number]    EEG    Result Date: 11/1/2023  Diffuse cerebral dysfunction of at least mild degree but nonspecific No ongoing seizures are present This report is transcribed using the Dragon dictation system.      CT Head Without Contrast    Result Date: 11/1/2023  Impression: Evolving left MCA territory infarct with associated cortical contrast staining. No acute hemorrhage. Electronically Signed: Geraldo Cueva MD  11/1/2023 5:44 AM EDT  Workstation ID: [id number]    XR Chest 1 View    Result Date: 10/31/2023  Impression: Generalized interstitial prominence without acute airspace disease. Electronically Signed: Saman Lopez MD  10/31/2023 9:40 PM CDT  Workstation ID: [id number]    CT Angiogram Head w AI Analysis of LVO    Result Date: 10/31/2023  1.Left M2 superior division segmental occlusion. 2.Right distal M1 saccular aneurysm. Electronically Signed: Saman Lopez MD  10/31/2023 9:15 PM CDT  Workstation ID: [id number]    CT Angiogram Neck    Result Date: 10/31/2023  1.Left M2 superior division segmental occlusion. 2.Right distal M1 saccular aneurysm. Electronically Signed: Saman Lopez MD  10/31/2023 9:15 PM CDT  Workstation ID: [id number]    CT CEREBRAL PERFUSION WITH & WITHOUT CONTRAST    Result Date: 10/31/2023   1. Imaging features are consistent with a left MCA territory core infarct with surrounding ischemic brain at risk. Electronically Signed: Saman Lopez MD  10/31/2023 8:59 PM CDT  Workstation ID: [id number]    CT Head Without Contrast Stroke Protocol    Result Date: 10/31/2023  Impression: No acute intracranial process identified. Electronically Signed:  Saman Lopez MD  10/31/2023 8:26 PM CDT  Workstation ID: [id number]   Results for orders placed during the hospital encounter of 10/31/23    Adult Transthoracic Echo Complete W/ Cont if Necessary Per Protocol (With Agitated Saline)    Interpretation Summary    Left ventricular systolic function is normal. Calculated left ventricular EF = 62.1%    Left ventricular wall thickness is consistent with mild concentric hypertrophy.    Saline test results are negative.    There is a mechanical aortic valve prosthesis present.    There is a mechanical mitral valve prosthesis present.    Estimated right ventricular systolic pressure from tricuspid regurgitation is normal (<35 mmHg).    Calcification on pap muscle            Assessment/Plan     Assessment/Plan:  Acute ischemic stroke, left MCA, status post mechanical thrombectomy -TICI 3  -The embolus appeared to be a soft tissue than clot,  it was sent to pathology.  -heparin was initiated 11/1/2023   Okay to start  aspirin 81  -repeat CT head today as patient is on heparin     Critically ill, spent more than 35 min reviewing the scans,  discussing the plan with multidisciplinary team.      Dilip Grullon MD  11/02/23  14:32 EDT

## 2023-11-02 NOTE — CONSULTS
Clinical Nutrition     Nutrition Support Assessment  Reason for Visit: Physician consult, EN      Patient Name: Mara Martínez  YOB: 1970  MRN: [id number]  Date of Encounter: 11/02/23 18:21 EDT  Admission date: 10/31/2023    Comments: EN order placed per MD: Zeny AF goal 65 ml/hr Water at 15 ml/hr   RD adj water to 30 ml ev 2 hr 2/2 small bore tube.  At goal this will supply 1560 kcal 104% est need, 99 g % est need, 7.8 g Fiber 1053 ml Water in EN 1353 total ml Free Water. Appropriate for est needs.    Nutrition Assessment   Admission Diagnosis:  Acute CVA (cerebrovascular accident) [I63.9]      Problem List:    Acute ischemic left MCA stroke    Anemia    Tobacco use    S/P mechanical aortic and mitral valve replacement (2018)    (HFpEF) heart failure with preserved ejection fraction    Chronic pain with intrathecal pump in place    Dyslipidemia    Cerebral aneurysm    H/O recurrent GIB 2* AVMs    UTI (urinary tract infection)    Chronic hypotension on midodrine        PMH:   She  has a past medical history of Anemia, Aortic regurgitation, Arthritis, Back pain, Carotid bruit, ISREAL (cerebral atherosclerosis) (12/2014), Chest pain, Chronic hypotension on midodrine (11/01/2023), Chronic obstructive pulmonary disease (09/07/2022), Coronary artery disease, COVID-19 vaccine administered, D-dimer, elevated, Diastolic congestive heart failure (07/25/2019), Dizziness, Edema, Epilepsy, Fatigue, Heart murmur, History of blood transfusion (08/2019), History of blood transfusion (2022), History of blood transfusion (11/2022), History of blood transfusion, History of recent blood transfusion (12/2021), History of transfusion, Hyperlipidemia, Hypertension, Kidney stones, Migraines, Mitral regurgitation, Mitral stenosis, Neuropathy, Neuropathy, Palpitations, Pulmonary edema, Retinal drusen, Seizure, Sleep apnea (09/2019), Sleeping difficulties, SOB (shortness of breath), Supraventricular  aortic stenosis, Syncope, Tachycardia, Tobacco abuse, VHD (valvular heart disease), Wears dentures, and Wears eyeglasses.    PSH:  She  has a past surgical history that includes Appendectomy;  section; Cholecystectomy; Hysterectomy; Cardiac catheterization; Colonoscopy; Upper gastrointestinal endoscopy; Hernia repair; Exploratory laparotomy; Joint replacement (Right); Coronary artery bypass graft (2018); Cardiac surgery; Colonoscopy (N/A, 2019); Portacath placement; Pain Pump Insertion/Revision; and Interventional radiology procedure (Bilateral, 10/31/2023).      Applicable Nutrition Concerns:   Skin:  Oral:  GI:      Applicable Interval History:    SLP rec NPO      Reported/Observed/Food/Nutrition Related History:       Order for EN      Labs    Labs Reviewed: Yes     Results from last 7 days   Lab Units 23  0104 23  0155 10/31/23  2126   GLUCOSE mg/dL 152*  152*  --  105*   BUN mg/dL 7  7  --  9   CREATININE mg/dL 0.68  0.68  --  0.83   SODIUM mmol/L 139  139  --  135*   CHLORIDE mmol/L 109*  109*  --  101   POTASSIUM mmol/L 3.7  3.7  --  3.4*   PHOSPHORUS mg/dL 3.9  --   --    MAGNESIUM mg/dL 1.8  --  1.9   ALT (SGPT) U/L <5  --  <5   LACTATE mmol/L  --  1.7 2.1*       Results from last 7 days   Lab Units 23  0104 23  1757 23  0155   ALBUMIN g/dL 2.4*  --   --    CRP mg/dL  --  2.62*  --    CHOLESTEROL mg/dL  --   --  88   TRIGLYCERIDES mg/dL  --   --  88       Results from last 7 days   Lab Units 23  1804 23  1139 23  0542 23  0002 23  1724 23  1157   GLUCOSE mg/dL 98 89 85 73 74 85     Lab Results   Lab Value Date/Time    HGBA1C <4.20 (L) 2023 0155    HGBA1C 5.10 2018 1345                 Medications    Medications Reviewed: Yes  Pertinent: abx, keppra, protonix,  Infusion:heparin  PRN:       Intake/Ouptut 24 hrs (01 - 0700)   I&O's Reviewed: Yes     Intake & Output (last day)           "0701 11/02 0700 11/02 0701 11/03 0700    I.V. (mL/kg) 567.5 (7.7) 421 (5.7)    Blood 720     NG/GT 45 200    Total Intake(mL/kg) 1332.5 (18.2) 621 (8.5)    Urine (mL/kg/hr) 1250 (0.7) 425 (0.5)    Total Output 1250 425    Net +82.5 +196                No stool recorded    Anthropometrics     Flowsheet Rows      Flowsheet Row First Filed Value   Admission Height 152.4 cm (60\") Documented at 10/31/2023 2229   Admission Weight 61.2 kg (135 lb) Documented at 10/31/2023 2229       Height: Height: 152.4 cm (60\")  Last Filed Weight: Weight: 73.4 kg (161 lb 13.1 oz) (11/02/23 0400)  Method: Weight Method: Bed scale  BMI: BMI (Calculated): 31.6  BMI classification: Obese Class I: 30-34.9kg/m2      UBW: Per EMR rpt of wt 142 lbs on 9/14 and of 166 lbs on 3/9  Weight change:     Nutrition Focused Physical Exam     Date: 11/2    Unable to perform exam due to: Defer pending indication      Needs Assessment   Date:11/2    Height used:Height: 152.4 cm (60\")  Weights used: 161 lb 76.4 Kg      Estimated Calorie needs: ~ 1500  Kcal/day  Method:  Kcals/KG x 20 = 1468    Estimated Protein needs: ~ 99  g PRO/day  Method: g/Kg 1,3    Estimated Fluid needs: ~ ml/day   Per clinical status    Current Nutrition Prescription     PO: NPO Diet NPO Type: Strict NPO  Oral Nutrition Supplement:   Intake: N/A      EN: Peptamen AF  Goal Rate: 65 ml/hr  Water Flushes: 30 ml ev 2 hr adj per RD for tube type  Modular: None  Route: NG  Tube: Small bore    At goal over: 20Hrs/day    Rx will supply:   Goal Volume 1300  mL/day     Flush Volume 300 mL/day     Energy 1560 Kcal/day 104 % Est Need   Protein 99 g/day 100 % Est Need   Fiber 7.8 g/day     Water in  EN 1053 mL     Total Water 1353 mL     Meet DRI Yes        --------------------------------------------------------------------------  Product/Rate verified at bedside: No  Infusing Rate at time of visit: not started    Average Delivery from Charting: N/A  Volume  mL/day   % Goal Vol.   Flush Volume  " mL/day     Energy  Kcal/day  % Est Need   Protein  g/day  % Est Need   Fiber  g/day     Water in  EN  mL     Total Water  mL     Meet DRI N/A            Nutrition Diagnosis   Date: 11/2 Updated:   Problem Inadequate oral intake   Etiology Failed SLP eval    Signs/Symptoms NPO   Status: EN order in place      Goal:   General: Nutrition support treatment  PO: Advace diet as medically feasible/appropriate  EN/PN: Initiate EN, Tolerate EN at goal, Deliver estimated needs    Nutrition Intervention      Follow treatment progress, Care plan reviewed        Monitoring/Evaluation:   Per protocol, I&O, Pertinent labs, EN delivery/tolerance, Weight, Symptoms, Swallow function      Michelle Yang RD  Time Spent: 30 min

## 2023-11-02 NOTE — THERAPY EVALUATION
Acute Care - Speech Language Pathology Initial Evaluation  [facility]  Cognitive-Communication Evaluation  And Clinical Swallow Evaluation       Patient Name: Mara Martínez  : 1970  MRN: [id number]  Today's Date: 2023               Admit Date: 10/31/2023     Visit Dx:    ICD-10-CM ICD-9-CM   1. Acute CVA (cerebrovascular accident)  I63.9 434.91   2. Dysphagia, unspecified type  R13.10 787.20   3. Communication deficit  F80.9 307.9     Patient Active Problem List   Diagnosis    Anemia    Aortic valve insufficiency    Bruit of left carotid artery    Chest pain    Positive D-dimer    Dizziness    Edema    Fatigue    HTN (hypertension)    Heart murmur    Hyperlipidemia    Mitral valve stenosis    Mitral valve insufficiency    Palpitations    Pulmonary edema    Seizure disorder    Shortness of breath    Difficulty sleeping    Snoring    Supravalvular aortic stenosis    Syncope    Tachycardia    Mitral stenosis    Rheumatic aortic stenosis    Valvular heart disease    Epilepsy    ISREAL (cerebral atherosclerosis)    Tobacco use    Migraines    Aortic regurgitation    Rheumatic mitral regurgitation    SOB (shortness of breath)    Supraventricular aortic stenosis    Aortic valve stenosis    Rheumatic aortic valve insufficiency    Moderate aortic stenosis    Aortic valve disorder    Coronary artery disease involving native coronary artery of native heart without angina pectoris    S/P mitral valve replacement    S/P mechanical aortic and mitral valve replacement (2018)    Stenosis of carotid artery    Generalized abdominal pain    Rectal bleeding    Functional diarrhea    Lower extremity edema    Rectal bleed    (HFpEF) heart failure with preserved ejection fraction    Chronic pain with intrathecal pump in place    Dyslipidemia    Bilateral carotid artery stenosis    Low back pain    Degeneration of lumbar intervertebral disc    Lumbosacral radiculopathy    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    Cerebral aneurysm    Complex partial seizures with consciousness impaired    Migraine without aura and with status migrainosus, not intractable    Chronic obstructive pulmonary disease    Essential tremor    Hesitancy of micturition    Complex partial epilepsy with generalization and with intractable epilepsy    Focal (motor) epilepsy    Occipital neuralgia    Paresthesia    Restless legs syndrome    Retinal drusen    Acute ischemic left MCA stroke    H/O recurrent GIB 2* AVMs    UTI (urinary tract infection)    Chronic hypotension on midodrine     Past Medical History:   Diagnosis Date    Anemia     Aortic regurgitation     Arthritis     Back pain     Carotid bruit     ISREAL (cerebral atherosclerosis) 2014    nonobstructive    Chest pain     Chronic hypotension on midodrine 2023    Chronic obstructive pulmonary disease 2022    Coronary artery disease     COVID-19 vaccine administered     phizer    D-dimer, elevated     Diastolic congestive heart failure 2019    Dizziness     Edema     Epilepsy     Fatigue     Heart murmur     History of blood transfusion 2019    History of blood transfusion     History of blood transfusion 2022    History of blood transfusion     August 2023 x2    History of recent blood transfusion 2021    History of transfusion     Hyperlipidemia     Hypertension     Kidney stones     Migraines     Mitral regurgitation     Mitral stenosis     mild    Neuropathy     Neuropathy     Palpitations     Pulmonary edema     Retinal drusen     Seizure     Sleep apnea 2019    Sleeping difficulties     SOB (shortness of breath)     Supraventricular aortic stenosis     Syncope     Tachycardia     Tobacco abuse     VHD (valvular heart disease)     Wears dentures     Wears eyeglasses      Past Surgical History:   Procedure Laterality Date    APPENDECTOMY      CARDIAC CATHETERIZATION      CARDIAC SURGERY      2 valves replaced     SECTION      x 2     CHOLECYSTECTOMY      COLONOSCOPY      COLONOSCOPY N/A 5/2/2019    Procedure: COLONOSCOPY;  Surgeon: Kurt Gaines MD;  Location:  [facility];  Service: Gastroenterology    CORONARY ARTERY BYPASS GRAFT  06/12/2018    EXPLORATORY LAPAROTOMY      HERNIA REPAIR      HYSTERECTOMY      INTERVENTIONAL RADIOLOGY PROCEDURE Bilateral 10/31/2023    Procedure: Carotid Cerebral Angiogram;  Surgeon: Cayetano Mckeon MD;  Location:  [facility];  Service: Interventional Radiology;  Laterality: Bilateral;    JOINT REPLACEMENT Right     knee replacement    PAIN PUMP INSERTION/REVISION      morphine    PORTACATH PLACEMENT      UPPER GASTROINTESTINAL ENDOSCOPY         SLP Recommendation and Plan  SLP Diagnosis: severe, aphasia (11/02/23 1000)           Swallow Criteria for Skilled Therapeutic Interventions Met: demonstrates skilled criteria (11/02/23 1000)  SLC Criteria for Skilled Therapy Interventions Met: yes (11/02/23 1000)  Anticipated Discharge Disposition (SLP): inpatient rehabilitation facility (11/02/23 1000)     Therapy Frequency (Swallow): 5 days per week (11/02/23 1000)  Therapy Frequency (SLP SLC): 5 days per week (11/02/23 1000)  Predicted Duration Therapy Intervention (Days): until discharge (11/02/23 1000)  Oral Care Recommendations: Oral Care BID/PRN, Suction toothbrush (11/02/23 1000)                          Plan of Care Reviewed With: patient (11/02/23 1103)  Progress: no change (11/02/23 1103)      SLP EVALUATION (last 72 hours)       SLP SLC Evaluation       Row Name 11/02/23 1000                   Communication Assessment/Intervention    Document Type evaluation  -RS        Subjective Information no complaints  -RS        Patient Observations --  awake  -RS        Patient/Family/Caregiver Comments/Observations no family present  -RS        Patient Effort fair  -RS        Symptoms Noted During/After Treatment none  -RS        Oral Care teeth brushed - suction toothbrush;tongue brushed  -RS            General Information    Patient Profile Reviewed yes  -RS        Pertinent History Of Current Problem Pt is a 53 yoF w PMHx significant for Afib, COPD, HTN, Sjogren's PA, DM2, chronic anemia and previous CVA. She presented to [facility] 11/1 and was found to have acute CVA and is s/p left MCA mechanical thrombectomy. NIH scores have consistently been 24  -RS        Precautions/Limitations, Vision difficult to assess  -RS        Precautions/Limitations, Hearing difficult to assess  -RS        Prior Level of Function-Communication unknown;other (see comments)  pt is unable to state and no family present  -RS        Plans/Goals Discussed with other (see comments)  pt unable to indicate understanding  -RS        Patient's Goals for Discharge patient could not state;patient did not state  -RS           Pain    Additional Documentation Pain Scale: FACES Pre/Post-Treatment (Group)  -RS           Pain Scale: FACES Pre/Post-Treatment    Pain: FACES Scale, Pretreatment 0-->no hurt  -RS        Posttreatment Pain Rating 0-->no hurt  -RS           Comprehension Assessment/Intervention    Comprehension Assessment/Intervention Auditory Comprehension  -RS           Auditory Comprehension Assessment/Intervention    Auditory Comprehension (Communication) severe impairment  -RS        Able to Identify Objects/Pictures (Communication) unable/difficult to assess  -RS        Answers Questions (Communication) yes/no;simple;moderate impairment  -RS        Able to Follow Commands (Communication) 1-step;severe impairment  -RS           Expression Assessment/Intervention    Expression Assessment/Intervention verbal expression  -RS           Verbal Expression Assessment/Intervention    Verbal Expression unable/difficult to assess  -RS        Automatic Speech (Communication) unable/difficult to assess  -RS        Repetition unable/difficult to assess  -RS           Oral Motor Structure and Function    Oral Motor Structure and Function  unable/difficult to assess  -RS           Oral Musculature and Cranial Nerve Assessment    Oral Motor General Assessment unable to assess  -RS           Motor Speech Assessment/Intervention    Motor Speech Function unable/difficult to assess  -RS           SLP Evaluation Clinical Impressions    SLP Diagnosis severe;aphasia  -RS        Rehab Potential/Prognosis fair  -RS        SLC Criteria for Skilled Therapy Interventions Met yes  -RS        Functional Impact unable to care for self  -RS           Recommendations    Therapy Frequency (SLP SLC) 5 days per week  -RS        Predicted Duration Therapy Intervention (Days) until discharge  -RS        Anticipated Discharge Disposition (SLP) inpatient rehabilitation facility  -RS                  User Key  (r) = Recorded By, (t) = Taken By, (c) = Cosigned By      Initials Name Effective Dates    RS Seth Cueva, MS CCC-SLP 09/14/23 -                        EDUCATION  The patient has been educated in the following areas:     Communication Impairment.           SLP GOALS       Row Name 11/02/23 1000             (LTG) Patient will demonstrate functional swallow for    Diet Texture (Demonstrate functional swallow) pureed textures  -RS      Liquid viscosity (Demonstrate functional swallow) nectar/ mildly thick liquids  -RS      [place] (Demonstrate functional swallow) with maximum cues (25-49% accuracy)  -RS      Time Frame (Demonstrate functional swallow) by discharge  -RS      Progress/Outcomes (Demonstrate functional swallow) new goal  -RS         Patient will demonstrate functional language skills for return to discharge environment     [place] with moderate cues  -RS      Time frame by discharge  -RS      Progress/Outcomes new goal  -RS         Comprehend Questions Goal 1 (SLP)    Improve Ability to Comprehend Questions Goal 1 (SLP) simple yes/no questions;80%;with moderate cues (50-74%)  -RS      Time Frame (Comprehend Questions Goal 1, SLP) by discharge  -RS       Progress/Outcomes (Comprehend Questions Goal 1, SLP) new goal  -RS         Follow Directions Goal 2 (SLP)    Improve Ability to Follow Directions Goal 1 (SLP) 1 step direction with objects;80%;with moderate cues (50-74%)  -RS      Time Frame (Follow Directions Goal 1, SLP) by discharge  -RS      Progress/Outcomes (Follow Directions Goal 1, SLP) new goal  -RS                User Key  (r) = Recorded By, (t) = Taken By, (c) = Cosigned By      Initials Name Provider Type    Seth Aggarwal MS CCC-SLP Speech and Language Pathologist                            Time Calculation:      Time Calculation- SLP       Row Name 23 1102             Time Calculation- SLP    SLP Start Time 1000  -RS      SLP Received On 23  -RS         Untimed Charges    96559-[id number] Eval Speech and Production w/ Language Minutes 25  -RS      [id number] Eval Oral Pharyng Swallow Minutes 24  -RS         Total Minutes    Untimed Charges Total Minutes 49  -RS       Total Minutes 49  -RS                User Key  (r) = Recorded By, (t) = Taken By, (c) = Cosigned By      Initials Name Provider Type    RS Seth Cueva MS CCC-SLP Speech and Language Pathologist                    Therapy Charges for Today       Code Description Service Date Service Provider Modifiers Qty    81574699070 HC ST EVAL ORAL PHARYNG SWALLOW 2 2023 Seth Cueva MS CCC-SLP GN 1    [id number] HC ST EVAL SPEECH AND PROD W LANG  2 2023 Seth Cueav MS CCC-LICHA GN 1                       MS TARA Samayoa  2023   and Acute Care - Speech Language Pathology   Swallow Initial Evaluation  [facility]     Patient Name: Mara Martínez  : 1970  MRN: [id number]  Today's Date: 2023               Admit Date: 10/31/2023    Visit Dx:     ICD-10-CM ICD-9-CM   1. Acute CVA (cerebrovascular accident)  I63.9 434.91   2. Dysphagia, unspecified type  R13.10 787.20   3. Communication deficit  F80.9 307.9     Patient Active Problem List   Diagnosis    Anemia    Aortic  valve insufficiency    Bruit of left carotid artery    Chest pain    Positive D-dimer    Dizziness    Edema    Fatigue    HTN (hypertension)    Heart murmur    Hyperlipidemia    Mitral valve stenosis    Mitral valve insufficiency    Palpitations    Pulmonary edema    Seizure disorder    Shortness of breath    Difficulty sleeping    Snoring    Supravalvular aortic stenosis    Syncope    Tachycardia    Mitral stenosis    Rheumatic aortic stenosis    Valvular heart disease    Epilepsy    ISREAL (cerebral atherosclerosis)    Tobacco use    Migraines    Aortic regurgitation    Rheumatic mitral regurgitation    SOB (shortness of breath)    Supraventricular aortic stenosis    Aortic valve stenosis    Rheumatic aortic valve insufficiency    Moderate aortic stenosis    Aortic valve disorder    Coronary artery disease involving native coronary artery of native heart without angina pectoris    S/P mitral valve replacement    S/P mechanical aortic and mitral valve replacement (2018)    Stenosis of carotid artery    Generalized abdominal pain    Rectal bleeding    Functional diarrhea    Lower extremity edema    Rectal bleed    (HFpEF) heart failure with preserved ejection fraction    Chronic pain with intrathecal pump in place    Dyslipidemia    Bilateral carotid artery stenosis    Low back pain    Degeneration of lumbar intervertebral disc    Lumbosacral radiculopathy    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    Cerebral aneurysm    Complex partial seizures with consciousness impaired    Migraine without aura and with status migrainosus, not intractable    Chronic obstructive pulmonary disease    Essential tremor    Hesitancy of micturition    Complex partial epilepsy with generalization and with intractable epilepsy    Focal (motor) epilepsy    Occipital neuralgia    Paresthesia    Restless legs syndrome    Retinal drusen    Acute ischemic left MCA stroke    H/O recurrent GIB 2* AVMs    UTI (urinary tract  infection)    Chronic hypotension on midodrine     Past Medical History:   Diagnosis Date    Anemia     Aortic regurgitation     Arthritis     Back pain     Carotid bruit     ISREAL (cerebral atherosclerosis) 2014    nonobstructive    Chest pain     Chronic hypotension on midodrine 2023    Chronic obstructive pulmonary disease 2022    Coronary artery disease     COVID-19 vaccine administered     phizer    D-dimer, elevated     Diastolic congestive heart failure 2019    Dizziness     Edema     Epilepsy     Fatigue     Heart murmur     History of blood transfusion 2019    History of blood transfusion     History of blood transfusion 2022    History of blood transfusion     August 2023 x2    History of recent blood transfusion 2021    History of transfusion     Hyperlipidemia     Hypertension     Kidney stones     Migraines     Mitral regurgitation     Mitral stenosis     mild    Neuropathy     Neuropathy     Palpitations     Pulmonary edema     Retinal drusen     Seizure     Sleep apnea 2019    Sleeping difficulties     SOB (shortness of breath)     Supraventricular aortic stenosis     Syncope     Tachycardia     Tobacco abuse     VHD (valvular heart disease)     Wears dentures     Wears eyeglasses      Past Surgical History:   Procedure Laterality Date    APPENDECTOMY      CARDIAC CATHETERIZATION      CARDIAC SURGERY      2 valves replaced     SECTION      x 2    CHOLECYSTECTOMY      COLONOSCOPY      COLONOSCOPY N/A 2019    Procedure: COLONOSCOPY;  Surgeon: Kurt Gaines MD;  Location: [facility] OR;  Service: Gastroenterology    CORONARY ARTERY BYPASS GRAFT  2018    EXPLORATORY LAPAROTOMY      HERNIA REPAIR      HYSTERECTOMY      INTERVENTIONAL RADIOLOGY PROCEDURE Bilateral 10/31/2023    Procedure: Carotid Cerebral Angiogram;  Surgeon: Cayetano Mckeon MD;  Location: [facility] CATH INVASIVE LOCATION;  Service: Interventional Radiology;  Laterality: Bilateral;    JOINT  REPLACEMENT Right     knee replacement    PAIN PUMP INSERTION/REVISION      morphine    PORTACATH PLACEMENT      UPPER GASTROINTESTINAL ENDOSCOPY         SLP Recommendation and Plan  SLP Swallowing Diagnosis: oral dysphagia, suspected pharyngeal dysphagia (11/02/23 1000)  SLP Diet Recommendation: NPO, temporary alternate methods of nutrition/hydration (11/02/23 1000)  Recommended Precautions and Strategies: general aspiration precautions (11/02/23 1000)  SLP Rec. for Method of Medication Administration: meds via alternate route (11/02/23 1000)     Monitor for Signs of Aspiration: yes, notify SLP if any concerns (11/02/23 1000)     Swallow Criteria for Skilled Therapeutic Interventions Met: demonstrates skilled criteria (11/02/23 1000)  Anticipated Discharge Disposition (SLP): inpatient rehabilitation facility (11/02/23 1000)  Rehab Potential/Prognosis, Swallowing: adequate, monitor progress closely (11/02/23 1000)  Therapy Frequency (Swallow): 5 days per week (11/02/23 1000)  Predicted Duration Therapy Intervention (Days): until discharge (11/02/23 1000)  Oral Care Recommendations: Oral Care BID/PRN, Suction toothbrush (11/02/23 1000)                                      Oral Care Recommendations: Oral Care BID/PRN, Suction toothbrush (11/02/23 1000)    Plan of Care Reviewed With: patient  Progress: no change      SWALLOW EVALUATION (last 72 hours)       SLP Adult Swallow Evaluation       Row Name 11/02/23 1000                   Oral Motor Structure and Function    Dentition Assessment --  Pt clamped lips shut, unable to assess  -RS           General Eating/Swallowing Observations    Respiratory Support Currently in Use nasal cannula  -RS        Eating/Swallowing Skills fed by SLP  -RS        Positioning During Eating upright in bed  -RS        Utensils Used spoon  -RS        Consistencies Trialed ice chips  -RS           Respiratory    Respiratory Status WFL  -RS           Clinical Swallow Eval    Oral Prep Phase  impaired  -RS        Oral Transit impaired  -RS        Oral Residue impaired  -RS        Pharyngeal Phase no overt signs/symptoms of pharyngeal impairment  -RS        Esophageal Phase unremarkable  -RS        Clinical Swallow Evaluation Summary Limited assessment given severity of pt's symptoms. She has her mouth clamped shut during portions of exam; however, when ice chips are presented her mouth will open but she is unable to remove ice from utensil. When ice was placed in oral cavity, she has complete anterior loss without awareness. No spontaneous swallows or swallows on command. Pt is not safe for PO diet or appropriate for instrumental. Will need to increase PO trials and establish consistent swallow prior to considering instrumental  -RS           SLP Evaluation Clinical Impression    SLP Swallowing Diagnosis oral dysphagia;suspected pharyngeal dysphagia  -RS        Functional Impact risk of aspiration/pneumonia  -RS        Rehab Potential/Prognosis, Swallowing adequate, monitor progress closely  -RS        Swallow Criteria for Skilled Therapeutic Interventions Met demonstrates skilled criteria  -RS           Recommendations    Therapy Frequency (Swallow) 5 days per week  -RS        SLP Diet Recommendation NPO;temporary alternate methods of nutrition/hydration  -RS        Recommended Precautions and Strategies general aspiration precautions  -RS        Oral Care Recommendations Oral Care BID/PRN;Suction toothbrush  -RS        SLP Rec. for Method of Medication Administration meds via alternate route  -RS        Monitor for Signs of Aspiration yes;notify SLP if any concerns  -RS                  User Key  (r) = Recorded By, (t) = Taken By, (c) = Cosigned By      Initials Name Effective Dates    RS Seth Cueva MS CCC-SLP 09/14/23 -                     EDUCATION  The patient has been educated in the following areas:   Dysphagia (Swallowing Impairment).        SLP GOALS       Row Name 11/02/23 1000              (LTG) Patient will demonstrate functional swallow for    Diet Texture (Demonstrate functional swallow) pureed textures  -RS      Liquid viscosity (Demonstrate functional swallow) nectar/ mildly thick liquids  -RS      [place] (Demonstrate functional swallow) with maximum cues (25-49% accuracy)  -RS      Time Frame (Demonstrate functional swallow) by discharge  -RS      Progress/Outcomes (Demonstrate functional swallow) new goal  -RS         Patient will demonstrate functional language skills for return to discharge environment     [place] with moderate cues  -RS      Time frame by discharge  -RS      Progress/Outcomes new goal  -RS         Comprehend Questions Goal 1 (SLP)    Improve Ability to Comprehend Questions Goal 1 (SLP) simple yes/no questions;80%;with moderate cues (50-74%)  -RS      Time Frame (Comprehend Questions Goal 1, SLP) by discharge  -RS      Progress/Outcomes (Comprehend Questions Goal 1, SLP) new goal  -RS         Follow Directions Goal 2 (SLP)    Improve Ability to Follow Directions Goal 1 (SLP) 1 step direction with objects;80%;with moderate cues (50-74%)  -RS      Time Frame (Follow Directions Goal 1, SLP) by discharge  -RS      Progress/Outcomes (Follow Directions Goal 1, SLP) new goal  -RS                User Key  (r) = Recorded By, (t) = Taken By, (c) = Cosigned By      Initials Name Provider Type    RS Seth Cueva MS CCC-SLP Speech and Language Pathologist                       Time Calculation:    Time Calculation- SLP       Row Name 11/02/23 1102             Time Calculation- SLP    SLP Start Time 1000  -RS      SLP Received On 11/02/23  -RS         Untimed Charges    [id number] Eval Speech and Production w/ Language Minutes 25  -RS      [id number] Eval Oral Pharyng Swallow Minutes 24  -RS         Total Minutes    Untimed Charges Total Minutes 49  -RS       Total Minutes 49  -RS                User Key  (r) = Recorded By, (t) = Taken By, (c) = Cosigned By      Initials Name  Provider Type    RS Seth Cueva MS CCC-SLP Speech and Language Pathologist                    Therapy Charges for Today       Code Description Service Date Service Provider Modifiers Qty    99303320668 HC ST EVAL ORAL PHARYNG SWALLOW 2 11/2/2023 Seth Cueva MS CCC-SLP GN 1    [id number] HC ST EVAL SPEECH AND PROD W LANG  2 11/2/2023 Seth Cueva MS CCC-SLP GN 1                 MS TARA Samayoa  11/2/2023

## 2023-11-03 ENCOUNTER — APPOINTMENT (OUTPATIENT)
Dept: GENERAL RADIOLOGY | Facility: HOSPITAL | Age: 53
DRG: 024 | End: 2023-11-03
Payer: MEDICARE

## 2023-11-03 LAB
BASOPHILS # BLD AUTO: 0.03 10*3/MM3 (ref 0–0.2)
BASOPHILS NFR BLD AUTO: 0.3 % (ref 0–1.5)
DEPRECATED RDW RBC AUTO: 76.4 FL (ref 37–54)
EOSINOPHIL # BLD AUTO: 0.12 10*3/MM3 (ref 0–0.4)
EOSINOPHIL NFR BLD AUTO: 1.2 % (ref 0.3–6.2)
ERYTHROCYTE [DISTWIDTH] IN BLOOD BY AUTOMATED COUNT: 21.8 % (ref 12.3–15.4)
GLUCOSE BLDC GLUCOMTR-MCNC: 126 MG/DL (ref 70–130)
GLUCOSE BLDC GLUCOMTR-MCNC: 138 MG/DL (ref 70–130)
GLUCOSE BLDC GLUCOMTR-MCNC: 147 MG/DL (ref 70–130)
GLUCOSE BLDC GLUCOMTR-MCNC: 163 MG/DL (ref 70–130)
HCT VFR BLD AUTO: 27.9 % (ref 34–46.6)
HGB BLD-MCNC: 8.6 G/DL (ref 12–15.9)
IMM GRANULOCYTES # BLD AUTO: 0.06 10*3/MM3 (ref 0–0.05)
IMM GRANULOCYTES NFR BLD AUTO: 0.6 % (ref 0–0.5)
LYMPHOCYTES # BLD AUTO: 0.47 10*3/MM3 (ref 0.7–3.1)
LYMPHOCYTES NFR BLD AUTO: 4.8 % (ref 19.6–45.3)
MACROCYTES BLD QL SMEAR: NORMAL
MCH RBC QN AUTO: 30 PG (ref 26.6–33)
MCHC RBC AUTO-ENTMCNC: 30.8 G/DL (ref 31.5–35.7)
MCV RBC AUTO: 97.2 FL (ref 79–97)
MICROCYTES BLD QL: NORMAL
MONOCYTES # BLD AUTO: 0.63 10*3/MM3 (ref 0.1–0.9)
MONOCYTES NFR BLD AUTO: 6.4 % (ref 5–12)
NEUTROPHILS NFR BLD AUTO: 8.56 10*3/MM3 (ref 1.7–7)
NEUTROPHILS NFR BLD AUTO: 86.7 % (ref 42.7–76)
NRBC BLD AUTO-RTO: 0 /100 WBC (ref 0–0.2)
PLAT MORPH BLD: NORMAL
PLATELET # BLD AUTO: 247 10*3/MM3 (ref 140–450)
PMV BLD AUTO: 9.8 FL (ref 6–12)
POLYCHROMASIA BLD QL SMEAR: NORMAL
RBC # BLD AUTO: 2.87 10*6/MM3 (ref 3.77–5.28)
UFH PPP CHRO-ACNC: 0.1 IU/ML (ref 0.3–0.7)
UFH PPP CHRO-ACNC: 0.21 IU/ML (ref 0.3–0.7)
UFH PPP CHRO-ACNC: 0.22 IU/ML (ref 0.3–0.7)
WBC MORPH BLD: NORMAL
WBC NRBC COR # BLD: 9.87 10*3/MM3 (ref 3.4–10.8)

## 2023-11-03 PROCEDURE — 94799 UNLISTED PULMONARY SVC/PX: CPT

## 2023-11-03 PROCEDURE — 99232 SBSQ HOSP IP/OBS MODERATE 35: CPT | Performed by: NURSE PRACTITIONER

## 2023-11-03 PROCEDURE — 82948 REAGENT STRIP/BLOOD GLUCOSE: CPT

## 2023-11-03 PROCEDURE — 85007 BL SMEAR W/DIFF WBC COUNT: CPT | Performed by: INTERNAL MEDICINE

## 2023-11-03 PROCEDURE — 74018 RADEX ABDOMEN 1 VIEW: CPT

## 2023-11-03 PROCEDURE — 99233 SBSQ HOSP IP/OBS HIGH 50: CPT | Performed by: INTERNAL MEDICINE

## 2023-11-03 PROCEDURE — 85520 HEPARIN ASSAY: CPT

## 2023-11-03 PROCEDURE — 25010000002 CEFTRIAXONE PER 250 MG: Performed by: NURSE PRACTITIONER

## 2023-11-03 PROCEDURE — 85025 COMPLETE CBC W/AUTO DIFF WBC: CPT | Performed by: INTERNAL MEDICINE

## 2023-11-03 PROCEDURE — 94664 DEMO&/EVAL PT USE INHALER: CPT

## 2023-11-03 PROCEDURE — 92507 TX SP LANG VOICE COMM INDIV: CPT

## 2023-11-03 PROCEDURE — 97535 SELF CARE MNGMENT TRAINING: CPT

## 2023-11-03 PROCEDURE — 97530 THERAPEUTIC ACTIVITIES: CPT

## 2023-11-03 PROCEDURE — 94761 N-INVAS EAR/PLS OXIMETRY MLT: CPT

## 2023-11-03 PROCEDURE — 92526 ORAL FUNCTION THERAPY: CPT

## 2023-11-03 PROCEDURE — 25010000002 HEPARIN (PORCINE) 25000-0.45 UT/250ML-% SOLUTION

## 2023-11-03 PROCEDURE — 94640 AIRWAY INHALATION TREATMENT: CPT

## 2023-11-03 RX ORDER — LEVOTHYROXINE SODIUM 0.03 MG/1
25 TABLET ORAL
Status: DISCONTINUED | OUTPATIENT
Start: 2023-11-04 | End: 2023-11-04

## 2023-11-03 RX ORDER — LEVETIRACETAM 100 MG/ML
1000 SOLUTION ORAL EVERY 12 HOURS SCHEDULED
Status: DISCONTINUED | OUTPATIENT
Start: 2023-11-03 | End: 2023-11-04

## 2023-11-03 RX ORDER — IPRATROPIUM BROMIDE AND ALBUTEROL SULFATE 2.5; .5 MG/3ML; MG/3ML
3 SOLUTION RESPIRATORY (INHALATION)
Status: DISCONTINUED | OUTPATIENT
Start: 2023-11-03 | End: 2023-11-10 | Stop reason: HOSPADM

## 2023-11-03 RX ORDER — LEVOTHYROXINE SODIUM 0.03 MG/1
25 TABLET ORAL
Status: DISCONTINUED | OUTPATIENT
Start: 2023-11-04 | End: 2023-11-03

## 2023-11-03 RX ADMIN — IPRATROPIUM BROMIDE AND ALBUTEROL SULFATE 3 ML: 2.5; .5 SOLUTION RESPIRATORY (INHALATION) at 15:51

## 2023-11-03 RX ADMIN — PANTOPRAZOLE SODIUM 40 MG: 40 INJECTION, POWDER, LYOPHILIZED, FOR SOLUTION INTRAVENOUS at 06:12

## 2023-11-03 RX ADMIN — LEVETIRACETAM 1000 MG: 100 SOLUTION ORAL at 08:54

## 2023-11-03 RX ADMIN — MIDODRINE HYDROCHLORIDE 5 MG: 5 TABLET ORAL at 12:26

## 2023-11-03 RX ADMIN — ASPIRIN 81 MG CHEWABLE TABLET 81 MG: 81 TABLET CHEWABLE at 08:54

## 2023-11-03 RX ADMIN — SODIUM CHLORIDE 1000 MG: 900 INJECTION INTRAVENOUS at 21:00

## 2023-11-03 RX ADMIN — ATORVASTATIN CALCIUM 80 MG: 40 TABLET, FILM COATED ORAL at 21:01

## 2023-11-03 RX ADMIN — MIDODRINE HYDROCHLORIDE 5 MG: 5 TABLET ORAL at 06:11

## 2023-11-03 RX ADMIN — HEPARIN SODIUM 18 UNITS/KG/HR: 10000 INJECTION, SOLUTION INTRAVENOUS at 12:25

## 2023-11-03 RX ADMIN — IPRATROPIUM BROMIDE AND ALBUTEROL SULFATE 3 ML: 2.5; .5 SOLUTION RESPIRATORY (INHALATION) at 19:35

## 2023-11-03 RX ADMIN — TOPIRAMATE 200 MG: 100 TABLET, FILM COATED ORAL at 08:54

## 2023-11-03 RX ADMIN — Medication 10 ML: at 21:01

## 2023-11-03 RX ADMIN — TOPIRAMATE 200 MG: 100 TABLET, FILM COATED ORAL at 21:01

## 2023-11-03 RX ADMIN — MIDODRINE HYDROCHLORIDE 5 MG: 5 TABLET ORAL at 21:01

## 2023-11-03 RX ADMIN — Medication 10 ML: at 08:54

## 2023-11-03 RX ADMIN — LEVETIRACETAM 1000 MG: 100 SOLUTION ORAL at 21:01

## 2023-11-03 NOTE — PROGRESS NOTES
"Critical Care Note     LOS: 3 days   Patient Care Team:  Rowan Nolasco APRN as PCP - General (Nurse Practitioner)  Alfonzo Garcia MD as Consulting Physician (Cardiology)    Chief Complaint/Reason for visit:    Chief Complaint   Patient presents with    Stroke   Acute left middle cerebral artery CVA  Anemia  UTI  Hypertension  Dyslipidemia  History of mechanical aortic and mitral valve replacement 2018  Chronic pain with intrathecal pain pump    Subjective     Interval History:     .  NIH stroke score is a 8.  She is able to speak some compared to yesterday.  She is afebrile.  Saturation 99% on RA.  Urine output 975mls.  Hemoglobin is 8.6, stable posttransfusion.  She was started on heparin without a bolus.  She is off phenylephrine.  She is tolerating tube feeding    Review of Systems:    All systems were reviewed and negative except as noted in subjective.    Medical history, surgical history, social history, family history reviewed    Objective     Intake/Output:    Intake/Output Summary (Last 24 hours) at 11/3/2023 1117  Last data filed at 11/3/2023 1000  Gross per 24 hour   Intake 1206.26 ml   Output 1100 ml   Net 106.26 ml       Nutrition:  NPO Diet NPO Type: Strict NPO    Infusions:  heparin, 18 Units/kg/hr, Last Rate: 18 Units/kg/hr (11/03/23 0709)  Pharmacy to Dose Heparin,   phenylephrine, 0.5-3 mcg/kg/min, Last Rate: Stopped (11/02/23 0546)        Mechanical Ventilator Settings:                                                Telemetry: Sinus rhythm             Vital Signs  Blood pressure 108/67, pulse 74, temperature 97.6 °F (36.4 °C), temperature source Oral, resp. rate 16, height 152.4 cm (60\"), weight 73.4 kg (161 lb 13.1 oz), SpO2 99%, not currently breastfeeding.    Physical Exam:  General Appearance:  Middle-aged woman awake with expressive aphasia   Head:  No visible trauma   Eyes:          Pupils are small, equal   Ears:     Throat: Oral mucosa moist   Neck: Trachea midline, no carotid " bruit   Back:      Lungs:   Scattered expiratory rhonchi, wheezing improved    Heart:  regular rhythm, mechanical S1, mechanical S2   Abdomen:   active bowel sounds, nondistended   Rectal:   Deferred   Extremities:   Right femoral site without hematoma   Pulses:    Skin: No rash   Lymph nodes: No cervical adenopathy   Neurologic: Continues to have right hemiparesis, and facial droop.  Severe dysarthria.  She will follow commands on the left.    Interval:  (Handoff)  1a. Level of Consciousness: 0-->Alert, keenly responsive  1b. LOC Questions: 1-->Answers one question correctly  1c. LOC Commands: 0-->Performs both tasks correctly  2. Best Gaze: 0-->Normal  3. Visual: 0-->No visual loss  4. Facial Palsy: 1-->Minor paralysis (flattened nasolabial fold, asymmetry on smiling)  5a. Motor Arm, Left: 0-->No drift, limb holds 90 (or 45) degrees for full 10 secs  5b. Motor Arm, Right: 2-->Some effort against gravity, limb cannot get to or maintain (if cued) 90 (or 45) degrees, drifts down to bed, but has some effort against gravity  6a. Motor Leg, Left: 0-->No drift, leg holds 30 degree position for full 5 secs  6b. Motor Leg, Right: 1-->Drift, leg falls by the end of the 5-sec period but does not hit bed  7. Limb Ataxia: 0-->Absent  8. Sensory: 0-->Normal, no sensory loss  9. Best Language: 2-->Severe aphasia, all communication is through fragmentary expression, great need for inference, questioning, and guessing by the listener. Range of information that can be exchanged is limited, listener carries burden of. . . (see row details)  10. Dysarthria: 1-->Mild-to-moderate dysarthria, patient slurs at least some words and, at worst, can be understood with some difficulty  11. Extinction and Inattention (formerly Neglect): 0-->No abnormality    Total (NIH Stroke Scale): 8   Results Review:     I reviewed the patient's new clinical results.   Results from last 7 days   Lab Units 11/02/23  0104 10/31/23  4742   SODIUM mmol/L 139   139 135*   POTASSIUM mmol/L 3.7  3.7 3.4*   CHLORIDE mmol/L 109*  109* 101   CO2 mmol/L 23.0  23.0 23.0   BUN mg/dL 7  7 9   CREATININE mg/dL 0.68  0.68 0.83   CALCIUM mg/dL 8.0*  8.0* 8.9   BILIRUBIN mg/dL 0.3  --    ALK PHOS U/L 99  --    ALT (SGPT) U/L <5 <5   AST (SGOT) U/L 8 6   GLUCOSE mg/dL 152*  152* 105*     Results from last 7 days   Lab Units 11/03/23  0439 11/02/23  0440 11/01/23  1757 10/31/23  2126   WBC 10*3/mm3 9.87 10.74  --  13.88*   HEMOGLOBIN g/dL 8.6* 8.9* 9.2* 6.7*   HEMATOCRIT % 27.9* 28.6* 29.5* 23.0*   PLATELETS 10*3/mm3 247 250  --  297     Results from last 7 days   Lab Units 11/01/23  0247   PH, ARTERIAL pH units 7.346*   PO2 ART mm Hg 80.8*   PCO2, ARTERIAL mm Hg 40.5   HCO3 ART mmol/L 22.1     Lab Results   Component Value Date    BLOODCX No growth at 2 days 11/01/2023     Lab Results   Component Value Date    URINECX (A) 10/31/2023     >100,000 CFU/mL Klebsiella pneumoniae ssp pneumoniae       I reviewed the patient's new imaging including images and reports.    KUB shows feeding tube in the distal duodenum.    Findings:  Increasing hypoattenuation in the region of the left insula consistent with evolving infarct. No new territorial infarction identified.    There there is no acute intracranial hemorrhage. There are no extra-axial collections.    Ventricles and CSF spaces are symmetric. No mass effect nor hydrocephalus.    Resolution of previous cortical contrast staining. Brain parenchyma is otherwise unchanged     Paranasal sinuses and mastoid air cells are adequately aerated.     Osseous structures and orbits appear intact.   Impression:     Impression:  Evolving left MCA territory infarct. There has been resolution of previous left cortical contrast staining. No superimposed acute process identified.        Electronically Signed: Saman Lopez MD   11/2/2023 2:52 PM CDT   Workstation ID: [id number]         XR CHEST 1 VW    Date of Exam: 10/31/2023 8:57 PM CDT    Indication:  Acute Stroke Protocol (onset < 12 hrs)    Comparison: 3/1/2018    Findings:  Cardiomediastinal silhouette is enlarged. There is a left subclavian port with tip in the distal SVC. There is generalized interstitial prominence. No airspace disease, pneumothorax, nor pleural effusion.No acute osseous abnormality identified.   Impression:     Impression:  Generalized interstitial prominence without acute airspace disease.      Electronically Signed: Saman Lopez MD   10/31/2023 9:40 PM CDT   Interpretation Summary         Left ventricular systolic function is normal. Calculated left ventricular EF = 62.1%    Left ventricular wall thickness is consistent with mild concentric hypertrophy.    Saline test results are negative.    There is a mechanical aortic valve prosthesis present.    There is a mechanical mitral valve prosthesis present.    Estimated right ventricular systolic pressure from tricuspid regurgitation is normal (<35 mmHg).    Calcification on pap muscle         All medications reviewed.   aspirin, 81 mg, Nasogastric, Daily   Or  aspirin, 300 mg, Rectal, Daily  atorvastatin, 80 mg, Nasogastric, Nightly  cefTRIAXone, 1,000 mg, Intravenous, Q24H  ipratropium-albuterol, 3 mL, Nebulization, 4x Daily - RT  levETIRAcetam, 1,000 mg, Oral, Q12H  midodrine, 5 mg, Nasogastric, Q8H  pantoprazole, 40 mg, Intravenous, Q AM  pharmacy consult - MTM, , Does not apply, Daily  sodium chloride, 10 mL, Intravenous, Q12H  topiramate, 200 mg, Nasogastric, BID          Assessment & Plan       Acute ischemic left MCA stroke    Anemia    Tobacco use    S/P mechanical aortic and mitral valve replacement (2018)    (HFpEF) heart failure with preserved ejection fraction    Chronic pain with intrathecal pump in place    Dyslipidemia    Cerebral aneurysm    H/O recurrent GIB 2* AVMs    UTI (urinary tract infection)    Chronic hypotension on midodrine    53-year-old woman, active smoker with history of mechanical aortic and mitral valve  replacements, hypertension, dyslipidemia, left internal carotid artery stent in 2020, seizure disorder on Keppra and Topamax, chronic anemia and chronic GI loss from AVMs, chronic pain with a pain pump, RB ILD followed at  presenting with an acute left middle cerebral artery stroke, embolic event.  Patient was outside of the window for thrombolysis and underwent a salvage mechanical thrombectomy for a saddle embolus in the left MCA bifurcation.  This morning her NIH stroke score is an 8 compared to 24 on admit.  Echocardiogram revealed no shunt and functional mechanical valves.  She remains anticoagulated on a heparin drip.  She is tolerating tube feeding.  She was able to ambulate 6 feet with moderate assist yesterday.  This is improved.    She has a history of seizure disorder for which she takes Topamax and Keppra.  She has had no evidence of seizures.    On admission urinalysis had 4+ bacteria and she was placed on Rocephin.  She remains afebrile.  Cultures are growing Klebsiella pneumonia.  It is sensitive to Rocephin, resistant to ampicillin.  White blood cell count is improved at 9.87.    She had mechanical aortic and mitral valve placement in 2017.  She is currently in sinus rhythm.  Echocardiogram revealed an EF of 62%, some mild LVH, negative saline test, prosthetic aortic and mitral valves, functional without stenosis or regurgitation.  She usually takes Coumadin.  INR was only 1.73.  She was placed on a heparin drip.  She is in sinus rhythm    She has a history of chronic hypotension for which she takes midodrine.  Systolic blood pressures ranging from 180-131    She has a longstanding history of chronic anemia and AVMs with intermittent GI blood loss.  Hemoglobin was 6.7 on admission.  She has cold agglutinins positive and anti-K antibody.  She is not having bright red blood or melena.  She did receive 2 units of packed red cells and today her hemoglobin is 8.6.  Hemoglobin has been stable for 24  hours    She is an active smoker with RB ILD followed at .  She was last seen by pulmonary in June, 2023.  She had quit smoking and her CT scan showed improvement in her groundglass opacities.  Spirometry and March 2023 revealed mild diffusion impairment, no obstruction and a normal TLC.  She did have an elevated RV consistent with air trapping.  She apparently started smoking again.  She had some wheezing yesterday that has resolved.  She is currently on room air.  She also wears CPAP for sleep apnea.    Patient has a history of chronic pain and has an intrathecal pain pump with morphine since February 2020.  She is currently receiving 0.9 mg/day of morphine and bupivacaine.  Her pump was last filled September 14 and will last for 3 months.  She also takes baclofen 10 mg 3 times daily.  She was somnolent yesterday and baclofen remains on hold.      PLAN:    Monitor H&H  Heparin drip with no bolus  Monitor for GI blood loss  Continue Rocephin for urinary tract infection  Continue midodrine    Aspirin, statin  Monitor NIH stroke score  Continue thyroid replacement   Keppra, Topamax, home medications for known seizure disorder  Hold baclofen secondary to altered mentation  Have continued the pain pump  Support with tube feeding  CPAP at night  PT, OT, speech therapy  Will discuss with stroke team possible floor    VTE Prophylaxis:SCDS    Stress Ulcer Prophylaxis: Protonix    Kamala Small MD  11/03/23  11:17 EDT      Time: Critical care 25 min  I personally provided care to this critically ill patient as documented above.  Critical care time does not include time spent on separately billed procedures.  None of my critical care time was concurrent with other critical care providers.

## 2023-11-03 NOTE — CASE MANAGEMENT/SOCIAL WORK
Continued Stay Note  [facility]     Patient Name: Mara Martínez  MRN: [id number]  Today's Date: 11/3/2023    Admit Date: 10/31/2023    Plan: [facility]   Discharge Plan       Row Name 11/03/23 1157       Plan    Plan [facility]    Patient/Family in Agreement with Plan yes    Plan Comments Discussed patient in MDR and spoke with Mr. Martínez by phone to discuss inpatient rehab options.  Mr. Martínez prefers [facility] for acute rehab since it is closer to their home.  Patient continues with NGT and tube feed at this time; failed SLP evaluation yesterday.  CM will continue to follow and make referral to [facility] when nearing medical readiness.                   Discharge Codes    No documentation.                   Jasmin Enriquez RN

## 2023-11-03 NOTE — THERAPY TREATMENT NOTE
Patient Name: Mara Martínez  : 1970    MRN: [id number]                              Today's Date: 11/3/2023       Admit Date: 10/31/2023    Visit Dx:     ICD-10-CM ICD-9-CM   1. Acute CVA (cerebrovascular accident)  I63.9 434.91   2. Dysphagia, unspecified type  R13.10 787.20   3. Communication deficit  F80.9 307.9     Patient Active Problem List   Diagnosis    Anemia    Aortic valve insufficiency    Bruit of left carotid artery    Chest pain    Positive D-dimer    Dizziness    Edema    Fatigue    HTN (hypertension)    Heart murmur    Hyperlipidemia    Mitral valve stenosis    Mitral valve insufficiency    Palpitations    Pulmonary edema    Seizure disorder    Shortness of breath    Difficulty sleeping    Snoring    Supravalvular aortic stenosis    Syncope    Tachycardia    Mitral stenosis    Rheumatic aortic stenosis    Valvular heart disease    Epilepsy    ISREAL (cerebral atherosclerosis)    Tobacco use    Migraines    Aortic regurgitation    Rheumatic mitral regurgitation    SOB (shortness of breath)    Supraventricular aortic stenosis    Aortic valve stenosis    Rheumatic aortic valve insufficiency    Moderate aortic stenosis    Aortic valve disorder    Coronary artery disease involving native coronary artery of native heart without angina pectoris    S/P mitral valve replacement    S/P mechanical aortic and mitral valve replacement (2018)    Stenosis of carotid artery    Generalized abdominal pain    Rectal bleeding    Functional diarrhea    Lower extremity edema    Rectal bleed    (HFpEF) heart failure with preserved ejection fraction    Chronic pain with intrathecal pump in place    Dyslipidemia    Bilateral carotid artery stenosis    Low back pain    Degeneration of lumbar intervertebral disc    Lumbosacral radiculopathy    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    Cerebral aneurysm    Complex partial seizures with consciousness impaired    Migraine without aura and with status  migrainosus, not intractable    Chronic obstructive pulmonary disease    Essential tremor    Hesitancy of micturition    Complex partial epilepsy with generalization and with intractable epilepsy    Focal (motor) epilepsy    Occipital neuralgia    Paresthesia    Restless legs syndrome    Retinal drusen    Acute ischemic left MCA stroke    H/O recurrent GIB 2* AVMs    UTI (urinary tract infection)    Chronic hypotension on midodrine     Past Medical History:   Diagnosis Date    Anemia     Aortic regurgitation     Arthritis     Back pain     Carotid bruit     ISREAL (cerebral atherosclerosis) 2014    nonobstructive    Chest pain     Chronic hypotension on midodrine 2023    Chronic obstructive pulmonary disease 2022    Coronary artery disease     COVID-19 vaccine administered     phizer    D-dimer, elevated     Diastolic congestive heart failure 2019    Dizziness     Edema     Epilepsy     Fatigue     Heart murmur     History of blood transfusion 2019    History of blood transfusion     History of blood transfusion 2022    History of blood transfusion     August 2023 x2    History of recent blood transfusion 2021    History of transfusion     Hyperlipidemia     Hypertension     Kidney stones     Migraines     Mitral regurgitation     Mitral stenosis     mild    Neuropathy     Neuropathy     Palpitations     Pulmonary edema     Retinal drusen     Seizure     Sleep apnea 2019    Sleeping difficulties     SOB (shortness of breath)     Supraventricular aortic stenosis     Syncope     Tachycardia     Tobacco abuse     VHD (valvular heart disease)     Wears dentures     Wears eyeglasses      Past Surgical History:   Procedure Laterality Date    APPENDECTOMY      CARDIAC CATHETERIZATION      CARDIAC SURGERY      2 valves replaced     SECTION      x 2    CHOLECYSTECTOMY      COLONOSCOPY      COLONOSCOPY N/A 2019    Procedure: COLONOSCOPY;  Surgeon: Kurt Gaines MD;  Location:   [facility];  Service: Gastroenterology    CORONARY ARTERY BYPASS GRAFT  06/12/2018    EXPLORATORY LAPAROTOMY      HERNIA REPAIR      HYSTERECTOMY      INTERVENTIONAL RADIOLOGY PROCEDURE Bilateral 10/31/2023    Procedure: Carotid Cerebral Angiogram;  Surgeon: Cayetano Mckeon MD;  Location: [facility] INVASIVE LOCATION;  Service: Interventional Radiology;  Laterality: Bilateral;    JOINT REPLACEMENT Right     knee replacement    PAIN PUMP INSERTION/REVISION      morphine    PORTACATH PLACEMENT      UPPER GASTROINTESTINAL ENDOSCOPY        General Information       Row Name 11/03/23 1459          OT Time and Intention    Document Type therapy note (daily note)  -CS     Mode of Treatment occupational therapy  -CS       Row Name 11/03/23 1459          General Information    Existing Precautions/Restrictions fall;oxygen therapy device and L/min  NG  -CS     Barriers to Rehab medically complex;previous functional deficit;cognitive status;visual deficit  -CS       Row Name 11/03/23 1459          Cognition    Orientation Status (Cognition) oriented to;person;place  -CS       Row Name 11/03/23 1459          Safety Issues, Functional Mobility    Impairments Affecting Function (Mobility) balance;cognition;coordination;endurance/activity tolerance;motor planning;strength;visual/perceptual;postural/trunk control  -CS     Cognitive Impairments, Mobility Safety/Performance attention;insight into deficits/self-awareness;sequencing abilities  -CS               User Key  (r) = Recorded By, (t) = Taken By, (c) = Cosigned By      Initials Name Provider Type    CS Aurea Ernst OT Occupational Therapist                     Mobility/ADL's       Row Name 11/03/23 1450          Bed Mobility    Bed Mobility supine-sit  -CS     Supine-Sit [place] (Bed Mobility) moderate assist (50% patient effort);verbal cues  -CS     Assistive Device (Bed Mobility) bed rails;draw sheet;head of bed elevated  -CS       Row Name 11/03/23 1455           Transfers    Transfers sit-stand transfer;stand-sit transfer  -CS     Comment, (Transfers) STSx2 reps  -       Row Name 11/03/23 1459          Sit-Stand Transfer    Sit-Stand [place] (Transfers) minimum assist (75% patient effort);verbal cues  -CS     Assistive Device (Sit-Stand Transfers) walker, front-wheeled  -CS       Row Name 11/03/23 1459          Stand-Sit Transfer    Stand-Sit [place] (Transfers) minimum assist (75% patient effort);verbal cues  -CS     Assistive Device (Stand-Sit Transfers) walker, front-wheeled  -CS       Row Name 11/03/23 1459          Functional Mobility    Functional Mobility- Ind. Level minimum assist (75% patient effort);1 person + 1 person to manage equipment;verbal cues required  -     Functional Mobility- Device walker, front-wheeled  -     Functional Mobility-Distance (Feet) --  to the doorway  -     Functional Mobility- Comment assist for RW management, chair follow  -       Row Name 11/03/23 1459          Activities of Daily Living    BADL Assessment/Intervention lower body dressing;toileting;grooming  -       Row Name 11/03/23 1459          Lower Body Dressing Assessment/Training    [place] Level (Lower Body Dressing) doff;don;socks;dependent (less than 25% patient effort)  -CS     Position (Lower Body Dressing) supported sitting;supported standing  -       Row Name 11/03/23 1459          Grooming Assessment/Training    [place] Level (Grooming) wash face, hands;minimum assist (75% patient effort)  -CS     Position (Grooming) supported sitting  -       Row Name 11/03/23 1459          Toileting Assessment/Training    [place] Level (Toileting) adjust/manage clothing;change pad/brief;perform perineal hygiene;dependent (less than 25% patient effort)  -CS     Position (Toileting) supported sitting;supported standing  -CS     Comment, (Toileting) incontinence episode x2  -CS               User Key  (r) = Recorded By, (t) = Taken By, (c) =  Cosigned By      Initials Name Provider Type    CS Aurea Ernst OT Occupational Therapist                   Obj/Interventions       Row Name 11/03/23 1501          Balance    Balance Assessment sitting static balance;sitting dynamic balance;standing static balance;standing dynamic balance  -CS     Static Sitting Balance standby assist  -CS     Dynamic Sitting Balance contact guard  -CS     Position, Sitting Balance sitting edge of bed  -CS     Static Standing Balance minimal assist  -CS     Dynamic Standing Balance minimal assist  -CS     Position/Device Used, Standing Balance walker, rolling  -CS     Balance Interventions sitting;standing;static;dynamic;dynamic reaching;occupation based/functional task;weight shifting activity  -CS               User Key  (r) = Recorded By, (t) = Taken By, (c) = Cosigned By      Initials Name Provider Type     Aurea Ernst OT Occupational Therapist                   Goals/Plan    No documentation.                  Clinical Impression       Row Name 11/03/23 1501          Pain Assessment    Pretreatment Pain Rating 0/10 - no pain  -CS     Posttreatment Pain Rating 0/10 - no pain  -       Row Name 11/03/23 1501          Plan of Care Review    Plan of Care Reviewed With patient  -CS     Progress improving  -     Outcome Evaluation Pt demo significantly improved alertness, command following, and independence this date. Pt is Min Ax1+1 w/ RW for functional mobility though conts to be Dep for LB ADL performance d/t deficits in balance and strength. Recommend cont skilled IPOT POC to promote return to PLOF. Recommend pt DC to IP rehab.  -       Row Name 11/03/23 1501          Therapy Plan Review/Discharge Plan (OT)    Anticipated Discharge Disposition (OT) inpatient rehabilitation facility  -       Row Name 11/03/23 1501          Vital Signs    Pre Systolic BP Rehab 166  -CS     Pre Treatment Diastolic BP 60  -CS     Post Systolic BP Rehab --  w/ RN, getting bathed  -      Pretreatment Heart Rate (beats/min) 75  -CS     Pre SpO2 (%) 95  -CS     O2 Delivery Pre Treatment nasal cannula  -CS     Pre Patient Position Supine  -CS     Intra Patient Position Standing  -CS     Post Patient Position Sitting  -CS       Row Name 11/03/23 1501          Positioning and Restraints    Pre-Treatment Position in bed  -CS     Post Treatment Position chair  -CS     In Chair notified nsg;reclined;call light within reach;encouraged to call for assist;exit alarm on;RUE elevated;LUE elevated;waffle cushion;on mechanical lift sling;with nsg;legs elevated;heels elevated  -CS               User Key  (r) = Recorded By, (t) = Taken By, (c) = Cosigned By      Initials Name Provider Type    CS Aurea Ernst, DEBBIE Occupational Therapist                   Outcome Measures       Row Name 11/03/23 1502          How much help from another is currently needed...    Putting on and taking off regular lower body clothing? 1  -CS     Bathing (including washing, rinsing, and drying) 2  -CS     Toileting (which includes using toilet bed pan or urinal) 1  -CS     Putting on and taking off regular upper body clothing 2  -CS     Taking care of personal grooming (such as brushing teeth) 3  -CS     Eating meals 3  -CS     AM-PAC 6 Clicks Score (OT) 12  -CS       Row Name 11/03/23 0800          How much help from another person do you currently need...    Turning from your back to your side while in flat bed without using bedrails? 3  -SW     Moving from lying on back to sitting on the side of a flat bed without bedrails? 3  -SW     Moving to and from a bed to a chair (including a wheelchair)? 2  -SW     Standing up from a chair using your arms (e.g., wheelchair, bedside chair)? 3  -SW     Climbing 3-5 steps with a railing? 2  -SW     To walk in hospital room? 2  -SW     AM-PAC 6 Clicks Score (PT) 15  -SW     Highest level of mobility 4 --> Transferred to chair/commode  -SW       Row Name 11/03/23 1502          Modified [place]  Scale    Modified [place] Scale 3 - Moderate disability.  Requiring some help, but able to walk without assistance.  -       Row Name 11/03/23 1502          Functional Assessment    Outcome Measure Options AM-PAC 6 Clicks Daily Activity (OT);Modified [place]  -CS               User Key  (r) = Recorded By, (t) = Taken By, (c) = Cosigned By      Initials Name Provider Type    Patricia Lugo RN Registered Nurse    Aurea Whitt OT Occupational Therapist                    Occupational Therapy Education       Title: PT OT SLP Therapies (In Progress)       Topic: Occupational Therapy (In Progress)       Point: ADL training (In Progress)       Description:   Instruct learner(s) on proper safety adaptation and remediation techniques during self care or transfers.   Instruct in proper use of assistive devices.                  Learning Progress Summary             Patient Acceptance, E, NR by CS at 11/3/2023 1503    Acceptance, E, NR by CS at 11/1/2023 1159   Family Acceptance, E, NR by CS at 11/3/2023 1503    Acceptance, E, NR by CS at 11/1/2023 1159                         Point: Home exercise program (Not Started)       Description:   Instruct learner(s) on appropriate technique for monitoring, assisting and/or progressing therapeutic exercises/activities.                  Learner Progress:  Not documented in this visit.              Point: Precautions (In Progress)       Description:   Instruct learner(s) on prescribed precautions during self-care and functional transfers.                  Learning Progress Summary             Patient Acceptance, E, NR by CS at 11/3/2023 1503    Acceptance, E, NR by CS at 11/1/2023 1159   Family Acceptance, E, NR by CS at 11/3/2023 1503    Acceptance, E, NR by CS at 11/1/2023 1159                         Point: Body mechanics (In Progress)       Description:   Instruct learner(s) on proper positioning and spine alignment during self-care, functional mobility activities  and/or exercises.                  Learning Progress Summary             Patient Acceptance, E, NR by CS at 11/3/2023 1503    Acceptance, E, NR by CS at 11/1/2023 1159   Family Acceptance, E, NR by CS at 11/3/2023 1503    Acceptance, E, NR by CS at 11/1/2023 1159                                         User Key       Initials Effective Dates Name Provider Type Discipline     09/02/21 -  Aurea Ernst, OT Occupational Therapist OT                  OT Recommendation and Plan  Planned Therapy Interventions (OT): activity tolerance training, BADL retraining, adaptive equipment training, functional balance retraining, occupation/activity based interventions, ROM/therapeutic exercise, strengthening exercise, transfer/mobility retraining  Therapy Frequency (OT): daily  Plan of Care Review  Plan of Care Reviewed With: patient  Progress: improving  Outcome Evaluation: Pt demo significantly improved alertness, command following, and independence this date. Pt is Min Ax1+1 w/ RW for functional mobility though conts to be Dep for LB ADL performance d/t deficits in balance and strength. Recommend cont skilled IPOT POC to promote return to PLOF. Recommend pt DC to IP rehab.     Time Calculation:   Evaluation Complexity (OT)  Review Occupational Profile/Medical/Therapy History Complexity: brief/low complexity  Assessment, Occupational Performance/Identification of Deficit Complexity: 5 or more performance deficits  Clinical Decision Making Complexity (OT): detailed assessment/moderate complexity  Overall Complexity of Evaluation (OT): low complexity     Time Calculation- OT       Row Name 11/03/23 1503             Time Calculation- OT    OT Start Time 1340  -CS      OT Received On 11/03/23  -CS      OT Goal Re-Cert Due Date 11/11/23  -CS         Timed Charges    79028 - OT Therapeutic Activity Minutes 20  -CS      75177 - OT Self Care/Mgmt Minutes 20  -CS         Total Minutes    Timed Charges Total Minutes 40  -CS       Total  Minutes 40  -CS                User Key  (r) = Recorded By, (t) = Taken By, (c) = Cosigned By      Initials Name Provider Type    CS Aurea Ernst OT Occupational Therapist                  Therapy Charges for Today       Code Description Service Date Service Provider Modifiers Qty    [id number]  OT THERAPEUTIC ACT EA 15 MIN 11/3/2023 Aurea Ernst OT GO 2    [id number]  OT SELF CARE/MGMT/TRAIN EA 15 MIN 11/3/2023 Aurea Ernst OT GO 1                 Aurea Ernst OT  11/3/2023

## 2023-11-03 NOTE — THERAPY RE-EVALUATION
Acute Care - Speech Language Pathology   Swallow Re-Assessment and Treatment Note ALEXANDR Paredes     Patient Name: Mara Martínez  : 1970  MRN: [id number]  Today's Date: 11/3/2023               Admit Date: 10/31/2023    Visit Dx:     ICD-10-CM ICD-9-CM   1. Acute CVA (cerebrovascular accident)  I63.9 434.91   2. Dysphagia, unspecified type  R13.10 787.20   3. Communication deficit  F80.9 307.9     Patient Active Problem List   Diagnosis    Anemia    Aortic valve insufficiency    Bruit of left carotid artery    Chest pain    Positive D-dimer    Dizziness    Edema    Fatigue    HTN (hypertension)    Heart murmur    Hyperlipidemia    Mitral valve stenosis    Mitral valve insufficiency    Palpitations    Pulmonary edema    Seizure disorder    Shortness of breath    Difficulty sleeping    Snoring    Supravalvular aortic stenosis    Syncope    Tachycardia    Mitral stenosis    Rheumatic aortic stenosis    Valvular heart disease    Epilepsy    ISREAL (cerebral atherosclerosis)    Tobacco use    Migraines    Aortic regurgitation    Rheumatic mitral regurgitation    SOB (shortness of breath)    Supraventricular aortic stenosis    Aortic valve stenosis    Rheumatic aortic valve insufficiency    Moderate aortic stenosis    Aortic valve disorder    Coronary artery disease involving native coronary artery of native heart without angina pectoris    S/P mitral valve replacement    S/P mechanical aortic and mitral valve replacement (2018)    Stenosis of carotid artery    Generalized abdominal pain    Rectal bleeding    Functional diarrhea    Lower extremity edema    Rectal bleed    (HFpEF) heart failure with preserved ejection fraction    Chronic pain with intrathecal pump in place    Dyslipidemia    Bilateral carotid artery stenosis    Low back pain    Degeneration of lumbar intervertebral disc    Lumbosacral radiculopathy    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    Cerebral aneurysm    Complex partial  seizures with consciousness impaired    Migraine without aura and with status migrainosus, not intractable    Chronic obstructive pulmonary disease    Essential tremor    Hesitancy of micturition    Complex partial epilepsy with generalization and with intractable epilepsy    Focal (motor) epilepsy    Occipital neuralgia    Paresthesia    Restless legs syndrome    Retinal drusen    Acute ischemic left MCA stroke    H/O recurrent GIB 2* AVMs    UTI (urinary tract infection)    Chronic hypotension on midodrine     Past Medical History:   Diagnosis Date    Anemia     Aortic regurgitation     Arthritis     Back pain     Carotid bruit     ISREAL (cerebral atherosclerosis) 2014    nonobstructive    Chest pain     Chronic hypotension on midodrine 2023    Chronic obstructive pulmonary disease 2022    Coronary artery disease     COVID-19 vaccine administered     phizer    D-dimer, elevated     Diastolic congestive heart failure 2019    Dizziness     Edema     Epilepsy     Fatigue     Heart murmur     History of blood transfusion 2019    History of blood transfusion     History of blood transfusion 2022    History of blood transfusion     August 2023 x2    History of recent blood transfusion 2021    History of transfusion     Hyperlipidemia     Hypertension     Kidney stones     Migraines     Mitral regurgitation     Mitral stenosis     mild    Neuropathy     Neuropathy     Palpitations     Pulmonary edema     Retinal drusen     Seizure     Sleep apnea 2019    Sleeping difficulties     SOB (shortness of breath)     Supraventricular aortic stenosis     Syncope     Tachycardia     Tobacco abuse     VHD (valvular heart disease)     Wears dentures     Wears eyeglasses      Past Surgical History:   Procedure Laterality Date    APPENDECTOMY      CARDIAC CATHETERIZATION      CARDIAC SURGERY      2 valves replaced     SECTION      x 2    CHOLECYSTECTOMY      COLONOSCOPY      COLONOSCOPY N/A  5/2/2019    Procedure: COLONOSCOPY;  Surgeon: Kurt Gaines MD;  Location:  [facility];  Service: Gastroenterology    CORONARY ARTERY BYPASS GRAFT  06/12/2018    EXPLORATORY LAPAROTOMY      HERNIA REPAIR      HYSTERECTOMY      INTERVENTIONAL RADIOLOGY PROCEDURE Bilateral 10/31/2023    Procedure: Carotid Cerebral Angiogram;  Surgeon: Cayetano Mckeon MD;  Location:  [facility];  Service: Interventional Radiology;  Laterality: Bilateral;    JOINT REPLACEMENT Right     knee replacement    PAIN PUMP INSERTION/REVISION      morphine    PORTACATH PLACEMENT      UPPER GASTROINTESTINAL ENDOSCOPY         SLP Recommendation and Plan  SLP Swallowing Diagnosis: oral dysphagia, suspected pharyngeal dysphagia (11/03/23 1440)  SLP Diet Recommendation: NPO, temporary alternate methods of nutrition/hydration (11/03/23 1440)  Recommended Precautions and Strategies: general aspiration precautions (11/03/23 1440)  SLP Rec. for Method of Medication Administration: meds via alternate route (11/03/23 1440)     Monitor for Signs of Aspiration: yes, notify SLP if any concerns (11/03/23 1440)     Swallow Criteria for Skilled Therapeutic Interventions Met: demonstrates skilled criteria (11/03/23 1440)  Anticipated Discharge Disposition (SLP): inpatient rehabilitation facility (11/03/23 1440)  Rehab Potential/Prognosis, Swallowing: good, to achieve stated therapy goals (11/03/23 1440)  Therapy Frequency (Swallow): 5 days per week (11/03/23 1440)  Predicted Duration Therapy Intervention (Days): until discharge (11/03/23 1440)  Oral Care Recommendations: Oral Care BID/PRN, Suction toothbrush (11/03/23 1440)        Daily Summary of Progress (SLP): progress toward functional goals is good (11/03/23 1440)                  Treatment Assessment Comments (SLP): RN requested SLP re-assessment. Pt appears much improved today. She has excellent oral acceptance of ice and water with good oral manipulation. Overt pharyngeal s/s with most  trials. While pt has certainly improved, she is still not yet ready for instrumental. Would anticipate early-mid next week. Great effort and participation with all speech/lang tasks, goals met and new goals added (11/03/23 1440)          Oral Care Recommendations: Oral Care BID/PRN, Suction toothbrush (11/03/23 1440)    Plan of Care Reviewed With: patient  Progress: improving      SWALLOW EVALUATION (last 72 hours)       SLP Adult Swallow Evaluation       Row Name 11/03/23 1440 11/02/23 1000                Rehab Evaluation    Document Type re-evaluation  -RS --       Subjective Information no complaints  -RS --       Patient Observations alert;cooperative;agree to therapy  -RS --       Patient Effort good  -RS --       Oral Care teeth brushed - suction toothbrush;tongue brushed  -RS --          Pain    Additional Documentation Pain Scale: FACES Pre/Post-Treatment (Group)  -RS --          Pain Scale: FACES Pre/Post-Treatment    Pain: FACES Scale, Pretreatment 0-->no hurt  -RS --       Posttreatment Pain Rating 0-->no hurt  -RS --          Oral Motor Structure and Function    Dentition Assessment -- --  Pt clamped lips shut, unable to assess  -RS          General Eating/Swallowing Observations    Respiratory Support Currently in Use -- nasal cannula  -RS       Eating/Swallowing Skills -- fed by SLP  -RS       Positioning During Eating -- upright in bed  -RS       Utensils Used -- spoon  -RS       Consistencies Trialed -- ice chips  -RS          Respiratory    Respiratory Status -- WFL  -RS          Clinical Swallow Eval    Oral Prep Phase -- impaired  -RS       Oral Transit -- impaired  -RS       Oral Residue -- impaired  -RS       Pharyngeal Phase -- no overt signs/symptoms of pharyngeal impairment  -RS       Esophageal Phase -- unremarkable  -RS       Clinical Swallow Evaluation Summary -- Limited assessment given severity of pt's symptoms. She has her mouth clamped shut during portions of exam; however, when ice  chips are presented her mouth will open but she is unable to remove ice from utensil. When ice was placed in oral cavity, she has complete anterior loss without awareness. No spontaneous swallows or swallows on command. Pt is not safe for PO diet or appropriate for instrumental. Will need to increase PO trials and establish consistent swallow prior to considering instrumental  -RS          SLP Evaluation Clinical Impression    SLP Swallowing Diagnosis oral dysphagia;suspected pharyngeal dysphagia  -RS oral dysphagia;suspected pharyngeal dysphagia  -RS       Functional Impact risk of aspiration/pneumonia  -RS risk of aspiration/pneumonia  -RS       Rehab Potential/Prognosis, Swallowing good, to achieve stated therapy goals  -RS adequate, monitor progress closely  -RS       Swallow Criteria for Skilled Therapeutic Interventions Met demonstrates skilled criteria  -RS demonstrates skilled criteria  -RS          SLP Treatment Clinical Impressions    Treatment Assessment Comments (SLP) RN requested SLP re-assessment. Pt appears much improved today. She has excellent oral acceptance of ice and water with good oral manipulation. Overt pharyngeal s/s with most trials. While pt has certainly improved, she is still not yet ready for instrumental. Would anticipate early-mid next week. Great effort and participation with all speech/lang tasks, goals met and new goals added  -RS --       Daily Summary of Progress (SLP) progress toward functional goals is good  -RS --          Recommendations    Therapy Frequency (Swallow) 5 days per week  -RS 5 days per week  -RS       Predicted Duration Therapy Intervention (Days) until discharge  -RS --       SLP Diet Recommendation NPO;temporary alternate methods of nutrition/hydration  -RS NPO;temporary alternate methods of nutrition/hydration  -RS       Recommended Precautions and Strategies general aspiration precautions  -RS general aspiration precautions  -RS       Oral Care  Recommendations Oral Care BID/PRN;Suction toothbrush  -RS Oral Care BID/PRN;Suction toothbrush  -RS       SLP Rec. for Method of Medication Administration meds via alternate route  -RS meds via alternate route  -RS       Monitor for Signs of Aspiration yes;notify SLP if any concerns  -RS yes;notify SLP if any concerns  -RS       Anticipated Discharge Disposition (SLP) inpatient rehabilitation facility  -RS --                 User Key  (r) = Recorded By, (t) = Taken By, (c) = Cosigned By      Initials Name Effective Dates    RS Seth Cueva MS CCC-SLP 09/14/23 -                     EDUCATION  The patient has been educated in the following areas:   Communication Impairment Dysphagia (Swallowing Impairment).        SLP GOALS       Row Name 11/03/23 1440 11/02/23 1000          (LTG) Patient will demonstrate functional swallow for    Diet Texture (Demonstrate functional swallow) pureed textures  -RS pureed textures  -RS     Liquid viscosity (Demonstrate functional swallow) nectar/ mildly thick liquids  -RS nectar/ mildly thick liquids  -RS     [place] (Demonstrate functional swallow) with maximum cues (25-49% accuracy)  -RS with maximum cues (25-49% accuracy)  -RS     Time Frame (Demonstrate functional swallow) by discharge  -RS by discharge  -RS     Progress/Outcomes (Demonstrate functional swallow) continuing progress toward goal  -RS new goal  -RS        Patient will demonstrate functional language skills for return to discharge environment     [place] with moderate cues  -RS with moderate cues  -RS     Time frame by discharge  -RS by discharge  -RS     Progress/Outcomes continuing progress toward goal  -RS new goal  -RS        SLP Diagnostic Treatment     Patient will participate in further assessment in the following areas reading comprehension;graphic expression;cognitive-linguistic  -RS --     Time Frame (Diagnostic) by discharge  -RS --     Progress/Outcomes (Additional Goal 1, SLP) new goal  -RS --         Comprehend Questions Goal 1 (SLP)    Improve Ability to Comprehend Questions Goal 1 (SLP) complex general questions;90%;independently (over 90% accuracy)  -RS simple yes/no questions;80%;with moderate cues (50-74%)  -RS     Time Frame (Comprehend Questions Goal 1, SLP) by discharge  -RS by discharge  -RS     Progress/Outcomes (Comprehend Questions Goal 1, SLP) new goal  -RS new goal  -RS     Comment (Comprehend Questions Goal 1, SLP) simple and complex y/n questions answered with 100% accuracy independently  -RS --        Follow Directions Goal 2 (SLP)    Improve Ability to Follow Directions Goal 1 (SLP) multistep commands;90%;independently (over 90% accuracy)  -RS 1 step direction with objects;80%;with moderate cues (50-74%)  -RS     Time Frame (Follow Directions Goal 1, SLP) by discharge  -RS by discharge  -RS     Progress/Outcomes (Follow Directions Goal 1, SLP) new goal  -RS new goal  -RS     Comment (Follow Directions Goal 1, SLP) single step and 2-step directions met with 100% accuracy independently  -RS --        Articulation Goal 1 (SLP)    Improve Articulation Goal 1 (SLP) by over-articulating at phrase level;80%;with minimal cues (75-90%)  -RS --     Time Frame (Articulation Goal 1, SLP) by discharge  -RS --     Progress/Outcomes (Articulation Goal 1, SLP) new goal  -RS --               User Key  (r) = Recorded By, (t) = Taken By, (c) = Cosigned By      Initials Name Provider Type    Seth Aggarwal MS CCC-SLP Speech and Language Pathologist                       Time Calculation:    Time Calculation- SLP       Row Name 11/03/23 1603             Time Calculation- SLP    SLP Start Time 1440  -RS      SLP Received On 11/03/23  -RS                User Key  (r) = Recorded By, (t) = Taken By, (c) = Cosigned By      Initials Name Provider Type    Seth Aggarwal MS CCC-SLP Speech and Language Pathologist                    Therapy Charges for Today       Code Description Service Date Service Provider  Modifiers Qty    84131635643 HC ST EVAL ORAL PHARYNG SWALLOW 2 11/2/2023 Seth Cueva, MS CCC-SLP GN 1    22296101058 HC ST EVAL SPEECH AND PROD W LANG  2 11/2/2023 Seth Cueva, MS SAN-SLP GN 1    79695163785 HC ST TREATMENT SWALLOW 2 11/3/2023 Seth Cueva, MS SAN-SLP GN 1    52779681203 HC ST TREATMENT SPEECH 2 11/3/2023 Seth Cueva MS CCC-SLP GN 1                 MS TARA [place]  11/3/2023

## 2023-11-03 NOTE — PLAN OF CARE
Goal Outcome Evaluation:    NIHSS 8. Marked improvement in neuro status.     Speech reevaluated. OKAY to have 4-6 ice chips an hour. FEES soon. Tube feed at goal.     Ambulated to doorway with PT/OT.      to visit tomorrow. Updated via phone.

## 2023-11-03 NOTE — PROGRESS NOTES
Stroke Progress Note       Chief Complaint:  right sided weakness    Subjective    Subjective     Subjective:  Drowsy, awakens to voice.  She is able to tell me her name is Mara.  Intermittently follows simple commands.  Right hemineglect noted.      Review of Systems   UTO    Objective      Temp:  [97.3 °F (36.3 °C)-98.9 °F (37.2 °C)] 98.3 °F (36.8 °C)  Heart Rate:  [71-82] 75  Resp:  [16-18] 16  BP: (101-131)/(51-84) 131/80        Physical Exam  Constitutional:       Appearance: She is ill-appearing.      Comments: Drowsy, awakens to voice.   HENT:      Head: Normocephalic and atraumatic.   Eyes:      Extraocular Movements: Extraocular movements intact.      Pupils: Pupils are equal, round, and reactive to light.   Cardiovascular:      Rate and Rhythm: Normal rate and regular rhythm.   Pulmonary:      Effort: Pulmonary effort is normal. No respiratory distress.   Skin:     General: Skin is warm and dry.   Neurological:      Comments: Oriented to person, mixed aphasia, moderate dysarthria, right sided neglect, R FD, RUE with some movement, RLE with some movement         Results Review:    I reviewed the patient's new clinical results.  CT Head Without Contrast    Result Date: 11/2/2023  Impression: Evolving left MCA territory infarct. There has been resolution of previous left cortical contrast staining. No superimposed acute process identified. Electronically Signed: Saman Lopez MD  11/2/2023 2:52 PM CDT  Workstation ID: [id number]    XR Abdomen KUB    Result Date: 11/1/2023  Impression: The tip of the feeding tube lies within the gastric antrum. Electronically Signed: Yasir Cr MD  11/1/2023 12:22 PM EDT  Workstation ID: [id number]    EEG    Result Date: 11/1/2023  Diffuse cerebral dysfunction of at least mild degree but nonspecific No ongoing seizures are present This report is transcribed using the Dragon dictation system.       Lab Results (last 24 hours)       Procedure Component Value Units Date/Time     CBC & Differential [[id number]]  (Abnormal) Collected: 11/03/23 0439    Specimen: Blood Updated: 11/03/23 0754    Narrative:      The following orders were created for panel order CBC & Differential.  Procedure                               Abnormality         Status                     ---------                               -----------         ------                     CBC Auto Differential[[id number]]        Abnormal            Final result               Scan Slide[[id number]]                                       Final result                 Please view results for these tests on the individual orders.    CBC Auto Differential [[id number]]  (Abnormal) Collected: 11/03/23 0439    Specimen: Blood Updated: 11/03/23 0754     WBC 9.87 10*3/mm3      RBC 2.87 10*6/mm3      Hemoglobin 8.6 g/dL      Hematocrit 27.9 %      MCV 97.2 fL      MCH 30.0 pg      MCHC 30.8 g/dL      RDW 21.8 %      RDW-SD 76.4 fl      MPV 9.8 fL      Platelets 247 10*3/mm3      Neutrophil % 86.7 %      Lymphocyte % 4.8 %      Monocyte % 6.4 %      Eosinophil % 1.2 %      Basophil % 0.3 %      Immature Grans % 0.6 %      Neutrophils, Absolute 8.56 10*3/mm3      Lymphocytes, Absolute 0.47 10*3/mm3      Monocytes, Absolute 0.63 10*3/mm3      Eosinophils, Absolute 0.12 10*3/mm3      Basophils, Absolute 0.03 10*3/mm3      Immature Grans, Absolute 0.06 10*3/mm3      nRBC 0.0 /100 WBC     Narrative:      Appended report. These results have been appended to a previously verified report.    Scan Slide [[id number]] Collected: 11/03/23 0439    Specimen: Blood Updated: 11/03/23 0754     Microcytes Slight/1+     Macrocytes Slight/1+     Polychromasia Slight/1+     WBC Morphology Normal     Platelet Morphology Normal    Heparin Anti-Xa [[id number]]  (Abnormal) Collected: 11/03/23 0439    Specimen: Blood Updated: 11/03/23 0626     Heparin Anti-Xa (UFH) 0.10 IU/ml     POC Glucose Once [[id number]]  (Normal) Collected: 11/03/23 0523    Specimen: Blood Updated:  11/03/23 0527     Glucose 126 mg/dL     Blood Culture - Blood, Hand, Right [[id number]]  (Normal) Collected: 11/01/23 0312    Specimen: Blood from Hand, Right Updated: 11/03/23 0345     Blood Culture No growth at 2 days    Blood Culture - Blood, Hand, Left [[id number]]  (Normal) Collected: 11/01/23 0151    Specimen: Blood from Hand, Left Updated: 11/03/23 0215     Blood Culture No growth at 2 days    POC Glucose Once [[id number]]  (Normal) Collected: 11/02/23 2338    Specimen: Blood Updated: 11/02/23 2341     Glucose 99 mg/dL     Heparin Anti-Xa [[id number]]  (Abnormal) Collected: 11/02/23 2020    Specimen: Blood Updated: 11/02/23 2107     Heparin Anti-Xa (UFH) 0.10 IU/ml     POC Glucose Once [[id number]]  (Normal) Collected: 11/02/23 1804    Specimen: Blood Updated: 11/02/23 1805     Glucose 98 mg/dL     Heparin Anti-Xa [[id number]]  (Abnormal) Collected: 11/02/23 1341    Specimen: Blood Updated: 11/02/23 1413     Heparin Anti-Xa (UFH) 0.10 IU/ml     Protime-INR [[id number]]  (Abnormal) Collected: 11/02/23 1341    Specimen: Blood Updated: 11/02/23 1413     Protime 20.6 Seconds      INR 1.75    aPTT [[id number]]  (Abnormal) Collected: 11/02/23 1341    Specimen: Blood Updated: 11/02/23 1413     PTT 38.6 seconds     Narrative:      PTT = The equivalent PTT values for the therapeutic range of heparin levels at 0.3 to 0.5 U/ml are 60 to 70 seconds.    POC Glucose Once [[id number]]  (Normal) Collected: 11/02/23 1139    Specimen: Blood Updated: 11/02/23 1140     Glucose 89 mg/dL     Tissue Pathology Exam [[id number]] Collected: 10/31/23 0846    Specimen: Tissue from Artery Updated: 11/02/23 0951     Case Report --     Surgical Pathology Report                         Case: [id number]                                  Authorizing Provider:  Cayetano Mckeon MD        Collected:           10/31/2023 08:46 AM          Ordering Location:     [facility]   Received:            11/01/2023 08:47 AM                               "   CATH LAB                                                                     Pathologist:           Jalil Tam MD                                                             Specimen:    Artery                                                                                      Clinical Information --     Acute CVA       Final Diagnosis --     LEFT MCA EMBOLUS:  Focally calcified fibrin         Gross Description --     1. Artery.  Received in formalin labeled \"left MCA embolus is a 1.6 x 0.5 x 0.2 cm piece of white tissue, filtered and submitted entirely in block 1A. HDM         Microscopic Description --     The slides are reviewed and demonstrate histopathologic features supporting the above rendered diagnosis.              Lipid Panel          11/1/2023    01:55   Lipid Panel   Total Cholesterol 88    Triglycerides 88    HDL Cholesterol 30    VLDL Cholesterol 18    LDL Cholesterol  40    LDL/HDL Ratio 1.35          Lab 11/01/23  0155   HEMOGLOBIN A1C <4.20*       CT Head Without Contrast    Result Date: 11/2/2023  Impression: Evolving left MCA territory infarct. There has been resolution of previous left cortical contrast staining. No superimposed acute process identified. Electronically Signed: Saman Lopez MD  11/2/2023 2:52 PM CDT  Workstation ID: [id number]    XR Abdomen KUB    Result Date: 11/1/2023  Impression: The tip of the feeding tube lies within the gastric antrum. Electronically Signed: Yasir Cr MD  11/1/2023 12:22 PM EDT  Workstation ID: [id number]    EEG    Result Date: 11/1/2023  Diffuse cerebral dysfunction of at least mild degree but nonspecific No ongoing seizures are present This report is transcribed using the Dragon dictation system.     Results for orders placed during the hospital encounter of 10/31/23    Adult Transthoracic Echo Complete W/ Cont if Necessary Per Protocol (With Agitated Saline)    Interpretation Summary    Left ventricular systolic function is normal. Calculated " left ventricular EF = 62.1%    Left ventricular wall thickness is consistent with mild concentric hypertrophy.    Saline test results are negative.    There is a mechanical aortic valve prosthesis present.    There is a mechanical mitral valve prosthesis present.    Estimated right ventricular systolic pressure from tricuspid regurgitation is normal (<35 mmHg).    Calcification on pap muscle            Assessment/Plan   53-year-old female with a PMH significant for HTN, HLD, hypotension (on midodrine), T2DM, CHF, aortic and mitral valve replacement (on Coumadin), CAD, and seizure disorder who presented to [facility] on 10/31 with complaints of aphasia and generalized weakness.  She was found to have a saddle embolus within the superior division of the left MCA.  She was taken to the Cath Lab for mechanical thrombectomy with resultant TICI 3 recanalization.    Assessment/Plan:  Acute ischemic stroke, left MCA, status post mechanical thrombectomy -TICI 3  -ESUS  -The embolus appeared to be a soft tissue than clot,  it was sent to pathology.  -Continue heparin drip  -Continue aspirin 81 mg  -CT head 11/2 an evolving LMCA territory infarct.  Resolution of prior contrast staining noted.  -Unable to obtain MRI D/T incompatible pain pump.  -JASON yesterday technically difficult for diagnosis.  EF 62.1%.  Mitral valve and aortic valve prosthesis within defined limits.  No thrombus or vegetation noted. Saline test are negative.  -Guarded prognosis, patient remains critically ill  -Dysphagia; Keofeed placed yesterday.  SLP following.  -Seizures; EEG on 11/1 diffuse cerebral dysfunction to a mild degree and no noted seizures.  -T2DM; hemoglobin A1c <4.2.  Well-controlled.  Management per primary team.  -HLD; continue high-dose statin  -HTN/hypotension; normalize BP goals.  SBP goals 100-140.  Avoid hypotension.  Home midodrine restarted on 11/1 due to systolic blood pressures in the 80s.  /80 this AM.  -Patient discussed with   Mitchel and nursing staff.  Stroke neurology will continue to follow.  Please call with any questions or concerns.    CHARLIE Suggs, AGACNP-BC  11/03/23  08:40 EDT

## 2023-11-03 NOTE — PLAN OF CARE
Goal Outcome Evaluation:  Plan of Care Reviewed With: patient        Progress: improving       SLP re-evaluation and treatment completed. Will continue to address communication and dysphagia. Please see note for further details and recommendations.

## 2023-11-03 NOTE — PLAN OF CARE
Goal Outcome Evaluation:  Plan of Care Reviewed With: patient        Progress: improving  Outcome Evaluation: Pt demo significantly improved alertness, command following, and independence this date. Pt is Min Ax1+1 w/ RW for functional mobility though conts to be Dep for LB ADL performance d/t deficits in balance and strength. Recommend cont skilled IPOT POC to promote return to PLOF. Recommend pt DC to IP rehab.      Anticipated Discharge Disposition (OT): inpatient rehabilitation facility

## 2023-11-04 LAB
ANION GAP SERPL CALCULATED.3IONS-SCNC: 8 MMOL/L (ref 5–15)
BUN SERPL-MCNC: 12 MG/DL (ref 6–20)
BUN/CREAT SERPL: 25 (ref 7–25)
CALCIUM SPEC-SCNC: 8.1 MG/DL (ref 8.6–10.5)
CHLORIDE SERPL-SCNC: 111 MMOL/L (ref 98–107)
CO2 SERPL-SCNC: 22 MMOL/L (ref 22–29)
CREAT SERPL-MCNC: 0.48 MG/DL (ref 0.57–1)
DEPRECATED RDW RBC AUTO: 75.8 FL (ref 37–54)
EGFRCR SERPLBLD CKD-EPI 2021: 113.4 ML/MIN/1.73
ERYTHROCYTE [DISTWIDTH] IN BLOOD BY AUTOMATED COUNT: 21 % (ref 12.3–15.4)
GLUCOSE BLDC GLUCOMTR-MCNC: 149 MG/DL (ref 70–130)
GLUCOSE BLDC GLUCOMTR-MCNC: 152 MG/DL (ref 70–130)
GLUCOSE BLDC GLUCOMTR-MCNC: 157 MG/DL (ref 70–130)
GLUCOSE SERPL-MCNC: 133 MG/DL (ref 65–99)
HCT VFR BLD AUTO: 25.4 % (ref 34–46.6)
HGB BLD-MCNC: 7.7 G/DL (ref 12–15.9)
MAGNESIUM SERPL-MCNC: 1.9 MG/DL (ref 1.6–2.6)
MCH RBC QN AUTO: 30.1 PG (ref 26.6–33)
MCHC RBC AUTO-ENTMCNC: 30.3 G/DL (ref 31.5–35.7)
MCV RBC AUTO: 99.2 FL (ref 79–97)
PHOSPHATE SERPL-MCNC: 2.9 MG/DL (ref 2.5–4.5)
PLATELET # BLD AUTO: 232 10*3/MM3 (ref 140–450)
PMV BLD AUTO: 10.1 FL (ref 6–12)
POTASSIUM SERPL-SCNC: 3.9 MMOL/L (ref 3.5–5.2)
RBC # BLD AUTO: 2.56 10*6/MM3 (ref 3.77–5.28)
SODIUM SERPL-SCNC: 141 MMOL/L (ref 136–145)
UFH PPP CHRO-ACNC: 0.29 IU/ML (ref 0.3–0.7)
UFH PPP CHRO-ACNC: 0.4 IU/ML (ref 0.3–0.7)
UFH PPP CHRO-ACNC: 0.43 IU/ML (ref 0.3–0.7)
WBC NRBC COR # BLD: 9.98 10*3/MM3 (ref 3.4–10.8)

## 2023-11-04 PROCEDURE — 85520 HEPARIN ASSAY: CPT

## 2023-11-04 PROCEDURE — 83735 ASSAY OF MAGNESIUM: CPT | Performed by: NURSE PRACTITIONER

## 2023-11-04 PROCEDURE — 84100 ASSAY OF PHOSPHORUS: CPT | Performed by: NURSE PRACTITIONER

## 2023-11-04 PROCEDURE — 25010000002 HEPARIN (PORCINE) 25000-0.45 UT/250ML-% SOLUTION

## 2023-11-04 PROCEDURE — 82948 REAGENT STRIP/BLOOD GLUCOSE: CPT

## 2023-11-04 PROCEDURE — 25010000002 CEFTRIAXONE PER 250 MG: Performed by: NURSE PRACTITIONER

## 2023-11-04 PROCEDURE — 94799 UNLISTED PULMONARY SVC/PX: CPT

## 2023-11-04 PROCEDURE — 92610 EVALUATE SWALLOWING FUNCTION: CPT

## 2023-11-04 PROCEDURE — 80048 BASIC METABOLIC PNL TOTAL CA: CPT | Performed by: NURSE PRACTITIONER

## 2023-11-04 PROCEDURE — 85027 COMPLETE CBC AUTOMATED: CPT | Performed by: NURSE PRACTITIONER

## 2023-11-04 PROCEDURE — 94664 DEMO&/EVAL PT USE INHALER: CPT

## 2023-11-04 PROCEDURE — 99232 SBSQ HOSP IP/OBS MODERATE 35: CPT | Performed by: NURSE PRACTITIONER

## 2023-11-04 PROCEDURE — 99232 SBSQ HOSP IP/OBS MODERATE 35: CPT | Performed by: INTERNAL MEDICINE

## 2023-11-04 PROCEDURE — 94761 N-INVAS EAR/PLS OXIMETRY MLT: CPT

## 2023-11-04 RX ORDER — ACETAMINOPHEN 160 MG/5ML
650 SOLUTION ORAL EVERY 6 HOURS PRN
Status: DISCONTINUED | OUTPATIENT
Start: 2023-11-04 | End: 2023-11-04

## 2023-11-04 RX ORDER — LEVETIRACETAM 100 MG/ML
1000 SOLUTION ORAL EVERY 12 HOURS SCHEDULED
Status: DISCONTINUED | OUTPATIENT
Start: 2023-11-04 | End: 2023-11-05

## 2023-11-04 RX ORDER — LEVOTHYROXINE SODIUM 0.07 MG/1
75 TABLET ORAL
Status: DISCONTINUED | OUTPATIENT
Start: 2023-11-05 | End: 2023-11-06

## 2023-11-04 RX ORDER — ACETAMINOPHEN 160 MG/5ML
650 SOLUTION ORAL EVERY 6 HOURS PRN
Status: DISCONTINUED | OUTPATIENT
Start: 2023-11-04 | End: 2023-11-06

## 2023-11-04 RX ADMIN — MIDODRINE HYDROCHLORIDE 5 MG: 5 TABLET ORAL at 12:00

## 2023-11-04 RX ADMIN — ACETAMINOPHEN 650 MG: 650 SOLUTION ORAL at 23:54

## 2023-11-04 RX ADMIN — TOPIRAMATE 200 MG: 100 TABLET, FILM COATED ORAL at 08:34

## 2023-11-04 RX ADMIN — LEVETIRACETAM 1000 MG: 100 SOLUTION ORAL at 08:34

## 2023-11-04 RX ADMIN — TOPIRAMATE 200 MG: 100 TABLET, FILM COATED ORAL at 21:44

## 2023-11-04 RX ADMIN — Medication 10 ML: at 21:50

## 2023-11-04 RX ADMIN — LEVETIRACETAM 1000 MG: 100 SOLUTION ORAL at 21:44

## 2023-11-04 RX ADMIN — IPRATROPIUM BROMIDE AND ALBUTEROL SULFATE 3 ML: 2.5; .5 SOLUTION RESPIRATORY (INHALATION) at 18:39

## 2023-11-04 RX ADMIN — IPRATROPIUM BROMIDE AND ALBUTEROL SULFATE 3 ML: 2.5; .5 SOLUTION RESPIRATORY (INHALATION) at 12:49

## 2023-11-04 RX ADMIN — PANTOPRAZOLE SODIUM 40 MG: 40 INJECTION, POWDER, LYOPHILIZED, FOR SOLUTION INTRAVENOUS at 05:37

## 2023-11-04 RX ADMIN — IPRATROPIUM BROMIDE AND ALBUTEROL SULFATE 3 ML: 2.5; .5 SOLUTION RESPIRATORY (INHALATION) at 16:01

## 2023-11-04 RX ADMIN — LEVOTHYROXINE SODIUM 25 MCG: 25 TABLET ORAL at 05:37

## 2023-11-04 RX ADMIN — MIDODRINE HYDROCHLORIDE 5 MG: 5 TABLET ORAL at 21:44

## 2023-11-04 RX ADMIN — HEPARIN SODIUM 21 UNITS/KG/HR: 10000 INJECTION, SOLUTION INTRAVENOUS at 08:34

## 2023-11-04 RX ADMIN — SODIUM CHLORIDE 1000 MG: 900 INJECTION INTRAVENOUS at 21:44

## 2023-11-04 RX ADMIN — IPRATROPIUM BROMIDE AND ALBUTEROL SULFATE 3 ML: 2.5; .5 SOLUTION RESPIRATORY (INHALATION) at 07:43

## 2023-11-04 RX ADMIN — ATORVASTATIN CALCIUM 80 MG: 40 TABLET, FILM COATED ORAL at 21:44

## 2023-11-04 RX ADMIN — ASPIRIN 81 MG CHEWABLE TABLET 81 MG: 81 TABLET CHEWABLE at 08:34

## 2023-11-04 RX ADMIN — MIDODRINE HYDROCHLORIDE 5 MG: 5 TABLET ORAL at 05:37

## 2023-11-04 NOTE — THERAPY RE-EVALUATION
Acute Care - Speech Language Pathology   Swallow Re-Evaluation [facility]  Clinical Swallow Evaluation       Patient Name: Mara Martínez  : 1970  MRN: [id number]  Today's Date: 2023               Admit Date: 10/31/2023    Visit Dx:     ICD-10-CM ICD-9-CM   1. Acute CVA (cerebrovascular accident)  I63.9 434.91   2. Dysphagia, unspecified type  R13.10 787.20   3. Communication deficit  F80.9 307.9   4. Anemia, unspecified type  D64.9 285.9   5. Acute UTI  N39.0 599.0     Patient Active Problem List   Diagnosis    Anemia    Aortic valve insufficiency    Bruit of left carotid artery    Chest pain    Positive D-dimer    Dizziness    Edema    Fatigue    HTN (hypertension)    Heart murmur    Hyperlipidemia    Mitral valve stenosis    Mitral valve insufficiency    Palpitations    Pulmonary edema    Seizure disorder    Shortness of breath    Difficulty sleeping    Snoring    Supravalvular aortic stenosis    Syncope    Tachycardia    Mitral stenosis    Rheumatic aortic stenosis    Valvular heart disease    Epilepsy    ISREAL (cerebral atherosclerosis)    Tobacco use    Migraines    Aortic regurgitation    Rheumatic mitral regurgitation    SOB (shortness of breath)    Supraventricular aortic stenosis    Aortic valve stenosis    Rheumatic aortic valve insufficiency    Moderate aortic stenosis    Aortic valve disorder    Coronary artery disease involving native coronary artery of native heart without angina pectoris    S/P mitral valve replacement    S/P mechanical aortic and mitral valve replacement (2018)    Stenosis of carotid artery    Generalized abdominal pain    Rectal bleeding    Functional diarrhea    Lower extremity edema    Rectal bleed    (HFpEF) heart failure with preserved ejection fraction    Chronic pain with intrathecal pump in place    Dyslipidemia    Bilateral carotid artery stenosis    Low back pain    Degeneration of lumbar intervertebral disc    Lumbosacral radiculopathy    Respiratory  bronchiolitis associated interstitial lung disease    Cerebral aneurysm    Complex partial seizures with consciousness impaired    Migraine without aura and with status migrainosus, not intractable    Chronic obstructive pulmonary disease    Essential tremor    Hesitancy of micturition    Complex partial epilepsy with generalization and with intractable epilepsy    Focal (motor) epilepsy    Occipital neuralgia    Paresthesia    Restless legs syndrome    Retinal drusen    Acute ischemic left MCA stroke    H/O recurrent GIB 2* AVMs    UTI (urinary tract infection)    Chronic hypotension on midodrine     Past Medical History:   Diagnosis Date    Anemia     Aortic regurgitation     Arthritis     Back pain     Carotid bruit     ISREAL (cerebral atherosclerosis) 12/2014    nonobstructive    Chest pain     Chronic hypotension on midodrine 11/01/2023    Chronic obstructive pulmonary disease 09/07/2022    Coronary artery disease     COVID-19 vaccine administered     phizer    D-dimer, elevated     Diastolic congestive heart failure 07/25/2019    Dizziness     Edema     Epilepsy     Fatigue     Heart murmur     History of blood transfusion 08/2019    History of blood transfusion 2022    History of blood transfusion 11/2022    History of blood transfusion     August 2023 x2    History of recent blood transfusion 12/2021    History of transfusion     Hyperlipidemia     Hypertension     Kidney stones     Migraines     Mitral regurgitation     Mitral stenosis     mild    Neuropathy     Neuropathy     Palpitations     Pulmonary edema     Retinal drusen     Seizure     Sleep apnea 09/2019    Sleeping difficulties     SOB (shortness of breath)     Supraventricular aortic stenosis     Syncope     Tachycardia     Tobacco abuse     VHD (valvular heart disease)     Wears dentures     Wears eyeglasses      Past Surgical History:   Procedure Laterality Date    APPENDECTOMY      CARDIAC CATHETERIZATION      CARDIAC SURGERY      2 valves  replaced     SECTION      x 2    CHOLECYSTECTOMY      COLONOSCOPY      COLONOSCOPY N/A 2019    Procedure: COLONOSCOPY;  Surgeon: Kurt Gaines MD;  Location:  [facility];  Service: Gastroenterology    CORONARY ARTERY BYPASS GRAFT  2018    EXPLORATORY LAPAROTOMY      HERNIA REPAIR      HYSTERECTOMY      INTERVENTIONAL RADIOLOGY PROCEDURE Bilateral 10/31/2023    Procedure: Carotid Cerebral Angiogram;  Surgeon: Cayetano Mckeon MD;  Location:  [facility];  Service: Interventional Radiology;  Laterality: Bilateral;    JOINT REPLACEMENT Right     knee replacement    PAIN PUMP INSERTION/REVISION      morphine    PORTACATH PLACEMENT      UPPER GASTROINTESTINAL ENDOSCOPY         SLP Recommendation and Plan  SLP Swallowing Diagnosis: suspected pharyngeal dysphagia (23)  SLP Diet Recommendation: NPO, temporary alternate methods of nutrition/hydration (23)  Recommended Precautions and Strategies: general aspiration precautions (23)  SLP Rec. for Method of Medication Administration: meds via alternate route (23)     Monitor for Signs of Aspiration: yes, notify SLP if any concerns (23)  Recommended Diagnostics: FEES, other (see comments) () (23)  Swallow Criteria for Skilled Therapeutic Interventions Met: demonstrates skilled criteria (23)  Anticipated Discharge Disposition (SLP): inpatient rehabilitation facility, anticipate therapy at next level of care (23)  Rehab Potential/Prognosis, Swallowing: good, to achieve stated therapy goals (23)  Therapy Frequency (Swallow): 5 days per week (23)  Predicted Duration Therapy Intervention (Days): until discharge (23)  Oral Care Recommendations: Oral Care BID/PRN, Suction toothbrush (23)                                      Oral Care Recommendations: Oral Care BID/PRN, Suction toothbrush (23)    Plan of  Care Reviewed With: patient      SWALLOW EVALUATION (last 72 hours)       SLP Adult Swallow Evaluation       Row Name 11/04/23 1045 11/03/23 1440 11/02/23 1000             Rehab Evaluation    Document Type re-evaluation  -DV re-evaluation  -RS --      Subjective Information no complaints  -DV no complaints  -RS --      Patient Observations alert;cooperative  -DV alert;cooperative;agree to therapy  -RS --      Patient/Family/Caregiver Comments/Observations no family present  -DV -- --      Patient Effort good  -DV good  -RS --      Symptoms Noted During/After Treatment none  -DV -- --      Oral Care -- teeth brushed - suction toothbrush;tongue brushed  -RS --         General Information    Patient Profile Reviewed yes  -DV -- --      Pertinent History Of Current Problem see initial eval  -DV -- --      Current Method of Nutrition NPO;nasogastric feedings  -DV -- --      Precautions/Limitations, Vision WFL with corrective lenses;for purposes of eval  -DV -- --      Precautions/Limitations, Hearing WFL;for purposes of eval  -DV -- --      Prior Level of Function-Communication unknown  -DV -- --      Prior Level of Function-Swallowing no diet consistency restrictions  -DV -- --      Plans/Goals Discussed with patient;agreed upon  -DV -- --      Barriers to Rehab medically complex  -DV -- --      Patient's Goals for Discharge return to PO diet  -DV -- --         Pain    Additional Documentation Pain Scale: Numbers Pre/Post-Treatment (Group)  -DV Pain Scale: FACES Pre/Post-Treatment (Group)  -RS --         Pain Scale: Numbers Pre/Post-Treatment    Pretreatment Pain Rating 0/10 - no pain  -DV -- --      Posttreatment Pain Rating 0/10 - no pain  -DV -- --         Pain Scale: FACES Pre/Post-Treatment    Pain: FACES Scale, Pretreatment -- 0-->no hurt  -RS --      Posttreatment Pain Rating -- 0-->no hurt  -RS --         Oral Motor Structure and Function    Dentition Assessment natural, present and adequate  -DV -- --  Pt  clamped lips shut, unable to assess  -RS      Secretion Management WNL/WFL  -DV -- --      Mucosal Quality moist, healthy  -DV -- --         Oral Musculature and Cranial Nerve Assessment    Oral Motor General Assessment generalized oral motor weakness  -DV -- --         General Eating/Swallowing Observations    Respiratory Support Currently in Use room air  -DV -- nasal cannula  -RS      Eating/Swallowing Skills fed by SLP  -DV -- fed by SLP  -RS      Positioning During Eating upright in bed  -DV -- upright in bed  -RS      Utensils Used spoon;straw  -DV -- spoon  -RS      Consistencies Trialed thin liquids;ice chips;pureed;regular textures  -DV -- ice chips  -RS         Respiratory    Respiratory Status WFL  -DV -- WFL  -RS         Clinical Swallow Eval    Oral Prep Phase WFL  -DV -- impaired  -RS      Oral Transit -- -- impaired  -RS      Oral Residue WFL  -DV -- impaired  -RS      Pharyngeal Phase suspected pharyngeal impairment  -DV -- no overt signs/symptoms of pharyngeal impairment  -RS      Esophageal Phase unremarkable  -DV -- unremarkable  -RS      Clinical Swallow Evaluation Summary Pt has improved significantly since initial eval two days ago. Pt is upright, alert and conversing. Swallowing appears effortful at times with intermittent multiple swallows noted, but no overt s/sx aspiration. Recommend FEES tomorrow.  -DV -- Limited assessment given severity of pt's symptoms. She has her mouth clamped shut during portions of exam; however, when ice chips are presented her mouth will open but she is unable to remove ice from utensil. When ice was placed in oral cavity, she has complete anterior loss without awareness. No spontaneous swallows or swallows on command. Pt is not safe for PO diet or appropriate for instrumental. Will need to increase PO trials and establish consistent swallow prior to considering instrumental  -RS         Pharyngeal Phase Concerns    Pharyngeal Phase Concerns multiple swallows  -DV  -- --      Multiple Swallows all consistencies;other (see comments)  intermittently  -DV -- --         SLP Evaluation Clinical Impression    SLP Swallowing Diagnosis suspected pharyngeal dysphagia  -DV oral dysphagia;suspected pharyngeal dysphagia  -RS oral dysphagia;suspected pharyngeal dysphagia  -RS      Functional Impact risk of aspiration/pneumonia  -DV risk of aspiration/pneumonia  -RS risk of aspiration/pneumonia  -RS      Rehab Potential/Prognosis, Swallowing good, to achieve stated therapy goals  -DV good, to achieve stated therapy goals  -RS adequate, monitor progress closely  -RS      Swallow Criteria for Skilled Therapeutic Interventions Met demonstrates skilled criteria  -DV demonstrates skilled criteria  -RS demonstrates skilled criteria  -RS         SLP Treatment Clinical Impressions    Treatment Assessment Comments (SLP) -- RN requested SLP re-assessment. Pt appears much improved today. She has excellent oral acceptance of ice and water with good oral manipulation. Overt pharyngeal s/s with most trials. While pt has certainly improved, she is still not yet ready for instrumental. Would anticipate early-mid next week. Great effort and participation with all speech/lang tasks, goals met and new goals added  -RS --      Daily Summary of Progress (SLP) -- progress toward functional goals is good  -RS --         Recommendations    Therapy Frequency (Swallow) 5 days per week  -DV 5 days per week  -RS 5 days per week  -RS      Predicted Duration Therapy Intervention (Days) until discharge  -DV until discharge  -RS --      SLP Diet Recommendation NPO;temporary alternate methods of nutrition/hydration  -DV NPO;temporary alternate methods of nutrition/hydration  -RS NPO;temporary alternate methods of nutrition/hydration  -RS      Recommended Diagnostics FEES;other (see comments)  11/5  -DV -- --      Recommended Precautions and Strategies general aspiration precautions  -DV general aspiration precautions  -RS  general aspiration precautions  -RS      Oral Care Recommendations Oral Care BID/PRN;Suction toothbrush  -DV Oral Care BID/PRN;Suction toothbrush  -RS Oral Care BID/PRN;Suction toothbrush  -RS      SLP Rec. for Method of Medication Administration meds via alternate route  -DV meds via alternate route  -RS meds via alternate route  -RS      Monitor for Signs of Aspiration yes;notify SLP if any concerns  -DV yes;notify SLP if any concerns  -RS yes;notify SLP if any concerns  -RS      Anticipated Discharge Disposition (SLP) inpatient rehabilitation facility;anticipate therapy at next level of care  -DV inpatient rehabilitation facility  -RS --                User Key  (r) = Recorded By, (t) = Taken By, (c) = Cosigned By      Initials Name Effective Dates    DV Bruna Campo MS CCC-SLP 06/16/21 -     RS Seth Cueva MS CCC-SLP 09/14/23 -                     EDUCATION  The patient has been educated in the following areas:   Dysphagia (Swallowing Impairment) NPO rationale.        SLP GOALS       Row Name 11/04/23 1045 11/03/23 1440 11/02/23 1000       (LTG) Patient will demonstrate functional swallow for    Diet Texture (Demonstrate functional swallow) pureed textures  -DV pureed textures  -RS pureed textures  -RS    Liquid viscosity (Demonstrate functional swallow) nectar/ mildly thick liquids  -DV nectar/ mildly thick liquids  -RS nectar/ mildly thick liquids  -RS    [place] (Demonstrate functional swallow) with maximum cues (25-49% accuracy)  -DV with maximum cues (25-49% accuracy)  -RS with maximum cues (25-49% accuracy)  -RS    Time Frame (Demonstrate functional swallow) by discharge  -DV by discharge  -RS by discharge  -RS    Progress/Outcomes (Demonstrate functional swallow) goal ongoing  -DV continuing progress toward goal  -RS new goal  -RS       Patient will demonstrate functional language skills for return to discharge environment     [place] with moderate cues  -DV with moderate cues  -RS with  moderate cues  -RS    Time frame by discharge  -DV by discharge  -RS by discharge  -RS    Progress/Outcomes goal ongoing  -DV continuing progress toward goal  -RS new goal  -RS       SLP Diagnostic Treatment     Patient will participate in further assessment in the following areas reading comprehension;graphic expression;cognitive-linguistic  -DV reading comprehension;graphic expression;cognitive-linguistic  -RS --    Time Frame (Diagnostic) by discharge  -DV by discharge  -RS --    Progress/Outcomes (Additional Goal 1, SLP) goal ongoing  -DV new goal  -RS --       Comprehend Questions Goal 1 (SLP)    Improve Ability to Comprehend Questions Goal 1 (SLP) complex general questions;90%;independently (over 90% accuracy)  -DV complex general questions;90%;independently (over 90% accuracy)  -RS simple yes/no questions;80%;with moderate cues (50-74%)  -RS    Time Frame (Comprehend Questions Goal 1, SLP) by discharge  -DV by discharge  -RS by discharge  -RS    Progress/Outcomes (Comprehend Questions Goal 1, SLP) goal ongoing  -DV new goal  -RS new goal  -RS    Comment (Comprehend Questions Goal 1, SLP) -- simple and complex y/n questions answered with 100% accuracy independently  -RS --       Follow Directions Goal 2 (SLP)    Improve Ability to Follow Directions Goal 1 (SLP) multistep commands;90%;independently (over 90% accuracy)  -DV multistep commands;90%;independently (over 90% accuracy)  -RS 1 step direction with objects;80%;with moderate cues (50-74%)  -RS    Time Frame (Follow Directions Goal 1, SLP) by discharge  -DV by discharge  -RS by discharge  -RS    Progress/Outcomes (Follow Directions Goal 1, SLP) goal ongoing  -DV new goal  -RS new goal  -RS    Comment (Follow Directions Goal 1, SLP) -- single step and 2-step directions met with 100% accuracy independently  -RS --       Articulation Goal 1 (SLP)    Improve Articulation Goal 1 (SLP) by over-articulating at phrase level;80%;with minimal cues (75-90%)  -DV by  over-articulating at phrase level;80%;with minimal cues (75-90%)  -RS --    Time Frame (Articulation Goal 1, SLP) by discharge  -DV by discharge  -RS --    Progress/Outcomes (Articulation Goal 1, SLP) goal ongoing  -DV new goal  -RS --              User Key  (r) = Recorded By, (t) = Taken By, (c) = Cosigned By      Initials Name Provider Type    Bruna Nicole MS CCC-SLP Speech and Language Pathologist    Seth Aggarwal MS CCC-SLP Speech and Language Pathologist                       Time Calculation:    Time Calculation- SLP       Row Name 11/04/23 1337             Time Calculation- SLP    SLP Start Time 1045  -DV      SLP Received On 11/04/23  -DV         Untimed Charges    SLP Eval/Re-eval  ST Eval Oral Pharyng Swallow - 99307  -DV      04007-YF Eval Oral Pharyng Swallow Minutes 46  -DV         Total Minutes    Untimed Charges Total Minutes 46  -DV       Total Minutes 46  -DV                User Key  (r) = Recorded By, (t) = Taken By, (c) = Cosigned By      Initials Name Provider Type    Bruna Nicole MS CCC-SLP Speech and Language Pathologist                    Therapy Charges for Today       Code Description Service Date Service Provider Modifiers Qty    [id number]  ST EVAL ORAL PHARYNG SWALLOW 3 11/4/2023 Bruna Campo MS CCC-LICHA GN 1                 MS TARA Garcia  11/4/2023

## 2023-11-04 NOTE — SIGNIFICANT NOTE
Patient was seen on night rounds primary RN at the bedside no acute event.  Patient is notable to be dysarthric, aphasic on a heparin drip, right-sided deficits, however withdraw all 4 extremity.  Patient breathing comfortable on 2 L nasal cannula while asleep satting above 98%.  On the monitor heart rate 86 normal sinus rhythm, blood pressure 108/64.  ROS: No reported headache.  Objective  Vitals:    11/03/23 1935   BP:    Pulse: 81   Resp: 15   Temp:    SpO2: 99%     Interval:  (Handoff)  1a. Level of Consciousness: 0-->Alert, keenly responsive  1b. LOC Questions: 1-->Answers one question correctly  1c. LOC Commands: 0-->Performs both tasks correctly  2. Best Gaze: 0-->Normal  3. Visual: 0-->No visual loss  4. Facial Palsy: 1-->Minor paralysis (flattened nasolabial fold, asymmetry on smiling)  5a. Motor Arm, Left: 0-->No drift, limb holds 90 (or 45) degrees for full 10 secs  5b. Motor Arm, Right: 2-->Some effort against gravity, limb cannot get to or maintain (if cued) 90 (or 45) degrees, drifts down to bed, but has some effort against gravity  6a. Motor Leg, Left: 0-->No drift, leg holds 30 degree position for full 5 secs  6b. Motor Leg, Right: 1-->Drift, leg falls by the end of the 5-sec period but does not hit bed  7. Limb Ataxia: 0-->Absent  8. Sensory: 0-->Normal, no sensory loss  9. Best Language: 2-->Severe aphasia, all communication is through fragmentary expression, great need for inference, questioning, and guessing by the listener. Range of information that can be exchanged is limited, listener carries burden of. . . (see row details)  10. Dysarthria: 1-->Mild-to-moderate dysarthria, patient slurs at least some words and, at worst, can be understood with some difficulty  11. Extinction and Inattention (formerly Neglect): 0-->No abnormality    Total (NIH Stroke Scale): 8  Left MCA acute ischemic stroke status post mechanical thrombectomy TICI  -Continue heparin  -SBP goals 100-140.   Neurology stroke we  will continue to follow-up

## 2023-11-04 NOTE — PLAN OF CARE
Problem: Adult Inpatient Plan of Care  Goal: Plan of Care Review  Outcome: Ongoing, Progressing  Flowsheets (Taken 11/4/2023 0972)  Plan of Care Reviewed With: patient   Goal Outcome Evaluation:  Plan of Care Reviewed With: patient         SLP re-evaluation completed. Will address dysphagia with FEES tomorrow. Please see note for further details and recommendations.

## 2023-11-04 NOTE — PROGRESS NOTES
Stroke Progress Note       Chief Complaint:  right sided weakness    Subjective    Subjective     Subjective:  Patient is more alert this a.m. awakens easily to voice.  Follows simple commands.  Right-sided neglect improving.  NIH 4 on my exam this AM.    Review of Systems   Denies pain, headache, shortness of air    Objective      Temp:  [97.5 °F (36.4 °C)-97.8 °F (36.6 °C)] 97.5 °F (36.4 °C)  Heart Rate:  [74-87] 82  Resp:  [15-18] 18  BP: ()/(52-80) 119/71        Physical Exam  Constitutional:       Appearance: She is ill-appearing.      Comments: Follows simple commands   HENT:      Head: Normocephalic and atraumatic.   Eyes:      Extraocular Movements: Extraocular movements intact.      Pupils: Pupils are equal, round, and reactive to light.   Cardiovascular:      Rate and Rhythm: Normal rate and regular rhythm.   Pulmonary:      Effort: Pulmonary effort is normal. No respiratory distress.   Skin:     General: Skin is warm and dry.   Neurological:      Mental Status: She is alert.      Comments: Oriented to person, year.  Mixed aphasia-improving, moderate dysarthria, right-sided drift, R FD       Interval:  (Handoff)  1a. Level of Consciousness: 0-->Alert, keenly responsive  1b. LOC Questions: 0-->Answers both questions correctly  1c. LOC Commands: 0-->Performs both tasks correctly  2. Best Gaze: 0-->Normal  3. Visual: 0-->No visual loss  4. Facial Palsy: 1-->Minor paralysis (flattened nasolabial fold, asymmetry on smiling)  5a. Motor Arm, Left: 0-->No drift, limb holds 90 (or 45) degrees for full 10 secs  5b. Motor Arm, Right: 1-->Drift, limb holds 90 (or 45) degrees, but drifts down before full 10 secs, does not hit bed or other support  6a. Motor Leg, Left: 0-->No drift, leg holds 30 degree position for full 5 secs  6b. Motor Leg, Right: 0-->No drift, leg holds 30 degree position for full 5 secs  7. Limb Ataxia: 0-->Absent  8. Sensory: 0-->Normal, no sensory loss  9. Best Language: 1-->Mild-to-moderate  aphasia, some obvious loss of fluency or facility of comprehension, without significant limitation on ideas expressed or form of expression. Reduction of speech and/or comprehension, however, makes conversation. . . (see row details)  10. Dysarthria: 1-->Mild-to-moderate dysarthria, patient slurs at least some words and, at worst, can be understood with some difficulty  11. Extinction and Inattention (formerly Neglect): 0-->No abnormality    Total (NIH Stroke Scale): 4    Results Review:    I reviewed the patient's new clinical results.  XR Abdomen KUB    Result Date: 11/3/2023  Impression: Enteric tube tip within the distal duodenum. Electronically Signed: Saman Lopez MD  11/3/2023 9:39 AM CDT  Workstation ID: [id number]    CT Head Without Contrast    Result Date: 11/2/2023  Impression: Evolving left MCA territory infarct. There has been resolution of previous left cortical contrast staining. No superimposed acute process identified. Electronically Signed: Saman Lopez MD  11/2/2023 2:52 PM CDT  Workstation ID: [id number]     Lab Results (last 24 hours)       Procedure Component Value Units Date/Time    POC Glucose Once [[id number]]  (Abnormal) Collected: 11/04/23 0504    Specimen: Blood Updated: 11/04/23 0523     Glucose 149 mg/dL     Heparin Anti-Xa [[id number]]  (Abnormal) Collected: 11/04/23 0332    Specimen: Blood Updated: 11/04/23 0456     Heparin Anti-Xa (UFH) 0.29 IU/ml     Blood Culture - Blood, Hand, Right [[id number]]  (Normal) Collected: 11/01/23 0312    Specimen: Blood from Hand, Right Updated: 11/04/23 0345     Blood Culture No growth at 3 days    Blood Culture - Blood, Hand, Left [[id number]]  (Normal) Collected: 11/01/23 0151    Specimen: Blood from Hand, Left Updated: 11/04/23 0215     Blood Culture No growth at 3 days    POC Glucose Once [[id number]]  (Abnormal) Collected: 11/03/23 2252    Specimen: Blood Updated: 11/03/23 2308     Glucose 147 mg/dL     Heparin Anti-Xa [[id number]]  (Abnormal) Collected:  11/03/23 2028    Specimen: Blood Updated: 11/03/23 2104     Heparin Anti-Xa (UFH) 0.21 IU/ml     POC Glucose Once [[id number]]  (Abnormal) Collected: 11/03/23 1747    Specimen: Blood Updated: 11/03/23 1749     Glucose 163 mg/dL     Heparin Anti-Xa [[id number]]  (Abnormal) Collected: 11/03/23 1245    Specimen: Blood Updated: 11/03/23 1315     Heparin Anti-Xa (UFH) 0.22 IU/ml     POC Glucose Once [[id number]]  (Abnormal) Collected: 11/03/23 1156    Specimen: Blood Updated: 11/03/23 1159     Glucose 138 mg/dL     CBC & Differential [[id number]]  (Abnormal) Collected: 11/03/23 0439    Specimen: Blood Updated: 11/03/23 0754    Narrative:      The following orders were created for panel order CBC & Differential.  Procedure                               Abnormality         Status                     ---------                               -----------         ------                     CBC Auto Differential[[id number]]        Abnormal            Final result               Scan Slide[[id number]]                                       Final result                 Please view results for these tests on the individual orders.    CBC Auto Differential [[id number]]  (Abnormal) Collected: 11/03/23 0439    Specimen: Blood Updated: 11/03/23 0754     WBC 9.87 10*3/mm3      RBC 2.87 10*6/mm3      Hemoglobin 8.6 g/dL      Hematocrit 27.9 %      MCV 97.2 fL      MCH 30.0 pg      MCHC 30.8 g/dL      RDW 21.8 %      RDW-SD 76.4 fl      MPV 9.8 fL      Platelets 247 10*3/mm3      Neutrophil % 86.7 %      Lymphocyte % 4.8 %      Monocyte % 6.4 %      Eosinophil % 1.2 %      Basophil % 0.3 %      Immature Grans % 0.6 %      Neutrophils, Absolute 8.56 10*3/mm3      Lymphocytes, Absolute 0.47 10*3/mm3      Monocytes, Absolute 0.63 10*3/mm3      Eosinophils, Absolute 0.12 10*3/mm3      Basophils, Absolute 0.03 10*3/mm3      Immature Grans, Absolute 0.06 10*3/mm3      nRBC 0.0 /100 WBC     Narrative:      Appended report. These results have been  appended to a previously verified report.    Scan Slide [[id number]] Collected: 11/03/23 0439    Specimen: Blood Updated: 11/03/23 0754     Microcytes Slight/1+     Macrocytes Slight/1+     Polychromasia Slight/1+     WBC Morphology Normal     Platelet Morphology Normal          Lipid Panel          11/1/2023    01:55   Lipid Panel   Total Cholesterol 88    Triglycerides 88    HDL Cholesterol 30    VLDL Cholesterol 18    LDL Cholesterol  40    LDL/HDL Ratio 1.35          Lab 11/01/23  0155   HEMOGLOBIN A1C <4.20*       XR Abdomen KUB    Result Date: 11/3/2023  Impression: Enteric tube tip within the distal duodenum. Electronically Signed: Saman Lopez MD  11/3/2023 9:39 AM CDT  Workstation ID: [id number]    CT Head Without Contrast    Result Date: 11/2/2023  Impression: Evolving left MCA territory infarct. There has been resolution of previous left cortical contrast staining. No superimposed acute process identified. Electronically Signed: Saman Lopez MD  11/2/2023 2:52 PM CDT  Workstation ID: [id number]   Results for orders placed during the hospital encounter of 10/31/23    Adult Transthoracic Echo Complete W/ Cont if Necessary Per Protocol (With Agitated Saline)    Interpretation Summary    Left ventricular systolic function is normal. Calculated left ventricular EF = 62.1%    Left ventricular wall thickness is consistent with mild concentric hypertrophy.    Saline test results are negative.    There is a mechanical aortic valve prosthesis present.    There is a mechanical mitral valve prosthesis present.    Estimated right ventricular systolic pressure from tricuspid regurgitation is normal (<35 mmHg).    Calcification on pap muscle            Assessment/Plan   53-year-old female with a PMH significant for HTN, HLD, hypotension (on midodrine), T2DM, CHF, aortic and mitral valve replacement (on Coumadin), CAD, and seizure disorder who presented to [facility] on 10/31 with complaints of aphasia and generalized weakness.   She was found to have a saddle embolus within the superior division of the left MCA.  She was taken to the Cath Lab for mechanical thrombectomy with resultant TICI 3 recanalization.    Assessment/Plan:  Acute ischemic stroke, left MCA, status post mechanical thrombectomy -TICI 3  -ESUS  -The embolus appeared to be a soft tissue than clot,  it was sent to pathology.  -Exam improving.  NIH 4 this AM.  -Continue heparin drip  -Continue aspirin 81 mg  -CT head 11/2 an evolving LMCA territory infarct.  Resolution of prior contrast staining noted.  -Unable to obtain MRI D/T incompatible pain pump.  -JASON yesterday technically difficult for diagnosis.  EF 62.1%.  Mitral valve and aortic valve prosthesis within defined limits.  No thrombus or vegetation noted. Saline test are negative.  -Guarded prognosis, patient remains critically ill  -Dysphagia; Keofeed placed yesterday.  SLP following.  -Seizures; EEG on 11/1 diffuse cerebral dysfunction to a mild degree and no noted seizures.  -T2DM; hemoglobin A1c <4.2.  Well-controlled.  Management per primary team.  -HLD; continue high-dose statin  -HTN/hypotension; normalize BP goals.  SBP goals 100-140.  Avoid hypotension.  Home midodrine restarted on 11/1 due to systolic blood pressures in the 80s.  /71 this AM.  -Patient is okay to go to the floor from a neurology perspective.  -Patient discussed with Dr. Grullon, Shana GUERRA, and nursing staff.  Stroke neurology will follow peripherally and see as needed..  Please call with any questions or concerns.    CHARLIE Suggs, AGACNP-BC  11/04/23  07:00 EDT

## 2023-11-05 ENCOUNTER — ANCILLARY PROCEDURE (OUTPATIENT)
Dept: SPEECH THERAPY | Facility: HOSPITAL | Age: 53
DRG: 024 | End: 2023-11-05
Payer: MEDICARE

## 2023-11-05 LAB
ANION GAP SERPL CALCULATED.3IONS-SCNC: 6 MMOL/L (ref 5–15)
BASOPHILS # BLD AUTO: 0.03 10*3/MM3 (ref 0–0.2)
BASOPHILS NFR BLD AUTO: 0.3 % (ref 0–1.5)
BUN SERPL-MCNC: 13 MG/DL (ref 6–20)
BUN/CREAT SERPL: 26.5 (ref 7–25)
CALCIUM SPEC-SCNC: 8.5 MG/DL (ref 8.6–10.5)
CHLORIDE SERPL-SCNC: 113 MMOL/L (ref 98–107)
CO2 SERPL-SCNC: 22 MMOL/L (ref 22–29)
CREAT SERPL-MCNC: 0.49 MG/DL (ref 0.57–1)
DEPRECATED RDW RBC AUTO: 75.7 FL (ref 37–54)
EGFRCR SERPLBLD CKD-EPI 2021: 112.9 ML/MIN/1.73
EOSINOPHIL # BLD AUTO: 0.15 10*3/MM3 (ref 0–0.4)
EOSINOPHIL NFR BLD AUTO: 1.5 % (ref 0.3–6.2)
ERYTHROCYTE [DISTWIDTH] IN BLOOD BY AUTOMATED COUNT: 20.4 % (ref 12.3–15.4)
GLUCOSE BLDC GLUCOMTR-MCNC: 106 MG/DL (ref 70–130)
GLUCOSE BLDC GLUCOMTR-MCNC: 110 MG/DL (ref 70–130)
GLUCOSE BLDC GLUCOMTR-MCNC: 144 MG/DL (ref 70–130)
GLUCOSE BLDC GLUCOMTR-MCNC: 151 MG/DL (ref 70–130)
GLUCOSE BLDC GLUCOMTR-MCNC: 152 MG/DL (ref 70–130)
GLUCOSE SERPL-MCNC: 121 MG/DL (ref 65–99)
HCT VFR BLD AUTO: 24.8 % (ref 34–46.6)
HGB BLD-MCNC: 7.3 G/DL (ref 12–15.9)
IMM GRANULOCYTES # BLD AUTO: 0.05 10*3/MM3 (ref 0–0.05)
IMM GRANULOCYTES NFR BLD AUTO: 0.5 % (ref 0–0.5)
INR PPP: 1.11 (ref 0.89–1.12)
LYMPHOCYTES # BLD AUTO: 0.69 10*3/MM3 (ref 0.7–3.1)
LYMPHOCYTES NFR BLD AUTO: 6.8 % (ref 19.6–45.3)
MAGNESIUM SERPL-MCNC: 2 MG/DL (ref 1.6–2.6)
MCH RBC QN AUTO: 29.8 PG (ref 26.6–33)
MCHC RBC AUTO-ENTMCNC: 29.4 G/DL (ref 31.5–35.7)
MCV RBC AUTO: 101.2 FL (ref 79–97)
MONOCYTES # BLD AUTO: 0.78 10*3/MM3 (ref 0.1–0.9)
MONOCYTES NFR BLD AUTO: 7.7 % (ref 5–12)
NEUTROPHILS NFR BLD AUTO: 8.43 10*3/MM3 (ref 1.7–7)
NEUTROPHILS NFR BLD AUTO: 83.2 % (ref 42.7–76)
NRBC BLD AUTO-RTO: 0 /100 WBC (ref 0–0.2)
PHOSPHATE SERPL-MCNC: 3.4 MG/DL (ref 2.5–4.5)
PLATELET # BLD AUTO: 241 10*3/MM3 (ref 140–450)
PMV BLD AUTO: 10.1 FL (ref 6–12)
POTASSIUM SERPL-SCNC: 4.3 MMOL/L (ref 3.5–5.2)
PROTHROMBIN TIME: 14.5 SECONDS (ref 12.2–14.5)
RBC # BLD AUTO: 2.45 10*6/MM3 (ref 3.77–5.28)
SODIUM SERPL-SCNC: 141 MMOL/L (ref 136–145)
UFH PPP CHRO-ACNC: 0.32 IU/ML (ref 0.3–0.7)
UFH PPP CHRO-ACNC: 0.33 IU/ML (ref 0.3–0.7)
UFH PPP CHRO-ACNC: 0.44 IU/ML (ref 0.3–0.7)
WBC NRBC COR # BLD: 10.13 10*3/MM3 (ref 3.4–10.8)

## 2023-11-05 PROCEDURE — 92612 ENDOSCOPY SWALLOW (FEES) VID: CPT

## 2023-11-05 PROCEDURE — 85025 COMPLETE CBC W/AUTO DIFF WBC: CPT | Performed by: INTERNAL MEDICINE

## 2023-11-05 PROCEDURE — 94664 DEMO&/EVAL PT USE INHALER: CPT

## 2023-11-05 PROCEDURE — 84100 ASSAY OF PHOSPHORUS: CPT | Performed by: INTERNAL MEDICINE

## 2023-11-05 PROCEDURE — 83735 ASSAY OF MAGNESIUM: CPT | Performed by: INTERNAL MEDICINE

## 2023-11-05 PROCEDURE — 97116 GAIT TRAINING THERAPY: CPT

## 2023-11-05 PROCEDURE — 25010000002 HEPARIN (PORCINE) 25000-0.45 UT/250ML-% SOLUTION

## 2023-11-05 PROCEDURE — 94799 UNLISTED PULMONARY SVC/PX: CPT

## 2023-11-05 PROCEDURE — 99232 SBSQ HOSP IP/OBS MODERATE 35: CPT | Performed by: INTERNAL MEDICINE

## 2023-11-05 PROCEDURE — 80048 BASIC METABOLIC PNL TOTAL CA: CPT | Performed by: INTERNAL MEDICINE

## 2023-11-05 PROCEDURE — 94761 N-INVAS EAR/PLS OXIMETRY MLT: CPT

## 2023-11-05 PROCEDURE — 85520 HEPARIN ASSAY: CPT

## 2023-11-05 PROCEDURE — 97110 THERAPEUTIC EXERCISES: CPT

## 2023-11-05 PROCEDURE — 82948 REAGENT STRIP/BLOOD GLUCOSE: CPT

## 2023-11-05 PROCEDURE — 85610 PROTHROMBIN TIME: CPT

## 2023-11-05 RX ORDER — LEVETIRACETAM 500 MG/1
1000 TABLET ORAL 2 TIMES DAILY
Status: DISCONTINUED | OUTPATIENT
Start: 2023-11-05 | End: 2023-11-10 | Stop reason: HOSPADM

## 2023-11-05 RX ADMIN — HEPARIN SODIUM 21 UNITS/KG/HR: 10000 INJECTION, SOLUTION INTRAVENOUS at 16:48

## 2023-11-05 RX ADMIN — IPRATROPIUM BROMIDE AND ALBUTEROL SULFATE 3 ML: 2.5; .5 SOLUTION RESPIRATORY (INHALATION) at 20:30

## 2023-11-05 RX ADMIN — MIDODRINE HYDROCHLORIDE 5 MG: 5 TABLET ORAL at 20:11

## 2023-11-05 RX ADMIN — HEPARIN SODIUM 21 UNITS/KG/HR: 10000 INJECTION, SOLUTION INTRAVENOUS at 01:42

## 2023-11-05 RX ADMIN — IPRATROPIUM BROMIDE AND ALBUTEROL SULFATE 3 ML: 2.5; .5 SOLUTION RESPIRATORY (INHALATION) at 15:49

## 2023-11-05 RX ADMIN — MIDODRINE HYDROCHLORIDE 5 MG: 5 TABLET ORAL at 11:42

## 2023-11-05 RX ADMIN — TOPIRAMATE 200 MG: 100 TABLET, FILM COATED ORAL at 08:30

## 2023-11-05 RX ADMIN — TOPIRAMATE 200 MG: 100 TABLET, FILM COATED ORAL at 21:47

## 2023-11-05 RX ADMIN — LEVETIRACETAM 1000 MG: 500 TABLET, FILM COATED ORAL at 21:47

## 2023-11-05 RX ADMIN — Medication 10 ML: at 21:48

## 2023-11-05 RX ADMIN — PANTOPRAZOLE SODIUM 40 MG: 40 INJECTION, POWDER, LYOPHILIZED, FOR SOLUTION INTRAVENOUS at 05:42

## 2023-11-05 RX ADMIN — ACETAMINOPHEN 650 MG: 650 SOLUTION ORAL at 08:34

## 2023-11-05 RX ADMIN — LEVETIRACETAM 1000 MG: 100 SOLUTION ORAL at 08:31

## 2023-11-05 RX ADMIN — ATORVASTATIN CALCIUM 80 MG: 40 TABLET, FILM COATED ORAL at 21:47

## 2023-11-05 RX ADMIN — LEVOTHYROXINE SODIUM 75 MCG: 0.07 TABLET ORAL at 05:42

## 2023-11-05 RX ADMIN — IPRATROPIUM BROMIDE AND ALBUTEROL SULFATE 3 ML: 2.5; .5 SOLUTION RESPIRATORY (INHALATION) at 11:17

## 2023-11-05 RX ADMIN — Medication 10 ML: at 08:31

## 2023-11-05 RX ADMIN — IPRATROPIUM BROMIDE AND ALBUTEROL SULFATE 3 ML: 2.5; .5 SOLUTION RESPIRATORY (INHALATION) at 08:01

## 2023-11-05 RX ADMIN — MIDODRINE HYDROCHLORIDE 5 MG: 5 TABLET ORAL at 04:02

## 2023-11-05 RX ADMIN — ASPIRIN 81 MG CHEWABLE TABLET 81 MG: 81 TABLET CHEWABLE at 08:30

## 2023-11-05 NOTE — PROGRESS NOTES
Intensive Care Follow-up     Hospital:  LOS: 5 days   Ms. Mara Martínez, 53 y.o. female is followed for:   Acute ischemic left MCA stroke            History of present illness:     53-year-old female who is active smoker with history of mechanical aortic and mitral valve, hypertension, dyslipidemia, left internal carotid artery stent in 2020, seizure disorders, chronic anemia and chronic GI loss from AVM, chronic pain with pain pump, RB ILD followed at , sleep apnea on CPAP presenting with acute left MCA stroke.  Patient was outside of window of thrombolysis so underwent salvage thrombectomy for saddle embolus in the left MCA bifurcation.  Patient was started on heparin drip per subsequently.  Patient was also found to have urinary tract infection on admission and cultures grew Klebsiella pneumoniae.  Patient was treated with Rocephin.  Patient also has chronic hypotension and on midodrine at home.    Subjective   Interval History:  Patient states that she is feeling better.  Remains on heparin drip.  She is talking in full sentences today.  No other acute issues per nursing report.                 The patient's past medical, surgical and social history were reviewed and updated in Epic as appropriate.       Objective     Infusions:  heparin, 21 Units/kg/hr, Last Rate: 21 Units/kg/hr (11/05/23 0142)  Pharmacy to Dose Heparin,   phenylephrine, 0.5-3 mcg/kg/min, Last Rate: Stopped (11/02/23 0546)      Medications:  aspirin, 81 mg, Nasogastric, Daily   Or  aspirin, 300 mg, Rectal, Daily  atorvastatin, 80 mg, Nasogastric, Nightly  ipratropium-albuterol, 3 mL, Nebulization, 4x Daily - RT  [START ON 11/6/2023] lansoprazole, 15 mg, Nasogastric, Q AM  levETIRAcetam, 1,000 mg, Nasogastric, Q12H  levothyroxine, 75 mcg, Nasogastric, Q AM  midodrine, 5 mg, Nasogastric, Q8H  sodium chloride, 10 mL, Intravenous, Q12H  topiramate, 200 mg, Nasogastric, BID        Vital Sign Min/Max for last 24 hours  Temp  Min: 97.4 °F (36.3 °C)   "Max: 98.7 °F (37.1 °C)   BP  Min: 90/49  Max: 126/66   Pulse  Min: 73  Max: 99   Resp  Min: 15  Max: 18   SpO2  Min: 90 %  Max: 100 %   Flow (L/min)  Min: 1  Max: 2       Input/Output for last 24 hour shift  11/04 0701 - 11/05 0700  In: 2086.4 [I.V.:604.4]  Out: 1750 [Urine:1750]      Objective:  Vital signs: (most recent): Blood pressure 101/50, pulse 76, temperature 97.5 °F (36.4 °C), temperature source Oral, resp. rate 18, height 152.4 cm (60\"), weight 73.4 kg (161 lb 13.1 oz), SpO2 96%, not currently breastfeeding.            General Appearance: Wakes up to alert, no acute distress  Lungs:   B/L Breath sounds present with decreased breath sounds on bases, no wheezing heard, no crackles.   Heart: S1 and S2 present, no murmur  Abdomen: Soft, nontender, no guarding or rigidity, bowel sounds positive.  Extremities: no edema, warm to touch.  Neurologic:  Interval:  (shift change)  1a. Level of Consciousness: 0-->Alert, keenly responsive  1b. LOC Questions: 0-->Answers both questions correctly  1c. LOC Commands: 0-->Performs both tasks correctly  2. Best Gaze: 0-->Normal  3. Visual: 0-->No visual loss  4. Facial Palsy: 1-->Minor paralysis (flattened nasolabial fold, asymmetry on smiling)  5a. Motor Arm, Left: 0-->No drift, limb holds 90 (or 45) degrees for full 10 secs  5b. Motor Arm, Right: 0-->No drift, limb holds 90 (or 45) degrees for full 10 secs  6a. Motor Leg, Left: 0-->No drift, leg holds 30 degree position for full 5 secs  6b. Motor Leg, Right: 0-->No drift, leg holds 30 degree position for full 5 secs  7. Limb Ataxia: 0-->Absent  8. Sensory: 0-->Normal, no sensory loss  9. Best Language: 1-->Mild-to-moderate aphasia, some obvious loss of fluency or facility of comprehension, without significant limitation on ideas expressed or form of expression. Reduction of speech and/or comprehension, however, makes conversation. . . (see row details)  10. Dysarthria: 1-->Mild-to-moderate dysarthria, patient slurs at " least some words and, at worst, can be understood with some difficulty  11. Extinction and Inattention (formerly Neglect): 0-->No abnormality    Total (NIH Stroke Scale): 3        Results from last 7 days   Lab Units 11/05/23  0742 11/04/23  0808 11/03/23  0439   WBC 10*3/mm3 10.13 9.98 9.87   HEMOGLOBIN g/dL 7.3* 7.7* 8.6*   PLATELETS 10*3/mm3 241 232 247     Results from last 7 days   Lab Units 11/05/23  0742 11/04/23  0808 11/02/23  0104   SODIUM mmol/L 141 141 139  139   POTASSIUM mmol/L 4.3 3.9 3.7  3.7   CO2 mmol/L 22.0 22.0 23.0  23.0   BUN mg/dL 13 12 7  7   CREATININE mg/dL 0.49* 0.48* 0.68  0.68   MAGNESIUM mg/dL 2.0 1.9 1.8   PHOSPHORUS mg/dL 3.4 2.9 3.9   GLUCOSE mg/dL 121* 133* 152*  152*     Estimated Creatinine Clearance: 118.8 mL/min (A) (by C-G formula based on SCr of 0.49 mg/dL (L)).    Results from last 7 days   Lab Units 11/01/23  0247   PH, ARTERIAL pH units 7.346*   PCO2, ARTERIAL mm Hg 40.5   PO2 ART mm Hg 80.8*       Images:   Ct head reviewed from 11/2:   Impression:  Evolving left MCA territory infarct. There has been resolution of previous left cortical contrast staining. No superimposed acute process identified.           Electronically Signed: Saman Lopez MD    11/2/2023 2:52 PM CDT    Workstation ID: [id number]    I reviewed the patient's results and images.     Assessment & Plan   Impression        Acute ischemic left MCA stroke    Anemia    Tobacco use    S/P mechanical aortic and mitral valve replacement (2018)    (HFpEF) heart failure with preserved ejection fraction    Chronic pain with intrathecal pump in place    Dyslipidemia    Cerebral aneurysm    H/O recurrent GIB 2* AVMs    UTI (urinary tract infection)    Chronic hypotension on midodrine       Plan        1.  Patient presented with acute CVA s/p thrombectomy.  Currently on heparin drip.  Stroke neurology following.  Continue to monitor closely.  No new neurological symptoms noted at this time.  2.  We are monitoring  H&H closely.  Hemoglobin is slowly trending down.  We will continue to trend for now.  If hemoglobin drops below 7 or if she develops overt bleeding, then will transfuse.    3.  Continue levothyroxine for history of hypothyroidism.  4.  Continue aspirin, statin.  5.  Keppra and Topamax for history of seizures.  6.  Continue midodrine patient's home dose.  7.  Treated with Rocephin for Klebsiella UTI.  WBC count is normalized.  Hemodynamically stable.  Will monitor.  8.  Replace electrolytes per ICU protocol  9.  Tolerating tube feeds okay, continue same.  10.  PT/OT and speech to follow.    Continue monitoring in ICU.  If remains stable hemodynamically we will plan on transitioning out of ICU tomorrow.    Plan of care and goals reviewed with multidisciplinary/antibiotic stewardship team during rounds.   I discussed the patient's findings and my recommendations with patient, nursing staff, and consulting provider       Durga Mccain MD, [facility]  Pulmonary, Critical care and Sleep Medicine

## 2023-11-05 NOTE — PLAN OF CARE
Goal Outcome Evaluation:  Plan of Care Reviewed With: patient        Progress: improving  Outcome Evaluation: Pt demonstrated increased indep with bed mobility and gait. Progressed forward ambulation distance to 90 total ft with RW, min A. Cont to be limited by fatigue and balance deficits, but very motivated to participate and progress. Will cont PT POC.

## 2023-11-05 NOTE — MBS/VFSS/FEES
Acute Care - Speech Language Pathology   Swallow Re-Evaluation [facility]    Fiberoptic Endoscopic Evaluation of Swallowing (FEES)       Patient Name: Mara Martínez  : 1970  MRN: [id number]  Today's Date: 2023               Admit Date: 10/31/2023    Visit Dx:     ICD-10-CM ICD-9-CM   1. Acute CVA (cerebrovascular accident)  I63.9 434.91   2. Dysphagia, unspecified type  R13.10 787.20   3. Communication deficit  F80.9 307.9   4. Anemia, unspecified type  D64.9 285.9   5. Acute UTI  N39.0 599.0     Patient Active Problem List   Diagnosis    Anemia    Aortic valve insufficiency    Bruit of left carotid artery    Chest pain    Positive D-dimer    Dizziness    Edema    Fatigue    HTN (hypertension)    Heart murmur    Hyperlipidemia    Mitral valve stenosis    Mitral valve insufficiency    Palpitations    Pulmonary edema    Seizure disorder    Shortness of breath    Difficulty sleeping    Snoring    Supravalvular aortic stenosis    Syncope    Tachycardia    Mitral stenosis    Rheumatic aortic stenosis    Valvular heart disease    Epilepsy    ISREAL (cerebral atherosclerosis)    Tobacco use    Migraines    Aortic regurgitation    Rheumatic mitral regurgitation    SOB (shortness of breath)    Supraventricular aortic stenosis    Aortic valve stenosis    Rheumatic aortic valve insufficiency    Moderate aortic stenosis    Aortic valve disorder    Coronary artery disease involving native coronary artery of native heart without angina pectoris    S/P mitral valve replacement    S/P mechanical aortic and mitral valve replacement (2018)    Stenosis of carotid artery    Generalized abdominal pain    Rectal bleeding    Functional diarrhea    Lower extremity edema    Rectal bleed    (HFpEF) heart failure with preserved ejection fraction    Chronic pain with intrathecal pump in place    Dyslipidemia    Bilateral carotid artery stenosis    Low back pain    Degeneration of lumbar intervertebral disc    Lumbosacral  radiculopathy    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    Cerebral aneurysm    Complex partial seizures with consciousness impaired    Migraine without aura and with status migrainosus, not intractable    Chronic obstructive pulmonary disease    Essential tremor    Hesitancy of micturition    Complex partial epilepsy with generalization and with intractable epilepsy    Focal (motor) epilepsy    Occipital neuralgia    Paresthesia    Restless legs syndrome    Retinal drusen    Acute ischemic left MCA stroke    H/O recurrent GIB 2* AVMs    UTI (urinary tract infection)    Chronic hypotension on midodrine     Past Medical History:   Diagnosis Date    Anemia     Aortic regurgitation     Arthritis     Back pain     Carotid bruit     ISREAL (cerebral atherosclerosis) 12/2014    nonobstructive    Chest pain     Chronic hypotension on midodrine 11/01/2023    Chronic obstructive pulmonary disease 09/07/2022    Coronary artery disease     COVID-19 vaccine administered     phizer    D-dimer, elevated     Diastolic congestive heart failure 07/25/2019    Dizziness     Edema     Epilepsy     Fatigue     Heart murmur     History of blood transfusion 08/2019    History of blood transfusion 2022    History of blood transfusion 11/2022    History of blood transfusion     August 2023 x2    History of recent blood transfusion 12/2021    History of transfusion     Hyperlipidemia     Hypertension     Kidney stones     Migraines     Mitral regurgitation     Mitral stenosis     mild    Neuropathy     Neuropathy     Palpitations     Pulmonary edema     Retinal drusen     Seizure     Sleep apnea 09/2019    Sleeping difficulties     SOB (shortness of breath)     Supraventricular aortic stenosis     Syncope     Tachycardia     Tobacco abuse     VHD (valvular heart disease)     Wears dentures     Wears eyeglasses      Past Surgical History:   Procedure Laterality Date    APPENDECTOMY      CARDIAC CATHETERIZATION       CARDIAC SURGERY      2 valves replaced     SECTION      x 2    CHOLECYSTECTOMY      COLONOSCOPY      COLONOSCOPY N/A 2019    Procedure: COLONOSCOPY;  Surgeon: Kurt Gaines MD;  Location:  [facility];  Service: Gastroenterology    CORONARY ARTERY BYPASS GRAFT  2018    EXPLORATORY LAPAROTOMY      HERNIA REPAIR      HYSTERECTOMY      INTERVENTIONAL RADIOLOGY PROCEDURE Bilateral 10/31/2023    Procedure: Carotid Cerebral Angiogram;  Surgeon: Cayetano Mckeon MD;  Location:  [facility];  Service: Interventional Radiology;  Laterality: Bilateral;    JOINT REPLACEMENT Right     knee replacement    PAIN PUMP INSERTION/REVISION      morphine    PORTACATH PLACEMENT      UPPER GASTROINTESTINAL ENDOSCOPY         SLP Recommendation and Plan  SLP Swallowing Diagnosis: functional oral phase, mild, pharyngeal dysphagia (23)  SLP Diet Recommendation: regular textures, thin liquids, other (see comments) (No straw) (23)  Recommended Precautions and Strategies: upright posture during/after eating, small bites of food and sips of liquid, no straw, general aspiration precautions (23)  SLP Rec. for Method of Medication Administration: meds whole, with puree, as tolerated (23)     Monitor for Signs of Aspiration: yes, notify SLP if any concerns (23)     Swallow Criteria for Skilled Therapeutic Interventions Met: demonstrates skilled criteria (23)  Anticipated Discharge Disposition (SLP): inpatient rehabilitation facility, anticipate therapy at next level of care (23)  Rehab Potential/Prognosis, Swallowing: good, to achieve stated therapy goals (23)  Therapy Frequency (Swallow): 5 days per week (23)  Predicted Duration Therapy Intervention (Days): until discharge (23)  Oral Care Recommendations: Oral Care BID/PRN (23)                                      Oral Care Recommendations: Oral  Care BID/PRN (11/05/23 1400)           SWALLOW EVALUATION (last 72 hours)       SLP Adult Swallow Evaluation       Row Name 11/05/23 1400 11/04/23 1045 11/03/23 1440             Rehab Evaluation    Document Type re-evaluation;other (see comments)  FEES  -CH re-evaluation  -DV re-evaluation  -RS      Subjective Information no complaints  -CH no complaints  -DV no complaints  -RS      Patient Observations alert;cooperative;agree to therapy  - alert;cooperative  -DV alert;cooperative;agree to therapy  -RS      Patient/Family/Caregiver Comments/Observations none present  - no family present  -DV --      Patient Effort good  - good  -DV good  -RS      Symptoms Noted During/After Treatment none  -CH none  -DV --      Oral Care -- -- teeth brushed - suction toothbrush;tongue brushed  -RS         General Information    Patient Profile Reviewed yes  - yes  -DV --      Pertinent History Of Current Problem see prior eval.  - see initial eval  -DV --      Current Method of Nutrition NPO;nasogastric feedings  - NPO;nasogastric feedings  -DV --      Precautions/Limitations, Vision WFL with corrective lenses;for purposes of eval  -CH WFL with corrective lenses;for purposes of eval  -DV --      Precautions/Limitations, Hearing WFL;for purposes of eval  - WFL;for purposes of eval  -DV --      Prior Level of Function-Communication unknown  - unknown  -DV --      Prior Level of Function-Swallowing no diet consistency restrictions  - no diet consistency restrictions  -DV --      Plans/Goals Discussed with patient;agreed upon  - patient;agreed upon  -DV --      Barriers to Rehab medically complex  - medically complex  -DV --      Patient's Goals for Discharge return to PO diet  - return to PO diet  -DV --         Pain    Additional Documentation Pain Scale: FACES Pre/Post-Treatment (Group)  -CH Pain Scale: Numbers Pre/Post-Treatment (Group)  -DV Pain Scale: FACES Pre/Post-Treatment (Group)  -RS         Pain  Scale: Numbers Pre/Post-Treatment    Pretreatment Pain Rating -- 0/10 - no pain  -DV --      Posttreatment Pain Rating -- 0/10 - no pain  -DV --         Pain Scale: FACES Pre/Post-Treatment    Pain: FACES Scale, Pretreatment 0-->no hurt  -CH -- 0-->no hurt  -RS      Posttreatment Pain Rating 0-->no hurt  -CH -- 0-->no hurt  -RS         Oral Motor Structure and Function    Dentition Assessment -- natural, present and adequate  -DV --      Secretion Management -- WNL/WFL  -DV --      Mucosal Quality -- moist, healthy  -DV --         Oral Musculature and Cranial Nerve Assessment    Oral Motor General Assessment -- generalized oral motor weakness  -DV --         General Eating/Swallowing Observations    Respiratory Support Currently in Use -- room air  -DV --      Eating/Swallowing Skills -- fed by SLP  -DV --      Positioning During Eating -- upright in bed  -DV --      Utensils Used -- spoon;straw  -DV --      Consistencies Trialed -- thin liquids;ice chips;pureed;regular textures  -DV --         Respiratory    Respiratory Status -- WFL  -DV --         Clinical Swallow Eval    Oral Prep Phase -- WFL  -DV --      Oral Residue -- WFL  -DV --      Pharyngeal Phase -- suspected pharyngeal impairment  -DV --      Esophageal Phase -- unremarkable  -DV --      Clinical Swallow Evaluation Summary -- Pt has improved significantly since initial eval two days ago. Pt is upright, alert and conversing. Swallowing appears effortful at times with intermittent multiple swallows noted, but no overt s/sx aspiration. Recommend FEES tomorrow.  -DV --         Pharyngeal Phase Concerns    Pharyngeal Phase Concerns -- multiple swallows  -DV --      Multiple Swallows -- all consistencies;other (see comments)  intermittently  -DV --         Fiberoptic Endoscopic Evaluation of Swallowing (FEES)    Risks/Benefits Reviewed risks/benefits explained;patient;agreed to eval  - -- --      Nasal Entry left:  -CH -- --      Scope serial  number/identification [id number]  -CH -- --         Anatomy and Physiology    Anatomic Considerations no anatomic structural deviation  -CH -- --      Velopharyngeal WFL  -CH -- --      Base of Tongue symmetrical  -CH -- --      Epiglottis WFL  -CH -- --      Laryngeal Function Breathing symmetrical  -CH -- --      Laryngeal Function Phonation symmetrical  -CH -- --      Laryngeal Function to Breath Hold TVF/FVF/Arytenoid  -CH -- --      Secretion Rating Scale (Danilo et alMyles 1996) 0- normal, no visible secretions  -CH -- --      Spontaneous Swallow frequency adequate  -CH -- --      Sensory sensed scope  -CH -- --      Utensils Used Spoon;Cup;Straw  -CH -- --      Consistencies Trialed ice chips;thin liquids;pudding/puree;regular textures  -CH -- --         FEES Interpretation    Oral Phase WFL  -CH -- --         Initiation of Pharyngeal Swallow    Initiation of Pharyngeal Swallow bolus in pyriform sinuses  -CH -- --      Pharyngeal Phase impaired pharyngeal phase of swallowing  -CH -- --      Penetration Before the Swallow thin liquids;secondary to delayed swallow initiation or mistiming  -CH -- --      Penetration During the Swallow thin liquids;secondary to delayed swallow initiation or mistiming;secondary to reduced laryngeal elevation  -CH -- --      Aspiration During the Swallow thin liquids;other (see comments);secondary to delayed swallow initiation or mistiming;secondary to reduced laryngeal elevation;secondary to reduced vestibular closure  by straw  -CH -- --      Depth of Penetration deep  -CH -- --      Response to Penetration No  -CH -- --      Response to Aspiration No  -CH -- --      No spontaneous response to aspiration with effective subglottic clearance with cue (see comments)  -CH -- --      Rosenbek's Scale thin:;8-->Level 8;pudding/puree:;regular textures:;1-->Level 1  -CH -- --      Attempted Compensatory Maneuvers bolus size;bolus presentation style  -CH -- --      Response to Attempted Compensatory  Maneuvers prevented aspiration  - -- --      Successful Compensatory Maneuver Competency patient able to;demonstrate compensations;with cues  - -- --      FEES Summary Deep penetration of thin liquids before the swallow with aspiration during the swallow via straw. High shallow penetration of thin liquids during the swallow by spoon and cup. No penetration or aspiration with pureed or regular consistencies. Recommend regular diet and thin liquids, no straw. Small single cup sips. Meds whole with pureed.  - -- --         Swallowing Quality of Life Assessment    Education and counseling provided Signs of aspiration;Risks of aspiration;Safest diet options;Compensatory strategy recommendations and rationale;Aspiration precautions  - -- --         SLP Evaluation Clinical Impression    SLP Swallowing Diagnosis functional oral phase;mild;pharyngeal dysphagia  -CH suspected pharyngeal dysphagia  -DV oral dysphagia;suspected pharyngeal dysphagia  -RS      Functional Impact risk of aspiration/pneumonia  -CH risk of aspiration/pneumonia  -DV risk of aspiration/pneumonia  -RS      Rehab Potential/Prognosis, Swallowing good, to achieve stated therapy goals  -CH good, to achieve stated therapy goals  -DV good, to achieve stated therapy goals  -RS      Swallow Criteria for Skilled Therapeutic Interventions Met demonstrates skilled criteria  -CH demonstrates skilled criteria  -DV demonstrates skilled criteria  -RS         SLP Treatment Clinical Impressions    Treatment Assessment Comments (SLP) -- -- RN requested SLP re-assessment. Pt appears much improved today. She has excellent oral acceptance of ice and water with good oral manipulation. Overt pharyngeal s/s with most trials. While pt has certainly improved, she is still not yet ready for instrumental. Would anticipate early-mid next week. Great effort and participation with all speech/lang tasks, goals met and new goals added  -RS      Daily Summary of Progress (SLP)  -- -- progress toward functional goals is good  -RS         Recommendations    Therapy Frequency (Swallow) 5 days per week  -CH 5 days per week  -DV 5 days per week  -RS      Predicted Duration Therapy Intervention (Days) until discharge  - until discharge  -DV until discharge  -RS      SLP Diet Recommendation regular textures;thin liquids;other (see comments)  No straw  - NPO;temporary alternate methods of nutrition/hydration  -DV NPO;temporary alternate methods of nutrition/hydration  -RS      Recommended Diagnostics -- FEES;other (see comments)  11/5  -DV --      Recommended Precautions and Strategies upright posture during/after eating;small bites of food and sips of liquid;no straw;general aspiration precautions  - general aspiration precautions  -DV general aspiration precautions  -RS      Oral Care Recommendations Oral Care BID/PRN  - Oral Care BID/PRN;Suction toothbrush  -DV Oral Care BID/PRN;Suction toothbrush  -RS      SLP Rec. for Method of Medication Administration meds whole;with puree;as tolerated  - meds via alternate route  -DV meds via alternate route  -RS      Monitor for Signs of Aspiration yes;notify SLP if any concerns  - yes;notify SLP if any concerns  - yes;notify SLP if any concerns  -RS      Anticipated Discharge Disposition (SLP) inpatient rehabilitation facility;anticipate therapy at next level of care  - inpatient rehabilitation facility;anticipate therapy at next level of care  -DV inpatient rehabilitation facility  -RS                User Key  (r) = Recorded By, (t) = Taken By, (c) = Cosigned By      Initials Name Effective Dates     Mahnaz Carbajal MS CCC-SLP 06/16/21 -     DV Bruna Campo MS CCC-SLP 06/16/21 -     Seth Aggarwal MS CCC-SLP 09/14/23 -                     EDUCATION  The patient has been educated in the following areas:   Home Exercise Program (HEP) Dysphagia (Swallowing Impairment) Oral Care/Hydration Modified Diet Instruction.        SLP GOALS        Row Name 11/05/23 1400 11/04/23 1045 11/03/23 1440       (LTG) Patient will demonstrate functional swallow for    Diet Texture (Demonstrate functional swallow) regular textures  -CH pureed textures  -DV pureed textures  -RS    Liquid viscosity (Demonstrate functional swallow) thin liquids  -CH nectar/ mildly thick liquids  -DV nectar/ mildly thick liquids  -RS    Braxton (Demonstrate functional swallow) with minimal cues (75-90% accuracy)  -CH with maximum cues (25-49% accuracy)  -DV with maximum cues (25-49% accuracy)  -RS    Time Frame (Demonstrate functional swallow) by discharge  -CH by discharge  -DV by discharge  -RS    Progress/Outcomes (Demonstrate functional swallow) -- goal ongoing  -DV continuing progress toward goal  -RS       ([facility]) Pharyngeal Strengthening Exercise Goal 1 (SLP)    Activity (Pharyngeal Strengthening Goal 1, SLP) increase superior movement of the hyolaryngeal complex;increase anterior movement of the hyolaryngeal complex;increase closure at entrance to airway/closure of airway at glottis;increase squeeze/positive pressure generation;increase timing  -CH -- --    Increase Timing prepping - 3 second prep or suck swallow or 3-step swallow  -CH -- --    Increase Superior Movement of the Hyolaryngeal Complex falsetto  -CH -- --    Increase Anterior Movement of the Hyolaryngeal Complex chin tuck against resistance (CTAR)  -CH -- --    Increase Closure at Entrance to Airway/Closure of Airway at Glottis breath hold exercises  -CH -- --    Increase Squeeze/Positive Pressure Generation hard effortful swallow  -CH -- --    Braxton/Accuracy (Pharyngeal Strengthening Goal 1, SLP) with minimal cues (75-90% accuracy)  -CH -- --    Time Frame (Pharyngeal Strengthening Goal 1, SLP) by discharge  -CH -- --       Patient will demonstrate functional language skills for return to discharge environment     Braxton -- with moderate cues  -DV with moderate cues  -RS    Time frame -- by  discharge  -DV by discharge  -RS    Progress/Outcomes -- goal ongoing  -DV continuing progress toward goal  -RS       SLP Diagnostic Treatment     Patient will participate in further assessment in the following areas -- reading comprehension;graphic expression;cognitive-linguistic  -DV reading comprehension;graphic expression;cognitive-linguistic  -RS    Time Frame (Diagnostic) -- by discharge  -DV by discharge  -RS    Progress/Outcomes (Additional Goal 1, SLP) -- goal ongoing  -DV new goal  -RS       Comprehend Questions Goal 1 (SLP)    Improve Ability to Comprehend Questions Goal 1 (SLP) -- complex general questions;90%;independently (over 90% accuracy)  -DV complex general questions;90%;independently (over 90% accuracy)  -RS    Time Frame (Comprehend Questions Goal 1, SLP) -- by discharge  -DV by discharge  -RS    Progress/Outcomes (Comprehend Questions Goal 1, SLP) -- goal ongoing  -DV new goal  -RS    Comment (Comprehend Questions Goal 1, SLP) -- -- simple and complex y/n questions answered with 100% accuracy independently  -RS       Follow Directions Goal 2 (SLP)    Improve Ability to Follow Directions Goal 1 (SLP) -- multistep commands;90%;independently (over 90% accuracy)  -DV multistep commands;90%;independently (over 90% accuracy)  -RS    Time Frame (Follow Directions Goal 1, SLP) -- by discharge  -DV by discharge  -RS    Progress/Outcomes (Follow Directions Goal 1, SLP) -- goal ongoing  -DV new goal  -RS    Comment (Follow Directions Goal 1, SLP) -- -- single step and 2-step directions met with 100% accuracy independently  -RS       Articulation Goal 1 (SLP)    Improve Articulation Goal 1 (SLP) -- by over-articulating at phrase level;80%;with minimal cues (75-90%)  -DV by over-articulating at phrase level;80%;with minimal cues (75-90%)  -RS    Time Frame (Articulation Goal 1, SLP) -- by discharge  -DV by discharge  -RS    Progress/Outcomes (Articulation Goal 1, SLP) -- goal ongoing  -DV new goal  -RS               User Key  (r) = Recorded By, (t) = Taken By, (c) = Cosigned By      Initials Name Provider Type    Mahnaz Riley MS CCC-SLP Speech and Language Pathologist    Bruna Nicole MS CCC-SLP Speech and Language Pathologist    Seth Aggarwal MS CCC-SLP Speech and Language Pathologist                       Time Calculation:    Time Calculation- SLP       Row Name 11/05/23 1659             Time Calculation- SLP    SLP Start Time 1400  -CH      SLP Received On 11/05/23  -CH         Untimed Charges    SLP Eval/Re-eval  ST Fiberoptic Endo Eval Swallow - 58206  -CH      57715-AO Fiberoptic Endo Eval Swallow Minutes 70  -CH         Total Minutes    Untimed Charges Total Minutes 70  -CH       Total Minutes 70  -CH                User Key  (r) = Recorded By, (t) = Taken By, (c) = Cosigned By      Initials Name Provider Type    RAPHAEL Mahnaz Carbajal MS CCC-SLP Speech and Language Pathologist                    Therapy Charges for Today       Code Description Service Date Service Provider Modifiers Qty    [id number]  ST FIBEROPTIC ENDO EVAL SWALL 5 11/5/2023 Mahnaz Carbajal MS CCC-SLP GN 1                 Mahnaz Carbajal MS CCC-SLP  11/5/2023

## 2023-11-05 NOTE — THERAPY TREATMENT NOTE
Patient Name: Mara Martínez  : 1970    MRN: [id number]                              Today's Date: 2023       Admit Date: 10/31/2023    Visit Dx:     ICD-10-CM ICD-9-CM   1. Acute CVA (cerebrovascular accident)  I63.9 434.91   2. Dysphagia, unspecified type  R13.10 787.20   3. Communication deficit  F80.9 307.9   4. Anemia, unspecified type  D64.9 285.9   5. Acute UTI  N39.0 599.0     Patient Active Problem List   Diagnosis    Anemia    Aortic valve insufficiency    Bruit of left carotid artery    Chest pain    Positive D-dimer    Dizziness    Edema    Fatigue    HTN (hypertension)    Heart murmur    Hyperlipidemia    Mitral valve stenosis    Mitral valve insufficiency    Palpitations    Pulmonary edema    Seizure disorder    Shortness of breath    Difficulty sleeping    Snoring    Supravalvular aortic stenosis    Syncope    Tachycardia    Mitral stenosis    Rheumatic aortic stenosis    Valvular heart disease    Epilepsy    ISREAL (cerebral atherosclerosis)    Tobacco use    Migraines    Aortic regurgitation    Rheumatic mitral regurgitation    SOB (shortness of breath)    Supraventricular aortic stenosis    Aortic valve stenosis    Rheumatic aortic valve insufficiency    Moderate aortic stenosis    Aortic valve disorder    Coronary artery disease involving native coronary artery of native heart without angina pectoris    S/P mitral valve replacement    S/P mechanical aortic and mitral valve replacement (2018)    Stenosis of carotid artery    Generalized abdominal pain    Rectal bleeding    Functional diarrhea    Lower extremity edema    Rectal bleed    (HFpEF) heart failure with preserved ejection fraction    Chronic pain with intrathecal pump in place    Dyslipidemia    Bilateral carotid artery stenosis    Low back pain    Degeneration of lumbar intervertebral disc    Lumbosacral radiculopathy    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    Cerebral aneurysm    Complex partial seizures  with consciousness impaired    Migraine without aura and with status migrainosus, not intractable    Chronic obstructive pulmonary disease    Essential tremor    Hesitancy of micturition    Complex partial epilepsy with generalization and with intractable epilepsy    Focal (motor) epilepsy    Occipital neuralgia    Paresthesia    Restless legs syndrome    Retinal drusen    Acute ischemic left MCA stroke    H/O recurrent GIB 2* AVMs    UTI (urinary tract infection)    Chronic hypotension on midodrine     Past Medical History:   Diagnosis Date    Anemia     Aortic regurgitation     Arthritis     Back pain     Carotid bruit     ISREAL (cerebral atherosclerosis) 2014    nonobstructive    Chest pain     Chronic hypotension on midodrine 2023    Chronic obstructive pulmonary disease 2022    Coronary artery disease     COVID-19 vaccine administered     phizer    D-dimer, elevated     Diastolic congestive heart failure 2019    Dizziness     Edema     Epilepsy     Fatigue     Heart murmur     History of blood transfusion 2019    History of blood transfusion     History of blood transfusion 2022    History of blood transfusion     August 2023 x2    History of recent blood transfusion 2021    History of transfusion     Hyperlipidemia     Hypertension     Kidney stones     Migraines     Mitral regurgitation     Mitral stenosis     mild    Neuropathy     Neuropathy     Palpitations     Pulmonary edema     Retinal drusen     Seizure     Sleep apnea 2019    Sleeping difficulties     SOB (shortness of breath)     Supraventricular aortic stenosis     Syncope     Tachycardia     Tobacco abuse     VHD (valvular heart disease)     Wears dentures     Wears eyeglasses      Past Surgical History:   Procedure Laterality Date    APPENDECTOMY      CARDIAC CATHETERIZATION      CARDIAC SURGERY      2 valves replaced     SECTION      x 2    CHOLECYSTECTOMY      COLONOSCOPY      COLONOSCOPY N/A 2019     Procedure: COLONOSCOPY;  Surgeon: Kurt Gaines MD;  Location: [facility] OR;  Service: Gastroenterology    CORONARY ARTERY BYPASS GRAFT  06/12/2018    EXPLORATORY LAPAROTOMY      HERNIA REPAIR      HYSTERECTOMY      INTERVENTIONAL RADIOLOGY PROCEDURE Bilateral 10/31/2023    Procedure: Carotid Cerebral Angiogram;  Surgeon: Cayetano Mckeon MD;  Location:  [facility];  Service: Interventional Radiology;  Laterality: Bilateral;    JOINT REPLACEMENT Right     knee replacement    PAIN PUMP INSERTION/REVISION      morphine    PORTACATH PLACEMENT      UPPER GASTROINTESTINAL ENDOSCOPY        General Information       Row Name 11/05/23 1503          Physical Therapy Time and Intention    Document Type therapy note (daily note)  -     Mode of Treatment physical therapy  -       Row Name 11/05/23 1503          General Information    Existing Precautions/Restrictions fall;oxygen therapy device and L/min  -       Row Name 11/05/23 1503          Cognition    Orientation Status (Cognition) oriented x 3  -       Row Name 11/05/23 1503          Safety Issues, Functional Mobility    Impairments Affecting Function (Mobility) balance;cognition;coordination;endurance/activity tolerance;motor planning;strength;postural/trunk control  -               User Key  (r) = Recorded By, (t) = Taken By, (c) = Cosigned By      Initials Name Provider Type     Janna Abdullahi, PT Physical Therapist                   Mobility       Row Name 11/05/23 1504          Bed Mobility    Supine-Sit [place] (Bed Mobility) supervision  -     Assistive Device (Bed Mobility) bed rails;draw sheet;head of bed elevated  -       Row Name 11/05/23 1504          Sit-Stand Transfer    Sit-Stand [place] (Transfers) minimum assist (75% patient effort);verbal cues  -     Assistive Device (Sit-Stand Transfers) walker, front-wheeled  -       Row Name 11/05/23 1504          Gait/Stairs (Locomotion)    [place] Level (Gait)  minimum assist (75% patient effort);verbal cues  -LS     Assistive Device (Gait) walker, front-wheeled  -LS     Distance in Feet (Gait) 90  -LS     Deviations/Abnormal Patterns (Gait) bilateral deviations;mela decreased  -LS     Bilateral Gait Deviations forward flexed posture;heel strike decreased  -LS     Comment, (Gait/Stairs) VC for upright posture, looking forward, and increasing step length within RW. Distance limited by fatigue but pt very motivated to participate and progress.  -               User Key  (r) = Recorded By, (t) = Taken By, (c) = Cosigned By      Initials Name Provider Type    Janna Turner, PT Physical Therapist                   Obj/Interventions       Row Name 11/05/23 1508          Motor Skills    Therapeutic Exercise knee;ankle  -       Row Name 11/05/23 1508          Knee (Therapeutic Exercise)    Knee (Therapeutic Exercise) strengthening exercise  -     Knee Strengthening (Therapeutic Exercise) bilateral;marching while seated;LAQ (long arc quad);sitting;10 repetitions  -       Row Name 11/05/23 1508          Ankle (Therapeutic Exercise)    Ankle (Therapeutic Exercise) AROM (active range of motion)  -     Ankle AROM (Therapeutic Exercise) bilateral;dorsiflexion;plantarflexion;10 repetitions;sitting  -       Row Name 11/05/23 1508          Balance    Static Sitting Balance supervision  -     Position, Sitting Balance sitting edge of bed  -     Static Standing Balance contact guard;verbal cues  -LS     Position/Device Used, Standing Balance walker, front-wheeled  -LS     Balance Interventions standing;dynamic;weight shifting activity  -     Comment, Balance standing at sink for brushing teeth  -               User Key  (r) = Recorded By, (t) = Taken By, (c) = Cosigned By      Initials Name Provider Type    Janna Turner, PT Physical Therapist                   Goals/Plan    No documentation.                  Clinical Impression       Row Name 11/05/23 1509           Pain    Pretreatment Pain Rating 0/10 - no pain  -LS     Posttreatment Pain Rating 0/10 - no pain  -LS       Row Name 11/05/23 1509          Plan of Care Review    Plan of Care Reviewed With patient  -LS     Progress improving  -LS     Outcome Evaluation Pt demonstrated increased indep with bed mobility and gait. Progressed forward ambulation distance to 90 total ft with RW, min A. Cont to be limited by fatigue and balance deficits, but very motivated to participate and progress. Will cont PT POC.  -LS       Row Name 11/05/23 1509          Vital Signs    Pre Systolic BP Rehab 113  -LS     Pre Treatment Diastolic BP 56  -LS     Pretreatment Heart Rate (beats/min) 77  -LS     Posttreatment Heart Rate (beats/min) 84  -LS     Pre SpO2 (%) 97  -LS     O2 Delivery Pre Treatment nasal cannula  -LS     O2 Delivery Intra Treatment nasal cannula  -LS     Post SpO2 (%) 99  -LS     O2 Delivery Post Treatment nasal cannula  -LS     Pre Patient Position Supine  -LS     Intra Patient Position Standing  -LS     Post Patient Position Sitting  -LS       Row Name 11/05/23 1509          Positioning and Restraints    Pre-Treatment Position in bed  -LS     Post Treatment Position chair  -LS     In Chair notified nsg;reclined;call light within reach;encouraged to call for assist;exit alarm on;waffle cushion;legs elevated;on mechanical lift sling;RUE elevated;LUE elevated;heels elevated  -LS               User Key  (r) = Recorded By, (t) = Taken By, (c) = Cosigned By      Initials Name Provider Type    Janna Turner, PT Physical Therapist                   Outcome Measures       Row Name 11/05/23 1511          How much help from another person do you currently need...    Turning from your back to your side while in flat bed without using bedrails? 3  -LS     Moving from lying on back to sitting on the side of a flat bed without bedrails? 3  -LS     Moving to and from a bed to a chair (including a wheelchair)? 3  -LS     Standing  up from a chair using your arms (e.g., wheelchair, bedside chair)? 3  -LS     Climbing 3-5 steps with a railing? 2  -LS     To walk in hospital room? 3  -LS     AM-PAC 6 Clicks Score (PT) 17  -LS     Highest level of mobility 5 --> Static standing  -LS               User Key  (r) = Recorded By, (t) = Taken By, (c) = Cosigned By      Initials Name Provider Type    LS Janna Abdullahi, PT Physical Therapist                                 Physical Therapy Education       Title: PT OT SLP Therapies (In Progress)       Topic: Physical Therapy (Done)       Point: Mobility training (Done)       Learning Progress Summary             Patient Acceptance, E,D, VU,NR by  at 11/5/2023 1511    Acceptance, E,TB, NR by AY at 11/2/2023 1421    Acceptance, E,TB, VU,NR by AY at 11/1/2023 1417                         Point: Home exercise program (Done)       Learning Progress Summary             Patient Acceptance, E,D, VU,NR by  at 11/5/2023 1511    Acceptance, E,TB, NR by AY at 11/2/2023 1421                         Point: Body mechanics (Done)       Learning Progress Summary             Patient Acceptance, E,D, VU,NR by  at 11/5/2023 1511    Acceptance, E,TB, NR by AY at 11/2/2023 1421    Acceptance, E,TB, VU,NR by AY at 11/1/2023 1417                         Point: Precautions (Done)       Learning Progress Summary             Patient Acceptance, E,D, VU,NR by  at 11/5/2023 1511    Acceptance, E,TB, NR by AY at 11/2/2023 1421    Acceptance, E,TB, VU,NR by AY at 11/1/2023 1417                                         User Key       Initials Effective Dates Name Provider Type Discipline     02/03/23 -  Janna Abdullahi, PT Physical Therapist PT    AY 11/10/20 -  Nidhi Blank, IN[employer] Physical Therapist PT                  PT Recommendation and Plan     Plan of Care Reviewed With: patient  Progress: improving  Outcome Evaluation: Pt demonstrated increased indep with bed mobility and gait. Progressed forward ambulation distance to 90  total ft with RW, min A. Cont to be limited by fatigue and balance deficits, but very motivated to participate and progress. Will cont PT POC.     Time Calculation:         PT Charges       Row Name 11/05/23 1512             Time Calculation    Start Time 1432  -LS      PT Received On 11/05/23  -LS         Timed Charges    57961 - PT Therapeutic Exercise Minutes 5  -LS      72291 - Gait Training Minutes  16  -LS      18431 - PT Therapeutic Activity Minutes 2  -LS         Total Minutes    Timed Charges Total Minutes 23  -LS       Total Minutes 23  -LS                User Key  (r) = Recorded By, (t) = Taken By, (c) = Cosigned By      Initials Name Provider Type    LS Janna Abdullahi, PT Physical Therapist                  Therapy Charges for Today       Code Description Service Date Service Provider Modifiers Qty    47261863996 HC PT THER PROC EA 15 MIN 11/5/2023 Janna Abdullahi, PT GP 1    16898355409 HC GAIT TRAINING EA 15 MIN 11/5/2023 Janna Abdullahi, PT GP 1            PT G-Codes  Outcome Measure Options: AM-PAC 6 Clicks Daily Activity (OT), Modified [place]  AM-PAC 6 Clicks Score (PT): 17  AM-PAC 6 Clicks Score (OT): 12  Modified [place] Scale: 3 - Moderate disability.  Requiring some help, but able to walk without assistance.       Janna Abdullahi, PT  11/5/2023

## 2023-11-05 NOTE — SIGNIFICANT NOTE
On night rounds seen patient resting in bed no acute events able to move all extremities, on the monitor heart rate 91, breathing comfortable on room air satting above 97% blood pressure 103/63.  Patient is alert and oriented was notable dysarthria.  NG in place.  No acute events per RN.  Neurology will continue to follow-up

## 2023-11-05 NOTE — PLAN OF CARE
Goal Outcome Evaluation:    SLP re-evaluation completed. Will continue to address dysphagia. Please see note for further details and recommendations.

## 2023-11-06 LAB
ANION GAP SERPL CALCULATED.3IONS-SCNC: 9 MMOL/L (ref 5–15)
BACTERIA SPEC AEROBE CULT: NORMAL
BACTERIA SPEC AEROBE CULT: NORMAL
BASOPHILS # BLD AUTO: 0.05 10*3/MM3 (ref 0–0.2)
BASOPHILS NFR BLD AUTO: 0.5 % (ref 0–1.5)
BUN SERPL-MCNC: 12 MG/DL (ref 6–20)
BUN/CREAT SERPL: 22.2 (ref 7–25)
CALCIUM SPEC-SCNC: 9.1 MG/DL (ref 8.6–10.5)
CHLORIDE SERPL-SCNC: 111 MMOL/L (ref 98–107)
CO2 SERPL-SCNC: 20 MMOL/L (ref 22–29)
CREAT SERPL-MCNC: 0.54 MG/DL (ref 0.57–1)
DEPRECATED RDW RBC AUTO: 73.7 FL (ref 37–54)
EGFRCR SERPLBLD CKD-EPI 2021: 110.2 ML/MIN/1.73
EOSINOPHIL # BLD AUTO: 0.19 10*3/MM3 (ref 0–0.4)
EOSINOPHIL NFR BLD AUTO: 2 % (ref 0.3–6.2)
ERYTHROCYTE [DISTWIDTH] IN BLOOD BY AUTOMATED COUNT: 19.8 % (ref 12.3–15.4)
GLUCOSE BLDC GLUCOMTR-MCNC: 105 MG/DL (ref 70–130)
GLUCOSE BLDC GLUCOMTR-MCNC: 116 MG/DL (ref 70–130)
GLUCOSE BLDC GLUCOMTR-MCNC: 140 MG/DL (ref 70–130)
GLUCOSE BLDC GLUCOMTR-MCNC: 94 MG/DL (ref 70–130)
GLUCOSE SERPL-MCNC: 93 MG/DL (ref 65–99)
HCT VFR BLD AUTO: 24.4 % (ref 34–46.6)
HGB BLD-MCNC: 7.2 G/DL (ref 12–15.9)
IMM GRANULOCYTES # BLD AUTO: 0.06 10*3/MM3 (ref 0–0.05)
IMM GRANULOCYTES NFR BLD AUTO: 0.6 % (ref 0–0.5)
INR PPP: 1.05 (ref 0.89–1.12)
LYMPHOCYTES # BLD AUTO: 0.66 10*3/MM3 (ref 0.7–3.1)
LYMPHOCYTES NFR BLD AUTO: 7 % (ref 19.6–45.3)
MAGNESIUM SERPL-MCNC: 2.2 MG/DL (ref 1.6–2.6)
MCH RBC QN AUTO: 29.9 PG (ref 26.6–33)
MCHC RBC AUTO-ENTMCNC: 29.5 G/DL (ref 31.5–35.7)
MCV RBC AUTO: 101.2 FL (ref 79–97)
MONOCYTES # BLD AUTO: 0.67 10*3/MM3 (ref 0.1–0.9)
MONOCYTES NFR BLD AUTO: 7.1 % (ref 5–12)
NEUTROPHILS NFR BLD AUTO: 7.76 10*3/MM3 (ref 1.7–7)
NEUTROPHILS NFR BLD AUTO: 82.8 % (ref 42.7–76)
NRBC BLD AUTO-RTO: 0 /100 WBC (ref 0–0.2)
PLATELET # BLD AUTO: 235 10*3/MM3 (ref 140–450)
PMV BLD AUTO: 9.9 FL (ref 6–12)
POTASSIUM SERPL-SCNC: 4.7 MMOL/L (ref 3.5–5.2)
PROTHROMBIN TIME: 13.8 SECONDS (ref 12.2–14.5)
RBC # BLD AUTO: 2.41 10*6/MM3 (ref 3.77–5.28)
SODIUM SERPL-SCNC: 140 MMOL/L (ref 136–145)
UFH PPP CHRO-ACNC: 0.26 IU/ML (ref 0.3–0.7)
UFH PPP CHRO-ACNC: 0.43 IU/ML (ref 0.3–0.7)
UFH PPP CHRO-ACNC: 0.45 IU/ML (ref 0.3–0.7)
WBC NRBC COR # BLD: 9.39 10*3/MM3 (ref 3.4–10.8)

## 2023-11-06 PROCEDURE — 99232 SBSQ HOSP IP/OBS MODERATE 35: CPT | Performed by: INTERNAL MEDICINE

## 2023-11-06 PROCEDURE — 97116 GAIT TRAINING THERAPY: CPT

## 2023-11-06 PROCEDURE — 94799 UNLISTED PULMONARY SVC/PX: CPT

## 2023-11-06 PROCEDURE — 99232 SBSQ HOSP IP/OBS MODERATE 35: CPT | Performed by: NURSE PRACTITIONER

## 2023-11-06 PROCEDURE — 25010000002 HEPARIN (PORCINE) 25000-0.45 UT/250ML-% SOLUTION

## 2023-11-06 PROCEDURE — 85025 COMPLETE CBC W/AUTO DIFF WBC: CPT | Performed by: INTERNAL MEDICINE

## 2023-11-06 PROCEDURE — 82948 REAGENT STRIP/BLOOD GLUCOSE: CPT

## 2023-11-06 PROCEDURE — 80048 BASIC METABOLIC PNL TOTAL CA: CPT | Performed by: INTERNAL MEDICINE

## 2023-11-06 PROCEDURE — 85610 PROTHROMBIN TIME: CPT

## 2023-11-06 PROCEDURE — 85520 HEPARIN ASSAY: CPT

## 2023-11-06 PROCEDURE — 94664 DEMO&/EVAL PT USE INHALER: CPT

## 2023-11-06 PROCEDURE — 97110 THERAPEUTIC EXERCISES: CPT

## 2023-11-06 PROCEDURE — 83735 ASSAY OF MAGNESIUM: CPT | Performed by: INTERNAL MEDICINE

## 2023-11-06 RX ORDER — ASPIRIN 81 MG/1
81 TABLET, CHEWABLE ORAL DAILY
Status: DISCONTINUED | OUTPATIENT
Start: 2023-11-07 | End: 2023-11-10 | Stop reason: HOSPADM

## 2023-11-06 RX ORDER — PANTOPRAZOLE SODIUM 40 MG/1
40 TABLET, DELAYED RELEASE ORAL
Status: DISCONTINUED | OUTPATIENT
Start: 2023-11-07 | End: 2023-11-10 | Stop reason: HOSPADM

## 2023-11-06 RX ORDER — MIDODRINE HYDROCHLORIDE 5 MG/1
5 TABLET ORAL EVERY 8 HOURS
Status: DISCONTINUED | OUTPATIENT
Start: 2023-11-06 | End: 2023-11-10 | Stop reason: HOSPADM

## 2023-11-06 RX ORDER — TOPIRAMATE 100 MG/1
200 TABLET, FILM COATED ORAL 2 TIMES DAILY
Status: DISCONTINUED | OUTPATIENT
Start: 2023-11-06 | End: 2023-11-10 | Stop reason: HOSPADM

## 2023-11-06 RX ORDER — WARFARIN SODIUM 4 MG/1
4 TABLET ORAL
Status: DISCONTINUED | OUTPATIENT
Start: 2023-11-07 | End: 2023-11-08

## 2023-11-06 RX ORDER — WARFARIN SODIUM 3 MG/1
6 TABLET ORAL
Status: COMPLETED | OUTPATIENT
Start: 2023-11-06 | End: 2023-11-06

## 2023-11-06 RX ORDER — ATORVASTATIN CALCIUM 40 MG/1
80 TABLET, FILM COATED ORAL NIGHTLY
Status: DISCONTINUED | OUTPATIENT
Start: 2023-11-06 | End: 2023-11-10 | Stop reason: HOSPADM

## 2023-11-06 RX ORDER — ASPIRIN 300 MG/1
300 SUPPOSITORY RECTAL DAILY
Status: DISCONTINUED | OUTPATIENT
Start: 2023-11-07 | End: 2023-11-10 | Stop reason: HOSPADM

## 2023-11-06 RX ORDER — ACETAMINOPHEN 160 MG/5ML
650 SOLUTION ORAL EVERY 6 HOURS PRN
Status: DISCONTINUED | OUTPATIENT
Start: 2023-11-06 | End: 2023-11-10 | Stop reason: HOSPADM

## 2023-11-06 RX ORDER — LEVOTHYROXINE SODIUM 0.07 MG/1
75 TABLET ORAL
Status: DISCONTINUED | OUTPATIENT
Start: 2023-11-07 | End: 2023-11-10 | Stop reason: HOSPADM

## 2023-11-06 RX ADMIN — IPRATROPIUM BROMIDE AND ALBUTEROL SULFATE 3 ML: 2.5; .5 SOLUTION RESPIRATORY (INHALATION) at 13:20

## 2023-11-06 RX ADMIN — HEPARIN SODIUM 22 UNITS/KG/HR: 10000 INJECTION, SOLUTION INTRAVENOUS at 09:53

## 2023-11-06 RX ADMIN — WARFARIN SODIUM 6 MG: 3 TABLET ORAL at 17:33

## 2023-11-06 RX ADMIN — MIDODRINE HYDROCHLORIDE 5 MG: 5 TABLET ORAL at 03:34

## 2023-11-06 RX ADMIN — LEVETIRACETAM 1000 MG: 500 TABLET, FILM COATED ORAL at 20:05

## 2023-11-06 RX ADMIN — MIDODRINE HYDROCHLORIDE 5 MG: 5 TABLET ORAL at 11:57

## 2023-11-06 RX ADMIN — IPRATROPIUM BROMIDE AND ALBUTEROL SULFATE 3 ML: 2.5; .5 SOLUTION RESPIRATORY (INHALATION) at 19:48

## 2023-11-06 RX ADMIN — LANSOPRAZOLE 15 MG: 15 TABLET, ORALLY DISINTEGRATING ORAL at 05:38

## 2023-11-06 RX ADMIN — ASPIRIN 81 MG CHEWABLE TABLET 81 MG: 81 TABLET CHEWABLE at 08:07

## 2023-11-06 RX ADMIN — TOPIRAMATE 200 MG: 100 TABLET, FILM COATED ORAL at 08:07

## 2023-11-06 RX ADMIN — Medication 10 ML: at 08:07

## 2023-11-06 RX ADMIN — MIDODRINE HYDROCHLORIDE 5 MG: 5 TABLET ORAL at 20:05

## 2023-11-06 RX ADMIN — LEVETIRACETAM 1000 MG: 500 TABLET, FILM COATED ORAL at 08:07

## 2023-11-06 RX ADMIN — TOPIRAMATE 200 MG: 100 TABLET, FILM COATED ORAL at 20:05

## 2023-11-06 RX ADMIN — ATORVASTATIN CALCIUM 80 MG: 40 TABLET, FILM COATED ORAL at 20:05

## 2023-11-06 RX ADMIN — Medication 10 ML: at 20:06

## 2023-11-06 RX ADMIN — LEVOTHYROXINE SODIUM 75 MCG: 0.07 TABLET ORAL at 05:38

## 2023-11-06 NOTE — DISCHARGE PLACEMENT REQUEST
"  [phone]    Sandeep Martínez (53 y.o. Female)       Date of Birth   1970    Social Security Number       Address   [address] [facility] [address]    Home Phone   [phone]    MRN   [id number]       [employer]   None    Marital Status                               Admission Date   10/31/23    Admission Type   Emergency    Admitting Provider   Fernando Guevara MD    Attending Provider   Fernando Guevara MD    Department, Room/Bed   [facility] 2B ICU, N222/1       Discharge Date       Discharge Disposition       Discharge Destination                                 Attending Provider: Fernando Guevara MD    Allergies: Azithromycin, Metformin, Gabapentin    Isolation: None   Infection: None   Code Status: CPR    Ht: 152.4 cm (60\")   Wt: 73.4 kg (161 lb 13.1 oz)    Admission Cmt: None   Principal Problem: Acute ischemic left MCA stroke [I63.512]                   Active Insurance as of 10/31/2023       Primary Coverage       Payor Plan Insurance Group Employer/Plan Group    HUMANA MEDICARE REPLACEMENT HUMANA MEDICARE REPLACEMENT [id number]       Payor Plan Address Payor Plan Phone Number Payor Plan Fax Number Effective Dates    [address] [address] [phone]  1/1/2023 - None Entered    Formerly [facility] [phone]         Subscriber Name Subscriber Birth Date Member ID       SANDEEP MARTÍNEZ 1970 [id number]                     Emergency Contacts        (Rel.) Home Phone Work Phone Mobile Phone    WILLIE MARTÍNEZ (Spouse) [phone] -- [phone]    Leopoldo Martínez (Son) -- -- [phone]                 Physician Progress Notes (most recent note)        Durga Mccain MD at 11/05/23 1106            Intensive Care Follow-up     Hospital:  LOS: 5 days   Ms. Sandeep Martínez, 53 y.o. female is followed for:   Acute ischemic left MCA stroke     Subjective       History of present illness:     53-year-old female who is active smoker with history of mechanical aortic and " mitral valve, hypertension, dyslipidemia, left internal carotid artery stent in 2020, seizure disorders, chronic anemia and chronic GI loss from AVM, chronic pain with pain pump, RB ILD followed at , sleep apnea on CPAP presenting with acute left MCA stroke.  Patient was outside of window of thrombolysis so underwent salvage thrombectomy for saddle embolus in the left MCA bifurcation.  Patient was started on heparin drip per subsequently.  Patient was also found to have urinary tract infection on admission and cultures grew Klebsiella pneumoniae.  Patient was treated with Rocephin.  Patient also has chronic hypotension and on midodrine at home.    Subjective   Interval History:  Patient states that she is feeling better.  Remains on heparin drip.  She is talking in full sentences today.  No other acute issues per nursing report.                 The patient's past medical, surgical and social history were reviewed and updated in Epic as appropriate.    Objective   Objective     Infusions:  heparin, 21 Units/kg/hr, Last Rate: 21 Units/kg/hr (11/05/23 0142)  Pharmacy to Dose Heparin,   phenylephrine, 0.5-3 mcg/kg/min, Last Rate: Stopped (11/02/23 0546)      Medications:  aspirin, 81 mg, Nasogastric, Daily   Or  aspirin, 300 mg, Rectal, Daily  atorvastatin, 80 mg, Nasogastric, Nightly  ipratropium-albuterol, 3 mL, Nebulization, 4x Daily - RT  [START ON 11/6/2023] lansoprazole, 15 mg, Nasogastric, Q AM  levETIRAcetam, 1,000 mg, Nasogastric, Q12H  levothyroxine, 75 mcg, Nasogastric, Q AM  midodrine, 5 mg, Nasogastric, Q8H  sodium chloride, 10 mL, Intravenous, Q12H  topiramate, 200 mg, Nasogastric, BID        Vital Sign Min/Max for last 24 hours  Temp  Min: 97.4 °F (36.3 °C)  Max: 98.7 °F (37.1 °C)   BP  Min: 90/49  Max: 126/66   Pulse  Min: 73  Max: 99   Resp  Min: 15  Max: 18   SpO2  Min: 90 %  Max: 100 %   Flow (L/min)  Min: 1  Max: 2       Input/Output for last 24 hour shift  11/04 0701 - 11/05 0700  In: 2086.4  "[I.V.:604.4]  Out: 1750 [Urine:1750]      Objective:  Vital signs: (most recent): Blood pressure 101/50, pulse 76, temperature 97.5 °F (36.4 °C), temperature source Oral, resp. rate 18, height 152.4 cm (60\"), weight 73.4 kg (161 lb 13.1 oz), SpO2 96%, not currently breastfeeding.            General Appearance: Wakes up to alert, no acute distress  Lungs:   B/L Breath sounds present with decreased breath sounds on bases, no wheezing heard, no crackles.   Heart: S1 and S2 present, no murmur  Abdomen: Soft, nontender, no guarding or rigidity, bowel sounds positive.  Extremities: no edema, warm to touch.  Neurologic:  Interval:  (shift change)  1a. Level of Consciousness: 0-->Alert, keenly responsive  1b. LOC Questions: 0-->Answers both questions correctly  1c. LOC Commands: 0-->Performs both tasks correctly  2. Best Gaze: 0-->Normal  3. Visual: 0-->No visual loss  4. Facial Palsy: 1-->Minor paralysis (flattened nasolabial fold, asymmetry on smiling)  5a. Motor Arm, Left: 0-->No drift, limb holds 90 (or 45) degrees for full 10 secs  5b. Motor Arm, Right: 0-->No drift, limb holds 90 (or 45) degrees for full 10 secs  6a. Motor Leg, Left: 0-->No drift, leg holds 30 degree position for full 5 secs  6b. Motor Leg, Right: 0-->No drift, leg holds 30 degree position for full 5 secs  7. Limb Ataxia: 0-->Absent  8. Sensory: 0-->Normal, no sensory loss  9. Best Language: 1-->Mild-to-moderate aphasia, some obvious loss of fluency or facility of comprehension, without significant limitation on ideas expressed or form of expression. Reduction of speech and/or comprehension, however, makes conversation. . . (see row details)  10. Dysarthria: 1-->Mild-to-moderate dysarthria, patient slurs at least some words and, at worst, can be understood with some difficulty  11. Extinction and Inattention (formerly Neglect): 0-->No abnormality    Total (NIH Stroke Scale): 3        Results from last 7 days   Lab Units 11/05/23  0742 11/04/23  0808 " 11/03/23  0439   WBC 10*3/mm3 10.13 9.98 9.87   HEMOGLOBIN g/dL 7.3* 7.7* 8.6*   PLATELETS 10*3/mm3 241 232 247     Results from last 7 days   Lab Units 11/05/23  0742 11/04/23  0808 11/02/23  0104   SODIUM mmol/L 141 141 139  139   POTASSIUM mmol/L 4.3 3.9 3.7  3.7   CO2 mmol/L 22.0 22.0 23.0  23.0   BUN mg/dL 13 12 7  7   CREATININE mg/dL 0.49* 0.48* 0.68  0.68   MAGNESIUM mg/dL 2.0 1.9 1.8   PHOSPHORUS mg/dL 3.4 2.9 3.9   GLUCOSE mg/dL 121* 133* 152*  152*     Estimated Creatinine Clearance: 118.8 mL/min (A) (by C-G formula based on SCr of 0.49 mg/dL (L)).    Results from last 7 days   Lab Units 11/01/23  0247   PH, ARTERIAL pH units 7.346*   PCO2, ARTERIAL mm Hg 40.5   PO2 ART mm Hg 80.8*       Images:   Ct head reviewed from 11/2:   Impression:  Evolving left MCA territory infarct. There has been resolution of previous left cortical contrast staining. No superimposed acute process identified.           Electronically Signed: Saman Lopez MD    11/2/2023 2:52 PM CDT    Workstation ID: [id number]    I reviewed the patient's results and images.     Assessment & Plan   Impression        Acute ischemic left MCA stroke    Anemia    Tobacco use    S/P mechanical aortic and mitral valve replacement (2018)    (HFpEF) heart failure with preserved ejection fraction    Chronic pain with intrathecal pump in place    Dyslipidemia    Cerebral aneurysm    H/O recurrent GIB 2* AVMs    UTI (urinary tract infection)    Chronic hypotension on midodrine       Plan        1.  Patient presented with acute CVA s/p thrombectomy.  Currently on heparin drip.  Stroke neurology following.  Continue to monitor closely.  No new neurological symptoms noted at this time.  2.  We are monitoring H&H closely.  Hemoglobin is slowly trending down.  We will continue to trend for now.  If hemoglobin drops below 7 or if she develops overt bleeding, then will transfuse.    3.  Continue levothyroxine for history of hypothyroidism.  4.  Continue  aspirin, statin.  5.  Keppra and Topamax for history of seizures.  6.  Continue midodrine patient's home dose.  7.  Treated with Rocephin for Klebsiella UTI.  WBC count is normalized.  Hemodynamically stable.  Will monitor.  8.  Replace electrolytes per ICU protocol  9.  Tolerating tube feeds okay, continue same.  10.  PT/OT and speech to follow.    Continue monitoring in ICU.  If remains stable hemodynamically we will plan on transitioning out of ICU tomorrow.    Plan of care and goals reviewed with multidisciplinary/antibiotic stewardship team during rounds.   I discussed the patient's findings and my recommendations with patient, nursing staff, and consulting provider       Durga Mccain MD, [facility]  Pulmonary, Critical care and Sleep Medicine         Electronically signed by Durga Mccain MD at 23 1106          Physical Therapy Notes (most recent note)        Janna Abdullahi, PT at 23 1432  Version 1 of 1         Patient Name: Mara Martínez  : 1970    MRN: [id number]                              Today's Date: 2023       Admit Date: 10/31/2023    Visit Dx:     ICD-10-CM ICD-9-CM   1. Acute CVA (cerebrovascular accident)  I63.9 434.91   2. Dysphagia, unspecified type  R13.10 787.20   3. Communication deficit  F80.9 307.9   4. Anemia, unspecified type  D64.9 285.9   5. Acute UTI  N39.0 599.0     Patient Active Problem List   Diagnosis    Anemia    Aortic valve insufficiency    Bruit of left carotid artery    Chest pain    Positive D-dimer    Dizziness    Edema    Fatigue    HTN (hypertension)    Heart murmur    Hyperlipidemia    Mitral valve stenosis    Mitral valve insufficiency    Palpitations    Pulmonary edema    Seizure disorder    Shortness of breath    Difficulty sleeping    Snoring    Supravalvular aortic stenosis    Syncope    Tachycardia    Mitral stenosis    Rheumatic aortic stenosis    Valvular heart disease    Epilepsy    ISREAL (cerebral atherosclerosis)    Tobacco use     Migraines    Aortic regurgitation    Rheumatic mitral regurgitation    SOB (shortness of breath)    Supraventricular aortic stenosis    Aortic valve stenosis    Rheumatic aortic valve insufficiency    Moderate aortic stenosis    Aortic valve disorder    Coronary artery disease involving native coronary artery of native heart without angina pectoris    S/P mitral valve replacement    S/P mechanical aortic and mitral valve replacement (2018)    Stenosis of carotid artery    Generalized abdominal pain    Rectal bleeding    Functional diarrhea    Lower extremity edema    Rectal bleed    (HFpEF) heart failure with preserved ejection fraction    Chronic pain with intrathecal pump in place    Dyslipidemia    Bilateral carotid artery stenosis    Low back pain    Degeneration of lumbar intervertebral disc    Lumbosacral radiculopathy    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    Cerebral aneurysm    Complex partial seizures with consciousness impaired    Migraine without aura and with status migrainosus, not intractable    Chronic obstructive pulmonary disease    Essential tremor    Hesitancy of micturition    Complex partial epilepsy with generalization and with intractable epilepsy    Focal (motor) epilepsy    Occipital neuralgia    Paresthesia    Restless legs syndrome    Retinal drusen    Acute ischemic left MCA stroke    H/O recurrent GIB 2* AVMs    UTI (urinary tract infection)    Chronic hypotension on midodrine     Past Medical History:   Diagnosis Date    Anemia     Aortic regurgitation     Arthritis     Back pain     Carotid bruit     ISREAL (cerebral atherosclerosis) 12/2014    nonobstructive    Chest pain     Chronic hypotension on midodrine 11/01/2023    Chronic obstructive pulmonary disease 09/07/2022    Coronary artery disease     COVID-19 vaccine administered     phizer    D-dimer, elevated     Diastolic congestive heart failure 07/25/2019    Dizziness     Edema     Epilepsy     Fatigue      Heart murmur     History of blood transfusion 2019    History of blood transfusion     History of blood transfusion 2022    History of blood transfusion     August 2023 x2    History of recent blood transfusion 2021    History of transfusion     Hyperlipidemia     Hypertension     Kidney stones     Migraines     Mitral regurgitation     Mitral stenosis     mild    Neuropathy     Neuropathy     Palpitations     Pulmonary edema     Retinal drusen     Seizure     Sleep apnea 2019    Sleeping difficulties     SOB (shortness of breath)     Supraventricular aortic stenosis     Syncope     Tachycardia     Tobacco abuse     VHD (valvular heart disease)     Wears dentures     Wears eyeglasses      Past Surgical History:   Procedure Laterality Date    APPENDECTOMY      CARDIAC CATHETERIZATION      CARDIAC SURGERY      2 valves replaced     SECTION      x 2    CHOLECYSTECTOMY      COLONOSCOPY      COLONOSCOPY N/A 2019    Procedure: COLONOSCOPY;  Surgeon: Kurt Gaines MD;  Location: [facility] OR;  Service: Gastroenterology    CORONARY ARTERY BYPASS GRAFT  2018    EXPLORATORY LAPAROTOMY      HERNIA REPAIR      HYSTERECTOMY      INTERVENTIONAL RADIOLOGY PROCEDURE Bilateral 10/31/2023    Procedure: Carotid Cerebral Angiogram;  Surgeon: Cayetano Mckeon MD;  Location:  [facility];  Service: Interventional Radiology;  Laterality: Bilateral;    JOINT REPLACEMENT Right     knee replacement    PAIN PUMP INSERTION/REVISION      morphine    PORTACATH PLACEMENT      UPPER GASTROINTESTINAL ENDOSCOPY        General Information       Row Name 23 1503          Physical Therapy Time and Intention    Document Type therapy note (daily note)  -     Mode of Treatment physical therapy  -       Row Name 23 1503          General Information    Existing Precautions/Restrictions fall;oxygen therapy device and L/min  -       Row Name 23 1503          Cognition    Orientation  Status (Cognition) oriented x 3  -       Row Name 11/05/23 1503          Safety Issues, Functional Mobility    Impairments Affecting Function (Mobility) balance;cognition;coordination;endurance/activity tolerance;motor planning;strength;postural/trunk control  -               User Key  (r) = Recorded By, (t) = Taken By, (c) = Cosigned By      Initials Name Provider Type     Janna Abdullahi, PT Physical Therapist                   Mobility       Row Name 11/05/23 1504          Bed Mobility    Supine-Sit [place] (Bed Mobility) supervision  -     Assistive Device (Bed Mobility) bed rails;draw sheet;head of bed elevated  -       Row Name 11/05/23 1504          Sit-Stand Transfer    Sit-Stand [place] (Transfers) minimum assist (75% patient effort);verbal cues  -LS     Assistive Device (Sit-Stand Transfers) walker, front-wheeled  -LS       Row Name 11/05/23 1504          Gait/Stairs (Locomotion)    [place] Level (Gait) minimum assist (75% patient effort);verbal cues  -LS     Assistive Device (Gait) walker, front-wheeled  -LS     Distance in Feet (Gait) 90  -LS     Deviations/Abnormal Patterns (Gait) bilateral deviations;mela decreased  -LS     Bilateral Gait Deviations forward flexed posture;heel strike decreased  -LS     Comment, (Gait/Stairs) VC for upright posture, looking forward, and increasing step length within RW. Distance limited by fatigue but pt very motivated to participate and progress.  -               User Key  (r) = Recorded By, (t) = Taken By, (c) = Cosigned By      Initials Name Provider Type     Janna Abdullahi, PT Physical Therapist                   Obj/Interventions       Row Name 11/05/23 1508          Motor Skills    Therapeutic Exercise knee;ankle  -       Row Name 11/05/23 1508          Knee (Therapeutic Exercise)    Knee (Therapeutic Exercise) strengthening exercise  -     Knee Strengthening (Therapeutic Exercise) bilateral;marching while seated;LAQ (long arc  quad);sitting;10 repetitions  -       Row Name 11/05/23 1508          Ankle (Therapeutic Exercise)    Ankle (Therapeutic Exercise) AROM (active range of motion)  -     Ankle AROM (Therapeutic Exercise) bilateral;dorsiflexion;plantarflexion;10 repetitions;sitting  -       Row Name 11/05/23 1508          Balance    Static Sitting Balance supervision  -     Position, Sitting Balance sitting edge of bed  -     Static Standing Balance contact guard;verbal cues  -     Position/Device Used, Standing Balance walker, front-wheeled  -     Balance Interventions standing;dynamic;weight shifting activity  -     Comment, Balance standing at sink for brushing teeth  -               User Key  (r) = Recorded By, (t) = Taken By, (c) = Cosigned By      Initials Name Provider Type    Janna Turner, PT Physical Therapist                   Goals/Plan    No documentation.                  Clinical Impression       Row Name 11/05/23 1509          Pain    Pretreatment Pain Rating 0/10 - no pain  -     Posttreatment Pain Rating 0/10 - no pain  -       Row Name 11/05/23 1509          Plan of Care Review    Plan of Care Reviewed With patient  -     Progress improving  -     Outcome Evaluation Pt demonstrated increased indep with bed mobility and gait. Progressed forward ambulation distance to 90 total ft with RW, min A. Cont to be limited by fatigue and balance deficits, but very motivated to participate and progress. Will cont PT POC.  -       Row Name 11/05/23 1509          Vital Signs    Pre Systolic BP Rehab 113  -LS     Pre Treatment Diastolic BP 56  -LS     Pretreatment Heart Rate (beats/min) 77  -LS     Posttreatment Heart Rate (beats/min) 84  -LS     Pre SpO2 (%) 97  -LS     O2 Delivery Pre Treatment nasal cannula  -LS     O2 Delivery Intra Treatment nasal cannula  -LS     Post SpO2 (%) 99  -LS     O2 Delivery Post Treatment nasal cannula  -LS     Pre Patient Position Supine  -LS     Intra Patient  Position Standing  -LS     Post Patient Position Sitting  -LS       Row Name 11/05/23 1509          Positioning and Restraints    Pre-Treatment Position in bed  -LS     Post Treatment Position chair  -LS     In Chair notified nsg;reclined;call light within reach;encouraged to call for assist;exit alarm on;waffle cushion;legs elevated;on mechanical lift sling;RUE elevated;LUE elevated;heels elevated  -LS               User Key  (r) = Recorded By, (t) = Taken By, (c) = Cosigned By      Initials Name Provider Type    Janna Turner, PT Physical Therapist                   Outcome Measures       Row Name 11/05/23 1511          How much help from another person do you currently need...    Turning from your back to your side while in flat bed without using bedrails? 3  -LS     Moving from lying on back to sitting on the side of a flat bed without bedrails? 3  -LS     Moving to and from a bed to a chair (including a wheelchair)? 3  -LS     Standing up from a chair using your arms (e.g., wheelchair, bedside chair)? 3  -LS     Climbing 3-5 steps with a railing? 2  -LS     To walk in hospital room? 3  -LS     AM-PAC 6 Clicks Score (PT) 17  -LS     Highest level of mobility 5 --> Static standing  -LS               User Key  (r) = Recorded By, (t) = Taken By, (c) = Cosigned By      Initials Name Provider Type    Janna Turner, PT Physical Therapist                                 Physical Therapy Education       Title: PT OT SLP Therapies (In Progress)       Topic: Physical Therapy (Done)       Point: Mobility training (Done)       Learning Progress Summary             Patient Acceptance, E,D, VU,NR by MAIA at 11/5/2023 1511    Acceptance, E,TB, NR by SREE at 11/2/2023 1421    Acceptance, E,TB, VU,NR by SREE at 11/1/2023 1417                         Point: Home exercise program (Done)       Learning Progress Summary             Patient Acceptance, E,D, VU,NR by MAIA at 11/5/2023 1511    Acceptance, E,TB, NR by SREE at 11/2/2023  1421                         Point: Body mechanics (Done)       Learning Progress Summary             Patient Acceptance, E,D, VU,NR by LS at 11/5/2023 1511    Acceptance, E,TB, NR by AY at 11/2/2023 1421    Acceptance, E,TB, VU,NR by AY at 11/1/2023 1417                         Point: Precautions (Done)       Learning Progress Summary             Patient Acceptance, E,D, VU,NR by LS at 11/5/2023 1511    Acceptance, E,TB, NR by AY at 11/2/2023 1421    Acceptance, E,TB, VU,NR by AY at 11/1/2023 1417                                         User Key       Initials Effective Dates Name Provider Type Discipline     02/03/23 -  Janna Abdullahi, PT Physical Therapist PT    AY 11/10/20 -  Nidhi Blank, PT Physical Therapist PT                  PT Recommendation and Plan     Plan of Care Reviewed With: patient  Progress: improving  Outcome Evaluation: Pt demonstrated increased indep with bed mobility and gait. Progressed forward ambulation distance to 90 total ft with RW, min A. Cont to be limited by fatigue and balance deficits, but very motivated to participate and progress. Will cont PT POC.     Time Calculation:         PT Charges       Row Name 11/05/23 1512             Time Calculation    Start Time 1432  -LS      PT Received On 11/05/23  -LS         Timed Charges    94276 - PT Therapeutic Exercise Minutes 5  -LS      30066 - Gait Training Minutes  16  -LS      66040 - PT Therapeutic Activity Minutes 2  -LS         Total Minutes    Timed Charges Total Minutes 23  -LS       Total Minutes 23  -LS                User Key  (r) = Recorded By, (t) = Taken By, (c) = Cosigned By      Initials Name Provider Type     Janna Abdullahi, PT Physical Therapist                  Therapy Charges for Today       Code Description Service Date Service Provider Modifiers Qty    70941364739 HC PT THER PROC EA 15 MIN 11/5/2023 Janna Abdullahi, PT GP 1    55479871326 HC GAIT TRAINING EA 15 MIN 11/5/2023 Janna Abdullahi, PT GP 1            PT  G-Codes  Outcome Measure Options: AM-PAC 6 Clicks Daily Activity (OT), Modified Meeker  AM-PAC 6 Clicks Score (PT): 17  AM-PAC 6 Clicks Score (OT): 12  Modified Meeker Scale: 3 - Moderate disability.  Requiring some help, but able to walk without assistance.       Janna Abdullahi, PT  2023      Electronically signed by Janna Abdullahi, PT at 23 1513          Occupational Therapy Notes (most recent note)        Aurea Ernst, OT at 23 1340          Patient Name: Mara Martínez  : 1970    MRN: [id number]                              Today's Date: 11/3/2023       Admit Date: 10/31/2023    Visit Dx:     ICD-10-CM ICD-9-CM   1. Acute CVA (cerebrovascular accident)  I63.9 434.91   2. Dysphagia, unspecified type  R13.10 787.20   3. Communication deficit  F80.9 307.9     Patient Active Problem List   Diagnosis    Anemia    Aortic valve insufficiency    Bruit of left carotid artery    Chest pain    Positive D-dimer    Dizziness    Edema    Fatigue    HTN (hypertension)    Heart murmur    Hyperlipidemia    Mitral valve stenosis    Mitral valve insufficiency    Palpitations    Pulmonary edema    Seizure disorder    Shortness of breath    Difficulty sleeping    Snoring    Supravalvular aortic stenosis    Syncope    Tachycardia    Mitral stenosis    Rheumatic aortic stenosis    Valvular heart disease    Epilepsy    ISREAL (cerebral atherosclerosis)    Tobacco use    Migraines    Aortic regurgitation    Rheumatic mitral regurgitation    SOB (shortness of breath)    Supraventricular aortic stenosis    Aortic valve stenosis    Rheumatic aortic valve insufficiency    Moderate aortic stenosis    Aortic valve disorder    Coronary artery disease involving native coronary artery of native heart without angina pectoris    S/P mitral valve replacement    S/P mechanical aortic and mitral valve replacement (2018)    Stenosis of carotid artery    Generalized abdominal pain    Rectal bleeding    Functional diarrhea    Lower  extremity edema    Rectal bleed    (HFpEF) heart failure with preserved ejection fraction    Chronic pain with intrathecal pump in place    Dyslipidemia    Bilateral carotid artery stenosis    Low back pain    Degeneration of lumbar intervertebral disc    Lumbosacral radiculopathy    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    Cerebral aneurysm    Complex partial seizures with consciousness impaired    Migraine without aura and with status migrainosus, not intractable    Chronic obstructive pulmonary disease    Essential tremor    Hesitancy of micturition    Complex partial epilepsy with generalization and with intractable epilepsy    Focal (motor) epilepsy    Occipital neuralgia    Paresthesia    Restless legs syndrome    Retinal drusen    Acute ischemic left MCA stroke    H/O recurrent GIB 2* AVMs    UTI (urinary tract infection)    Chronic hypotension on midodrine     Past Medical History:   Diagnosis Date    Anemia     Aortic regurgitation     Arthritis     Back pain     Carotid bruit     ISREAL (cerebral atherosclerosis) 12/2014    nonobstructive    Chest pain     Chronic hypotension on midodrine 11/01/2023    Chronic obstructive pulmonary disease 09/07/2022    Coronary artery disease     COVID-19 vaccine administered     phizer    D-dimer, elevated     Diastolic congestive heart failure 07/25/2019    Dizziness     Edema     Epilepsy     Fatigue     Heart murmur     History of blood transfusion 08/2019    History of blood transfusion 2022    History of blood transfusion 11/2022    History of blood transfusion     August 2023 x2    History of recent blood transfusion 12/2021    History of transfusion     Hyperlipidemia     Hypertension     Kidney stones     Migraines     Mitral regurgitation     Mitral stenosis     mild    Neuropathy     Neuropathy     Palpitations     Pulmonary edema     Retinal drusen     Seizure     Sleep apnea 09/2019    Sleeping difficulties     SOB (shortness of breath)      Supraventricular aortic stenosis     Syncope     Tachycardia     Tobacco abuse     VHD (valvular heart disease)     Wears dentures     Wears eyeglasses      Past Surgical History:   Procedure Laterality Date    APPENDECTOMY      CARDIAC CATHETERIZATION      CARDIAC SURGERY      2 valves replaced     SECTION      x 2    CHOLECYSTECTOMY      COLONOSCOPY      COLONOSCOPY N/A 2019    Procedure: COLONOSCOPY;  Surgeon: Kurt Gaines MD;  Location:  [facility];  Service: Gastroenterology    CORONARY ARTERY BYPASS GRAFT  2018    EXPLORATORY LAPAROTOMY      HERNIA REPAIR      HYSTERECTOMY      INTERVENTIONAL RADIOLOGY PROCEDURE Bilateral 10/31/2023    Procedure: Carotid Cerebral Angiogram;  Surgeon: Cayetano Mckeon MD;  Location: [facility] CATH INVASIVE LOCATION;  Service: Interventional Radiology;  Laterality: Bilateral;    JOINT REPLACEMENT Right     knee replacement    PAIN PUMP INSERTION/REVISION      morphine    PORTACATH PLACEMENT      UPPER GASTROINTESTINAL ENDOSCOPY        General Information       Row Name 23 1459          OT Time and Intention    Document Type therapy note (daily note)  -CS     Mode of Treatment occupational therapy  -CS       Row Name 23 1459          General Information    Existing Precautions/Restrictions fall;oxygen therapy device and L/min  NG  -CS     Barriers to Rehab medically complex;previous functional deficit;cognitive status;visual deficit  -CS       Row Name 23 1459          Cognition    Orientation Status (Cognition) oriented to;person;place  -CS       Row Name 23 1459          Safety Issues, Functional Mobility    Impairments Affecting Function (Mobility) balance;cognition;coordination;endurance/activity tolerance;motor planning;strength;visual/perceptual;postural/trunk control  -CS     Cognitive Impairments, Mobility Safety/Performance attention;insight into deficits/self-awareness;sequencing abilities  -CS               User Key  (r) =  Recorded By, (t) = Taken By, (c) = Cosigned By      Initials Name Provider Type    CS Aurea Ernst, OT Occupational Therapist                     Mobility/ADL's       Row Name 11/03/23 1459          Bed Mobility    Bed Mobility supine-sit  -     Supine-Sit [place] (Bed Mobility) moderate assist (50% patient effort);verbal cues  -     Assistive Device (Bed Mobility) bed rails;draw sheet;head of bed elevated  -       Row Name 11/03/23 1459          Transfers    Transfers sit-stand transfer;stand-sit transfer  -     Comment, (Transfers) STSx2 reps  -       Row Name 11/03/23 1459          Sit-Stand Transfer    Sit-Stand [place] (Transfers) minimum assist (75% patient effort);verbal cues  -     Assistive Device (Sit-Stand Transfers) walker, front-wheeled  -       Row Name 11/03/23 1459          Stand-Sit Transfer    Stand-Sit [place] (Transfers) minimum assist (75% patient effort);verbal cues  -     Assistive Device (Stand-Sit Transfers) walker, front-wheeled  -       Row Name 11/03/23 1459          Functional Mobility    Functional Mobility- Ind. Level minimum assist (75% patient effort);1 person + 1 person to manage equipment;verbal cues required  -     Functional Mobility- Device walker, front-wheeled  -     Functional Mobility-Distance (Feet) --  to the doorway  -     Functional Mobility- Comment assist for RW management, chair follow  -       Row Name 11/03/23 1459          Activities of Daily Living    BADL Assessment/Intervention lower body dressing;toileting;grooming  -       Row Name 11/03/23 1459          Lower Body Dressing Assessment/Training    [place] Level (Lower Body Dressing) doff;don;socks;dependent (less than 25% patient effort)  -     Position (Lower Body Dressing) supported sitting;supported standing  -       Row Name 11/03/23 1459          Grooming Assessment/Training    [place] Level (Grooming) wash face, hands;minimum assist (75% patient  effort)  -CS     Position (Grooming) supported sitting  -CS       Row Name 11/03/23 1459          Toileting Assessment/Training    [place] Level (Toileting) adjust/manage clothing;change pad/brief;perform perineal hygiene;dependent (less than 25% patient effort)  -CS     Position (Toileting) supported sitting;supported standing  -CS     Comment, (Toileting) incontinence episode x2  -CS               User Key  (r) = Recorded By, (t) = Taken By, (c) = Cosigned By      Initials Name Provider Type    CS Aurea Ernst OT Occupational Therapist                   Obj/Interventions       Row Name 11/03/23 1501          Balance    Balance Assessment sitting static balance;sitting dynamic balance;standing static balance;standing dynamic balance  -CS     Static Sitting Balance standby assist  -CS     Dynamic Sitting Balance contact guard  -CS     Position, Sitting Balance sitting edge of bed  -CS     Static Standing Balance minimal assist  -CS     Dynamic Standing Balance minimal assist  -CS     Position/Device Used, Standing Balance walker, rolling  -CS     Balance Interventions sitting;standing;static;dynamic;dynamic reaching;occupation based/functional task;weight shifting activity  -CS               User Key  (r) = Recorded By, (t) = Taken By, (c) = Cosigned By      Initials Name Provider Type    CS Aurea Ernst, DEBBIE Occupational Therapist                   Goals/Plan    No documentation.                  Clinical Impression       Row Name 11/03/23 1501          Pain Assessment    Pretreatment Pain Rating 0/10 - no pain  -CS     Posttreatment Pain Rating 0/10 - no pain  -CS       Row Name 11/03/23 1501          Plan of Care Review    Plan of Care Reviewed With patient  -CS     Progress improving  -CS     Outcome Evaluation Pt demo significantly improved alertness, command following, and independence this date. Pt is Min Ax1+1 w/ RW for functional mobility though conts to be Dep for LB ADL performance d/t deficits  in balance and strength. Recommend cont skilled IPOT POC to promote return to PLOF. Recommend pt DC to IP rehab.  -       Row Name 11/03/23 1501          Therapy Plan Review/Discharge Plan (OT)    Anticipated Discharge Disposition (OT) inpatient rehabilitation facility  -       Row Name 11/03/23 1501          Vital Signs    Pre Systolic BP Rehab 166  -CS     Pre Treatment Diastolic BP 60  -CS     Post Systolic BP Rehab --  w/ RN, getting bathed  -CS     Pretreatment Heart Rate (beats/min) 75  -CS     Pre SpO2 (%) 95  -CS     O2 Delivery Pre Treatment nasal cannula  -CS     Pre Patient Position Supine  -CS     Intra Patient Position Standing  -CS     Post Patient Position Sitting  -CS       Row Name 11/03/23 1501          Positioning and Restraints    Pre-Treatment Position in bed  -CS     Post Treatment Position chair  -CS     In Chair notified nsg;reclined;call light within reach;encouraged to call for assist;exit alarm on;RUE elevated;LUE elevated;waffle cushion;on mechanical lift sling;with nsg;legs elevated;heels elevated  -CS               User Key  (r) = Recorded By, (t) = Taken By, (c) = Cosigned By      Initials Name Provider Type    CS Aurea Ernst, OT Occupational Therapist                   Outcome Measures       Row Name 11/03/23 1502          How much help from another is currently needed...    Putting on and taking off regular lower body clothing? 1  -CS     Bathing (including washing, rinsing, and drying) 2  -CS     Toileting (which includes using toilet bed pan or urinal) 1  -CS     Putting on and taking off regular upper body clothing 2  -CS     Taking care of personal grooming (such as brushing teeth) 3  -CS     Eating meals 3  -CS     AM-PAC 6 Clicks Score (OT) 12  -CS       Row Name 11/03/23 0800          How much help from another person do you currently need...    Turning from your back to your side while in flat bed without using bedrails? 3  -SW     Moving from lying on back to sitting  on the side of a flat bed without bedrails? 3  -SW     Moving to and from a bed to a chair (including a wheelchair)? 2  -SW     Standing up from a chair using your arms (e.g., wheelchair, bedside chair)? 3  -SW     Climbing 3-5 steps with a railing? 2  -SW     To walk in hospital room? 2  -SW     AM-PAC 6 Clicks Score (PT) 15  -SW     Highest level of mobility 4 --> Transferred to chair/commode  -       Row Name 11/03/23 1502          Modified Wilkin Scale    Modified Wilkin Scale 3 - Moderate disability.  Requiring some help, but able to walk without assistance.  -       Row Name 11/03/23 1502          Functional Assessment    Outcome Measure Options AM-PAC 6 Clicks Daily Activity (OT);Modified Wilkin  -CS               User Key  (r) = Recorded By, (t) = Taken By, (c) = Cosigned By      Initials Name Provider Type    Patricia Lugo RN Registered Nurse    Aurea Whitt OT Occupational Therapist                    Occupational Therapy Education       Title: PT OT SLP Therapies (In Progress)       Topic: Occupational Therapy (In Progress)       Point: ADL training (In Progress)       Description:   Instruct learner(s) on proper safety adaptation and remediation techniques during self care or transfers.   Instruct in proper use of assistive devices.                  Learning Progress Summary             Patient Acceptance, E, NR by CS at 11/3/2023 1503    Acceptance, E, NR by CS at 11/1/2023 1159   Family Acceptance, E, NR by CS at 11/3/2023 1503    Acceptance, E, NR by CS at 11/1/2023 1159                         Point: Home exercise program (Not Started)       Description:   Instruct learner(s) on appropriate technique for monitoring, assisting and/or progressing therapeutic exercises/activities.                  Learner Progress:  Not documented in this visit.              Point: Precautions (In Progress)       Description:   Instruct learner(s) on prescribed precautions during self-care and  functional transfers.                  Learning Progress Summary             Patient Acceptance, E, NR by CS at 11/3/2023 1503    Acceptance, E, NR by CS at 11/1/2023 1159   Family Acceptance, E, NR by CS at 11/3/2023 1503    Acceptance, E, NR by CS at 11/1/2023 1159                         Point: Body mechanics (In Progress)       Description:   Instruct learner(s) on proper positioning and spine alignment during self-care, functional mobility activities and/or exercises.                  Learning Progress Summary             Patient Acceptance, E, NR by CS at 11/3/2023 1503    Acceptance, E, NR by CS at 11/1/2023 1159   Family Acceptance, E, NR by CS at 11/3/2023 1503    Acceptance, E, NR by CS at 11/1/2023 1159                                         User Key       Initials Effective Dates Name Provider Type Discipline     09/02/21 -  Aurea Ernst OT Occupational Therapist OT                  OT Recommendation and Plan  Planned Therapy Interventions (OT): activity tolerance training, BADL retraining, adaptive equipment training, functional balance retraining, occupation/activity based interventions, ROM/therapeutic exercise, strengthening exercise, transfer/mobility retraining  Therapy Frequency (OT): daily  Plan of Care Review  Plan of Care Reviewed With: patient  Progress: improving  Outcome Evaluation: Pt demo significantly improved alertness, command following, and independence this date. Pt is Min Ax1+1 w/ RW for functional mobility though conts to be Dep for LB ADL performance d/t deficits in balance and strength. Recommend cont skilled IPOT POC to promote return to PLOF. Recommend pt DC to IP rehab.     Time Calculation:   Evaluation Complexity (OT)  Review Occupational Profile/Medical/Therapy History Complexity: brief/low complexity  Assessment, Occupational Performance/Identification of Deficit Complexity: 5 or more performance deficits  Clinical Decision Making Complexity (OT): detailed  assessment/moderate complexity  Overall Complexity of Evaluation (OT): low complexity     Time Calculation- OT       Row Name 11/03/23 1503             Time Calculation- OT    OT Start Time 1340  -CS      OT Received On 11/03/23  -CS      OT Goal Re-Cert Due Date 11/11/23  -CS         Timed Charges    83405 - OT Therapeutic Activity Minutes 20  -CS      74332 - OT Self Care/Mgmt Minutes 20  -CS         Total Minutes    Timed Charges Total Minutes 40  -CS       Total Minutes 40  -CS                User Key  (r) = Recorded By, (t) = Taken By, (c) = Cosigned By      Initials Name Provider Type    CS Aurea Ernst OT Occupational Therapist                  Therapy Charges for Today       Code Description Service Date Service Provider Modifiers Qty    05430770447 HC OT THERAPEUTIC ACT EA 15 MIN 11/3/2023 Aurea Ernst OT GO 2    87649550249 HC OT SELF CARE/MGMT/TRAIN EA 15 MIN 11/3/2023 Aurea Ernst OT GO 1                 Aurea Ernst OT  11/3/2023    Electronically signed by Aurea Ernst OT at 11/03/23 1504          Speech Language Pathology Notes (most recent note)        Carbajal, Mahnaz, MS CCC-SLP at 11/05/23 1702          Goal Outcome Evaluation:    SLP re-evaluation completed. Will continue to address dysphagia. Please see note for further details and recommendations.                            Electronically signed by Mahnaz Carbajal MS CCC-SLP at 11/05/23 1704

## 2023-11-06 NOTE — PLAN OF CARE
Goal Outcome Evaluation:  Patient remained stable, NIH of 1 at start of shift and neuro exam unchanged throughout shift.

## 2023-11-06 NOTE — PROGRESS NOTES
Stroke Progress Note       Chief Complaint:  right sided weakness    Subjective    Subjective     Subjective:  Alert and resting in the recliner.  Continues to have some mild dysarthria and weakness.  Passed FEES and is tolerating a modified diet.    Review of Systems   Denies headache, dizziness, chest pain, and palpitations    Objective      Temp:  [97.4 °F (36.3 °C)-98.7 °F (37.1 °C)] 98.3 °F (36.8 °C)  Heart Rate:  [70-89] 79  Resp:  [16-18] 18  BP: ()/(50-75) 116/63        Physical Exam  Constitutional:       Appearance: Normal appearance. She is not ill-appearing.   HENT:      Head: Normocephalic and atraumatic.   Eyes:      Extraocular Movements: Extraocular movements intact.      Pupils: Pupils are equal, round, and reactive to light.   Cardiovascular:      Rate and Rhythm: Normal rate and regular rhythm.   Pulmonary:      Effort: Pulmonary effort is normal. No respiratory distress.   Musculoskeletal:         General: Tenderness present. No swelling.   Skin:     General: Skin is warm and dry.   Neurological:      Mental Status: She is alert.      Comments: Oriented x4.  Aphasia improved, mild dysarthria.  Mild right facial droop.  Gaze is normal, visual fields are intact.  No drift in BUE, strength is 4/5; BLE 3/5.  Sensation is intact to light touch bilaterally.       Results Review:    I reviewed the patient's new clinical results.  No radiology results for the last day    Lab Results (last 24 hours)       Procedure Component Value Units Date/Time    Magnesium [[id number]]  (Normal) Collected: 11/06/23 0641    Specimen: Blood Updated: 11/06/23 0736     Magnesium 2.2 mg/dL     Basic Metabolic Panel [[id number]]  (Abnormal) Collected: 11/06/23 0641    Specimen: Blood Updated: 11/06/23 0736     Glucose 93 mg/dL      BUN 12 mg/dL      Creatinine 0.54 mg/dL      Sodium 140 mmol/L      Potassium 4.7 mmol/L      Chloride 111 mmol/L      CO2 20.0 mmol/L      Calcium 9.1 mg/dL      BUN/Creatinine Ratio 22.2      Anion Gap 9.0 mmol/L      eGFR 110.2 mL/min/1.73     Narrative:      GFR Normal >60  Chronic Kidney Disease <60  Kidney Failure <15      Heparin Anti-Xa [[id number]]  (Abnormal) Collected: 11/06/23 0641    Specimen: Blood Updated: 11/06/23 0735     Heparin Anti-Xa (UFH) 0.26 IU/ml     Protime-INR [[id number]]  (Normal) Collected: 11/06/23 0641    Specimen: Blood Updated: 11/06/23 0734     Protime 13.8 Seconds      INR 1.05    CBC & Differential [[id number]]  (Abnormal) Collected: 11/06/23 0641    Specimen: Blood Updated: 11/06/23 0719    Narrative:      The following orders were created for panel order CBC & Differential.  Procedure                               Abnormality         Status                     ---------                               -----------         ------                     CBC Auto Differential[[id number]]        Abnormal            Final result               Scan Slide[[id number]]                                                                    Please view results for these tests on the individual orders.    CBC Auto Differential [[id number]]  (Abnormal) Collected: 11/06/23 0641    Specimen: Blood Updated: 11/06/23 0719     WBC 9.39 10*3/mm3      RBC 2.41 10*6/mm3      Hemoglobin 7.2 g/dL      Hematocrit 24.4 %      .2 fL      MCH 29.9 pg      MCHC 29.5 g/dL      RDW 19.8 %      RDW-SD 73.7 fl      MPV 9.9 fL      Platelets 235 10*3/mm3      Neutrophil % 82.8 %      Lymphocyte % 7.0 %      Monocyte % 7.1 %      Eosinophil % 2.0 %      Basophil % 0.5 %      Immature Grans % 0.6 %      Neutrophils, Absolute 7.76 10*3/mm3      Lymphocytes, Absolute 0.66 10*3/mm3      Monocytes, Absolute 0.67 10*3/mm3      Eosinophils, Absolute 0.19 10*3/mm3      Basophils, Absolute 0.05 10*3/mm3      Immature Grans, Absolute 0.06 10*3/mm3      nRBC 0.0 /100 WBC     POC Glucose Once [[id number]]  (Normal) Collected: 11/06/23 0538    Specimen: Blood Updated: 11/06/23 0548     Glucose 105 mg/dL     Blood Culture -  Blood, Hand, Right [[id number]]  (Normal) Collected: 11/01/23 0312    Specimen: Blood from Hand, Right Updated: 11/06/23 0245     Blood Culture No growth at 5 days    Blood Culture - Blood, Hand, Left [[id number]]  (Normal) Collected: 11/01/23 0151    Specimen: Blood from Hand, Left Updated: 11/06/23 0115     Blood Culture No growth at 5 days    POC Glucose Once [[id number]]  (Normal) Collected: 11/05/23 2333    Specimen: Blood Updated: 11/05/23 2335     Glucose 110 mg/dL     Heparin Anti-Xa [[id number]]  (Normal) Collected: 11/05/23 1758    Specimen: Blood Updated: 11/05/23 1820     Heparin Anti-Xa (UFH) 0.44 IU/ml     POC Glucose Once [[id number]]  (Normal) Collected: 11/05/23 1704    Specimen: Blood Updated: 11/05/23 1705     Glucose 106 mg/dL     POC Glucose Once [[id number]]  (Abnormal) Collected: 11/05/23 1201    Specimen: Blood Updated: 11/05/23 1202     Glucose 144 mg/dL           Lipid Panel          11/1/2023    01:55   Lipid Panel   Total Cholesterol 88    Triglycerides 88    HDL Cholesterol 30    VLDL Cholesterol 18    LDL Cholesterol  40    LDL/HDL Ratio 1.35          Lab 11/01/23 0155   HEMOGLOBIN A1C <4.20*       No radiology results for the last day  Results for orders placed during the hospital encounter of 10/31/23    Adult Transthoracic Echo Complete W/ Cont if Necessary Per Protocol (With Agitated Saline)    Interpretation Summary    Left ventricular systolic function is normal. Calculated left ventricular EF = 62.1%    Left ventricular wall thickness is consistent with mild concentric hypertrophy.    Saline test results are negative.    There is a mechanical aortic valve prosthesis present.    There is a mechanical mitral valve prosthesis present.    Estimated right ventricular systolic pressure from tricuspid regurgitation is normal (<35 mmHg).    Calcification on pap muscle            Assessment/Plan   53-year-old female with a PMH significant for HTN, HLD, hypotension (on midodrine), T2DM, CHF,  aortic and mitral valve replacement (on Coumadin), CAD, and seizure disorder who presented to [facility] on 10/31 with complaints of aphasia and generalized weakness.  She was found to have a saddle embolus within the superior division of the left MCA.  She was taken to the Cath Lab for mechanical thrombectomy with resultant TICI 3 recanalization.    PTA antiplatelet:  aspirin  PTA anticoagulant:  coumadin    Assessment/Plan:  Acute ischemic stroke, left MCA, status post mechanical thrombectomy -TICI 3  -ESUS; per pathology report embolus composed of calcified fibrin  -Exam improving  -Anemia:  H&H 7.2/24.4 today, trending  -Continue heparin drip; planning to transition back to coumadin tonight  -Continue aspirin 81 mg  -Repeat CT head in am  -CT head 11/2 an evolving LMCA territory infarct.  Resolution of prior contrast staining noted.  -Unable to obtain MRI D/T incompatible pain pump.  -JASON technically difficult for diagnosis.  EF 62.1%.  Mitral valve and aortic valve prosthesis within defined limits.  No thrombus or vegetation noted. Saline test are negative.  -Dysphagia; Keofeed removed; passed FEES yesterday, tolerating modified diet  -Seizures: EEG on 11/1 diffuse cerebral dysfunction to a mild degree and no noted seizures. Continue home dose of keppra and topamax  -T2DM; hemoglobin A1c <4.2.  Well-controlled.  Management per primary team.  -HLD; continue high-dose statin  -HTN/hypotension; normalize BP goals.  SBP goals 100-140.  Avoid hypotension,on home midodrine  -Continue PT/OT  -Patient is okay to go to the floor from a neurology perspective.  -Patient discussed with Dr. Grullon, and nursing staff.  Stroke neurology will follow peripherally and see as needed..  Please call with any questions or concerns.    CHARLIE Marie,   11/06/23  09:16 EST

## 2023-11-06 NOTE — PROGRESS NOTES
"Pharmacy Consult  -  Warfarin    Mara Martínez is a  53 y.o. female   Height - 152.4 cm (60\")  Weight - 73.4 kg (161 lb 13.1 oz)    Consulting Provider: - Durga Mccain  Indication: - Mechanical Valve  Goal INR: - 2.5-3.5  Home Regimen:   - 4 mg daily     Bridge Therapy: Yes   and unfractionated heparin    Drug-Drug Interactions with current regimen:  Heparin Drip - increased risk of bleeding  Aspirin- increased risk of bleeding    Warfarin Dosing During Admission:    Date  11/6           INR  1.05           Dose  (6 mg)                Education Provided: Will follow up     Discharge Follow up:   Following Provider - Rowan Nolasco ([facility])   Follow up time range or appointment - Will assess closer to discharge      Labs:    Results from last 7 days   Lab Units 11/06/23  0641 11/05/23  0742 11/04/23  0808 11/03/23  0439 11/02/23  1341 11/02/23  0440 11/01/23  1757 10/31/23  2126   INR  1.05 1.11  --   --  1.75*  --   --  1.73*   APTT seconds  --   --   --   --  38.6*  --   --  38.7   HEMOGLOBIN g/dL 7.2* 7.3* 7.7* 8.6*  --  8.9* 9.2* 6.7*   HEMATOCRIT % 24.4* 24.8* 25.4* 27.9*  --  28.6* 29.5* 23.0*     Results from last 7 days   Lab Units 11/06/23  0641 11/05/23  0742 11/04/23  0808 11/02/23  0104 10/31/23  2126   SODIUM mmol/L 140 141 141 139  139 135*   POTASSIUM mmol/L 4.7 4.3 3.9 3.7  3.7 3.4*   CHLORIDE mmol/L 111* 113* 111* 109*  109* 101   CO2 mmol/L 20.0* 22.0 22.0 23.0  23.0 23.0   BUN mg/dL 12 13 12 7  7 9   CREATININE mg/dL 0.54* 0.49* 0.48* 0.68  0.68 0.83   CALCIUM mg/dL 9.1 8.5* 8.1* 8.0*  8.0* 8.9   BILIRUBIN mg/dL  --   --   --  0.3  --    ALK PHOS U/L  --   --   --  99  --    ALT (SGPT) U/L  --   --   --  <5 <5   AST (SGOT) U/L  --   --   --  8 6   GLUCOSE mg/dL 93 121* 133* 152*  152* 105*     Current dietary intake:   11/6: 50% of Breakfast    Assessment/Plan:   1. Patient is currently on Warfarin for mechanical aortic and mitral valve repair. Patient's goal INR is 2.5-3.5.  2. " Patient's INR is subtherapeutic at 1.05.  3. Will give Warfarin 6 mg today and start home dose on 11/7.  4. Will monitor signs/symptoms of bleeding, dietary intake, and drug-drug interactions.   5. Will follow daily PT/INR and adjust dose accordingly    Thank you  Faisal Kumar, PharmD  11/6/2023  12:43 EST

## 2023-11-06 NOTE — PROGRESS NOTES
Intensive Care Follow-up     Hospital:  LOS: 6 days   Ms. Mara Martínez, 53 y.o. female is followed for:   Acute ischemic left MCA stroke            History of present illness:     53-year-old female who is active smoker with history of mechanical aortic and mitral valve, hypertension, dyslipidemia, left internal carotid artery stent in 2020, seizure disorders, chronic anemia and chronic GI loss from AVM, chronic pain with pain pump, RB ILD followed at , sleep apnea on CPAP presenting with acute left MCA stroke.  Patient was outside of window of thrombolysis so underwent salvage thrombectomy for saddle embolus in the left MCA bifurcation.  Patient was started on heparin drip per subsequently.  Patient was also found to have urinary tract infection on admission and cultures grew Klebsiella pneumoniae.  Patient was treated with Rocephin.  Patient also has chronic hypotension and on midodrine at home.    Subjective   Interval History:  Patient states that she is feeling better.  Remains on heparin drip.    Passed swallow evaluation and started on oral diet and Keofeed has been discontinued.  We will go ahead and resume patient's warfarin has no further evidence of GI bleed noted currently.               The patient's past medical, surgical and social history were reviewed and updated in Epic as appropriate.       Objective     Infusions:  heparin, 22 Units/kg/hr, Last Rate: 22 Units/kg/hr (11/06/23 4831)  Pharmacy to Dose Heparin,   Pharmacy to dose warfarin,   phenylephrine, 0.5-3 mcg/kg/min, Last Rate: Stopped (11/02/23 0546)      Medications:  [START ON 11/7/2023] aspirin, 81 mg, Oral, Daily   Or  [START ON 11/7/2023] aspirin, 300 mg, Rectal, Daily  atorvastatin, 80 mg, Oral, Nightly  ipratropium-albuterol, 3 mL, Nebulization, 4x Daily - RT  levETIRAcetam, 1,000 mg, Oral, BID  [START ON 11/7/2023] levothyroxine, 75 mcg, Oral, Q AM  midodrine, 5 mg, Oral, Q8H  [START ON 11/7/2023] pantoprazole, 40 mg, Oral, Q  "AM  sodium chloride, 10 mL, Intravenous, Q12H  topiramate, 200 mg, Oral, BID  [START ON 11/7/2023] warfarin, 4 mg, Oral, Daily  warfarin, 6 mg, Oral, Once        Vital Sign Min/Max for last 24 hours  Temp  Min: 97.4 °F (36.3 °C)  Max: 98.7 °F (37.1 °C)   BP  Min: 93/56  Max: 119/55   Pulse  Min: 70  Max: 89   Resp  Min: 16  Max: 18   SpO2  Min: 90 %  Max: 100 %   Flow (L/min)  Min: 1.5  Max: 2.5       Input/Output for last 24 hour shift  11/05 0701 - 11/06 0700  In: 1215 [I.V.:501]  Out: 2100 [Urine:2100]      Objective:  Vital signs: (most recent): Blood pressure 110/53, pulse 80, temperature 98.3 °F (36.8 °C), temperature source Axillary, resp. rate 18, height 152.4 cm (60\"), weight 73.4 kg (161 lb 13.1 oz), SpO2 97%, not currently breastfeeding.            General Appearance: Patient sitting up in bed, awake, alert  Lungs:   B/L Breath sounds present with decreased breath sounds on bases, no wheezing heard, no crackles.   Heart: S1 and S2 present, no murmur  Abdomen: Soft, nontender, no guarding or rigidity, bowel sounds positive.  Extremities: no edema, warm to touch.  Neurologic:  Interval:  (shift change)  1a. Level of Consciousness: 0-->Alert, keenly responsive  1b. LOC Questions: 0-->Answers both questions correctly  1c. LOC Commands: 0-->Performs both tasks correctly  2. Best Gaze: 0-->Normal  3. Visual: 0-->No visual loss  4. Facial Palsy: 1-->Minor paralysis (flattened nasolabial fold, asymmetry on smiling)  5a. Motor Arm, Left: 0-->No drift, limb holds 90 (or 45) degrees for full 10 secs  5b. Motor Arm, Right: 0-->No drift, limb holds 90 (or 45) degrees for full 10 secs  6a. Motor Leg, Left: 0-->No drift, leg holds 30 degree position for full 5 secs  6b. Motor Leg, Right: 0-->No drift, leg holds 30 degree position for full 5 secs  7. Limb Ataxia: 0-->Absent  8. Sensory: 0-->Normal, no sensory loss  9. Best Language: 0-->No aphasia, normal  10. Dysarthria: 1-->Mild-to-moderate dysarthria, patient slurs at " least some words and, at worst, can be understood with some difficulty  11. Extinction and Inattention (formerly Neglect): 0-->No abnormality    Total (NIH Stroke Scale): 2        Results from last 7 days   Lab Units 11/06/23  0641 11/05/23  0742 11/04/23  0808   WBC 10*3/mm3 9.39 10.13 9.98   HEMOGLOBIN g/dL 7.2* 7.3* 7.7*   PLATELETS 10*3/mm3 235 241 232     Results from last 7 days   Lab Units 11/06/23  0641 11/05/23  0742 11/04/23  0808 11/02/23  0104   SODIUM mmol/L 140 141 141 139  139   POTASSIUM mmol/L 4.7 4.3 3.9 3.7  3.7   CO2 mmol/L 20.0* 22.0 22.0 23.0  23.0   BUN mg/dL 12 13 12 7  7   CREATININE mg/dL 0.54* 0.49* 0.48* 0.68  0.68   MAGNESIUM mg/dL 2.2 2.0 1.9 1.8   PHOSPHORUS mg/dL  --  3.4 2.9 3.9   GLUCOSE mg/dL 93 121* 133* 152*  152*     Estimated Creatinine Clearance: 107.8 mL/min (A) (by C-G formula based on SCr of 0.54 mg/dL (L)).    Results from last 7 days   Lab Units 11/01/23  0247   PH, ARTERIAL pH units 7.346*   PCO2, ARTERIAL mm Hg 40.5   PO2 ART mm Hg 80.8*       Images:   Ct head reviewed from 11/2:   Impression:  Evolving left MCA territory infarct. There has been resolution of previous left cortical contrast staining. No superimposed acute process identified.           Electronically Signed: Saman Lopez MD    11/2/2023 2:52 PM CDT    Workstation ID: [id number]    I reviewed the patient's results and images.     Assessment & Plan   Impression        Acute ischemic left MCA stroke    Anemia    Tobacco use    S/P mechanical aortic and mitral valve replacement (2018)    (HFpEF) heart failure with preserved ejection fraction    Chronic pain with intrathecal pump in place    Dyslipidemia    Cerebral aneurysm    H/O recurrent GIB 2* AVMs    UTI (urinary tract infection)    Chronic hypotension on midodrine       Plan        1.  Patient presented with acute CVA s/p thrombectomy.  Currently on heparin drip.  Stroke neurology signed off. No new neurological symptoms noted at this time.  2.   We are monitoring H&H closely. We will continue to trend for now.  If hemoglobin drops below 7 or if she develops overt bleeding, then will transfuse.  Since hemoglobin is stable and no further bleeding noted.  We will start patient on warfarin and bridged with heparin.  3.  Continue levothyroxine for history of hypothyroidism.  4.  Continue aspirin, statin.  5.  Keppra and Topamax for history of seizures.  6.  Continue midodrine patient's home dose.  7.  Treated with Rocephin for Klebsiella UTI.  WBC count is normalized.  Hemodynamically stable.  Will monitor.  8.  Replace electrolytes per ICU protocol  9.  Patient clear speech evaluation and starting to take oral diet.  Continue to monitor oral intake.  10.  PT/OT and speech to follow.    Patient can be transferred out of ICU to telemetry floor.  We will sign out to hospitalist team for ongoing cares.    Plan of care and goals reviewed with multidisciplinary/antibiotic stewardship team during rounds.   I discussed the patient's findings and my recommendations with patient, nursing staff, and consulting provider       Durga Mccain MD, [facility]  Pulmonary, Critical care and Sleep Medicine

## 2023-11-06 NOTE — PROGRESS NOTES
"Clinical Nutrition Service      Patient Name: Mara Martínez  YOB: 1970  MRN: [id number]  Admission date: 10/31/2023      Multidisciplinary Rounds/EN Support Monitor    Additional information obtained during MDR:  RN reports pt is doing well today NIHSS is 1; she passed FEES, eating some. Plan to dc EN support and monitor intake.      Current diet: Diet: Cardiac Diets; Healthy Heart (2-3 Na+); No Straw; Texture: Regular Texture (IDDSI 7); Fluid Consistency: Thin (IDDSI 0)    Oral Nutrition Supplement:  none; dislikes Boost/Ensure    EN Support:  Product: Peptamen AF  Goal rate:  65 ml/hr  Goal volume (4618-5106): 1300  Based on a feeding duration of 20 hrs/d  Modulars: None  Water flushes: 30 ml every 2 hrs  Tube: Corpak  Route: distal duodenum  Verified at bedside: dc no feeding    Pertinent medical data reviewed:  yes  Last filed wt: Weight: 73.4 kg (161 lb 13.1 oz) (11/02/23 0400)  Weight Method: Bed scale    BMI: BMI (Calculated): 31.6    Nutrition risk identified on nursing screen; MST score \"0\"    Labs Reviewed: yes  Pertinent: Hgb A1C < 4.2    Medications Reviewed: yes  Pertinent: Keppra, synthroid, protonix, midodrine    Average oral intake per documentation:  50% of 2 meals    Past 24 hr I & O documentation (5105-6573):   563 mL EN (43% of daily goal volume), 151 mL water flushes    Estimated/Assessed Needs (11/2/23):      Energy: 1500 kcal  Protein:  99 gm  Fluids: 1500 ml per RDA method    Intervention:  Current EN support order set dc'd    Interviewed pt, she indicates she is a little hungry, discussed ONS, she is willing to try Magic Cup, she dislikes Boost or anything like it.    Magic cup ordered twice daily - vary flavors    Plan of care and goals of care reviewed    Monitor:  Per protocol, I&O, PO intake, Supplement intake, Pertinent labs, Weight, Symptoms, POC/GOC, Swallow function      Briana Dowling MS,RD,LD  11/06/23 16:39 EST  Time: 20  mins     "

## 2023-11-06 NOTE — PROGRESS NOTES
HEPARIN INFUSION  Mara Martínez is a  53 y.o. female receiving heparin infusion.     Therapy for (VTE/Cardiac):   Cardiac   Patient Weight: 73.4  Initial Bolus (Y/N):   No  Any Bolus (Y/N):   No        Signs or Symptoms of Bleeding: dark/tarry stool noted 11/4 in the AM.  STAT CBC and occult blood stool lab ordered      Cardiac or Other (Not VTE)     Initial rate: 12 units/kg/hr (Max 1,000 units/hr)   Anti Xa Rebolus Infusion Hold time Change infusion Dose (Units/kg/hr) Next Anti Xa or aPTT Level Due   < 0.11 None  None Increase by  3 Units/kg/hr 6 hours   0.11- 0.19 None  None Increase by  2 Units/kg/hr 6 hours   0.2 - 0.29 0 None Increase by  1 Units/kg/hr 6 hours   0.3 - 0.5 0 None No Change 6 hours (after 2 consecutive levels in range check qAM)   0.51 - 0.6 0 None Decrease by  1 Units/kg/hr 6 hours   0.61 - 0.8 0 30 Minutes Decrease by  2 Units/kg/hr 6 hours   0.81 - 1 0 60 Minutes Decrease by  3 Units/kg/hr 6 hours   >1 0 Hold  After Anti Xa less than 0.5 decrease previous rate by  4 Units/kg/hr  Every 2 hours until Anti Xa  less than 0.5 then when infusion restarts in 6 hours      Results from last 7 days   Lab Units 11/06/23  0641 11/05/23  0742 11/04/23  0808 11/03/23  0439 11/02/23  1341   INR  1.05 1.11  --   --  1.75*   HEMOGLOBIN g/dL 7.2* 7.3* 7.7*   < >  --    HEMATOCRIT % 24.4* 24.8* 25.4*   < >  --    PLATELETS 10*3/mm3 235 241 232   < >  --     < > = values in this interval not displayed.        Date   Time   Anti-Xa Current Rate (Unit/kg/hr) Bolus   (Units) Rate Change   (Unit/kg/hr) New Rate (Unit/kg/hr) Next   Anti-Xa Comments  Pump Check Daily   11/2 1400 0.1 0 None +12 12 2000 Dw Rn    11/2 2020 0.1 12 None +3 15 0400 DW RN   11/3 0439 0.1 15 None +3 18 1300 [place] [id number] [facility]   11/3 1245 0.22 18 None +1 19 2100 DW RN   11/3 2028 0.21 19 None +1 20 0400 DW RN   11/4 0332 0.29 20 None +1 21 1100 Brenda [id number] [facility]   11/4 1100 0.43 21 None -- 21 1800 Will D/w RN   11/4 1819 0.40 21 None -- 21 0000 DW  RN    11/4 2357 0.33 21 None -- 21 0600 Roly 5198 -b   11/4 0700 0.32 21 None -- 21 1800 D/w RN; will check in 12 hours   11/5 1758 0.44 21 None -- 21 0600 DW RN   11/6 0641 0.26 21 None +1 22 1400 DW RN       None           None           None           None           None           None           None           None           None       Cristobal Guy [facility]   11/06/23  08:12 EST

## 2023-11-06 NOTE — CASE MANAGEMENT/SOCIAL WORK
Continued Stay Note  [facility]     Patient Name: Mara Martínez  MRN: [id number]  Today's Date: 11/6/2023    Admit Date: 10/31/2023    Plan: [facility] acute rehab   Discharge Plan       Row Name 11/06/23 1044       Plan    Plan [facility] rehab    Patient/Family in Agreement with Plan yes    Plan Comments Discussed patient in MDR.  Patient passed FEES yesterday and will start coumadin bridge.  Referral sent to [facility] acute rehab; Regine is following in [facility].  CM will continue to follow.    Final Discharge Disposition Code 62 - inpatient rehab facility                   Discharge Codes    No documentation.                       Jasmin Enriquez RN

## 2023-11-06 NOTE — THERAPY TREATMENT NOTE
Patient Name: Mara Martínez  : 1970    MRN: [id number]                              Today's Date: 2023       Admit Date: 10/31/2023    Visit Dx:     ICD-10-CM ICD-9-CM   1. Acute CVA (cerebrovascular accident)  I63.9 434.91   2. Dysphagia, unspecified type  R13.10 787.20   3. Communication deficit  F80.9 307.9   4. Anemia, unspecified type  D64.9 285.9   5. Acute UTI  N39.0 599.0     Patient Active Problem List   Diagnosis    Anemia    Aortic valve insufficiency    Bruit of left carotid artery    Chest pain    Positive D-dimer    Dizziness    Edema    Fatigue    HTN (hypertension)    Heart murmur    Hyperlipidemia    Mitral valve stenosis    Mitral valve insufficiency    Palpitations    Pulmonary edema    Seizure disorder    Shortness of breath    Difficulty sleeping    Snoring    Supravalvular aortic stenosis    Syncope    Tachycardia    Mitral stenosis    Rheumatic aortic stenosis    Valvular heart disease    Epilepsy    ISREAL (cerebral atherosclerosis)    Tobacco use    Migraines    Aortic regurgitation    Rheumatic mitral regurgitation    SOB (shortness of breath)    Supraventricular aortic stenosis    Aortic valve stenosis    Rheumatic aortic valve insufficiency    Moderate aortic stenosis    Aortic valve disorder    Coronary artery disease involving native coronary artery of native heart without angina pectoris    S/P mitral valve replacement    S/P mechanical aortic and mitral valve replacement (2018)    Stenosis of carotid artery    Generalized abdominal pain    Rectal bleeding    Functional diarrhea    Lower extremity edema    Rectal bleed    (HFpEF) heart failure with preserved ejection fraction    Chronic pain with intrathecal pump in place    Dyslipidemia    Bilateral carotid artery stenosis    Low back pain    Degeneration of lumbar intervertebral disc    Lumbosacral radiculopathy    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    Cerebral aneurysm    Complex partial seizures  with consciousness impaired    Migraine without aura and with status migrainosus, not intractable    Chronic obstructive pulmonary disease    Essential tremor    Hesitancy of micturition    Complex partial epilepsy with generalization and with intractable epilepsy    Focal (motor) epilepsy    Occipital neuralgia    Paresthesia    Restless legs syndrome    Retinal drusen    Acute ischemic left MCA stroke    H/O recurrent GIB 2* AVMs    UTI (urinary tract infection)    Chronic hypotension on midodrine     Past Medical History:   Diagnosis Date    Anemia     Aortic regurgitation     Arthritis     Back pain     Carotid bruit     ISREAL (cerebral atherosclerosis) 2014    nonobstructive    Chest pain     Chronic hypotension on midodrine 2023    Chronic obstructive pulmonary disease 2022    Coronary artery disease     COVID-19 vaccine administered     phizer    D-dimer, elevated     Diastolic congestive heart failure 2019    Dizziness     Edema     Epilepsy     Fatigue     Heart murmur     History of blood transfusion 2019    History of blood transfusion     History of blood transfusion 2022    History of blood transfusion     August 2023 x2    History of recent blood transfusion 2021    History of transfusion     Hyperlipidemia     Hypertension     Kidney stones     Migraines     Mitral regurgitation     Mitral stenosis     mild    Neuropathy     Neuropathy     Palpitations     Pulmonary edema     Retinal drusen     Seizure     Sleep apnea 2019    Sleeping difficulties     SOB (shortness of breath)     Supraventricular aortic stenosis     Syncope     Tachycardia     Tobacco abuse     VHD (valvular heart disease)     Wears dentures     Wears eyeglasses      Past Surgical History:   Procedure Laterality Date    APPENDECTOMY      CARDIAC CATHETERIZATION      CARDIAC SURGERY      2 valves replaced     SECTION      x 2    CHOLECYSTECTOMY      COLONOSCOPY      COLONOSCOPY N/A 2019     Procedure: COLONOSCOPY;  Surgeon: Kurt Gaines MD;  Location:  [facility];  Service: Gastroenterology    CORONARY ARTERY BYPASS GRAFT  06/12/2018    EXPLORATORY LAPAROTOMY      HERNIA REPAIR      HYSTERECTOMY      INTERVENTIONAL RADIOLOGY PROCEDURE Bilateral 10/31/2023    Procedure: Carotid Cerebral Angiogram;  Surgeon: Cayetano Mckeon MD;  Location:  [facility];  Service: Interventional Radiology;  Laterality: Bilateral;    JOINT REPLACEMENT Right     knee replacement    PAIN PUMP INSERTION/REVISION      morphine    PORTACATH PLACEMENT      UPPER GASTROINTESTINAL ENDOSCOPY        General Information       Row Name 11/06/23 1400          Physical Therapy Time and Intention    Document Type therapy note (daily note)  -MB     Mode of Treatment physical therapy  -MB       Row Name 11/06/23 1400          General Information    Patient Profile Reviewed yes  -MB     Existing Precautions/Restrictions fall;oxygen therapy device and L/min  -MB     Barriers to Rehab medically complex;previous functional deficit;cognitive status;visual deficit  -MB       Row Name 11/06/23 1400          Cognition    Orientation Status (Cognition) oriented x 3  -MB       Row Name 11/06/23 1400          Safety Issues, Functional Mobility    Safety Issues Affecting Function (Mobility) safety precaution awareness  -MB     Impairments Affecting Function (Mobility) balance;cognition;coordination;endurance/activity tolerance;motor planning;strength;postural/trunk control  -MB               User Key  (r) = Recorded By, (t) = Taken By, (c) = Cosigned By      Initials Name Provider Type    MB Aarti Ventura, PT Physical Therapist                   Mobility       Row Name 11/06/23 7119          Bed Mobility    Comment, (Bed Mobility) Pt. received and left sitting UIC.  -MB       Row Name 11/06/23 8125          Transfers    Comment, (Transfers) VCs for safe hand placement.  -MB       Row Name 11/06/23 8070          Sit-Stand  Transfer    Sit-Stand [place] (Transfers) minimum assist (75% patient effort);verbal cues  -MB     Assistive Device (Sit-Stand Transfers) walker, front-wheeled  -MB       Row Name 11/06/23 1554          Gait/Stairs (Locomotion)    Davison Level (Gait) contact guard;verbal cues  -MB     Assistive Device (Gait) walker, front-wheeled  -MB     Distance in Feet (Gait) 115  -MB     Deviations/Abnormal Patterns (Gait) bilateral deviations;mela decreased;gait speed decreased;stride length decreased  -MB     Bilateral Gait Deviations forward flexed posture;heel strike decreased  -MB     Comment, (Gait/Stairs) Pt. ambulated w/ step through gait pattern w/ very slow mela and dec B step length. VCs for forward gaze, increased B heelstrike, and adaptive breathing.  -MB               User Key  (r) = Recorded By, (t) = Taken By, (c) = Cosigned By      Initials Name Provider Type    MB Aarti Ventura, PT Physical Therapist                   Obj/Interventions       Row Name 11/06/23 1555          Motor Skills    Therapeutic Exercise hip;knee;ankle  -MB       Row Name 11/06/23 1555          Hip (Therapeutic Exercise)    Hip (Therapeutic Exercise) strengthening exercise;isometric exercises  -MB     Hip Isometrics (Therapeutic Exercise) bilateral;gluteal sets;10 repetitions  -MB     Hip Strengthening (Therapeutic Exercise) bilateral;marching while seated;aBduction;aDduction;10 repetitions  -MB       Row Name 11/06/23 1555          Knee (Therapeutic Exercise)    Knee Strengthening (Therapeutic Exercise) bilateral;LAQ (long arc quad);10 repetitions  -MB       Row Name 11/06/23 1555          Ankle (Therapeutic Exercise)    Ankle AROM (Therapeutic Exercise) bilateral;dorsiflexion;plantarflexion;10 repetitions  -MB       Row Name 11/06/23 1555          Balance    Static Standing Balance contact guard  -MB     Dynamic Standing Balance contact guard  -MB     Position/Device Used, Standing Balance supported;walker, rolling   -MB     Balance Interventions standing;sit to stand;weight shifting activity;dynamic reaching  -MB               User Key  (r) = Recorded By, (t) = Taken By, (c) = Cosigned By      Initials Name Provider Type    Aarti Lazar, PT Physical Therapist                   Goals/Plan    No documentation.                  Clinical Impression       Row Name 11/06/23 1556          Pain    Pretreatment Pain Rating 0/10 - no pain  -MB     Posttreatment Pain Rating 0/10 - no pain  -MB       Row Name 11/06/23 1556          Plan of Care Review    Plan of Care Reviewed With patient  -MB     Progress improving  -MB     Outcome Evaluation Patient pleasant w/ good effort. She demonstrates improving independence w/ functional mobility, but continues to readily fatigue and be limited by weakness, impaired balance, and remains below her functional baseline. Pt. ambulated 115ft w/ RW and CGA w/ standing rests and cueing for safety. PT continues to recommend IP rehab at D/C.  -MB       Row Name 11/06/23 1556          Vital Signs    Pre Systolic BP Rehab 99  -MB     Pre Treatment Diastolic BP 57  -MB     Intra Systolic BP Rehab 116  -MB     Intra Treatment Diastolic BP 82  -MB     Pre SpO2 (%) 97  -MB     O2 Delivery Pre Treatment room air  -MB     Intra SpO2 (%) 94  -MB     O2 Delivery Intra Treatment room air  -MB     Post SpO2 (%) 98  -MB     O2 Delivery Post Treatment room air  -MB     Pre Patient Position Sitting  -MB     Intra Patient Position Standing  -MB     Post Patient Position Sitting  -MB       Row Name 11/06/23 1556          Positioning and Restraints    Pre-Treatment Position sitting in chair/recliner  -MB     Post Treatment Position chair  -MB     In Chair notified nsg;reclined;call light within reach;encouraged to call for assist;exit alarm on;waffle cushion;legs elevated  -MB               User Key  (r) = Recorded By, (t) = Taken By, (c) = Cosigned By      Initials Name Provider Type    Aarti Lazar, PT  Physical Therapist                   Outcome Measures       Row Name 11/06/23 1600          How much help from another person do you currently need...    Turning from your back to your side while in flat bed without using bedrails? 3  -MB     Moving from lying on back to sitting on the side of a flat bed without bedrails? 3  -MB     Moving to and from a bed to a chair (including a wheelchair)? 3  -MB     Standing up from a chair using your arms (e.g., wheelchair, bedside chair)? 3  -MB     Climbing 3-5 steps with a railing? 2  -MB     To walk in hospital room? 3  -MB     AM-PAC 6 Clicks Score (PT) 17  -MB     Highest level of mobility 5 --> Static standing  -MB       Row Name 11/06/23 1600          Functional Assessment    Outcome Measure Options AM-PAC 6 Clicks Basic Mobility (PT)  -MB               User Key  (r) = Recorded By, (t) = Taken By, (c) = Cosigned By      Initials Name Provider Type    Aarti Lazar, PT Physical Therapist                                 Physical Therapy Education       Title: PT OT SLP Therapies (In Progress)       Topic: Physical Therapy (Done)       Point: Mobility training (Done)       Learning Progress Summary             Patient Acceptance, E,D, VU,NR by MB at 11/6/2023 1601    Acceptance, E,D, VU,NR by MAIA at 11/5/2023 1511    Acceptance, E,TB, NR by SREE at 11/2/2023 1421    Acceptance, E,TB, VU,NR by SREE at 11/1/2023 1417                         Point: Home exercise program (Done)       Learning Progress Summary             Patient Acceptance, E,D, VU,NR by MB at 11/6/2023 1601    Acceptance, E,D, VU,NR by MAIA at 11/5/2023 1511    Acceptance, E,TB, NR by SREE at 11/2/2023 1421                         Point: Body mechanics (Done)       Learning Progress Summary             Patient Acceptance, E,D, VU,NR by MB at 11/6/2023 1601    Acceptance, E,D, VU,NR by MAIA at 11/5/2023 1511    Acceptance, E,TB, NR by SREE at 11/2/2023 1421    Acceptance, E,TB, VU,NR by SREE at 11/1/2023 1417                          Point: Precautions (Done)       Learning Progress Summary             Patient Acceptance, E,D, VU,NR by MB at 11/6/2023 1601    Acceptance, E,D, VU,NR by MAIA at 11/5/2023 1511    Acceptance, E,TB, NR by AY at 11/2/2023 1421    Acceptance, E,TB, VU,NR by AY at 11/1/2023 1417                                         User Key       Initials Effective Dates Name Provider Type Discipline    LS 02/03/23 -  Janna Abdullahi, PT Physical Therapist PT    MB 06/16/21 -  Aarti Ventura, PT Physical Therapist PT    SREE 11/10/20 -  Nidhi Blank, PT Physical Therapist PT                  PT Recommendation and Plan     Plan of Care Reviewed With: patient  Progress: improving  Outcome Evaluation: Patient pleasant w/ good effort. She demonstrates improving independence w/ functional mobility, but continues to readily fatigue and be limited by weakness, impaired balance, and remains below her functional baseline. Pt. ambulated 115ft w/ RW and CGA w/ standing rests and cueing for safety. PT continues to recommend IP rehab at D/C.     Time Calculation:         PT Charges       Row Name 11/06/23 1601             Time Calculation    Start Time 1400  -MB      PT Received On 11/06/23  -MB      PT Goal Re-Cert Due Date 11/11/23  -MB         Timed Charges    10299 - PT Therapeutic Exercise Minutes 12  -MB      32466 - Gait Training Minutes  20  -MB         Total Minutes    Timed Charges Total Minutes 32  -MB       Total Minutes 32  -MB                User Key  (r) = Recorded By, (t) = Taken By, (c) = Cosigned By      Initials Name Provider Type    Aarti Lazar, PT Physical Therapist                  Therapy Charges for Today       Code Description Service Date Service Provider Modifiers Qty    96721177847 HC PT THER PROC EA 15 MIN 11/6/2023 Aarti Ventura, PT GP 1    59061093075 HC GAIT TRAINING EA 15 MIN 11/6/2023 Aarti Ventura, PT GP 1            PT G-Codes  Outcome Measure Options: AM-PAC 6  Clicks Basic Mobility (PT)  AM-PAC 6 Clicks Score (PT): 17  AM-PAC 6 Clicks Score (OT): 12  Modified [place] Scale: 3 - Moderate disability.  Requiring some help, but able to walk without assistance.  PT Discharge Summary  Anticipated Discharge Disposition (PT): inpatient rehabilitation facility    Aarti Ventura, PT  11/6/2023

## 2023-11-06 NOTE — PLAN OF CARE
Goal Outcome Evaluation:  Plan of Care Reviewed With: patient        Progress: improving  Outcome Evaluation: Patient pleasant w/ good effort. She demonstrates improving independence w/ functional mobility, but continues to readily fatigue and be limited by weakness, impaired balance, and remains below her functional baseline. Pt. ambulated 115ft w/ RW and CGA w/ standing rests and cueing for safety. PT continues to recommend IP rehab at D/C.      Anticipated Discharge Disposition (PT): inpatient rehabilitation facility

## 2023-11-07 ENCOUNTER — APPOINTMENT (OUTPATIENT)
Dept: CT IMAGING | Facility: HOSPITAL | Age: 53
DRG: 024 | End: 2023-11-07
Payer: MEDICARE

## 2023-11-07 LAB
ANION GAP SERPL CALCULATED.3IONS-SCNC: 10 MMOL/L (ref 5–15)
BASOPHILS # BLD AUTO: 0.05 10*3/MM3 (ref 0–0.2)
BASOPHILS NFR BLD AUTO: 0.6 % (ref 0–1.5)
BUN SERPL-MCNC: 14 MG/DL (ref 6–20)
BUN/CREAT SERPL: 23 (ref 7–25)
CALCIUM SPEC-SCNC: 9.5 MG/DL (ref 8.6–10.5)
CHLORIDE SERPL-SCNC: 109 MMOL/L (ref 98–107)
CO2 SERPL-SCNC: 20 MMOL/L (ref 22–29)
CREAT SERPL-MCNC: 0.61 MG/DL (ref 0.57–1)
DEPRECATED RDW RBC AUTO: 69.6 FL (ref 37–54)
EGFRCR SERPLBLD CKD-EPI 2021: 107.1 ML/MIN/1.73
EOSINOPHIL # BLD AUTO: 0.23 10*3/MM3 (ref 0–0.4)
EOSINOPHIL NFR BLD AUTO: 2.9 % (ref 0.3–6.2)
ERYTHROCYTE [DISTWIDTH] IN BLOOD BY AUTOMATED COUNT: 18.9 % (ref 12.3–15.4)
GLUCOSE BLDC GLUCOMTR-MCNC: 108 MG/DL (ref 70–130)
GLUCOSE BLDC GLUCOMTR-MCNC: 108 MG/DL (ref 70–130)
GLUCOSE BLDC GLUCOMTR-MCNC: 129 MG/DL (ref 70–130)
GLUCOSE BLDC GLUCOMTR-MCNC: 97 MG/DL (ref 70–130)
GLUCOSE SERPL-MCNC: 82 MG/DL (ref 65–99)
HCT VFR BLD AUTO: 23.8 % (ref 34–46.6)
HGB BLD-MCNC: 7.1 G/DL (ref 12–15.9)
IMM GRANULOCYTES # BLD AUTO: 0.05 10*3/MM3 (ref 0–0.05)
IMM GRANULOCYTES NFR BLD AUTO: 0.6 % (ref 0–0.5)
INR PPP: 1.1 (ref 0.89–1.12)
LYMPHOCYTES # BLD AUTO: 0.69 10*3/MM3 (ref 0.7–3.1)
LYMPHOCYTES NFR BLD AUTO: 8.6 % (ref 19.6–45.3)
MAGNESIUM SERPL-MCNC: 2.1 MG/DL (ref 1.6–2.6)
MCH RBC QN AUTO: 29.7 PG (ref 26.6–33)
MCHC RBC AUTO-ENTMCNC: 29.8 G/DL (ref 31.5–35.7)
MCV RBC AUTO: 99.6 FL (ref 79–97)
MONOCYTES # BLD AUTO: 0.62 10*3/MM3 (ref 0.1–0.9)
MONOCYTES NFR BLD AUTO: 7.7 % (ref 5–12)
NEUTROPHILS NFR BLD AUTO: 6.38 10*3/MM3 (ref 1.7–7)
NEUTROPHILS NFR BLD AUTO: 79.6 % (ref 42.7–76)
NRBC BLD AUTO-RTO: 0 /100 WBC (ref 0–0.2)
PLATELET # BLD AUTO: 253 10*3/MM3 (ref 140–450)
PMV BLD AUTO: 10.9 FL (ref 6–12)
POTASSIUM SERPL-SCNC: 4.4 MMOL/L (ref 3.5–5.2)
PROTHROMBIN TIME: 14.3 SECONDS (ref 12.2–14.5)
RBC # BLD AUTO: 2.39 10*6/MM3 (ref 3.77–5.28)
SODIUM SERPL-SCNC: 139 MMOL/L (ref 136–145)
UFH PPP CHRO-ACNC: 0.38 IU/ML (ref 0.3–0.7)
WBC NRBC COR # BLD: 8.02 10*3/MM3 (ref 3.4–10.8)

## 2023-11-07 PROCEDURE — 85025 COMPLETE CBC W/AUTO DIFF WBC: CPT | Performed by: INTERNAL MEDICINE

## 2023-11-07 PROCEDURE — 92507 TX SP LANG VOICE COMM INDIV: CPT

## 2023-11-07 PROCEDURE — 97535 SELF CARE MNGMENT TRAINING: CPT

## 2023-11-07 PROCEDURE — 83735 ASSAY OF MAGNESIUM: CPT | Performed by: INTERNAL MEDICINE

## 2023-11-07 PROCEDURE — 82948 REAGENT STRIP/BLOOD GLUCOSE: CPT

## 2023-11-07 PROCEDURE — 99232 SBSQ HOSP IP/OBS MODERATE 35: CPT | Performed by: INTERNAL MEDICINE

## 2023-11-07 PROCEDURE — 94799 UNLISTED PULMONARY SVC/PX: CPT

## 2023-11-07 PROCEDURE — 85610 PROTHROMBIN TIME: CPT | Performed by: INTERNAL MEDICINE

## 2023-11-07 PROCEDURE — 99232 SBSQ HOSP IP/OBS MODERATE 35: CPT | Performed by: NURSE PRACTITIONER

## 2023-11-07 PROCEDURE — 94664 DEMO&/EVAL PT USE INHALER: CPT

## 2023-11-07 PROCEDURE — 70450 CT HEAD/BRAIN W/O DYE: CPT

## 2023-11-07 PROCEDURE — 94761 N-INVAS EAR/PLS OXIMETRY MLT: CPT

## 2023-11-07 PROCEDURE — 92526 ORAL FUNCTION THERAPY: CPT

## 2023-11-07 PROCEDURE — 25010000002 HEPARIN (PORCINE) 25000-0.45 UT/250ML-% SOLUTION

## 2023-11-07 PROCEDURE — 94760 N-INVAS EAR/PLS OXIMETRY 1: CPT

## 2023-11-07 PROCEDURE — 85520 HEPARIN ASSAY: CPT

## 2023-11-07 PROCEDURE — 80048 BASIC METABOLIC PNL TOTAL CA: CPT | Performed by: INTERNAL MEDICINE

## 2023-11-07 PROCEDURE — 97530 THERAPEUTIC ACTIVITIES: CPT

## 2023-11-07 RX ADMIN — LEVOTHYROXINE SODIUM 75 MCG: 0.07 TABLET ORAL at 06:11

## 2023-11-07 RX ADMIN — TOPIRAMATE 200 MG: 100 TABLET, FILM COATED ORAL at 20:05

## 2023-11-07 RX ADMIN — IPRATROPIUM BROMIDE AND ALBUTEROL SULFATE 3 ML: 2.5; .5 SOLUTION RESPIRATORY (INHALATION) at 16:42

## 2023-11-07 RX ADMIN — IPRATROPIUM BROMIDE AND ALBUTEROL SULFATE 3 ML: 2.5; .5 SOLUTION RESPIRATORY (INHALATION) at 21:23

## 2023-11-07 RX ADMIN — HEPARIN SODIUM 22 UNITS/KG/HR: 10000 INJECTION, SOLUTION INTRAVENOUS at 16:39

## 2023-11-07 RX ADMIN — LEVETIRACETAM 1000 MG: 500 TABLET, FILM COATED ORAL at 20:05

## 2023-11-07 RX ADMIN — PANTOPRAZOLE SODIUM 40 MG: 40 TABLET, DELAYED RELEASE ORAL at 06:11

## 2023-11-07 RX ADMIN — IPRATROPIUM BROMIDE AND ALBUTEROL SULFATE 3 ML: 2.5; .5 SOLUTION RESPIRATORY (INHALATION) at 08:52

## 2023-11-07 RX ADMIN — MIDODRINE HYDROCHLORIDE 5 MG: 5 TABLET ORAL at 20:05

## 2023-11-07 RX ADMIN — MIDODRINE HYDROCHLORIDE 5 MG: 5 TABLET ORAL at 04:05

## 2023-11-07 RX ADMIN — HEPARIN SODIUM 22 UNITS/KG/HR: 10000 INJECTION, SOLUTION INTRAVENOUS at 01:30

## 2023-11-07 RX ADMIN — TOPIRAMATE 200 MG: 100 TABLET, FILM COATED ORAL at 08:26

## 2023-11-07 RX ADMIN — WARFARIN SODIUM 4 MG: 4 TABLET ORAL at 18:17

## 2023-11-07 RX ADMIN — Medication 10 ML: at 08:27

## 2023-11-07 RX ADMIN — MIDODRINE HYDROCHLORIDE 5 MG: 5 TABLET ORAL at 12:11

## 2023-11-07 RX ADMIN — ASPIRIN 81 MG CHEWABLE TABLET 81 MG: 81 TABLET CHEWABLE at 08:26

## 2023-11-07 RX ADMIN — IPRATROPIUM BROMIDE AND ALBUTEROL SULFATE 3 ML: 2.5; .5 SOLUTION RESPIRATORY (INHALATION) at 13:17

## 2023-11-07 RX ADMIN — LEVETIRACETAM 1000 MG: 500 TABLET, FILM COATED ORAL at 08:26

## 2023-11-07 RX ADMIN — Medication 10 ML: at 20:05

## 2023-11-07 RX ADMIN — ATORVASTATIN CALCIUM 80 MG: 40 TABLET, FILM COATED ORAL at 20:05

## 2023-11-07 NOTE — CASE MANAGEMENT/SOCIAL WORK
Continued Stay Note  [facility]     Patient Name: aMra Martínez  MRN: [id number]  Today's Date: 11/7/2023    Admit Date: 10/31/2023    Plan: Home   Discharge Plan       Row Name 11/07/23 1139       Plan    Plan Home    Patient/Family in Agreement with Plan yes    Plan Comments Spoke with therapy today, patient improved significantly and they are now recommending home at discharge.  Notified Regine at [facility] of change in disposition plan.  CM will continue to follow.    Final Discharge Disposition Code 01 - home or self-care                   Discharge Codes    No documentation.                 Expected Discharge Date and Time       Expected Discharge Date Expected Discharge Time    Nov 10, 2023               Jasmin Enriquez RN

## 2023-11-07 NOTE — PLAN OF CARE
Goal Outcome Evaluation:  Plan of Care Reviewed With: patient         SLP treatment completed. Will continue to address dysphagia & communication. Please see note for further details and recommendations.

## 2023-11-07 NOTE — PLAN OF CARE
Goal Outcome Evaluation:  Plan of Care Reviewed With: patient        Progress: improving  Outcome Evaluation: Pt demo significantly improved independence by performing functional mobility w/ CGAx1+1 w/ RW and ADLs w/ SBA. Pt conts to be limited d/t mild balance deficits and generalized weakness warranting cont skilled IPOT POC to promote return to PLOF. Recommend pt DC home w/ 24/7 assist and HH OT/PT services.      Anticipated Discharge Disposition (OT): home with 24/7 care, home with home health

## 2023-11-07 NOTE — THERAPY TREATMENT NOTE
Patient Name: Mara Martínez  : 1970    MRN: [id number]                              Today's Date: 2023       Admit Date: 10/31/2023    Visit Dx:     ICD-10-CM ICD-9-CM   1. Acute CVA (cerebrovascular accident)  I63.9 434.91   2. Dysphagia, unspecified type  R13.10 787.20   3. Communication deficit  F80.9 307.9   4. Anemia, unspecified type  D64.9 285.9   5. Acute UTI  N39.0 599.0     Patient Active Problem List   Diagnosis    Anemia    Aortic valve insufficiency    Bruit of left carotid artery    Chest pain    Positive D-dimer    Dizziness    Edema    Fatigue    HTN (hypertension)    Heart murmur    Hyperlipidemia    Mitral valve stenosis    Mitral valve insufficiency    Palpitations    Pulmonary edema    Seizure disorder    Shortness of breath    Difficulty sleeping    Snoring    Supravalvular aortic stenosis    Syncope    Tachycardia    Mitral stenosis    Rheumatic aortic stenosis    Valvular heart disease    Epilepsy    ISREAL (cerebral atherosclerosis)    Tobacco use    Migraines    Aortic regurgitation    Rheumatic mitral regurgitation    SOB (shortness of breath)    Supraventricular aortic stenosis    Aortic valve stenosis    Rheumatic aortic valve insufficiency    Moderate aortic stenosis    Aortic valve disorder    Coronary artery disease involving native coronary artery of native heart without angina pectoris    S/P mitral valve replacement    S/P mechanical aortic and mitral valve replacement (2018)    Stenosis of carotid artery    Generalized abdominal pain    Rectal bleeding    Functional diarrhea    Lower extremity edema    Rectal bleed    (HFpEF) heart failure with preserved ejection fraction    Chronic pain with intrathecal pump in place    Dyslipidemia    Bilateral carotid artery stenosis    Low back pain    Degeneration of lumbar intervertebral disc    Lumbosacral radiculopathy    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    Cerebral aneurysm    Complex partial seizures  with consciousness impaired    Migraine without aura and with status migrainosus, not intractable    Chronic obstructive pulmonary disease    Essential tremor    Hesitancy of micturition    Complex partial epilepsy with generalization and with intractable epilepsy    Focal (motor) epilepsy    Occipital neuralgia    Paresthesia    Restless legs syndrome    Retinal drusen    Acute ischemic left MCA stroke    H/O recurrent GIB 2* AVMs    UTI (urinary tract infection)    Chronic hypotension on midodrine     Past Medical History:   Diagnosis Date    Anemia     Aortic regurgitation     Arthritis     Back pain     Carotid bruit     ISREAL (cerebral atherosclerosis) 2014    nonobstructive    Chest pain     Chronic hypotension on midodrine 2023    Chronic obstructive pulmonary disease 2022    Coronary artery disease     COVID-19 vaccine administered     phizer    D-dimer, elevated     Diastolic congestive heart failure 2019    Dizziness     Edema     Epilepsy     Fatigue     Heart murmur     History of blood transfusion 2019    History of blood transfusion     History of blood transfusion 2022    History of blood transfusion     August 2023 x2    History of recent blood transfusion 2021    History of transfusion     Hyperlipidemia     Hypertension     Kidney stones     Migraines     Mitral regurgitation     Mitral stenosis     mild    Neuropathy     Neuropathy     Palpitations     Pulmonary edema     Retinal drusen     Seizure     Sleep apnea 2019    Sleeping difficulties     SOB (shortness of breath)     Supraventricular aortic stenosis     Syncope     Tachycardia     Tobacco abuse     VHD (valvular heart disease)     Wears dentures     Wears eyeglasses      Past Surgical History:   Procedure Laterality Date    APPENDECTOMY      CARDIAC CATHETERIZATION      CARDIAC SURGERY      2 valves replaced     SECTION      x 2    CHOLECYSTECTOMY      COLONOSCOPY      COLONOSCOPY N/A 2019     Procedure: COLONOSCOPY;  Surgeon: Kurt Gaines MD;  Location: [facility] OR;  Service: Gastroenterology    CORONARY ARTERY BYPASS GRAFT  06/12/2018    EXPLORATORY LAPAROTOMY      HERNIA REPAIR      HYSTERECTOMY      INTERVENTIONAL RADIOLOGY PROCEDURE Bilateral 10/31/2023    Procedure: Carotid Cerebral Angiogram;  Surgeon: Cayetano Mckeon MD;  Location: [facility] CATH INVASIVE LOCATION;  Service: Interventional Radiology;  Laterality: Bilateral;    JOINT REPLACEMENT Right     knee replacement    PAIN PUMP INSERTION/REVISION      morphine    PORTACATH PLACEMENT      UPPER GASTROINTESTINAL ENDOSCOPY        General Information       Row Name 11/07/23 1441          OT Time and Intention    Document Type therapy note (daily note)  -CS     Mode of Treatment occupational therapy  -CS       Row Name 11/07/23 1441          General Information    Existing Precautions/Restrictions fall;oxygen therapy device and L/min  -CS     Barriers to Rehab medically complex;previous functional deficit  -CS       Row Name 11/07/23 1441          Cognition    Orientation Status (Cognition) oriented x 4  -CS       Row Name 11/07/23 1441          Safety Issues, Functional Mobility    Impairments Affecting Function (Mobility) balance;endurance/activity tolerance;strength;postural/trunk control  -CS               User Key  (r) = Recorded By, (t) = Taken By, (c) = Cosigned By      Initials Name Provider Type    CS Aurea Ernst, DEBBIE Occupational Therapist                     Mobility/ADL's       Row Name 11/07/23 1441          Bed Mobility    Bed Mobility supine-sit  -CS     Supine-Sit [place] (Bed Mobility) supervision;verbal cues  -CS     Assistive Device (Bed Mobility) bed rails;draw sheet;head of bed elevated  -CS       Row Name 11/07/23 1441          Transfers    Transfers sit-stand transfer;stand-sit transfer  -CS       Row Name 11/07/23 1441          Sit-Stand Transfer    Sit-Stand [place] (Transfers) standby assist;verbal cues   -     Assistive Device (Sit-Stand Transfers) walker, front-wheeled  -       Row Name 11/07/23 1441          Stand-Sit Transfer    Stand-Sit [place] (Transfers) standby assist;verbal cues  -     Assistive Device (Stand-Sit Transfers) walker, front-wheeled  -       Row Name 11/07/23 1441          Functional Mobility    Functional Mobility- Ind. Level contact guard assist;1 person + 1 person to manage equipment;verbal cues required  -     Functional Mobility- Device walker, front-wheeled  -     Functional Mobility-Distance (Feet) --  >household distance  -       Row Name 11/07/23 1441          Activities of Daily Living    BADL Assessment/Intervention toileting;lower body dressing  -       Row Name 11/07/23 1441          Lower Body Dressing Assessment/Training    [place] Level (Lower Body Dressing) doff;don;socks;standby assist  -     Position (Lower Body Dressing) supported sitting  -       Row Name 11/07/23 1441          Toileting Assessment/Training    [place] Level (Toileting) adjust/manage clothing;change pad/brief;standby assist  -     Position (Toileting) supported sitting;supported standing  -               User Key  (r) = Recorded By, (t) = Taken By, (c) = Cosigned By      Initials Name Provider Type     Aurea Ernst OT Occupational Therapist                   Obj/Interventions       [facility] Name 11/07/23 1442          Balance    Balance Assessment sitting static balance;sitting dynamic balance;standing static balance;standing dynamic balance  -     Static Sitting Balance supervision  -     Dynamic Sitting Balance supervision  -     Position, Sitting Balance sitting edge of bed  -     Static Standing Balance standby assist  -     Dynamic Standing Balance contact guard  -     Position/Device Used, Standing Balance supported;walker, rolling  -     Balance Interventions sitting;standing;static;dynamic;dynamic reaching;occupation based/functional task;weight  shifting activity  -CS               User Key  (r) = Recorded By, (t) = Taken By, (c) = Cosigned By      Initials Name Provider Type    CS Aurea Ernst OT Occupational Therapist                   Goals/Plan    No documentation.                  Clinical Impression       Row Name 11/07/23 1442          Pain Assessment    Pretreatment Pain Rating 0/10 - no pain  -CS     Posttreatment Pain Rating 0/10 - no pain  -CS       Row Name 11/07/23 1442          Plan of Care Review    Plan of Care Reviewed With patient  -CS     Progress improving  -CS     Outcome Evaluation Pt demo significantly improved independence by performing functional mobility w/ CGAx1+1 w/ RW and ADLs w/ SBA. Pt conts to be limited d/t mild balance deficits and generalized weakness warranting cont skilled IPOT POC to promote return to PLOF. Recommend pt DC home w/ 24/7 assist and HH OT/PT services.  -       Row Name 11/07/23 1442          Therapy Plan Review/Discharge Plan (OT)    Anticipated Discharge Disposition (OT) home with 24/7 care;home with home health  -       Row Name 11/07/23 1442          Vital Signs    Pre Systolic BP Rehab 114  -CS     Pre Treatment Diastolic BP 59  -CS     Post Systolic BP Rehab 102  -CS     Post Treatment Diastolic BP 54  -CS     Pretreatment Heart Rate (beats/min) 74  -CS     Posttreatment Heart Rate (beats/min) 75  -CS     Pre SpO2 (%) 100  -CS     O2 Delivery Pre Treatment room air  -CS     Post SpO2 (%) 100  -CS     O2 Delivery Post Treatment room air  -CS     Pre Patient Position Supine  -CS     Intra Patient Position Standing  -CS     Post Patient Position Sitting  -CS       Row Name 11/07/23 1442          Positioning and Restraints    Pre-Treatment Position in bed  -CS     Post Treatment Position chair  -CS     In Chair notified nsg;reclined;call light within reach;exit alarm on;encouraged to call for assist;RUE elevated;LUE elevated;waffle cushion;legs elevated;heels elevated;with nsg  -CS                User Key  (r) = Recorded By, (t) = Taken By, (c) = Cosigned By      Initials Name Provider Type    Aurea Whitt OT Occupational Therapist                   Outcome Measures       Row Name 11/07/23 1444          How much help from another is currently needed...    Putting on and taking off regular lower body clothing? 3  -CS     Bathing (including washing, rinsing, and drying) 3  -CS     Toileting (which includes using toilet bed pan or urinal) 3  -CS     Putting on and taking off regular upper body clothing 3  -CS     Taking care of personal grooming (such as brushing teeth) 4  -CS     Eating meals 4  -CS     AM-PAC 6 Clicks Score (OT) 20  -CS       Row Name 11/07/23 1444          Modified [place] Scale    Modified [place] Scale 3 - Moderate disability.  Requiring some help, but able to walk without assistance.  -CS       Row Name 11/07/23 1444          Functional Assessment    Outcome Measure Options AM-PAC 6 Clicks Daily Activity (OT);Modified [place]  -CS               User Key  (r) = Recorded By, (t) = Taken By, (c) = Cosigned By      Initials Name Provider Type    Aurea Whitt OT Occupational Therapist                    Occupational Therapy Education       Title: PT OT SLP Therapies (In Progress)       Topic: Occupational Therapy (In Progress)       Point: ADL training (In Progress)       Description:   Instruct learner(s) on proper safety adaptation and remediation techniques during self care or transfers.   Instruct in proper use of assistive devices.                  Learning Progress Summary             Patient Acceptance, E, NR by CS at 11/7/2023 1444    Acceptance, E, NR by CS at 11/3/2023 1503    Acceptance, E, NR by CS at 11/1/2023 1159   Family Acceptance, E, NR by CS at 11/3/2023 1503    Acceptance, E, NR by CS at 11/1/2023 1159                         Point: Home exercise program (In Progress)       Description:   Instruct learner(s) on appropriate technique for monitoring, assisting and/or  progressing therapeutic exercises/activities.                  Learning Progress Summary             Patient Acceptance, E, NR by CS at 11/7/2023 1444                         Point: Precautions (In Progress)       Description:   Instruct learner(s) on prescribed precautions during self-care and functional transfers.                  Learning Progress Summary             Patient Acceptance, E, NR by CS at 11/7/2023 1444    Acceptance, E, NR by CS at 11/3/2023 1503    Acceptance, E, NR by CS at 11/1/2023 1159   Family Acceptance, E, NR by CS at 11/3/2023 1503    Acceptance, E, NR by CS at 11/1/2023 1159                         Point: Body mechanics (In Progress)       Description:   Instruct learner(s) on proper positioning and spine alignment during self-care, functional mobility activities and/or exercises.                  Learning Progress Summary             Patient Acceptance, E, NR by CS at 11/7/2023 1444    Acceptance, E, NR by CS at 11/3/2023 1503    Acceptance, E, NR by CS at 11/1/2023 1159   Family Acceptance, E, NR by CS at 11/3/2023 1503    Acceptance, E, NR by CS at 11/1/2023 1159                                         User Key       Initials Effective Dates Name Provider Type Discipline     09/02/21 -  Aurea Ernst, DEBBIE Occupational Therapist OT                  OT Recommendation and Plan  Planned Therapy Interventions (OT): activity tolerance training, BADL retraining, adaptive equipment training, functional balance retraining, occupation/activity based interventions, ROM/therapeutic exercise, strengthening exercise, transfer/mobility retraining  Therapy Frequency (OT): daily  Plan of Care Review  Plan of Care Reviewed With: patient  Progress: improving  Outcome Evaluation: Pt demo significantly improved independence by performing functional mobility w/ CGAx1+1 w/ RW and ADLs w/ SBA. Pt conts to be limited d/t mild balance deficits and generalized weakness warranting cont skilled IPOT POC to  promote return to PLOF. Recommend pt DC home w/ 24/7 assist and HH OT/PT services.     Time Calculation:   Evaluation Complexity (OT)  Review Occupational Profile/Medical/Therapy History Complexity: brief/low complexity  Assessment, Occupational Performance/Identification of Deficit Complexity: 5 or more performance deficits  Clinical Decision Making Complexity (OT): detailed assessment/moderate complexity  Overall Complexity of Evaluation (OT): low complexity     Time Calculation- OT       Row Name 11/07/23 1445             Time Calculation- OT    OT Start Time 1110  -CS      OT Received On 11/07/23  -CS      OT Goal Re-Cert Due Date 11/11/23  -CS         Timed Charges    12707 - OT Therapeutic Activity Minutes 15  -CS      78521 - OT Self Care/Mgmt Minutes 10  -CS         Total Minutes    Timed Charges Total Minutes 25  -CS       Total Minutes 25  -CS                User Key  (r) = Recorded By, (t) = Taken By, (c) = Cosigned By      Initials Name Provider Type    CS Aurea Ernst OT Occupational Therapist                  Therapy Charges for Today       Code Description Service Date Service Provider Modifiers Qty    24232235557 HC OT THERAPEUTIC ACT EA 15 MIN 11/7/2023 Aurea Ernst OT GO 1    21377812981 HC OT SELF CARE/MGMT/TRAIN EA 15 MIN 11/7/2023 Aurea Ernst OT GO 1                 Aurea Ernst OT  11/7/2023

## 2023-11-07 NOTE — THERAPY TREATMENT NOTE
Acute Care - Speech Language Pathology   Swallow Treatment Note [facility]     Patient Name: Mara Martínez  : 1970  MRN: [id number]  Today's Date: 2023               Admit Date: 10/31/2023    Visit Dx:     ICD-10-CM ICD-9-CM   1. Acute CVA (cerebrovascular accident)  I63.9 434.91   2. Dysphagia, unspecified type  R13.10 787.20   3. Communication deficit  F80.9 307.9   4. Anemia, unspecified type  D64.9 285.9   5. Acute UTI  N39.0 599.0     Patient Active Problem List   Diagnosis    Anemia    Aortic valve insufficiency    Bruit of left carotid artery    Chest pain    Positive D-dimer    Dizziness    Edema    Fatigue    HTN (hypertension)    Heart murmur    Hyperlipidemia    Mitral valve stenosis    Mitral valve insufficiency    Palpitations    Pulmonary edema    Seizure disorder    Shortness of breath    Difficulty sleeping    Snoring    Supravalvular aortic stenosis    Syncope    Tachycardia    Mitral stenosis    Rheumatic aortic stenosis    Valvular heart disease    Epilepsy    ISREAL (cerebral atherosclerosis)    Tobacco use    Migraines    Aortic regurgitation    Rheumatic mitral regurgitation    SOB (shortness of breath)    Supraventricular aortic stenosis    Aortic valve stenosis    Rheumatic aortic valve insufficiency    Moderate aortic stenosis    Aortic valve disorder    Coronary artery disease involving native coronary artery of native heart without angina pectoris    S/P mitral valve replacement    S/P mechanical aortic and mitral valve replacement (2018)    Stenosis of carotid artery    Generalized abdominal pain    Rectal bleeding    Functional diarrhea    Lower extremity edema    Rectal bleed    (HFpEF) heart failure with preserved ejection fraction    Chronic pain with intrathecal pump in place    Dyslipidemia    Bilateral carotid artery stenosis    Low back pain    Degeneration of lumbar intervertebral disc    Lumbosacral radiculopathy    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    Cerebral aneurysm    Complex partial seizures with consciousness impaired    Migraine without aura and with status migrainosus, not intractable    Chronic obstructive pulmonary disease    Essential tremor    Hesitancy of micturition    Complex partial epilepsy with generalization and with intractable epilepsy    Focal (motor) epilepsy    Occipital neuralgia    Paresthesia    Restless legs syndrome    Retinal drusen    Acute ischemic left MCA stroke    H/O recurrent GIB 2* AVMs    UTI (urinary tract infection)    Chronic hypotension on midodrine     Past Medical History:   Diagnosis Date    Anemia     Aortic regurgitation     Arthritis     Back pain     Carotid bruit     ISREAL (cerebral atherosclerosis) 2014    nonobstructive    Chest pain     Chronic hypotension on midodrine 2023    Chronic obstructive pulmonary disease 2022    Coronary artery disease     COVID-19 vaccine administered     phizer    D-dimer, elevated     Diastolic congestive heart failure 2019    Dizziness     Edema     Epilepsy     Fatigue     Heart murmur     History of blood transfusion 2019    History of blood transfusion     History of blood transfusion 2022    History of blood transfusion     August 2023 x2    History of recent blood transfusion 2021    History of transfusion     Hyperlipidemia     Hypertension     Kidney stones     Migraines     Mitral regurgitation     Mitral stenosis     mild    Neuropathy     Neuropathy     Palpitations     Pulmonary edema     Retinal drusen     Seizure     Sleep apnea 2019    Sleeping difficulties     SOB (shortness of breath)     Supraventricular aortic stenosis     Syncope     Tachycardia     Tobacco abuse     VHD (valvular heart disease)     Wears dentures     Wears eyeglasses      Past Surgical History:   Procedure Laterality Date    APPENDECTOMY      CARDIAC CATHETERIZATION      CARDIAC SURGERY      2 valves replaced     SECTION      x 2     CHOLECYSTECTOMY      COLONOSCOPY      COLONOSCOPY N/A 5/2/2019    Procedure: COLONOSCOPY;  Surgeon: Kurt Gaines MD;  Location:  [facility];  Service: Gastroenterology    CORONARY ARTERY BYPASS GRAFT  06/12/2018    EXPLORATORY LAPAROTOMY      HERNIA REPAIR      HYSTERECTOMY      INTERVENTIONAL RADIOLOGY PROCEDURE Bilateral 10/31/2023    Procedure: Carotid Cerebral Angiogram;  Surgeon: Cayetano Mckeon MD;  Location:  [facility];  Service: Interventional Radiology;  Laterality: Bilateral;    JOINT REPLACEMENT Right     knee replacement    PAIN PUMP INSERTION/REVISION      morphine    PORTACATH PLACEMENT      UPPER GASTROINTESTINAL ENDOSCOPY         SLP Recommendation and Plan     SLP Diet Recommendation: regular textures, thin liquids, other (see comments) (no straw) (11/07/23 1530)  Recommended Precautions and Strategies: upright posture during/after eating, small bites of food and sips of liquid, no straw, general aspiration precautions (11/07/23 1530)  SLP Rec. for Method of Medication Administration: meds whole, with puree, as tolerated (11/07/23 1530)     Monitor for Signs of Aspiration: yes, notify SLP if any concerns (11/07/23 1530)        Anticipated Discharge Disposition (SLP): home with home health (11/07/23 1530)     Therapy Frequency (Swallow): 5 days per week (11/07/23 1530)  Predicted Duration Therapy Intervention (Days): until discharge (11/07/23 1530)  Oral Care Recommendations: Oral Care BID/PRN (11/07/23 1530)        Daily Summary of Progress (SLP): progress toward functional goals is good (11/07/23 1530)               Treatment Assessment (SLP): suspected, continued, pharyngeal dysphagia, toleration of diet, dysarthria (11/07/23 1530)  Treatment Assessment Comments (SLP): Reviewed & completed dysphagia exercises w/ pt, as well as completed speech tx. (11/07/23 1530)  Plan for Continued Treatment (SLP): continue treatment per plan of care (11/07/23 1530)       Oral Care  Recommendations: Oral Care BID/PRN (11/07/23 1530)    Plan of Care Reviewed With: patient      SWALLOW EVALUATION (last 72 hours)       SLP Adult Swallow Evaluation       Row Name 11/07/23 1530 11/05/23 1400                Rehab Evaluation    Document Type therapy note (daily note)  -MP re-evaluation;other (see comments)  FEES  -CH       Subjective Information no complaints  -MP no complaints  -CH       Patient Observations alert;cooperative  -MP alert;cooperative;agree to therapy  -       Patient/Family/Caregiver Comments/Observations No family present  -MP none present  -CH       Patient Effort good  -MP good  -       Symptoms Noted During/After Treatment -- none  -          General Information    Patient Profile Reviewed -- yes  -       Pertinent History Of Current Problem -- see prior eval.  -       Current Method of Nutrition -- NPO;nasogastric feedings  -       Precautions/Limitations, Vision -- WFL with corrective lenses;for purposes of eval  -       Precautions/Limitations, Hearing -- WFL;for purposes of eval  -       Prior Level of Function-Communication -- unknown  -       Prior Level of Function-Swallowing -- no diet consistency restrictions  -       Plans/Goals Discussed with -- patient;agreed upon  -       Barriers to Rehab -- medically complex  -       Patient's Goals for Discharge -- return to PO diet  -          Pain    Additional Documentation Pain Scale: FACES Pre/Post-Treatment (Group)  -MP Pain Scale: FACES Pre/Post-Treatment (Group)  -CH          Pain Scale: FACES Pre/Post-Treatment    Pain: FACES Scale, Pretreatment 0-->no hurt  -MP 0-->no hurt  -CH       Posttreatment Pain Rating 0-->no hurt  -MP 0-->no hurt  -CH          Fiberoptic Endoscopic Evaluation of Swallowing (FEES)    Risks/Benefits Reviewed -- risks/benefits explained;patient;agreed to eval  -       Nasal Entry -- left:  -       Scope serial number/identification -- [id number]  -          Anatomy and  Physiology    Anatomic Considerations -- no anatomic structural deviation  -CH       Velopharyngeal -- WFL  -CH       Base of Tongue -- symmetrical  -CH       Epiglottis -- WFL  -CH       Laryngeal Function Breathing -- symmetrical  -CH       Laryngeal Function Phonation -- symmetrical  -CH       Laryngeal Function to Breath Hold -- TVF/FVF/Arytenoid  -CH       Secretion Rating Scale (Danilo et al. 1996) -- 0- normal, no visible secretions  -CH       Spontaneous Swallow -- frequency adequate  -CH       Sensory -- sensed scope  -CH       Utensils Used -- Spoon;Cup;Straw  -CH       Consistencies Trialed -- ice chips;thin liquids;pudding/puree;regular textures  -CH          FEES Interpretation    Oral Phase -- WFL  -CH          Initiation of Pharyngeal Swallow    Initiation of Pharyngeal Swallow -- bolus in pyriform sinuses  -CH       Pharyngeal Phase -- impaired pharyngeal phase of swallowing  -CH       Penetration Before the Swallow -- thin liquids;secondary to delayed swallow initiation or mistiming  -CH       Penetration During the Swallow -- thin liquids;secondary to delayed swallow initiation or mistiming;secondary to reduced laryngeal elevation  -CH       Aspiration During the Swallow -- thin liquids;other (see comments);secondary to delayed swallow initiation or mistiming;secondary to reduced laryngeal elevation;secondary to reduced vestibular closure  by straw  -CH       Depth of Penetration -- deep  -CH       Response to Penetration -- No  -CH       Response to Aspiration -- No  -CH       No spontaneous response to aspiration with -- effective subglottic clearance with cue (see comments)  -CH       Rosenbek's Scale -- thin:;8-->Level 8;pudding/puree:;regular textures:;1-->Level 1  -CH       Attempted Compensatory Maneuvers -- bolus size;bolus presentation style  -CH       Response to Attempted Compensatory Maneuvers -- prevented aspiration  -CH       Successful Compensatory Maneuver Competency -- patient able  to;demonstrate compensations;with cues  -       FEES Summary -- Deep penetration of thin liquids before the swallow with aspiration during the swallow via straw. High shallow penetration of thin liquids during the swallow by spoon and cup. No penetration or aspiration with pureed or regular consistencies. Recommend regular diet and thin liquids, no straw. Small single cup sips. Meds whole with pureed.  -          Swallowing Quality of Life Assessment    Education and counseling provided -- Signs of aspiration;Risks of aspiration;Safest diet options;Compensatory strategy recommendations and rationale;Aspiration precautions  -          SLP Evaluation Clinical Impression    SLP Swallowing Diagnosis -- functional oral phase;mild;pharyngeal dysphagia  -       Functional Impact -- risk of aspiration/pneumonia  -       Rehab Potential/Prognosis, Swallowing -- good, to achieve stated therapy goals  -       Swallow Criteria for Skilled Therapeutic Interventions Met -- demonstrates skilled criteria  -          SLP Treatment Clinical Impressions    Treatment Assessment (SLP) suspected;continued;pharyngeal dysphagia;toleration of diet;dysarthria  -MP --       Treatment Assessment Comments (SLP) Reviewed & completed dysphagia exercises w/ pt, as well as completed speech tx.  -MP --       Daily Summary of Progress (SLP) progress toward functional goals is good  -MP --       Plan for Continued Treatment (SLP) continue treatment per plan of care  -MP --       Care Plan Review care plan/treatment goals reviewed;patient/other agree to care plan  -MP --          Recommendations    Therapy Frequency (Swallow) 5 days per week  - 5 days per week  -       Predicted Duration Therapy Intervention (Days) until discharge  -MP until discharge  -       SLP Diet Recommendation regular textures;thin liquids;other (see comments)  no straw  - regular textures;thin liquids;other (see comments)  No straw  -       Recommended  Precautions and Strategies upright posture during/after eating;small bites of food and sips of liquid;no straw;general aspiration precautions  - upright posture during/after eating;small bites of food and sips of liquid;no straw;general aspiration precautions  -       Oral Care Recommendations Oral Care BID/PRN  -MP Oral Care BID/PRN  -CH       SLP Rec. for Method of Medication Administration meds whole;with puree;as tolerated  -MP meds whole;with puree;as tolerated  -       Monitor for Signs of Aspiration yes;notify SLP if any concerns  -MP yes;notify SLP if any concerns  -       Anticipated Discharge Disposition (SLP) home with home health  - inpatient rehabilitation facility;anticipate therapy at next level of care  -                 User Key  (r) = Recorded By, (t) = Taken By, (c) = Cosigned By      Initials Name Effective Dates    MP Roly Lopez, MS CCC-SLP 12/28/21 -      Mahnaz Carbajal, MS CCC-SLP 06/16/21 -                     EDUCATION  The patient has been educated in the following areas:   Communication Impairment Dysphagia (Swallowing Impairment) Modified Diet Instruction.        SLP GOALS       Row Name 11/07/23 1530 11/05/23 1400          (LTG) Patient will demonstrate functional swallow for    Diet Texture (Demonstrate functional swallow) regular textures  -MP regular textures  -     Liquid viscosity (Demonstrate functional swallow) thin liquids  -MP thin liquids  -     [place]ine (Demonstrate functional swallow) with minimal cues (75-90% accuracy)  -MP with minimal cues (75-90% accuracy)  -CH     Time Frame (Demonstrate functional swallow) by discharge  -MP by discharge  -CH     Progress/Outcomes (Demonstrate functional swallow) continuing progress toward goal  -MP --        (STG) Pharyngeal Strengthening Exercise Goal 1 (SLP)    Activity (Pharyngeal Strengthening Goal 1, SLP) -- increase superior movement of the hyolaryngeal complex;increase anterior movement of the  hyolaryngeal complex;increase closure at entrance to airway/closure of airway at glottis;increase squeeze/positive pressure generation;increase timing  -CH     Increase Timing prepping - 3 second prep or suck swallow or 3-step swallow  -MP prepping - 3 second prep or suck swallow or 3-step swallow  -CH     Increase Superior Movement of the Hyolaryngeal Complex falsetto  -MP falsetto  -CH     Increase Anterior Movement of the Hyolaryngeal Complex chin tuck against resistance (CTAR)  -MP chin tuck against resistance (CTAR)  -CH     Increase Closure at Entrance to Airway/Closure of Airway at Glottis breath hold exercises  -MP breath hold exercises  -CH     Increase Squeeze/Positive Pressure Generation hard effortful swallow  -MP hard effortful swallow  -CH     [place]/Accuracy (Pharyngeal Strengthening Goal 1, SLP) with minimal cues (75-90% accuracy)  -MP with minimal cues (75-90% accuracy)  -CH     Time Frame (Pharyngeal Strengthening Goal 1, SLP) by discharge  -MP by discharge  -CH     Progress/Outcomes (Pharyngeal Strengthening Goal 1, SLP) continuing progress toward goal  -MP --     Comment (Pharyngeal Strengthening Goal 1, SLP) Reviewed & completed. Provided handout.  -MP --        Patient will demonstrate functional language skills for return to discharge environment     [place] with moderate cues  -MP --     Time frame by discharge  -MP --     Progress/Outcomes continuing progress toward goal  -MP --        SLP Diagnostic Treatment     Patient will participate in further assessment in the following areas reading comprehension;graphic expression;cognitive-linguistic  -MP --     Time Frame (Diagnostic) by discharge  -MP --     Progress/Outcomes (Additional Goal 1, SLP) goal ongoing  -MP --        Comprehend Questions Goal 1 (SLP)    Improve Ability to Comprehend Questions Goal 1 (SLP) complex general questions;90%;independently (over 90% accuracy)  -MP --     Time Frame (Comprehend Questions Goal 1, SLP)  by discharge  -MP --     Progress (Ability to Comprehend Questions Goal 1, SLP) 70%;with minimal cues (75-90%)  -MP --     Progress/Outcomes (Comprehend Questions Goal 1, SLP) continuing progress toward goal  -MP --        Follow Directions Goal 2 (SLP)    Improve Ability to Follow Directions Goal 1 (SLP) multistep commands;90%;independently (over 90% accuracy)  -MP --     Time Frame (Follow Directions Goal 1, SLP) by discharge  -MP --     Progress (Ability to Follow Directions Goal 1, SLP) 80%;with minimal cues (75-90%)  -MP --     Progress/Outcomes (Follow Directions Goal 1, SLP) continuing progress toward goal  -MP --        Articulation Goal 1 (SLP)    Improve Articulation Goal 1 (SLP) by over-articulating at phrase level;80%;with minimal cues (75-90%)  -MP --     Time Frame (Articulation Goal 1, SLP) by discharge  -MP --     Progress (Articulation Goal 1, SLP) 70%;with minimal cues (75-90%)  -MP --     Progress/Outcomes (Articulation Goal 1, SLP) continuing progress toward goal  -MP --               User Key  (r) = Recorded By, (t) = Taken By, (c) = Cosigned By      Initials Name Provider Type    Roly Miller, MS CCC-SLP Speech and Language Pathologist    Mahnaz Riley MS CCC-SLP Speech and Language Pathologist                       Time Calculation:    Time Calculation- SLP       Row Name 11/07/23 1559             Time Calculation- SLP    SLP Start Time 1530  -MP      SLP Received On 11/07/23  -MP         Untimed Charges    08177-[facility] Treatment/ST Modification Prosth Aug Alter  12  -MP      20226-[facility] Treatment Swallow Minutes 12  -MP         Total Minutes    Untimed Charges Total Minutes 24  -MP       Total Minutes 24  -MP                User Key  (r) = Recorded By, (t) = Taken By, (c) = Cosigned By      Initials Name Provider Type    Roly Miller, MS CCC-SLP Speech and Language Pathologist                    Therapy Charges for Today       Code Description Service Date Service  Provider Modifiers Qty    [id number] HC ST TREATMENT SPEECH 1 11/7/2023 Roly Lopez, MS CCC-SLP GN 1    [id number]  ST TREATMENT SWALLOW 1 11/7/2023 Roly Lopez, MS SAN-SLP GN 1                 MS TARA Patel  11/7/2023

## 2023-11-07 NOTE — PROGRESS NOTES
Stroke Progress Note       Chief Complaint:  right sided weakness    Subjective    Subjective     Subjective:  No adverse events overnight.  Sitting up in bed.  Continues to have some mild dysarthria and aphasia     Review of Systems   Denies headache, dizziness, chest pain, and palpitations    Objective      Temp:  [97.4 °F (36.3 °C)-98.4 °F (36.9 °C)] 97.4 °F (36.3 °C)  Heart Rate:  [65-80] 65  Resp:  [17-18] 18  BP: ()/(47-64) 118/58        Physical Exam  Constitutional:       Appearance: Normal appearance. She is not ill-appearing.   HENT:      Head: Normocephalic and atraumatic.   Eyes:      Extraocular Movements: Extraocular movements intact.      Pupils: Pupils are equal, round, and reactive to light.   Cardiovascular:      Rate and Rhythm: Normal rate and regular rhythm.   Pulmonary:      Effort: Pulmonary effort is normal. No respiratory distress.   Musculoskeletal:         General: Tenderness present. No swelling.   Skin:     General: Skin is warm and dry.   Neurological:      Mental Status: She is alert.      Comments: Oriented x4.  Aphasia improving, mild dysarthria.  Mild right facial droop.  Gaze is normal, visual fields are intact.  No drift in BUE, strength is 4/5; BLE 3/5.  Sensation is intact to light touch bilaterally.       Results Review:    I reviewed the patient's new clinical results.  CT Head Without Contrast    Result Date: 11/7/2023  Impression: No significant change identified. Electronically Signed: Saman Lopez MD  11/7/2023 6:42 AM CST  Workstation ID: [id number]     Lab Results (last 24 hours)       Procedure Component Value Units Date/Time    POC Glucose Once [[id number]]  (Normal) Collected: 11/07/23 0739    Specimen: Blood Updated: 11/07/23 0740     Glucose 97 mg/dL     Basic Metabolic Panel [[id number]]  (Abnormal) Collected: 11/07/23 0430    Specimen: Blood Updated: 11/07/23 0644     Glucose 82 mg/dL      BUN 14 mg/dL      Creatinine 0.61 mg/dL      Sodium 139 mmol/L       Potassium 4.4 mmol/L      Chloride 109 mmol/L      CO2 20.0 mmol/L      Calcium 9.5 mg/dL      BUN/Creatinine Ratio 23.0     Anion Gap 10.0 mmol/L      eGFR 107.1 mL/min/1.73     Narrative:      GFR Normal >60  Chronic Kidney Disease <60  Kidney Failure <15      Magnesium [[id number]]  (Normal) Collected: 11/07/23 0430    Specimen: Blood Updated: 11/07/23 0644     Magnesium 2.1 mg/dL     CBC & Differential [[id number]]  (Abnormal) Collected: 11/07/23 0430    Specimen: Blood Updated: 11/07/23 0636    Narrative:      The following orders were created for panel order CBC & Differential.  Procedure                               Abnormality         Status                     ---------                               -----------         ------                     CBC Auto Differential[[id number]]        Abnormal            Final result               Scan Slide[[id number]]                                                                    Please view results for these tests on the individual orders.    CBC Auto Differential [[id number]]  (Abnormal) Collected: 11/07/23 0430    Specimen: Blood Updated: 11/07/23 0636     WBC 8.02 10*3/mm3      RBC 2.39 10*6/mm3      Hemoglobin 7.1 g/dL      Hematocrit 23.8 %      MCV 99.6 fL      MCH 29.7 pg      MCHC 29.8 g/dL      RDW 18.9 %      RDW-SD 69.6 fl      MPV 10.9 fL      Platelets 253 10*3/mm3      Neutrophil % 79.6 %      Lymphocyte % 8.6 %      Monocyte % 7.7 %      Eosinophil % 2.9 %      Basophil % 0.6 %      Immature Grans % 0.6 %      Neutrophils, Absolute 6.38 10*3/mm3      Lymphocytes, Absolute 0.69 10*3/mm3      Monocytes, Absolute 0.62 10*3/mm3      Eosinophils, Absolute 0.23 10*3/mm3      Basophils, Absolute 0.05 10*3/mm3      Immature Grans, Absolute 0.05 10*3/mm3      nRBC 0.0 /100 WBC     Heparin Anti-Xa [[id number]]  (Normal) Collected: 11/07/23 0431    Specimen: Blood Updated: 11/07/23 0634     Heparin Anti-Xa (UFH) 0.38 IU/ml     Protime-INR [[id number]]  (Normal)  Collected: 11/07/23 0431    Specimen: Blood Updated: 11/07/23 0633     Protime 14.3 Seconds      INR 1.10    Heparin Anti-Xa [[id number]]  (Normal) Collected: 11/06/23 2204    Specimen: Blood Updated: 11/06/23 2244     Heparin Anti-Xa (UFH) 0.45 IU/ml     POC Glucose Once [[id number]]  (Abnormal) Collected: 11/06/23 2034    Specimen: Blood Updated: 11/06/23 2036     Glucose 140 mg/dL     Heparin Anti-Xa [[id number]]  (Normal) Collected: 11/06/23 1613    Specimen: Blood Updated: 11/06/23 1646     Heparin Anti-Xa (UFH) 0.43 IU/ml     POC Glucose Once [[id number]]  (Normal) Collected: 11/06/23 1610    Specimen: Blood Updated: 11/06/23 1613     Glucose 116 mg/dL     POC Glucose Once [[id number]]  (Normal) Collected: 11/06/23 1114    Specimen: Blood Updated: 11/06/23 1121     Glucose 94 mg/dL           WBC   Date Value Ref Range Status   11/07/2023 8.02 3.40 - 10.80 10*3/mm3 Final     RBC   Date Value Ref Range Status   11/07/2023 2.39 (L) 3.77 - 5.28 10*6/mm3 Final     Hemoglobin   Date Value Ref Range Status   11/07/2023 7.1 (L) 12.0 - 15.9 g/dL Final     Hematocrit   Date Value Ref Range Status   11/07/2023 23.8 (L) 34.0 - 46.6 % Final     MCV   Date Value Ref Range Status   11/07/2023 99.6 (H) 79.0 - 97.0 fL Final     MCH   Date Value Ref Range Status   11/07/2023 29.7 26.6 - 33.0 pg Final     MCHC   Date Value Ref Range Status   11/07/2023 29.8 (L) 31.5 - 35.7 g/dL Final     RDW   Date Value Ref Range Status   11/07/2023 18.9 (H) 12.3 - 15.4 % Final     RDW-SD   Date Value Ref Range Status   11/07/2023 69.6 (H) 37.0 - 54.0 fl Final     MPV   Date Value Ref Range Status   11/07/2023 10.9 6.0 - 12.0 fL Final     Platelets   Date Value Ref Range Status   11/07/2023 253 140 - 450 10*3/mm3 Final     Neutrophil %   Date Value Ref Range Status   11/07/2023 79.6 (H) 42.7 - 76.0 % Final     Lymphocyte %   Date Value Ref Range Status   11/07/2023 8.6 (L) 19.6 - 45.3 % Final     Monocyte %   Date Value Ref Range Status   11/07/2023  7.7 5.0 - 12.0 % Final     Eosinophil %   Date Value Ref Range Status   11/07/2023 2.9 0.3 - 6.2 % Final     Basophil %   Date Value Ref Range Status   11/07/2023 0.6 0.0 - 1.5 % Final     Immature Grans %   Date Value Ref Range Status   11/07/2023 0.6 (H) 0.0 - 0.5 % Final     Neutrophils, Absolute   Date Value Ref Range Status   11/07/2023 6.38 1.70 - 7.00 10*3/mm3 Final     Lymphocytes, Absolute   Date Value Ref Range Status   11/07/2023 0.69 (L) 0.70 - 3.10 10*3/mm3 Final     Monocytes, Absolute   Date Value Ref Range Status   11/07/2023 0.62 0.10 - 0.90 10*3/mm3 Final     Eosinophils, Absolute   Date Value Ref Range Status   11/07/2023 0.23 0.00 - 0.40 10*3/mm3 Final     Basophils, Absolute   Date Value Ref Range Status   11/07/2023 0.05 0.00 - 0.20 10*3/mm3 Final     Immature Grans, Absolute   Date Value Ref Range Status   11/07/2023 0.05 0.00 - 0.05 10*3/mm3 Final     nRBC   Date Value Ref Range Status   11/07/2023 0.0 0.0 - 0.2 /100 WBC Final       Lipid Panel          11/1/2023    01:55   Lipid Panel   Total Cholesterol 88    Triglycerides 88    HDL Cholesterol 30    VLDL Cholesterol 18    LDL Cholesterol  40    LDL/HDL Ratio 1.35          Lab 11/01/23  0155   HEMOGLOBIN A1C <4.20*       CT Head Without Contrast    Result Date: 11/7/2023  Impression: No significant change identified. Electronically Signed: Saman Lopez MD  11/7/2023 6:42 AM CST  Workstation ID: [id number]   Results for orders placed during the hospital encounter of 10/31/23    Adult Transthoracic Echo Complete W/ Cont if Necessary Per Protocol (With Agitated Saline)    Interpretation Summary    Left ventricular systolic function is normal. Calculated left ventricular EF = 62.1%    Left ventricular wall thickness is consistent with mild concentric hypertrophy.    Saline test results are negative.    There is a mechanical aortic valve prosthesis present.    There is a mechanical mitral valve prosthesis present.    Estimated right ventricular  systolic pressure from tricuspid regurgitation is normal (<35 mmHg).    Calcification on pap muscle            Assessment/Plan   53-year-old female with a PMH significant for HTN, HLD, hypotension (on midodrine), T2DM, CHF, aortic and mitral valve replacement (on Coumadin), CAD, and seizure disorder who presented to [facility] on 10/31 with complaints of aphasia and generalized weakness.  She was found to have a saddle embolus within the superior division of the left MCA.  She was taken to the Cath Lab for mechanical thrombectomy with resultant TICI 3 recanalization.    PTA antiplatelet:  aspirin  PTA anticoagulant:  coumadin    Assessment/Plan:  Acute ischemic stroke, left MCA, status post mechanical thrombectomy -TICI 3  -ESUS; per pathology report embolus composed of calcified fibrin  -Exam improving  -Anemia:  H&H 7.1/23.8 today, trending  -Continue heparin drip; bridging back to coumadin  -Continue aspirin 81 mg  -CT head this morning (11/7) stable evolving left MCA infarct  -Unable to obtain MRI D/T incompatible pain pump.  -JASON technically difficult for diagnosis.  EF 62.1%.  Mitral valve and aortic valve prosthesis within defined limits.  No thrombus or vegetation noted. Saline test are negative.  -Dysphagia; Keofeed removed; passed FEES, tolerating modified diet  -Seizures: EEG on 11/1 diffuse cerebral dysfunction to a mild degree and no noted seizures. Continue home dose of keppra and topamax  -T2DM; hemoglobin A1c <4.2.  Well-controlled.  Management per primary team.  -HLD; continue high-dose statin  -HTN/hypotension; normalize BP goals.  SBP goals 100-140.  Avoid hypotension,on home midodrine  -Continue PT/OT, therapy recommending IPR at DC  -Patient is okay to go to the floor from a neurology perspective.  -Stroke clinic follow up in 6 weeks in the [place] office      Patient discussed with Dr. Grullon, primary team and nursing staff.  No additional stroke workup, will follow in clinic.    Discussed the  importance of medication compliance Aspirin 81mg daily, Atorvastatin 80mg nightly, and Coumadin per orders (goal INR 2-3) and lifestyle modifications adequate control of blood pressure, adequate control of cholesterol (goal LDL <70), adequate control of glucose (<140, A1c goal <7), increased physical activity, and implementation of healthy diet to help reduce the risk of future cerebrovascular events.  Also discussed the signs symptoms that would warrant the patient return back to the emergency department including unilateral weakness, unilateral numbness, visual disturbances, loss of balance, speech difficulties, and/or a sudden severe headache.  Patient verbalized understanding.      CHARLIE Marie,   11/07/23  08:26 EST

## 2023-11-07 NOTE — PROGRESS NOTES
HEPARIN INFUSION  Mara Martínez is a  53 y.o. female receiving heparin infusion.     Therapy for (VTE/Cardiac):   Cardiac   Patient Weight: 73.4  Initial Bolus (Y/N):   No  Any Bolus (Y/N):   No        Signs or Symptoms of Bleeding: dark/tarry stool noted 11/4 in the AM.  STAT CBC and occult blood stool lab ordered      Cardiac or Other (Not VTE)     Initial rate: 12 units/kg/hr (Max 1,000 units/hr)   Anti Xa Rebolus Infusion Hold time Change infusion Dose (Units/kg/hr) Next Anti Xa or aPTT Level Due   < 0.11 None  None Increase by  3 Units/kg/hr 6 hours   0.11- 0.19 None  None Increase by  2 Units/kg/hr 6 hours   0.2 - 0.29 0 None Increase by  1 Units/kg/hr 6 hours   0.3 - 0.5 0 None No Change 6 hours (after 2 consecutive levels in range check qAM)   0.51 - 0.6 0 None Decrease by  1 Units/kg/hr 6 hours   0.61 - 0.8 0 30 Minutes Decrease by  2 Units/kg/hr 6 hours   0.81 - 1 0 60 Minutes Decrease by  3 Units/kg/hr 6 hours   >1 0 Hold  After Anti Xa less than 0.5 decrease previous rate by  4 Units/kg/hr  Every 2 hours until Anti Xa  less than 0.5 then when infusion restarts in 6 hours      Results from last 7 days   Lab Units 11/07/23  0431 11/07/23  0430 11/06/23  0641 11/05/23  0742   INR  1.10  --  1.05 1.11   HEMOGLOBIN g/dL  --  7.1* 7.2* 7.3*   HEMATOCRIT %  --  23.8* 24.4* 24.8*   PLATELETS 10*3/mm3  --  253 235 241        Date   Time   Anti-Xa Current Rate (Unit/kg/hr) Bolus   (Units) Rate Change   (Unit/kg/hr) New Rate (Unit/kg/hr) Next   Anti-Xa Comments  Pump Check Daily   11/2 1400 0.1 0 None +12 12 2000  Rn    11/2 2020 0.1 12 None +3 15 0400 DW RN   11/3 0439 0.1 15 None +3 18 1300 Brenda 5536 -b   11/3 1245 0.22 18 None +1 19 2100 [facility]   11/3 2028 0.21 19 None +1 20 0400  RN   11/4 0332 0.29 20 None +1 21 1100 Brenda 5536 [facility]   11/4 1100 0.43 21 None -- 21 1800 Will D/w RN   11/4 1819 0.40 21 None -- 21 0000 DW RN    11/4 2357 0.33 21 None -- 21 0600 Roly 5198 [facility]   11/4 0700 0.32 21 None -- 21  1800 D/w RN; will check in 12 hours   11/5 1758 0.44 21 None -- 21 0600 DW RN   11/6 0641 0.26 21 None +1 22 1400 DW RN, pump verified   11/6 1613 0.43 22 None -- 22 2200 DW RN   11/6 2204 0.45 22 None -- 22 0600 [address] [facility]   11/7 0431 0.38 22 None -- 22 0600 DW RN, verified pump       None           None           None           None           None           None       Faisal Kumar, PharmD   11/07/23  10:52 EST

## 2023-11-07 NOTE — PROGRESS NOTES
Intensive Care Follow-up     Hospital:  LOS: 7 days   Ms. Mara Martínez, 53 y.o. female is followed for:   Acute ischemic left MCA stroke            History of present illness:     53-year-old female who is active smoker with history of mechanical aortic and mitral valve, hypertension, dyslipidemia, left internal carotid artery stent in 2020, seizure disorders, chronic anemia and chronic GI loss from AVM, chronic pain with pain pump, RB ILD followed at , sleep apnea on CPAP presenting with acute left MCA stroke.  Patient was outside of window of thrombolysis so underwent salvage thrombectomy for saddle embolus in the left MCA bifurcation.  Patient was started on heparin drip per subsequently.  Patient was also found to have urinary tract infection on admission and cultures grew Klebsiella pneumoniae.  Patient was treated with Rocephin.  Patient also has chronic hypotension and on midodrine at home.    Subjective   Interval History:  Pt remains on heparin drip. Started on coumadin and INR still sub therapeutic. No other acute issues. Eating better. On Room air.              The patient's past medical, surgical and social history were reviewed and updated in Epic as appropriate.       Objective     Infusions:  heparin, 22 Units/kg/hr, Last Rate: 22 Units/kg/hr (11/07/23 0130)  Pharmacy to Dose Heparin,   Pharmacy to dose warfarin,       Medications:  aspirin, 81 mg, Oral, Daily   Or  aspirin, 300 mg, Rectal, Daily  atorvastatin, 80 mg, Oral, Nightly  ipratropium-albuterol, 3 mL, Nebulization, 4x Daily - RT  levETIRAcetam, 1,000 mg, Oral, BID  levothyroxine, 75 mcg, Oral, Q AM  midodrine, 5 mg, Oral, Q8H  pantoprazole, 40 mg, Oral, Q AM  sodium chloride, 10 mL, Intravenous, Q12H  topiramate, 200 mg, Oral, BID  warfarin, 4 mg, Oral, Daily        Vital Sign Min/Max for last 24 hours  Temp  Min: 97.4 °F (36.3 °C)  Max: 98.4 °F (36.9 °C)   BP  Min: 90/57  Max: 124/45   Pulse  Min: 62  Max: 80   Resp  Min: 17  Max: 18  "  SpO2  Min: 89 %  Max: 100 %   Flow (L/min)  Min: 2  Max: 2       Input/Output for last 24 hour shift  11/06 0701 - 11/07 0700  In: 1276.7 [P.O.:767; I.V.:509.7]  Out: 2150 [Urine:2150]      Objective:  Vital signs: (most recent): Blood pressure 96/48, pulse 77, temperature 97.7 °F (36.5 °C), temperature source Axillary, resp. rate 18, height 152.4 cm (60\"), weight 73.4 kg (161 lb 13.1 oz), SpO2 91%, not currently breastfeeding.            General Appearance: Patient sitting up in bed, awake, alert  Lungs:   B/L Breath sounds present with decreased breath sounds on bases, no wheezing heard, no crackles.   Heart: S1 and S2 present, no murmur  Abdomen: Soft, nontender, no guarding or rigidity, bowel sounds positive.  Extremities: no edema, warm to touch.  Neurologic:  Interval:  (shift change)  1a. Level of Consciousness: 0-->Alert, keenly responsive  1b. LOC Questions: 0-->Answers both questions correctly  1c. LOC Commands: 0-->Performs both tasks correctly  2. Best Gaze: 0-->Normal  3. Visual: 0-->No visual loss  4. Facial Palsy: 0-->Normal symmetrical movements  5a. Motor Arm, Left: 0-->No drift, limb holds 90 (or 45) degrees for full 10 secs  5b. Motor Arm, Right: 0-->No drift, limb holds 90 (or 45) degrees for full 10 secs  6a. Motor Leg, Left: 0-->No drift, leg holds 30 degree position for full 5 secs  6b. Motor Leg, Right: 0-->No drift, leg holds 30 degree position for full 5 secs  7. Limb Ataxia: 0-->Absent  8. Sensory: 0-->Normal, no sensory loss  9. Best Language: 0-->No aphasia, normal  10. Dysarthria: 1-->Mild-to-moderate dysarthria, patient slurs at least some words and, at worst, can be understood with some difficulty  11. Extinction and Inattention (formerly Neglect): 0-->No abnormality    Total (NIH Stroke Scale): 1        Results from last 7 days   Lab Units 11/07/23  0430 11/06/23  0641 11/05/23  0742   WBC 10*3/mm3 8.02 9.39 10.13   HEMOGLOBIN g/dL 7.1* 7.2* 7.3*   PLATELETS 10*3/mm3 253 235 241 "     Results from last 7 days   Lab Units 11/07/23  0430 11/06/23  0641 11/05/23  0742 11/04/23  0808 11/02/23  0104   SODIUM mmol/L 139 140 141 141 139  139   POTASSIUM mmol/L 4.4 4.7 4.3 3.9 3.7  3.7   CO2 mmol/L 20.0* 20.0* 22.0 22.0 23.0  23.0   BUN mg/dL 14 12 13 12 7  7   CREATININE mg/dL 0.61 0.54* 0.49* 0.48* 0.68  0.68   MAGNESIUM mg/dL 2.1 2.2 2.0 1.9 1.8   PHOSPHORUS mg/dL  --   --  3.4 2.9 3.9   GLUCOSE mg/dL 82 93 121* 133* 152*  152*     Estimated Creatinine Clearance: 95.5 mL/min (by C-G formula based on SCr of 0.61 mg/dL).    Results from last 7 days   Lab Units 11/01/23  0247   PH, ARTERIAL pH units 7.346*   PCO2, ARTERIAL mm Hg 40.5   PO2 ART mm Hg 80.8*       Images:   Ct head reviewed from 11/2:   Impression:  Evolving left MCA territory infarct. There has been resolution of previous left cortical contrast staining. No superimposed acute process identified.           Electronically Signed: Saman Lopez MD    11/2/2023 2:52 PM CDT    Workstation ID: [id number]    I reviewed the patient's results and images.     Assessment & Plan   Impression        Acute ischemic left MCA stroke    Anemia    Tobacco use    S/P mechanical aortic and mitral valve replacement (2018)    (HFpEF) heart failure with preserved ejection fraction    Chronic pain with intrathecal pump in place    Dyslipidemia    Cerebral aneurysm    H/O recurrent GIB 2* AVMs    UTI (urinary tract infection)    Chronic hypotension on midodrine       Plan        1.  Patient presented with acute CVA s/p thrombectomy.  Currently on heparin drip.  Stroke neurology signed off. Clinically doing better.   2.  We are monitoring H&H closely. We will continue to trend for now.  If hemoglobin drops below 7 or if she develops overt bleeding, then will transfuse.  Since hemoglobin is stable and no further bleeding noted. Pt started on warfarin and INR is subtherapeutic and will continue dual bridging until INR therapeutic. Pharmacy managing.   3.   Continue levothyroxine for history of hypothyroidism.  4.  Continue aspirin, statin.  5.  Keppra and Topamax for history of seizures.  6.  Continue midodrine patient's home dose.  7.  Treated with Rocephin for Klebsiella UTI.  WBC count is normalized.  Hemodynamically stable.  8.  Replace electrolytes per ICU protocol  9.  Patient cleared speech evaluation and starting to take oral diet.  Continue to monitor oral intake.  10.  PT/OT and speech to follow.    Patient awaiting bed to be transferred out of ICU to telemetry floor.  We will sign out to hospitalist team for ongoing cares.    Plan of care and goals reviewed with multidisciplinary/antibiotic stewardship team during rounds.   I discussed the patient's findings and my recommendations with patient, nursing staff, and consulting provider   Above documentation reviewed and reflect accurate information as of 11/7/2023 including copied elements of the note.       Durga Mccain MD, [facility]  Pulmonary, Critical care and Sleep Medicine

## 2023-11-07 NOTE — PROGRESS NOTES
"Clinical Nutrition Service      Patient Name: Mara Martínez  YOB: 1970  MRN: [id number]  Admission date: 10/31/2023      Multidisciplinary Rounds/EN Support Monitor    Additional information obtained during MDR:  RN reports pt is doing well today awaiting floor bed, eating but not a lot.      Current diet: Diet: Cardiac Diets; Healthy Heart (2-3 Na+); No Straw; Texture: Regular Texture (IDDSI 7); Fluid Consistency: Thin (IDDSI 0)      Pertinent medical data reviewed:  yes  Last filed wt: Weight: 73.4 kg (161 lb 13.1 oz) (11/02/23 0400)  Weight Method: Bed scale    BMI: BMI (Calculated): 31.6    Nutrition risk identified on nursing screen ( was on tube feeding); MST score \"0\"    Labs Reviewed: yes  Pertinent: Hgb A1C < 4.2    Medications Reviewed: yes  Pertinent: Keppra, synthroid, protonix, midodrine    Average oral intake per documentation:  45% of 4meals    Past 24 hr I & O documentation ([id number]):   no stool occurrence documented x 3 days    Estimated/Assessed Needs (11/2/23):      Energy: 1500 kcal  Protein:  99 gm  Fluids: 1500 ml per RDA method    Intervention:  Pt reports no improvement in appetite, she did not receive Magic Cup ONS, encouraged intake, alternate menu selections reviewed, menus adjusted to optimize intake.    Plan of care and goals of care reviewed    Monitor:  Per protocol, PO intake, Supplement intake, POC/GOC      Briana Dowling MS,RD,LD  11/07/23 16:39 EST  Time: 20  mins     "

## 2023-11-07 NOTE — PROGRESS NOTES
"Pharmacy Consult  -  Warfarin    Mara Martínez is a  53 y.o. female   Height - 152.4 cm (60\")  Weight - 73.4 kg (161 lb 13.1 oz)    Consulting Provider: - Durga Mccain  Indication: - Mechanical Valve  Goal INR: - 2.5-3.5  Home Regimen:   - 4 mg daily     Bridge Therapy: Yes   and unfractionated heparin    Drug-Drug Interactions with current regimen:  Heparin Drip - increased risk of bleeding  Aspirin- increased risk of bleeding    Warfarin Dosing During Admission:    Date  11/6 11/7          INR  1.05 1.1          Dose  6 mg (4 mg)               Education Provided: Will follow up     Discharge Follow up:   Following Provider - Rowan Nolasco ([facility])   Follow up time range or appointment - Will assess closer to discharge      Labs:    Results from last 7 days   Lab Units 11/07/23 0431 11/07/23  0430 11/06/23  0641 11/05/23  0742 11/04/23  0808 11/03/23  0439 11/02/23  1341 11/02/23  0440 11/01/23  1757 10/31/23  2126   INR  1.10  --  1.05 1.11  --   --  1.75*  --   --  1.73*   APTT seconds  --   --   --   --   --   --  38.6*  --   --  38.7   HEMOGLOBIN g/dL  --  7.1* 7.2* 7.3* 7.7* 8.6*  --  8.9* 9.2* 6.7*   HEMATOCRIT %  --  23.8* 24.4* 24.8* 25.4* 27.9*  --  28.6* 29.5* 23.0*     Results from last 7 days   Lab Units 11/07/23  0430 11/06/23  0641 11/05/23  0742 11/04/23  0808 11/02/23  0104 10/31/23  2126   SODIUM mmol/L 139 140 141   < > 139  139 135*   POTASSIUM mmol/L 4.4 4.7 4.3   < > 3.7  3.7 3.4*   CHLORIDE mmol/L 109* 111* 113*   < > 109*  109* 101   CO2 mmol/L 20.0* 20.0* 22.0   < > 23.0  23.0 23.0   BUN mg/dL 14 12 13   < > 7  7 9   CREATININE mg/dL 0.61 0.54* 0.49*   < > 0.68  0.68 0.83   CALCIUM mg/dL 9.5 9.1 8.5*   < > 8.0*  8.0* 8.9   BILIRUBIN mg/dL  --   --   --   --  0.3  --    ALK PHOS U/L  --   --   --   --  99  --    ALT (SGPT) U/L  --   --   --   --  <5 <5   AST (SGOT) U/L  --   --   --   --  8 6   GLUCOSE mg/dL 82 93 121*   < > 152*  152* 105*    < > = values in this interval " not displayed.     Current dietary intake:   11/6: 50% of Breakfast, Lunch, Dinner  11/7: 25% of Breakfast    Assessment/Plan:   1. Patient is currently on Warfarin for mechanical aortic and mitral valve repair. Patient's goal INR is 2.5-3.5.  2. Patient's INR is subtherapeutic today at 1.1.  3. Will give Warfarin 4 mg today.  4. Will monitor signs/symptoms of bleeding, dietary intake, and drug-drug interactions.   5. Will follow daily PT/INR and adjust dose accordingly    Thank you  Faisal Kumar, PharmD  11/7/2023  10:53 EST

## 2023-11-08 LAB
ANION GAP SERPL CALCULATED.3IONS-SCNC: 10 MMOL/L (ref 5–15)
BASOPHILS # BLD AUTO: 0.05 10*3/MM3 (ref 0–0.2)
BASOPHILS NFR BLD AUTO: 0.7 % (ref 0–1.5)
BUN SERPL-MCNC: 15 MG/DL (ref 6–20)
BUN/CREAT SERPL: 23.4 (ref 7–25)
CALCIUM SPEC-SCNC: 9.8 MG/DL (ref 8.6–10.5)
CHLORIDE SERPL-SCNC: 107 MMOL/L (ref 98–107)
CO2 SERPL-SCNC: 20 MMOL/L (ref 22–29)
CREAT SERPL-MCNC: 0.64 MG/DL (ref 0.57–1)
DEPRECATED RDW RBC AUTO: 68.5 FL (ref 37–54)
EGFRCR SERPLBLD CKD-EPI 2021: 105.8 ML/MIN/1.73
EOSINOPHIL # BLD AUTO: 0.23 10*3/MM3 (ref 0–0.4)
EOSINOPHIL NFR BLD AUTO: 3 % (ref 0.3–6.2)
ERYTHROCYTE [DISTWIDTH] IN BLOOD BY AUTOMATED COUNT: 18.6 % (ref 12.3–15.4)
GLUCOSE BLDC GLUCOMTR-MCNC: 102 MG/DL (ref 70–130)
GLUCOSE BLDC GLUCOMTR-MCNC: 108 MG/DL (ref 70–130)
GLUCOSE BLDC GLUCOMTR-MCNC: 109 MG/DL (ref 70–130)
GLUCOSE BLDC GLUCOMTR-MCNC: 114 MG/DL (ref 70–130)
GLUCOSE SERPL-MCNC: 84 MG/DL (ref 65–99)
HCT VFR BLD AUTO: 25.2 % (ref 34–46.6)
HGB BLD-MCNC: 7.5 G/DL (ref 12–15.9)
IMM GRANULOCYTES # BLD AUTO: 0.05 10*3/MM3 (ref 0–0.05)
IMM GRANULOCYTES NFR BLD AUTO: 0.7 % (ref 0–0.5)
INR PPP: 1.23 (ref 0.89–1.12)
LYMPHOCYTES # BLD AUTO: 0.68 10*3/MM3 (ref 0.7–3.1)
LYMPHOCYTES NFR BLD AUTO: 8.9 % (ref 19.6–45.3)
MCH RBC QN AUTO: 29.6 PG (ref 26.6–33)
MCHC RBC AUTO-ENTMCNC: 29.8 G/DL (ref 31.5–35.7)
MCV RBC AUTO: 99.6 FL (ref 79–97)
MONOCYTES # BLD AUTO: 0.66 10*3/MM3 (ref 0.1–0.9)
MONOCYTES NFR BLD AUTO: 8.6 % (ref 5–12)
NEUTROPHILS NFR BLD AUTO: 5.99 10*3/MM3 (ref 1.7–7)
NEUTROPHILS NFR BLD AUTO: 78.1 % (ref 42.7–76)
NRBC BLD AUTO-RTO: 0 /100 WBC (ref 0–0.2)
PLATELET # BLD AUTO: 275 10*3/MM3 (ref 140–450)
PMV BLD AUTO: 10.7 FL (ref 6–12)
POTASSIUM SERPL-SCNC: 4.5 MMOL/L (ref 3.5–5.2)
PROTHROMBIN TIME: 15.7 SECONDS (ref 12.2–14.5)
RBC # BLD AUTO: 2.53 10*6/MM3 (ref 3.77–5.28)
SODIUM SERPL-SCNC: 137 MMOL/L (ref 136–145)
UFH PPP CHRO-ACNC: 0.36 IU/ML (ref 0.3–0.7)
WBC NRBC COR # BLD: 7.66 10*3/MM3 (ref 3.4–10.8)

## 2023-11-08 PROCEDURE — 85520 HEPARIN ASSAY: CPT

## 2023-11-08 PROCEDURE — 80048 BASIC METABOLIC PNL TOTAL CA: CPT | Performed by: INTERNAL MEDICINE

## 2023-11-08 PROCEDURE — 85610 PROTHROMBIN TIME: CPT | Performed by: INTERNAL MEDICINE

## 2023-11-08 PROCEDURE — 94799 UNLISTED PULMONARY SVC/PX: CPT

## 2023-11-08 PROCEDURE — 25010000002 ENOXAPARIN PER 10 MG: Performed by: INTERNAL MEDICINE

## 2023-11-08 PROCEDURE — 94761 N-INVAS EAR/PLS OXIMETRY MLT: CPT

## 2023-11-08 PROCEDURE — 99232 SBSQ HOSP IP/OBS MODERATE 35: CPT | Performed by: INTERNAL MEDICINE

## 2023-11-08 PROCEDURE — 25010000002 HEPARIN (PORCINE) 25000-0.45 UT/250ML-% SOLUTION

## 2023-11-08 PROCEDURE — 85025 COMPLETE CBC W/AUTO DIFF WBC: CPT | Performed by: INTERNAL MEDICINE

## 2023-11-08 PROCEDURE — 94664 DEMO&/EVAL PT USE INHALER: CPT

## 2023-11-08 PROCEDURE — 82948 REAGENT STRIP/BLOOD GLUCOSE: CPT

## 2023-11-08 RX ORDER — BISACODYL 5 MG/1
5 TABLET, DELAYED RELEASE ORAL DAILY PRN
Status: DISCONTINUED | OUTPATIENT
Start: 2023-11-08 | End: 2023-11-10 | Stop reason: HOSPADM

## 2023-11-08 RX ORDER — WARFARIN SODIUM 3 MG/1
6 TABLET ORAL
Status: DISCONTINUED | OUTPATIENT
Start: 2023-11-08 | End: 2023-11-10 | Stop reason: HOSPADM

## 2023-11-08 RX ORDER — AMOXICILLIN 250 MG
2 CAPSULE ORAL 2 TIMES DAILY
Status: DISCONTINUED | OUTPATIENT
Start: 2023-11-08 | End: 2023-11-10 | Stop reason: HOSPADM

## 2023-11-08 RX ORDER — BISACODYL 10 MG
10 SUPPOSITORY, RECTAL RECTAL DAILY PRN
Status: DISCONTINUED | OUTPATIENT
Start: 2023-11-08 | End: 2023-11-10 | Stop reason: HOSPADM

## 2023-11-08 RX ORDER — POLYETHYLENE GLYCOL 3350 17 G/17G
17 POWDER, FOR SOLUTION ORAL DAILY PRN
Status: DISCONTINUED | OUTPATIENT
Start: 2023-11-08 | End: 2023-11-10 | Stop reason: HOSPADM

## 2023-11-08 RX ORDER — ENOXAPARIN SODIUM 100 MG/ML
1 INJECTION SUBCUTANEOUS 2 TIMES DAILY
Status: DISCONTINUED | OUTPATIENT
Start: 2023-11-08 | End: 2023-11-10 | Stop reason: HOSPADM

## 2023-11-08 RX ADMIN — WARFARIN SODIUM 6 MG: 3 TABLET ORAL at 17:48

## 2023-11-08 RX ADMIN — SENNOSIDES AND DOCUSATE SODIUM 2 TABLET: 8.6; 5 TABLET ORAL at 21:04

## 2023-11-08 RX ADMIN — LEVETIRACETAM 1000 MG: 500 TABLET, FILM COATED ORAL at 08:13

## 2023-11-08 RX ADMIN — Medication 10 ML: at 08:12

## 2023-11-08 RX ADMIN — TOPIRAMATE 200 MG: 100 TABLET, FILM COATED ORAL at 08:13

## 2023-11-08 RX ADMIN — IPRATROPIUM BROMIDE AND ALBUTEROL SULFATE 3 ML: 2.5; .5 SOLUTION RESPIRATORY (INHALATION) at 13:23

## 2023-11-08 RX ADMIN — ENOXAPARIN SODIUM 70 MG: 80 INJECTION SUBCUTANEOUS at 10:52

## 2023-11-08 RX ADMIN — LEVOTHYROXINE SODIUM 75 MCG: 0.07 TABLET ORAL at 05:57

## 2023-11-08 RX ADMIN — LEVETIRACETAM 1000 MG: 500 TABLET, FILM COATED ORAL at 21:04

## 2023-11-08 RX ADMIN — Medication 10 ML: at 21:07

## 2023-11-08 RX ADMIN — ASPIRIN 81 MG CHEWABLE TABLET 81 MG: 81 TABLET CHEWABLE at 08:13

## 2023-11-08 RX ADMIN — IPRATROPIUM BROMIDE AND ALBUTEROL SULFATE 3 ML: 2.5; .5 SOLUTION RESPIRATORY (INHALATION) at 06:46

## 2023-11-08 RX ADMIN — ENOXAPARIN SODIUM 70 MG: 80 INJECTION SUBCUTANEOUS at 21:05

## 2023-11-08 RX ADMIN — ATORVASTATIN CALCIUM 80 MG: 40 TABLET, FILM COATED ORAL at 21:05

## 2023-11-08 RX ADMIN — PANTOPRAZOLE SODIUM 40 MG: 40 TABLET, DELAYED RELEASE ORAL at 05:57

## 2023-11-08 RX ADMIN — HEPARIN SODIUM 22 UNITS/KG/HR: 10000 INJECTION, SOLUTION INTRAVENOUS at 08:18

## 2023-11-08 RX ADMIN — TOPIRAMATE 200 MG: 100 TABLET, FILM COATED ORAL at 21:05

## 2023-11-08 RX ADMIN — MIDODRINE HYDROCHLORIDE 5 MG: 5 TABLET ORAL at 12:08

## 2023-11-08 RX ADMIN — MIDODRINE HYDROCHLORIDE 5 MG: 5 TABLET ORAL at 21:04

## 2023-11-08 RX ADMIN — IPRATROPIUM BROMIDE AND ALBUTEROL SULFATE 3 ML: 2.5; .5 SOLUTION RESPIRATORY (INHALATION) at 20:02

## 2023-11-08 RX ADMIN — MIDODRINE HYDROCHLORIDE 5 MG: 5 TABLET ORAL at 04:07

## 2023-11-08 NOTE — PROGRESS NOTES
HEPARIN INFUSION  Mara Martíenz is a  53 y.o. female receiving heparin infusion.     Therapy for (VTE/Cardiac):   Cardiac   Patient Weight: 73.4  Initial Bolus (Y/N):   No  Any Bolus (Y/N):   No        Signs or Symptoms of Bleeding: dark/tarry stool noted 11/4 in the AM.  STAT CBC and occult blood stool lab ordered      Cardiac or Other (Not VTE)     Initial rate: 12 units/kg/hr (Max 1,000 units/hr)   Anti Xa Rebolus Infusion Hold time Change infusion Dose (Units/kg/hr) Next Anti Xa or aPTT Level Due   < 0.11 None  None Increase by  3 Units/kg/hr 6 hours   0.11- 0.19 None  None Increase by  2 Units/kg/hr 6 hours   0.2 - 0.29 0 None Increase by  1 Units/kg/hr 6 hours   0.3 - 0.5 0 None No Change 6 hours (after 2 consecutive levels in range check qAM)   0.51 - 0.6 0 None Decrease by  1 Units/kg/hr 6 hours   0.61 - 0.8 0 30 Minutes Decrease by  2 Units/kg/hr 6 hours   0.81 - 1 0 60 Minutes Decrease by  3 Units/kg/hr 6 hours   >1 0 Hold  After Anti Xa less than 0.5 decrease previous rate by  4 Units/kg/hr  Every 2 hours until Anti Xa  less than 0.5 then when infusion restarts in 6 hours      Results from last 7 days   Lab Units 11/08/23  0407 11/07/23  0431 11/07/23  0430 11/06/23  0641   INR  1.23* 1.10  --  1.05   HEMOGLOBIN g/dL 7.5*  --  7.1* 7.2*   HEMATOCRIT % 25.2*  --  23.8* 24.4*   PLATELETS 10*3/mm3 275  --  253 235        Date   Time   Anti-Xa Current Rate (Unit/kg/hr) Bolus   (Units) Rate Change   (Unit/kg/hr) New Rate (Unit/kg/hr) Next   Anti-Xa Comments  Pump Check Daily   11/2 1400 0.1 0 None +12 12 2000  Rn    11/2 2020 0.1 12 None +3 15 0400 DW RN   11/3 0439 0.1 15 None +3 18 1300 Brenda 5536 -b   11/3 1245 0.22 18 None +1 19 2100 [facility]   11/3 2028 0.21 19 None +1 20 0400  RN   11/4 0332 0.29 20 None +1 21 1100 Brenda 5536 [facility]   11/4 1100 0.43 21 None -- 21 1800 Will D/w RN   11/4 1819 0.40 21 None -- 21 0000 DW RN    11/4 2357 0.33 21 None -- 21 0600 Roly 5198 [facility]   11/4 0700 0.32 21 None --  21 1800 D/w RN; will check in 12 hours   11/5 1758 0.44 21 None -- 21 0600 DW RN   11/6 0641 0.26 21 None +1 22 1400 DW RN, pump verified   11/6 1613 0.43 22 None -- 22 2200 DW RN   11/6 2204 0.45 22 None -- 22 0600 Hector 5198 -acb   11/7 0431 0.38 22 None -- 22 0600 DW RN, verified pump   11/8 0407 0.36 22 None -- 22 0600 DW RN.        None           None           None           None           None       Cristobal Guy [facility]   11/08/23  07:24 EST

## 2023-11-08 NOTE — PROGRESS NOTES
"Pharmacy Consult  -  Warfarin    Mara Martínez is a  53 y.o. female   Height - 152.4 cm (60\")  Weight - 73.4 kg (161 lb 13.1 oz)    Consulting Provider: - Durga Mccain  Indication: - Mechanical Valve  Goal INR: - 2.5-3.5  Home Regimen:   - 4 mg daily     Bridge Therapy: Yes   and unfractionated heparin    Drug-Drug Interactions with current regimen:  Heparin Drip - increased risk of bleeding  Aspirin- increased risk of bleeding    Warfarin Dosing During Admission:    Date  11/6 11/7 11/8         INR  1.05 1.1 1.23         Dose  6 mg 4 mg (6 mg)               Education Provided: Will follow up     Discharge Follow up:   Following Provider - Rowan Nolasco ([facility])   Follow up time range or appointment - Will assess closer to discharge      Labs:    Results from last 7 days   Lab Units 11/08/23 0407 11/07/23  0431 11/07/23  0430 11/06/23  0641 11/05/23  0742 11/04/23  0808 11/03/23  0439 11/02/23  1341 11/02/23  0440   INR  1.23* 1.10  --  1.05 1.11  --   --  1.75*  --    APTT seconds  --   --   --   --   --   --   --  38.6*  --    HEMOGLOBIN g/dL 7.5*  --  7.1* 7.2* 7.3* 7.7* 8.6*  --  8.9*   HEMATOCRIT % 25.2*  --  23.8* 24.4* 24.8* 25.4* 27.9*  --  28.6*     Results from last 7 days   Lab Units 11/08/23  0407 11/07/23  0430 11/06/23  0641 11/04/23  0808 11/02/23  0104   SODIUM mmol/L 137 139 140   < > 139  139   POTASSIUM mmol/L 4.5 4.4 4.7   < > 3.7  3.7   CHLORIDE mmol/L 107 109* 111*   < > 109*  109*   CO2 mmol/L 20.0* 20.0* 20.0*   < > 23.0  23.0   BUN mg/dL 15 14 12   < > 7  7   CREATININE mg/dL 0.64 0.61 0.54*   < > 0.68  0.68   CALCIUM mg/dL 9.8 9.5 9.1   < > 8.0*  8.0*   BILIRUBIN mg/dL  --   --   --   --  0.3   ALK PHOS U/L  --   --   --   --  99   ALT (SGPT) U/L  --   --   --   --  <5   AST (SGOT) U/L  --   --   --   --  8   GLUCOSE mg/dL 84 82 93   < > 152*  152*    < > = values in this interval not displayed.     Current dietary intake:   11/6: 50% of Breakfast, Lunch, Dinner  11/7: 25% " of Breakfast    Assessment/Plan:   1. Patient is currently on Warfarin for mechanical aortic and mitral valve repair. Patient's goal INR is 2.5-3.5.  2. Patient's INR is subtherapeutic today at 1.23. Beginning to trend slowly up after boost on 11/6 and home dose 11/7.  3. Will increase dose and give Warfarin 6 mg today.  4. Patient with anemia, but hemoglobin is slightly increased today to 7.5 g/dL. Will monitor signs/symptoms of bleeding, dietary intake, and drug-drug interactions.   5. Will follow daily PT/INR and adjust dose accordingly    Thank you  Cristobal Guy [facility]  11/8/2023  07:17 EST

## 2023-11-08 NOTE — PLAN OF CARE
Goal Outcome Evaluation:  Plan of Care Reviewed With: patient           Outcome Evaluation: Patient remained neurologically intact throughout the night. No acute events occured. Patient awaiting floor bed for transfer.

## 2023-11-08 NOTE — PROGRESS NOTES
Intensive Care Follow-up     Hospital:  LOS: 8 days   Ms. Mara Martínez, 53 y.o. female is followed for:   Acute ischemic left MCA stroke            History of present illness:     53-year-old female who is active smoker with history of mechanical aortic and mitral valve, hypertension, dyslipidemia, left internal carotid artery stent in 2020, seizure disorders, chronic anemia and chronic GI loss from AVM, chronic pain with pain pump, RB ILD followed at , sleep apnea on CPAP presenting with acute left MCA stroke.  Patient was outside of window of thrombolysis so underwent salvage thrombectomy for saddle embolus in the left MCA bifurcation.  Patient was started on heparin drip per subsequently.  Patient was also found to have urinary tract infection on admission and cultures grew Klebsiella pneumoniae.  Patient was treated with Rocephin.  Patient also has chronic hypotension and on midodrine at home.    Subjective   Interval History:  Without any complaints overnight.  INR still subtherapeutic.  Hemoglobin stable.  No further GI bleed noted currently.             The patient's past medical, surgical and social history were reviewed and updated in Epic as appropriate.       Objective     Infusions:  Pharmacy to dose warfarin,       Medications:  aspirin, 81 mg, Oral, Daily   Or  aspirin, 300 mg, Rectal, Daily  atorvastatin, 80 mg, Oral, Nightly  enoxaparin, 1 mg/kg, Subcutaneous, BID  ipratropium-albuterol, 3 mL, Nebulization, 4x Daily - RT  levETIRAcetam, 1,000 mg, Oral, BID  levothyroxine, 75 mcg, Oral, Q AM  midodrine, 5 mg, Oral, Q8H  pantoprazole, 40 mg, Oral, Q AM  sodium chloride, 10 mL, Intravenous, Q12H  topiramate, 200 mg, Oral, BID  warfarin, 6 mg, Oral, Daily        Vital Sign Min/Max for last 24 hours  Temp  Min: 97.5 °F (36.4 °C)  Max: 98.8 °F (37.1 °C)   BP  Min: 99/51  Max: 122/59   Pulse  Min: 60  Max: 82   Resp  Min: 16  Max: 18   SpO2  Min: 90 %  Max: 100 %   Flow (L/min)  Min: 1  Max: 2        "Input/Output for last 24 hour shift  11/07 0701 - 11/08 0700  In: 456.2 [I.V.:456.2]  Out: 1450 [Urine:1450]      Objective:  Vital signs: (most recent): Blood pressure 114/58, pulse 68, temperature 97.5 °F (36.4 °C), temperature source Axillary, resp. rate 16, height 152.4 cm (60\"), weight 73.4 kg (161 lb 13.1 oz), SpO2 99%, not currently breastfeeding.            General Appearance: Patient sitting up in bed, awake, alert  Lungs:   B/L Breath sounds present with decreased breath sounds on bases, no wheezing heard, no crackles.   Heart: S1 and S2 present, no murmur  Abdomen: Soft, nontender, no guarding or rigidity, bowel sounds positive.  Extremities: no edema, warm to touch.  Neurologic:  Interval:  (handoff)  1a. Level of Consciousness: 0-->Alert, keenly responsive  1b. LOC Questions: 0-->Answers both questions correctly  1c. LOC Commands: 0-->Performs both tasks correctly  2. Best Gaze: 0-->Normal  3. Visual: 0-->No visual loss  4. Facial Palsy: 1-->Minor paralysis (flattened nasolabial fold, asymmetry on smiling)  5a. Motor Arm, Left: 0-->No drift, limb holds 90 (or 45) degrees for full 10 secs  5b. Motor Arm, Right: 0-->No drift, limb holds 90 (or 45) degrees for full 10 secs  6a. Motor Leg, Left: 0-->No drift, leg holds 30 degree position for full 5 secs  6b. Motor Leg, Right: 0-->No drift, leg holds 30 degree position for full 5 secs  7. Limb Ataxia: 0-->Absent  8. Sensory: 1-->Mild-to-moderate sensory loss, patient feels pinprick is less sharp or is dull on the affected side, or there is a loss of superficial pain with pinprick, but patient is aware of being touched (baseline per pt - neuropathy)  9. Best Language: 0-->No aphasia, normal  10. Dysarthria: 1-->Mild-to-moderate dysarthria, patient slurs at least some words and, at worst, can be understood with some difficulty  11. Extinction and Inattention (formerly Neglect): 0-->No abnormality    Total (NIH Stroke Scale): 3        Results from last 7 days "   Lab Units 11/08/23  0407 11/07/23  0430 11/06/23  0641   WBC 10*3/mm3 7.66 8.02 9.39   HEMOGLOBIN g/dL 7.5* 7.1* 7.2*   PLATELETS 10*3/mm3 275 253 235     Results from last 7 days   Lab Units 11/08/23  0407 11/07/23  0430 11/06/23  0641 11/05/23  0742 11/04/23  0808 11/02/23  0104   SODIUM mmol/L 137 139 140 141 141 139  139   POTASSIUM mmol/L 4.5 4.4 4.7 4.3 3.9 3.7  3.7   CO2 mmol/L 20.0* 20.0* 20.0* 22.0 22.0 23.0  23.0   BUN mg/dL 15 14 12 13 12 7  7   CREATININE mg/dL 0.64 0.61 0.54* 0.49* 0.48* 0.68  0.68   MAGNESIUM mg/dL  --  2.1 2.2 2.0 1.9 1.8   PHOSPHORUS mg/dL  --   --   --  3.4 2.9 3.9   GLUCOSE mg/dL 84 82 93 121* 133* 152*  152*     Estimated Creatinine Clearance: 91 mL/min (by C-G formula based on SCr of 0.64 mg/dL).            Images:   Ct head reviewed from 11/2:   Impression:  Evolving left MCA territory infarct. There has been resolution of previous left cortical contrast staining. No superimposed acute process identified.           Electronically Signed: Saman Lopez MD    11/2/2023 2:52 PM CDT    Workstation ID: [id number]    I reviewed the patient's results and images.     Assessment & Plan   Impression        Acute ischemic left MCA stroke    Anemia    Tobacco use    S/P mechanical aortic and mitral valve replacement (2018)    (HFpEF) heart failure with preserved ejection fraction    Chronic pain with intrathecal pump in place    Dyslipidemia    Cerebral aneurysm    H/O recurrent GIB 2* AVMs    UTI (urinary tract infection)    Chronic hypotension on midodrine       Plan        1.  Patient presented with acute CVA s/p thrombectomy. Patient was on IV heparin drip.  We will switch to Lovenox for now.  Continue warfarin.  INR is subtherapeutic.   2.  We are monitoring H&H closely. We will continue to trend for now.  If hemoglobin drops below 7 or if she develops overt bleeding, then will transfuse.  Since hemoglobin is stable and no further bleeding noted. Pt started on warfarin and INR is  subtherapeutic and will continue dual bridging until INR therapeutic. Pharmacy managing.   3.  Continue levothyroxine for history of hypothyroidism.  4.  Continue aspirin, statin.  5.  Keppra and Topamax for history of seizures.  6.  Continue midodrine patient's home dose.  7.  Treated with Rocephin for Klebsiella UTI.  WBC count is normalized.  Hemodynamically stable.  8.  Replace electrolytes per ICU protocol  9.  Patient cleared speech evaluation and starting to take oral diet.  Continue to monitor oral intake.  10.  PT/OT and speech to follow.    Patient awaiting bed to be transferred out of ICU to telemetry floor.  We will sign out to hospitalist team for ongoing cares.    Plan of care and goals reviewed with multidisciplinary/antibiotic stewardship team during rounds.   I discussed the patient's findings and my recommendations with patient, nursing staff, and consulting provider   Above documentation reviewed and reflect accurate information as of 11/8/2023 including copied elements of the note.       Durga Mccain MD, FCCP  Pulmonary, Critical care and Sleep Medicine

## 2023-11-09 LAB
ANION GAP SERPL CALCULATED.3IONS-SCNC: 11 MMOL/L (ref 5–15)
BASOPHILS # BLD AUTO: 0.04 10*3/MM3 (ref 0–0.2)
BASOPHILS NFR BLD AUTO: 0.5 % (ref 0–1.5)
BUN SERPL-MCNC: 18 MG/DL (ref 6–20)
BUN/CREAT SERPL: 23.1 (ref 7–25)
CALCIUM SPEC-SCNC: 10.2 MG/DL (ref 8.6–10.5)
CHLORIDE SERPL-SCNC: 106 MMOL/L (ref 98–107)
CO2 SERPL-SCNC: 21 MMOL/L (ref 22–29)
CREAT SERPL-MCNC: 0.78 MG/DL (ref 0.57–1)
DEPRECATED RDW RBC AUTO: 66.5 FL (ref 37–54)
EGFRCR SERPLBLD CKD-EPI 2021: 91 ML/MIN/1.73
EOSINOPHIL # BLD AUTO: 0.23 10*3/MM3 (ref 0–0.4)
EOSINOPHIL NFR BLD AUTO: 3.1 % (ref 0.3–6.2)
ERYTHROCYTE [DISTWIDTH] IN BLOOD BY AUTOMATED COUNT: 18 % (ref 12.3–15.4)
GLUCOSE BLDC GLUCOMTR-MCNC: 100 MG/DL (ref 70–130)
GLUCOSE BLDC GLUCOMTR-MCNC: 107 MG/DL (ref 70–130)
GLUCOSE BLDC GLUCOMTR-MCNC: 115 MG/DL (ref 70–130)
GLUCOSE BLDC GLUCOMTR-MCNC: 158 MG/DL (ref 70–130)
GLUCOSE SERPL-MCNC: 95 MG/DL (ref 65–99)
HCT VFR BLD AUTO: 25 % (ref 34–46.6)
HGB BLD-MCNC: 7.5 G/DL (ref 12–15.9)
IMM GRANULOCYTES # BLD AUTO: 0.03 10*3/MM3 (ref 0–0.05)
IMM GRANULOCYTES NFR BLD AUTO: 0.4 % (ref 0–0.5)
INR PPP: 1.44 (ref 0.89–1.12)
LYMPHOCYTES # BLD AUTO: 0.63 10*3/MM3 (ref 0.7–3.1)
LYMPHOCYTES NFR BLD AUTO: 8.4 % (ref 19.6–45.3)
MAGNESIUM SERPL-MCNC: 2.2 MG/DL (ref 1.6–2.6)
MCH RBC QN AUTO: 30 PG (ref 26.6–33)
MCHC RBC AUTO-ENTMCNC: 30 G/DL (ref 31.5–35.7)
MCV RBC AUTO: 100 FL (ref 79–97)
MONOCYTES # BLD AUTO: 0.59 10*3/MM3 (ref 0.1–0.9)
MONOCYTES NFR BLD AUTO: 7.9 % (ref 5–12)
NEUTROPHILS NFR BLD AUTO: 5.98 10*3/MM3 (ref 1.7–7)
NEUTROPHILS NFR BLD AUTO: 79.7 % (ref 42.7–76)
NRBC BLD AUTO-RTO: 0 /100 WBC (ref 0–0.2)
PHOSPHATE SERPL-MCNC: 5.1 MG/DL (ref 2.5–4.5)
PLATELET # BLD AUTO: 290 10*3/MM3 (ref 140–450)
PMV BLD AUTO: 10.1 FL (ref 6–12)
POTASSIUM SERPL-SCNC: 4.1 MMOL/L (ref 3.5–5.2)
PROTHROMBIN TIME: 17.7 SECONDS (ref 12.2–14.5)
RBC # BLD AUTO: 2.5 10*6/MM3 (ref 3.77–5.28)
SODIUM SERPL-SCNC: 138 MMOL/L (ref 136–145)
WBC NRBC COR # BLD: 7.5 10*3/MM3 (ref 3.4–10.8)

## 2023-11-09 PROCEDURE — 97110 THERAPEUTIC EXERCISES: CPT

## 2023-11-09 PROCEDURE — 94761 N-INVAS EAR/PLS OXIMETRY MLT: CPT

## 2023-11-09 PROCEDURE — 84100 ASSAY OF PHOSPHORUS: CPT | Performed by: INTERNAL MEDICINE

## 2023-11-09 PROCEDURE — 97116 GAIT TRAINING THERAPY: CPT

## 2023-11-09 PROCEDURE — 94799 UNLISTED PULMONARY SVC/PX: CPT

## 2023-11-09 PROCEDURE — 94664 DEMO&/EVAL PT USE INHALER: CPT

## 2023-11-09 PROCEDURE — 92526 ORAL FUNCTION THERAPY: CPT

## 2023-11-09 PROCEDURE — 85610 PROTHROMBIN TIME: CPT | Performed by: INTERNAL MEDICINE

## 2023-11-09 PROCEDURE — 92507 TX SP LANG VOICE COMM INDIV: CPT

## 2023-11-09 PROCEDURE — 83735 ASSAY OF MAGNESIUM: CPT | Performed by: INTERNAL MEDICINE

## 2023-11-09 PROCEDURE — 82948 REAGENT STRIP/BLOOD GLUCOSE: CPT

## 2023-11-09 PROCEDURE — 25010000002 ENOXAPARIN PER 10 MG: Performed by: INTERNAL MEDICINE

## 2023-11-09 PROCEDURE — 80048 BASIC METABOLIC PNL TOTAL CA: CPT | Performed by: INTERNAL MEDICINE

## 2023-11-09 PROCEDURE — 85025 COMPLETE CBC W/AUTO DIFF WBC: CPT | Performed by: INTERNAL MEDICINE

## 2023-11-09 RX ADMIN — ASPIRIN 81 MG CHEWABLE TABLET 81 MG: 81 TABLET CHEWABLE at 09:25

## 2023-11-09 RX ADMIN — ENOXAPARIN SODIUM 70 MG: 80 INJECTION SUBCUTANEOUS at 09:25

## 2023-11-09 RX ADMIN — MIDODRINE HYDROCHLORIDE 5 MG: 5 TABLET ORAL at 20:19

## 2023-11-09 RX ADMIN — Medication 10 ML: at 20:20

## 2023-11-09 RX ADMIN — IPRATROPIUM BROMIDE AND ALBUTEROL SULFATE 3 ML: 2.5; .5 SOLUTION RESPIRATORY (INHALATION) at 15:23

## 2023-11-09 RX ADMIN — MIDODRINE HYDROCHLORIDE 5 MG: 5 TABLET ORAL at 12:41

## 2023-11-09 RX ADMIN — PANTOPRAZOLE SODIUM 40 MG: 40 TABLET, DELAYED RELEASE ORAL at 06:20

## 2023-11-09 RX ADMIN — Medication 10 ML: at 09:26

## 2023-11-09 RX ADMIN — IPRATROPIUM BROMIDE AND ALBUTEROL SULFATE 3 ML: 2.5; .5 SOLUTION RESPIRATORY (INHALATION) at 19:08

## 2023-11-09 RX ADMIN — LEVETIRACETAM 1000 MG: 500 TABLET, FILM COATED ORAL at 09:25

## 2023-11-09 RX ADMIN — ENOXAPARIN SODIUM 70 MG: 80 INJECTION SUBCUTANEOUS at 20:19

## 2023-11-09 RX ADMIN — TOPIRAMATE 200 MG: 100 TABLET, FILM COATED ORAL at 20:19

## 2023-11-09 RX ADMIN — SENNOSIDES AND DOCUSATE SODIUM 2 TABLET: 8.6; 5 TABLET ORAL at 20:19

## 2023-11-09 RX ADMIN — TOPIRAMATE 200 MG: 100 TABLET, FILM COATED ORAL at 09:25

## 2023-11-09 RX ADMIN — IPRATROPIUM BROMIDE AND ALBUTEROL SULFATE 3 ML: 2.5; .5 SOLUTION RESPIRATORY (INHALATION) at 07:21

## 2023-11-09 RX ADMIN — LEVETIRACETAM 1000 MG: 500 TABLET, FILM COATED ORAL at 20:19

## 2023-11-09 RX ADMIN — LEVOTHYROXINE SODIUM 75 MCG: 0.07 TABLET ORAL at 06:20

## 2023-11-09 RX ADMIN — ATORVASTATIN CALCIUM 80 MG: 40 TABLET, FILM COATED ORAL at 20:19

## 2023-11-09 RX ADMIN — IPRATROPIUM BROMIDE AND ALBUTEROL SULFATE 3 ML: 2.5; .5 SOLUTION RESPIRATORY (INHALATION) at 10:56

## 2023-11-09 RX ADMIN — WARFARIN SODIUM 6 MG: 3 TABLET ORAL at 17:46

## 2023-11-09 RX ADMIN — MIDODRINE HYDROCHLORIDE 5 MG: 5 TABLET ORAL at 04:27

## 2023-11-09 NOTE — THERAPY TREATMENT NOTE
Acute Care - Speech Language Pathology   Swallow Treatment Note [facility]     Patient Name: Mara Martínez  : 1970  MRN: [id number]  Today's Date: 2023               Admit Date: 10/31/2023    Visit Dx:     ICD-10-CM ICD-9-CM   1. Acute CVA (cerebrovascular accident)  I63.9 434.91   2. Dysphagia, unspecified type  R13.10 787.20   3. Communication deficit  F80.9 307.9   4. Anemia, unspecified type  D64.9 285.9   5. Acute UTI  N39.0 599.0     Patient Active Problem List   Diagnosis    Anemia    Aortic valve insufficiency    Bruit of left carotid artery    Chest pain    Positive D-dimer    Dizziness    Edema    Fatigue    HTN (hypertension)    Heart murmur    Hyperlipidemia    Mitral valve stenosis    Mitral valve insufficiency    Palpitations    Pulmonary edema    Seizure disorder    Shortness of breath    Difficulty sleeping    Snoring    Supravalvular aortic stenosis    Syncope    Tachycardia    Mitral stenosis    Rheumatic aortic stenosis    Valvular heart disease    Epilepsy    ISREAL (cerebral atherosclerosis)    Tobacco use    Migraines    Aortic regurgitation    Rheumatic mitral regurgitation    SOB (shortness of breath)    Supraventricular aortic stenosis    Aortic valve stenosis    Rheumatic aortic valve insufficiency    Moderate aortic stenosis    Aortic valve disorder    Coronary artery disease involving native coronary artery of native heart without angina pectoris    S/P mitral valve replacement    S/P mechanical aortic and mitral valve replacement (2018)    Stenosis of carotid artery    Generalized abdominal pain    Rectal bleeding    Functional diarrhea    Lower extremity edema    Rectal bleed    (HFpEF) heart failure with preserved ejection fraction    Chronic pain with intrathecal pump in place    Dyslipidemia    Bilateral carotid artery stenosis    Low back pain    Degeneration of lumbar intervertebral disc    Lumbosacral radiculopathy    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    Cerebral aneurysm    Complex partial seizures with consciousness impaired    Migraine without aura and with status migrainosus, not intractable    Chronic obstructive pulmonary disease    Essential tremor    Hesitancy of micturition    Complex partial epilepsy with generalization and with intractable epilepsy    Focal (motor) epilepsy    Occipital neuralgia    Paresthesia    Restless legs syndrome    Retinal drusen    Acute ischemic left MCA stroke    H/O recurrent GIB 2* AVMs    UTI (urinary tract infection)    Chronic hypotension on midodrine     Past Medical History:   Diagnosis Date    Anemia     Aortic regurgitation     Arthritis     Back pain     Carotid bruit     ISREAL (cerebral atherosclerosis) 12/2014    nonobstructive    Chest pain     Chronic hypotension on midodrine 11/01/2023    Chronic obstructive pulmonary disease 09/07/2022    Coronary artery disease     COVID-19 vaccine administered     phizer    D-dimer, elevated     Diastolic congestive heart failure 07/25/2019    Dizziness     Edema     Epilepsy     Fatigue     Heart murmur     History of blood transfusion 08/2019    History of blood transfusion 2022    History of blood transfusion 11/2022    History of blood transfusion     August 2023 x2    History of recent blood transfusion 12/2021    History of transfusion     Hyperlipidemia     Hyperlipidemia 04/28/2016    Hypertension     Kidney stones     Migraines     Mitral regurgitation     Mitral stenosis     mild    Neuropathy     Neuropathy     Palpitations     Pulmonary edema     Retinal drusen     Seizure     Sleep apnea 09/2019    Sleeping difficulties     SOB (shortness of breath)     Supraventricular aortic stenosis     Syncope     Tachycardia     Tobacco abuse     VHD (valvular heart disease)     Wears dentures     Wears eyeglasses      Past Surgical History:   Procedure Laterality Date    APPENDECTOMY      CARDIAC CATHETERIZATION      CARDIAC SURGERY      2 valves replaced      SECTION      x 2    CHOLECYSTECTOMY      COLONOSCOPY      COLONOSCOPY N/A 2019    Procedure: COLONOSCOPY;  Surgeon: Kurt Gaines MD;  Location:  [facility];  Service: Gastroenterology    CORONARY ARTERY BYPASS GRAFT  2018    EXPLORATORY LAPAROTOMY      HERNIA REPAIR      HYSTERECTOMY      INTERVENTIONAL RADIOLOGY PROCEDURE Bilateral 10/31/2023    Procedure: Carotid Cerebral Angiogram;  Surgeon: Cayetano Mckeon MD;  Location:  [facility];  Service: Interventional Radiology;  Laterality: Bilateral;    JOINT REPLACEMENT Right     knee replacement    PAIN PUMP INSERTION/REVISION      morphine    PORTACATH PLACEMENT      UPPER GASTROINTESTINAL ENDOSCOPY         SLP Recommendation and Plan     SLP Diet Recommendation: regular textures, thin liquids, other (see comments) (no straw, single cup sips) (23)  Recommended Precautions and Strategies: no straw, small bites of food and sips of liquid, upright posture during/after eating, general aspiration precautions (23)  SLP Rec. for Method of Medication Administration: meds whole, with puree, as tolerated (23)     Monitor for Signs of Aspiration: yes, notify SLP if any concerns (23)        Anticipated Discharge Disposition (SLP): home with home health (23)     Therapy Frequency (Swallow): 5 days per week (23)  Predicted Duration Therapy Intervention (Days): until discharge (23)  Oral Care Recommendations: Oral Care BID/PRN (23)        Daily Summary of Progress (SLP): progress toward functional goals is good (23)               Treatment Assessment (SLP): toleration of diet, suspected, continued, mild, pharyngeal dysphagia, dysarthria (23)     Plan for Continued Treatment (SLP): continue treatment per plan of care (23)       Oral Care Recommendations: Oral Care BID/PRN (23)    Plan of Care Reviewed With:  patient      SWALLOW EVALUATION (last 72 hours)       SLP Adult Swallow Evaluation       Row Name 11/09/23 1047 11/07/23 1530                Rehab Evaluation    Document Type therapy note (daily note)  -MP therapy note (daily note)  -MP       Subjective Information no complaints  -MP no complaints  -MP       Patient Observations alert;cooperative  -MP alert;cooperative  -MP       Patient/Family/Caregiver Comments/Observations No family present  -MP No family present  -MP       Patient Effort good  -MP good  -MP          Pain    Additional Documentation Pain Scale: FACES Pre/Post-Treatment (Group)  -MP Pain Scale: FACES Pre/Post-Treatment (Group)  -MP          Pain Scale: FACES Pre/Post-Treatment    Pain: FACES Scale, Pretreatment 0-->no hurt  -MP 0-->no hurt  -MP       Posttreatment Pain Rating 0-->no hurt  -MP 0-->no hurt  -MP          SLP Treatment Clinical Impressions    Treatment Assessment (SLP) toleration of diet;suspected;continued;mild;pharyngeal dysphagia;dysarthria  -MP suspected;continued;pharyngeal dysphagia;toleration of diet;dysarthria  -MP       Treatment Assessment Comments (SLP) -- Reviewed & completed dysphagia exercises w/ pt, as well as completed speech tx.  -MP       Daily Summary of Progress (SLP) progress toward functional goals is good  -MP progress toward functional goals is good  -MP       Plan for Continued Treatment (SLP) continue treatment per plan of care  -MP continue treatment per plan of care  -MP       Care Plan Review care plan/treatment goals reviewed;patient/other agree to care plan  -MP care plan/treatment goals reviewed;patient/other agree to care plan  -MP          Recommendations    Therapy Frequency (Swallow) 5 days per week  -MP 5 days per week  -MP       Predicted Duration Therapy Intervention (Days) until discharge  -MP until discharge  -MP       SLP Diet Recommendation regular textures;thin liquids;other (see comments)  no straw, single cup sips  -MP regular  textures;thin liquids;other (see comments)  no straw  -MP       Recommended Precautions and Strategies no straw;small bites of food and sips of liquid;upright posture during/after eating;general aspiration precautions  -MP upright posture during/after eating;small bites of food and sips of liquid;no straw;general aspiration precautions  -MP       Oral Care Recommendations Oral Care BID/PRN  -MP Oral Care BID/PRN  -MP       SLP Rec. for Method of Medication Administration meds whole;with puree;as tolerated  -MP meds whole;with puree;as tolerated  -MP       Monitor for Signs of Aspiration yes;notify SLP if any concerns  -MP yes;notify SLP if any concerns  -MP       Anticipated Discharge Disposition (SLP) home with home health  -MP home with home health  -MP                 User Key  (r) = Recorded By, (t) = Taken By, (c) = Cosigned By      Initials Name Effective Dates    MP Roly Lopez, MS CCC-SLP 12/28/21 -                     EDUCATION  The patient has been educated in the following areas:   Communication Impairment Dysphagia (Swallowing Impairment).        SLP GOALS       Row Name 11/09/23 1047 11/07/23 1530          (LTG) Patient will demonstrate functional swallow for    Diet Texture (Demonstrate functional swallow) regular textures  -MP regular textures  -MP     Liquid viscosity (Demonstrate functional swallow) thin liquids  -MP thin liquids  -MP     [place] (Demonstrate functional swallow) with minimal cues (75-90% accuracy)  -MP with minimal cues (75-90% accuracy)  -MP     Time Frame (Demonstrate functional swallow) by discharge  -MP by discharge  -MP     Progress/Outcomes (Demonstrate functional swallow) continuing progress toward goal  -MP continuing progress toward goal  -MP     Comment (Demonstrate functional swallow) Pt independently recalls no straw and reported has been setting them to the side when they are brought to her  -MP --        (STG) Pharyngeal Strengthening Exercise Goal 1  (SLP)    Increase Timing prepping - 3 second prep or suck swallow or 3-step swallow  -MP prepping - 3 second prep or suck swallow or 3-step swallow  -MP     Increase Superior Movement of the Hyolaryngeal Complex falsetto  -MP falsetto  -MP     Increase Anterior Movement of the Hyolaryngeal Complex chin tuck against resistance (CTAR)  -MP chin tuck against resistance (CTAR)  -MP     Increase Closure at Entrance to Airway/Closure of Airway at [place] breath hold exercises  -MP breath hold exercises  -MP     Increase Squeeze/Positive Pressure Generation hard effortful swallow  -MP hard effortful swallow  -MP     [place]/Accuracy (Pharyngeal Strengthening Goal 1, SLP) with minimal cues (75-90% accuracy)  -MP with minimal cues (75-90% accuracy)  -MP     Time Frame (Pharyngeal Strengthening Goal 1, SLP) by discharge  -MP by discharge  -MP     Progress/Outcomes (Pharyngeal Strengthening Goal 1, SLP) continuing progress toward goal  -MP continuing progress toward goal  -MP     Comment (Pharyngeal Strengthening Goal 1, SLP) Has handout of exercises & has been completing independently. Completed 3 reps of each w/ pt  -MP Reviewed & completed. Provided handout.  -MP        Patient will demonstrate functional language skills for return to discharge environment     [place] with moderate cues  -MP with moderate cues  -MP     Time frame by discharge  -MP by discharge  -MP     Progress/Outcomes continuing progress toward goal  -MP continuing progress toward goal  -MP        SLP Diagnostic Treatment     Patient will participate in further assessment in the following areas reading comprehension;graphic expression;cognitive-linguistic  -MP reading comprehension;graphic expression;cognitive-linguistic  -MP     Time Frame (Diagnostic) by discharge  -MP by discharge  -MP     Progress/Outcomes (Additional Goal 1, SLP) goal met  -MP goal ongoing  -MP        Comprehend Questions Goal 1 (SLP)    Improve Ability to Comprehend  Questions Goal 1 (SLP) complex general questions;90%;independently (over 90% accuracy)  -MP complex general questions;90%;independently (over 90% accuracy)  -MP     Time Frame (Comprehend Questions Goal 1, SLP) by discharge  -MP by discharge  -MP     Progress (Ability to Comprehend Questions Goal 1, SLP) 70%;with minimal cues (75-90%)  -MP 70%;with minimal cues (75-90%)  -MP     Progress/Outcomes (Comprehend Questions Goal 1, SLP) continuing progress toward goal  -MP continuing progress toward goal  -MP        Follow Directions Goal 2 (SLP)    Improve Ability to Follow Directions Goal 1 (SLP) multistep commands;90%;independently (over 90% accuracy)  -MP multistep commands;90%;independently (over 90% accuracy)  -MP     Time Frame (Follow Directions Goal 1, SLP) by discharge  -MP by discharge  -MP     Progress (Ability to Follow Directions Goal 1, SLP) 100%;independently (over 90% accuracy)  -MP 80%;with minimal cues (75-90%)  -MP     Progress/Outcomes (Follow Directions Goal 1, SLP) goal met  -MP continuing progress toward goal  -MP        Articulation Goal 1 (SLP)    Improve Articulation Goal 1 (SLP) by over-articulating at phrase level;80%;with minimal cues (75-90%)  -MP by over-articulating at phrase level;80%;with minimal cues (75-90%)  -MP     Time Frame (Articulation Goal 1, SLP) by discharge  -MP by discharge  -MP     Progress (Articulation Goal 1, SLP) 70%;with minimal cues (75-90%)  -MP 70%;with minimal cues (75-90%)  -MP     Progress/Outcomes (Articulation Goal 1, SLP) continuing progress toward goal  -MP continuing progress toward goal  -MP               User Key  (r) = Recorded By, (t) = Taken By, (c) = Cosigned By      Initials Name Provider Type    MP Roly Lopez MS CCC-SLP Speech and Language Pathologist                       Time Calculation:    Time Calculation- SLP       Row Name 11/09/23 1140             Time Calculation- SLP    SLP Start Time 1047  -MP      SLP Received On 11/09/23  -          Untimed Charges    35513-GU Treatment/ST Modification Prosth Aug Alter  12  -MP      71217-TJ Treatment Swallow Minutes 12  -MP         Total Minutes    Untimed Charges Total Minutes 24  -MP       Total Minutes 24  -MP                User Key  (r) = Recorded By, (t) = Taken By, (c) = Cosigned By      Initials Name Provider Type    Roly Miller MS CCC-SLP Speech and Language Pathologist                    Therapy Charges for Today       Code Description Service Date Service Provider Modifiers Qty    57968026308 HC ST TREATMENT SPEECH 1 11/9/2023 Roly Lopez MS CCC-SLP GN 1    57417175038 HC ST TREATMENT SWALLOW 1 11/9/2023 Roly Lopez MS CCC-SLP GN 1                 MS TARA Patel  11/9/2023

## 2023-11-09 NOTE — PROGRESS NOTES
[facility] Medicine Services  PROGRESS NOTE    Patient Name: Mara Martínez  : 1970  MRN: [id number]    Date of Admission: 10/31/2023  Primary Care Physician: Rowan Nolasco APRN    Subjective   Subjective     CC:  Follow-up for CVA    HPI:  Resting in bed in no acute distress and overall feels comfortable.  Does not have any specific complaint.  No fever or chills.  No chest pain or palpitation or shortness of breath.  No nausea vomiting or diarrhea or abdominal pain.  No headache or visual changes.      Objective   Objective     Vital Signs:   Temp:  [98 °F (36.7 °C)-98.9 °F (37.2 °C)] 98 °F (36.7 °C)  Heart Rate:  [64-76] 64  Resp:  [16-18] 18  BP: (108-132)/(49-66) 115/63  Flow (L/min):  [1-2] 1     Physical Exam:  Constitutional: No acute distress, awake, alert  HENT: NCAT, mucous membranes moist  Respiratory: Clear to auscultation bilaterally, respiratory effort normal   Cardiovascular: RRR, no murmurs, present midsystolic clicks, no rubs or gallops  Gastrointestinal: Positive bowel sounds, soft, nontender, nondistended  Musculoskeletal: No bilateral ankle edema  Psychiatric: Appropriate affect, cooperative  Neurologic: Oriented x 3, strength symmetric in all extremities, Cranial Nerves grossly intact to confrontation, speech clear  Skin: No rashes     Results Reviewed:  LAB RESULTS:      Lab 23  0709 23  0407 23  0431 23  0430 23  2204 23  1613 23  0641 23  1758 23  0742   WBC 7.50 7.66  --  8.02  --   --  9.39  --  10.13   HEMOGLOBIN 7.5* 7.5*  --  7.1*  --   --  7.2*  --  7.3*   HEMATOCRIT 25.0* 25.2*  --  23.8*  --   --  24.4*  --  24.8*   PLATELETS 290 275  --  253  --   --  235  --  241   NEUTROS ABS 5.98 5.99  --  6.38  --   --  7.76*  --  8.43*   IMMATURE GRANS (ABS) 0.03 0.05  --  0.05  --   --  0.06*  --  0.05   LYMPHS ABS 0.63* 0.68*  --  0.69*  --   --  0.66*  --  0.69*   MONOS ABS 0.59 0.66  --  0.62  --    --  0.67  --  0.78   EOS ABS 0.23 0.23  --  0.23  --   --  0.19  --  0.15   .0* 99.6*  --  99.6*  --   --  101.2*  --  101.2*   PROTIME 17.7* 15.7* 14.3  --   --   --  13.8  --  14.5   HEPARIN ANTI-XA  --  0.36 0.38  --  0.45 0.43 0.26*   < > 0.32    < > = values in this interval not displayed.         Lab 11/09/23  0716 11/08/23  0407 11/07/23  0430 11/06/23  0641 11/05/23  0742 11/04/23  0808   SODIUM 138 137 139 140 141 141   POTASSIUM 4.1 4.5 4.4 4.7 4.3 3.9   CHLORIDE 106 107 109* 111* 113* 111*   CO2 21.0* 20.0* 20.0* 20.0* 22.0 22.0   ANION GAP 11.0 10.0 10.0 9.0 6.0 8.0   BUN 18 15 14 12 13 12   CREATININE 0.78 0.64 0.61 0.54* 0.49* 0.48*   EGFR 91.0 105.8 107.1 110.2 112.9 113.4   GLUCOSE 95 84 82 93 121* 133*   CALCIUM 10.2 9.8 9.5 9.1 8.5* 8.1*   MAGNESIUM 2.2  --  2.1 2.2 2.0 1.9   PHOSPHORUS 5.1*  --   --   --  3.4 2.9             Lab 11/09/23  0709 11/08/23  0407 11/07/23  0431 11/06/23  0641 11/05/23  0742   PROTIME 17.7* 15.7* 14.3 13.8 14.5   INR 1.44* 1.23* 1.10 1.05 1.11                 Brief Urine Lab Results  (Last result in the past 365 days)        Color   Clarity   Blood   Leuk Est   Nitrite   Protein   CREAT   Urine HCG        10/31/23 2204 Yellow   Clear   Negative   Moderate (2+)   Positive   Negative                   Microbiology Results Abnormal       Procedure Component Value - Date/Time    Blood Culture - Blood, Hand, Right [[id number]]  (Normal) Collected: 11/01/23 0312    Lab Status: Final result Specimen: Blood from Hand, Right Updated: 11/06/23 0245     Blood Culture No growth at 5 days    Blood Culture - Blood, Hand, Left [[id number]]  (Normal) Collected: 11/01/23 0151    Lab Status: Final result Specimen: Blood from Hand, Left Updated: 11/06/23 0115     Blood Culture No growth at 5 days            No radiology results from the last 24 hrs    Results for orders placed during the hospital encounter of 10/31/23    Adult Transthoracic Echo Complete W/ Cont if Necessary Per  Protocol (With Agitated Saline)    Interpretation Summary    Left ventricular systolic function is normal. Calculated left ventricular EF = 62.1%    Left ventricular wall thickness is consistent with mild concentric hypertrophy.    Saline test results are negative.    There is a mechanical aortic valve prosthesis present.    There is a mechanical mitral valve prosthesis present.    Estimated right ventricular systolic pressure from tricuspid regurgitation is normal (<35 mmHg).    Calcification on pap muscle      Current medications:  Scheduled Meds:aspirin, 81 mg, Oral, Daily   Or  aspirin, 300 mg, Rectal, Daily  atorvastatin, 80 mg, Oral, Nightly  enoxaparin, 1 mg/kg, Subcutaneous, BID  ipratropium-albuterol, 3 mL, Nebulization, 4x Daily - RT  levETIRAcetam, 1,000 mg, Oral, BID  levothyroxine, 75 mcg, Oral, Q AM  midodrine, 5 mg, Oral, Q8H  pantoprazole, 40 mg, Oral, Q AM  senna-docusate sodium, 2 tablet, Oral, BID  sodium chloride, 10 mL, Intravenous, Q12H  topiramate, 200 mg, Oral, BID  warfarin, 6 mg, Oral, Daily      Continuous Infusions:Pharmacy to dose warfarin,       PRN Meds:.  acetaminophen    senna-docusate sodium **AND** polyethylene glycol **AND** bisacodyl **AND** bisacodyl    Calcium Replacement - Follow Nurse / BPA Driven Protocol    dextrose    Magnesium Standard Dose Replacement - Follow Nurse / BPA Driven Protocol    ondansetron    Pharmacy to dose warfarin    Phosphorus Replacement - Follow Nurse / BPA Driven Protocol    Potassium Replacement - Follow Nurse / BPA Driven Protocol    sodium chloride    sodium chloride    Assessment & Plan   Assessment & Plan     Active Hospital Problems    Diagnosis  POA    **Acute ischemic left MCA stroke [I63.512]  Yes    H/O recurrent GIB 2* AVMs [Z87.19]  Not Applicable    UTI (urinary tract infection) [N39.0]  Yes    Chronic hypotension on midodrine [I95.89]  Yes    Cerebral aneurysm [I67.1]  Yes    Dyslipidemia [E78.5]  Yes    Chronic pain with intrathecal  pump in place [G89.29]  Yes    (HFpEF) heart failure with preserved ejection fraction [I50.30]  Yes    S/P mechanical aortic and mitral valve replacement (2018) [Z95.2]  Not Applicable    Tobacco use [Z72.0]  Yes    Anemia [D64.9]  Yes      Resolved Hospital Problems   No resolved problems to display.        Brief Hospital Course to date:  Mara Martínez is a 53 y.o. female  who is active smoker with history of mechanical aortic and mitral valve, hypertension, dyslipidemia, left internal carotid artery stent in 2020, seizure disorders, chronic anemia and chronic GI loss from AVM, chronic pain with pain pump, RB ILD followed at , sleep apnea on CPAP presenting with acute left MCA stroke.  Patient was outside of window of thrombolysis so underwent salvage thrombectomy for saddle embolus in the left MCA bifurcation.  Postprocedure patient was sent to ICU.  Patient was started on heparin drip subsequently.  Patient was also found to have urinary tract infection on admission and cultures grew Klebsiella pneumoniae.  Patient was treated with Rocephin.  Patient also has chronic hypotension and on midodrine at home.     **CVA status post thrombectomy.  Postprocedure patient was in ICU and initially started IV heparin drip.  Currently patient is back on Coumadin and also bridged with Lovenox.    **Mechanical mitral and aortic valve.  Patient was on Coumadin at home.  As mentioned above currently is on Coumadin and bridged with Lovenox.  INR is still is subtherapeutic.    **Seizure disorder, on Keppra and Topamax.    **Hypotension and patient has been on midodrine and that is to be continued.    **Hypothyroidism, on Synthroid.    **UTI with Klebsiella, present on admission.  Patient treated with Rocephin.        Expected Discharge Location and Transportation: home  Expected Discharge in a day or two when INR is acceptable.    Expected Discharge Date: 11/10/2023; Expected Discharge Time:      DVT prophylaxis:  Medical and  mechanical DVT prophylaxis orders are present.     AM-PAC 6 Clicks Score (PT): 19 (11/09/23 4239)    CODE STATUS:   Code Status and Medical Interventions:   Ordered at: 11/01/23 0225     Code Status (Patient has no pulse and is not breathing):    CPR (Attempt to Resuscitate)     Medical Interventions (Patient has pulse or is breathing):    Full Support       Thomas Aviles MD  11/09/23

## 2023-11-09 NOTE — PROGRESS NOTES
"Pharmacy Consult  -  Warfarin    Mara Martínez is a  53 y.o. female   Height - 152.4 cm (60\")  Weight - 73.4 kg (161 lb 13.1 oz)    Consulting Provider: - Durga Mccain  Indication: - Mechanical Valve  Goal INR: - 2.5-3.5  Home Regimen:   - 4 mg daily     Bridge Therapy: Yes   and unfractionated heparin    Drug-Drug Interactions with current regimen:  Heparin Drip - increased risk of bleeding  Aspirin- increased risk of bleeding    Warfarin Dosing During Admission:    Date  11/6 11/7 11/8         INR  1.05 1.1 1.23         Dose  6 mg 4 mg (6 mg)               Education Provided: Will follow up     Discharge Follow up:   Following Provider - Rowan Nolasco ([facility])   Follow up time range or appointment - Will assess closer to discharge      Labs:    Results from last 7 days   Lab Units 11/09/23  0709 11/08/23  0407 11/07/23  0431 11/07/23  0430 11/06/23  0641 11/05/23  0742 11/04/23  0808 11/03/23  0439 11/02/23  1341   INR  1.44* 1.23* 1.10  --  1.05 1.11  --   --  1.75*   APTT seconds  --   --   --   --   --   --   --   --  38.6*   HEMOGLOBIN g/dL 7.5* 7.5*  --  7.1* 7.2* 7.3* 7.7* 8.6*  --    HEMATOCRIT % 25.0* 25.2*  --  23.8* 24.4* 24.8* 25.4* 27.9*  --      Results from last 7 days   Lab Units 11/09/23  0716 11/08/23  0407 11/07/23  0430   SODIUM mmol/L 138 137 139   POTASSIUM mmol/L 4.1 4.5 4.4   CHLORIDE mmol/L 106 107 109*   CO2 mmol/L 21.0* 20.0* 20.0*   BUN mg/dL 18 15 14   CREATININE mg/dL 0.78 0.64 0.61   CALCIUM mg/dL 10.2 9.8 9.5   GLUCOSE mg/dL 95 84 82     Current dietary intake:   11/6: 50% of Breakfast, Lunch, Dinner  11/7: 25% of Breakfast, 75% Lunch    Assessment/Plan:   1. Patient is currently on Warfarin for mechanical aortic and mitral valve repair. Patient's goal INR is 2.5-3.5.   2. Patient's INR is subtherapeutic today at 1.44. Beginning to trend slowly up after boost on 11/6 and home dose 11/7.  3. Continue Warfarin 6 mg daily until therapeutic INR.  4. Patient with anemia, but " hemoglobin is slightly increased today to 7.5 g/dL. Patient is bridging with enoxaparin until INR therapeutic.   5.Will monitor signs/symptoms of bleeding, dietary intake, and drug-drug interactions. Will follow daily PT/INR and adjust dose accordingly    Thank you  Yancy Pickering, PharmD  Pharmacy Resident  11/9/2023  11:14 EST

## 2023-11-09 NOTE — PLAN OF CARE
Goal Outcome Evaluation:  Plan of Care Reviewed With: patient, spouse        Progress: improving  Outcome Evaluation: Patient showing improvement in strength and mobility, increasing her ambulation distance. Recommend home with home health for improved outcomes      Anticipated Discharge Disposition (PT): home with home health

## 2023-11-09 NOTE — THERAPY TREATMENT NOTE
Patient Name: Mara Martínez  : 1970    MRN: [id number]                              Today's Date: 2023       Admit Date: 10/31/2023    Visit Dx:     ICD-10-CM ICD-9-CM   1. Acute CVA (cerebrovascular accident)  I63.9 434.91   2. Dysphagia, unspecified type  R13.10 787.20   3. Communication deficit  F80.9 307.9   4. Anemia, unspecified type  D64.9 285.9   5. Acute UTI  N39.0 599.0     Patient Active Problem List   Diagnosis    Anemia    Aortic valve insufficiency    Bruit of left carotid artery    Chest pain    Positive D-dimer    Dizziness    Edema    Fatigue    HTN (hypertension)    Heart murmur    Hyperlipidemia    Mitral valve stenosis    Mitral valve insufficiency    Palpitations    Pulmonary edema    Seizure disorder    Shortness of breath    Difficulty sleeping    Snoring    Supravalvular aortic stenosis    Syncope    Tachycardia    Mitral stenosis    Rheumatic aortic stenosis    Valvular heart disease    Epilepsy    ISREAL (cerebral atherosclerosis)    Tobacco use    Migraines    Aortic regurgitation    Rheumatic mitral regurgitation    SOB (shortness of breath)    Supraventricular aortic stenosis    Aortic valve stenosis    Rheumatic aortic valve insufficiency    Moderate aortic stenosis    Aortic valve disorder    Coronary artery disease involving native coronary artery of native heart without angina pectoris    S/P mitral valve replacement    S/P mechanical aortic and mitral valve replacement (2018)    Stenosis of carotid artery    Generalized abdominal pain    Rectal bleeding    Functional diarrhea    Lower extremity edema    Rectal bleed    (HFpEF) heart failure with preserved ejection fraction    Chronic pain with intrathecal pump in place    Dyslipidemia    Bilateral carotid artery stenosis    Low back pain    Degeneration of lumbar intervertebral disc    Lumbosacral radiculopathy    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    Cerebral aneurysm    Complex partial seizures  with consciousness impaired    Migraine without aura and with status migrainosus, not intractable    Chronic obstructive pulmonary disease    Essential tremor    Hesitancy of micturition    Complex partial epilepsy with generalization and with intractable epilepsy    Focal (motor) epilepsy    Occipital neuralgia    Paresthesia    Restless legs syndrome    Retinal drusen    Acute ischemic left MCA stroke    H/O recurrent GIB 2* AVMs    UTI (urinary tract infection)    Chronic hypotension on midodrine     Past Medical History:   Diagnosis Date    Anemia     Aortic regurgitation     Arthritis     Back pain     Carotid bruit     ISREAL (cerebral atherosclerosis) 2014    nonobstructive    Chest pain     Chronic hypotension on midodrine 2023    Chronic obstructive pulmonary disease 2022    Coronary artery disease     COVID-19 vaccine administered     phizer    D-dimer, elevated     Diastolic congestive heart failure 2019    Dizziness     Edema     Epilepsy     Fatigue     Heart murmur     History of blood transfusion 2019    History of blood transfusion     History of blood transfusion 2022    History of blood transfusion     August 2023 x2    History of recent blood transfusion 2021    History of transfusion     Hyperlipidemia     Hyperlipidemia 2016    Hypertension     Kidney stones     Migraines     Mitral regurgitation     Mitral stenosis     mild    Neuropathy     Neuropathy     Palpitations     Pulmonary edema     Retinal drusen     Seizure     Sleep apnea 2019    Sleeping difficulties     SOB (shortness of breath)     Supraventricular aortic stenosis     Syncope     Tachycardia     Tobacco abuse     VHD (valvular heart disease)     Wears dentures     Wears eyeglasses      Past Surgical History:   Procedure Laterality Date    APPENDECTOMY      CARDIAC CATHETERIZATION      CARDIAC SURGERY      2 valves replaced     SECTION      x 2    CHOLECYSTECTOMY      COLONOSCOPY       COLONOSCOPY N/A 5/2/2019    Procedure: COLONOSCOPY;  Surgeon: Kurt Gaines MD;  Location: [facility] OR;  Service: Gastroenterology    CORONARY ARTERY BYPASS GRAFT  06/12/2018    EXPLORATORY LAPAROTOMY      HERNIA REPAIR      HYSTERECTOMY      INTERVENTIONAL RADIOLOGY PROCEDURE Bilateral 10/31/2023    Procedure: Carotid Cerebral Angiogram;  Surgeon: Cayetano Mckeon MD;  Location:  [facility];  Service: Interventional Radiology;  Laterality: Bilateral;    JOINT REPLACEMENT Right     knee replacement    PAIN PUMP INSERTION/REVISION      morphine    PORTACATH PLACEMENT      UPPER GASTROINTESTINAL ENDOSCOPY        General Information       Row Name 11/09/23 1407          Physical Therapy Time and Intention    Document Type therapy note (daily note)  -SC     Mode of Treatment physical therapy  -SC       Row Name 11/09/23 1407          General Information    Patient Profile Reviewed yes  -SC     Existing Precautions/Restrictions fall;oxygen therapy device and L/min  -SC       Row Name 11/09/23 1407          Cognition    Orientation Status (Cognition) oriented x 4  -SC       Row Name 11/09/23 1407          Safety Issues, Functional Mobility    Impairments Affecting Function (Mobility) balance;endurance/activity tolerance;strength;postural/trunk control  -SC     Comment, Safety Issues/Impairments (Mobility) alert, following commands  -SC               User Key  (r) = Recorded By, (t) = Taken By, (c) = Cosigned By      Initials Name Provider Type    SC Jay Avalos PT Physical Therapist                   Mobility       Row Name 11/09/23 1407          Bed Mobility    Bed Mobility sit-supine;scooting/bridging  -SC     Scooting/Bridging Benzie (Bed Mobility) independent  -SC     Supine-Sit [place] (Bed Mobility) modified independence;verbal cues  -SC     Assistive Device (Bed Mobility) head of bed elevated;bed rails  -SC     Comment, (Bed Mobility) able to get up to edge of bed using bed  rails. Required verbal cues and extra time  -[facility] Name 11/09/23 1407          Transfers    Comment, (Transfers) Demonstrated slow careful transfer into standing, cues or hand placement  -[facility] Name 11/09/23 1407          Sit-Stand Transfer    Sit-Stand [place] (Transfers) standby assist;verbal cues  -SC     Assistive Device (Sit-Stand Transfers) walker, front-wheeled  -SC       Row Name 11/09/23 1407          Gait/Stairs (Locomotion)    [place] Level (Gait) contact guard;verbal cues  -SC     Assistive Device (Gait) walker, front-wheeled  -SC     Distance in Feet (Gait) 300  -SC     Deviations/Abnormal Patterns (Gait) right sided deviations;stride length decreased;weight shifting decreased;base of support, narrow  -SC     Bilateral Gait Deviations forward flexed posture  -SC     Comment, (Gait/Stairs) Gt training focused on controling walker in hallway. Cues for upright posture.. Demonstrated mild LOB on turn requireing contact assistance.  -SC               User Key  (r) = Recorded By, (t) = Taken By, (c) = Cosigned By      Initials Name Provider Type    SC Jay Avalos, PT Physical Therapist                   Obj/Interventions       [facility] Name 11/09/23 1413          Motor Skills    Therapeutic Exercise hip;knee  supine: bridging, bent knee fall outs, trunk rotations x10  -[facility] Name 11/09/23 1421 11/09/23 1413       Hip (Therapeutic Exercise)    Hip Strengthening (Therapeutic Exercise) bilateral;flexion;extension;10 repetitions  -SC bilateral;flexion;extension;10 repetitions  -SC      Row Name 11/09/23 1421 11/09/23 1413       Ankle (Therapeutic Exercise)    Ankle (Therapeutic Exercise) -- AROM (active range of motion)  -SC    Ankle AROM (Therapeutic Exercise) bilateral;plantarflexion;standing;10 repetitions  -SC bilateral;dorsiflexion;plantarflexion  -[facility] Name 11/09/23 1413          Balance    Dynamic Standing Balance 1-person assist;verbal cues;contact guard  -SC      Position/Device Used, Standing Balance supported;walker, rolling  -SC               User Key  (r) = Recorded By, (t) = Taken By, (c) = Cosigned By      Initials Name Provider Type    Jay Sotelo PT Physical Therapist                   Goals/Plan    No documentation.                  Clinical Impression       [facility] Name 11/09/23 1415          Pain    Pretreatment Pain Rating 0/10 - no pain  -SC     Posttreatment Pain Rating 0/10 - no pain  -SC       Row Name 11/09/23 1415          Plan of Care Review    Plan of Care Reviewed With patient;spouse  -SC     Progress improving  -SC     Outcome Evaluation Patient showing improvement in strength and mobility, increasing her ambulation distance. Recommend home with home health for improved outcomes  -[facility] Name 11/09/23 1415          Therapy Assessment/Plan (PT)    Rehab Potential (PT) good, to achieve stated therapy goals  -SC     Criteria for Skilled Interventions Met (PT) yes;meets criteria;skilled treatment is necessary  -SC     Therapy Frequency (PT) daily  -SC       Row Name 11/09/23 1415          Vital Signs    Post Systolic BP Rehab 93  -SC     Post Treatment Diastolic BP 71  -SC     Posttreatment Heart Rate (beats/min) 71  -SC     Post SpO2 (%) 94  -SC       Row Name 11/09/23 1415          Positioning and Restraints    Pre-Treatment Position in bed  -SC     Post Treatment Position chair  -SC     In Chair notified nsg;reclined;sitting;encouraged to call for assist;exit alarm on;call light within reach  -SC               User Key  (r) = Recorded By, (t) = Taken By, (c) = Cosigned By      Initials Name Provider Type    SC Jay Avalos, PT Physical Therapist                   Outcome Measures       [facility] Name 11/09/23 1418 11/09/23 0805       How much help from another person do you currently need...    Turning from your back to your side while in flat bed without using bedrails? 4  -SC 3  -EO    Moving from lying on back to sitting on the side of a flat  bed without bedrails? 3  -SC 3  -EO    Moving to and from a bed to a chair (including a wheelchair)? 3  -SC 3  -EO    Standing up from a chair using your arms (e.g., wheelchair, bedside chair)? 3  -SC 3  -EO    Climbing 3-5 steps with a railing? 3  -SC 2  -EO    To walk in hospital room? 3  -SC 3  -EO    AM-PAC 6 Clicks Score (PT) 19  -SC 17  -EO    Highest level of mobility 6 --> Walked 10 steps or more  -SC 5 --> Static standing  -EO      Row Name 11/09/23 1418          Functional Assessment    Outcome Measure Options AM-PAC 6 Clicks Basic Mobility (PT)  -SC               User Key  (r) = Recorded By, (t) = Taken By, (c) = Cosigned By      Initials Name Provider Type    Jay Sotelo, PT Physical Therapist    EO Saba Whitaker RN Registered Nurse                                 Physical Therapy Education       Title: PT OT SLP Therapies (In Progress)       Topic: Physical Therapy (Done)       Point: Mobility training (Done)       Learning Progress Summary             Patient Eager, E, VU by SC at 11/9/2023 1420    Comment: reviewed HEP    Acceptance, E,D, VU,NR by MB at 11/6/2023 1601    Acceptance, E,D, VU,NR by MAIA at 11/5/2023 1511    Acceptance, E,TB, NR by SREE at 11/2/2023 1421    Acceptance, E,TB, VU,NR by AY at 11/1/2023 1417                         Point: Home exercise program (Done)       Learning Progress Summary             Patient Eager, E, VU by SC at 11/9/2023 1420    Comment: reviewed HEP    Acceptance, E,D, VU,NR by MB at 11/6/2023 1601    Acceptance, E,D, VU,NR by MAIA at 11/5/2023 1511    Acceptance, E,TB, NR by SREE at 11/2/2023 1421                         Point: Body mechanics (Done)       Learning Progress Summary             Patient Eager, E, VU by SC at 11/9/2023 1420    Comment: reviewed HEP    Acceptance, E,D, VU,NR by MB at 11/6/2023 1601    Acceptance, E,D, VU,NR by LS at 11/5/2023 1511    Acceptance, E,TB, NR by SREE at 11/2/2023 1421    Acceptance, E,TB, VU,NR by AY at 11/1/2023 1417                          Point: Precautions (Done)       Learning Progress Summary             Patient Eager, E, VU by SC at 11/9/2023 1420    Comment: reviewed HEP    Acceptance, E,D, VU,NR by MB at 11/6/2023 1601    Acceptance, E,D, VU,NR by LS at 11/5/2023 1511    Acceptance, E,TB, NR by AY at 11/2/2023 1421    Acceptance, E,TB, VU,NR by AY at 11/1/2023 1417                                         User Key       Initials Effective Dates Name Provider Type Discipline    SC 02/03/23 -  Jay Avalos, PT Physical Therapist PT     02/03/23 -  Janna Abdullahi, PT Physical Therapist PT    MB 06/16/21 -  Aarti Ventura, PT Physical Therapist PT    SREE 11/10/20 -  Nidhi Blank, PT Physical Therapist PT                  PT Recommendation and Plan     Plan of Care Reviewed With: patient, spouse  Progress: improving  Outcome Evaluation: Patient showing improvement in strength and mobility, increasing her ambulation distance. Recommend home with home health for improved outcomes     Time Calculation:         PT Charges       Row Name 11/09/23 1338             Time Calculation    Start Time 1338  -SC      PT Received On 11/09/23  -SC      PT Goal Re-Cert Due Date 11/11/23  -SC         Timed Charges    44302 - PT Therapeutic Exercise Minutes 10  -SC      27241 - Gait Training Minutes  13  -SC      95289 - PT Therapeutic Activity Minutes 3  -SC         Total Minutes    Timed Charges Total Minutes 26  -SC       Total Minutes 26  -SC                User Key  (r) = Recorded By, (t) = Taken By, (c) = Cosigned By      Initials Name Provider Type    SC Jay Avalos, PT Physical Therapist                  Therapy Charges for Today       Code Description Service Date Service Provider Modifiers Qty    74434238655 HC PT THER PROC EA 15 MIN 11/9/2023 Jay Avalos, PT GP 1    46112288265 HC GAIT TRAINING EA 15 MIN 11/9/2023 Jay Avalos, PT GP 1            PT G-Codes  Outcome Measure Options: AM-PAC 6 Clicks Basic Mobility  (PT)  AM-PAC 6 Clicks Score (PT): 19  AM-PAC 6 Clicks Score (OT): 20  Modified [place] Scale: 3 - Moderate disability.  Requiring some help, but able to walk without assistance.  PT Discharge Summary  Anticipated Discharge Disposition (PT): home with home health    Jay Avalos, PT  11/9/2023

## 2023-11-10 ENCOUNTER — READMISSION MANAGEMENT (OUTPATIENT)
Dept: CALL CENTER | Facility: HOSPITAL | Age: 53
End: 2023-11-10
Payer: MEDICARE

## 2023-11-10 ENCOUNTER — TELEPHONE (OUTPATIENT)
Dept: NEUROLOGY | Facility: CLINIC | Age: 53
End: 2023-11-10
Payer: MEDICARE

## 2023-11-10 VITALS
TEMPERATURE: 98 F | HEIGHT: 60 IN | OXYGEN SATURATION: 96 % | WEIGHT: 161.82 LBS | DIASTOLIC BLOOD PRESSURE: 65 MMHG | HEART RATE: 68 BPM | BODY MASS INDEX: 31.77 KG/M2 | RESPIRATION RATE: 18 BRPM | SYSTOLIC BLOOD PRESSURE: 120 MMHG

## 2023-11-10 LAB
GLUCOSE BLDC GLUCOMTR-MCNC: 107 MG/DL (ref 70–130)
INR PPP: 1.74 (ref 0.89–1.12)
PROTHROMBIN TIME: 20.5 SECONDS (ref 12.2–14.5)

## 2023-11-10 PROCEDURE — 92507 TX SP LANG VOICE COMM INDIV: CPT

## 2023-11-10 PROCEDURE — 25010000002 ENOXAPARIN PER 10 MG: Performed by: INTERNAL MEDICINE

## 2023-11-10 PROCEDURE — 82948 REAGENT STRIP/BLOOD GLUCOSE: CPT

## 2023-11-10 PROCEDURE — 92526 ORAL FUNCTION THERAPY: CPT

## 2023-11-10 PROCEDURE — 85610 PROTHROMBIN TIME: CPT | Performed by: INTERNAL MEDICINE

## 2023-11-10 PROCEDURE — 94664 DEMO&/EVAL PT USE INHALER: CPT

## 2023-11-10 PROCEDURE — 94799 UNLISTED PULMONARY SVC/PX: CPT

## 2023-11-10 RX ORDER — ENOXAPARIN SODIUM 100 MG/ML
1 INJECTION SUBCUTANEOUS 2 TIMES DAILY
Qty: 11.2 ML | Refills: 0 | Status: SHIPPED | OUTPATIENT
Start: 2023-11-10

## 2023-11-10 RX ADMIN — ENOXAPARIN SODIUM 70 MG: 80 INJECTION SUBCUTANEOUS at 09:09

## 2023-11-10 RX ADMIN — PANTOPRAZOLE SODIUM 40 MG: 40 TABLET, DELAYED RELEASE ORAL at 05:05

## 2023-11-10 RX ADMIN — IPRATROPIUM BROMIDE AND ALBUTEROL SULFATE 3 ML: 2.5; .5 SOLUTION RESPIRATORY (INHALATION) at 12:07

## 2023-11-10 RX ADMIN — MIDODRINE HYDROCHLORIDE 5 MG: 5 TABLET ORAL at 11:16

## 2023-11-10 RX ADMIN — ASPIRIN 81 MG CHEWABLE TABLET 81 MG: 81 TABLET CHEWABLE at 09:09

## 2023-11-10 RX ADMIN — LEVOTHYROXINE SODIUM 75 MCG: 0.07 TABLET ORAL at 05:05

## 2023-11-10 RX ADMIN — LEVETIRACETAM 1000 MG: 500 TABLET, FILM COATED ORAL at 09:09

## 2023-11-10 RX ADMIN — MIDODRINE HYDROCHLORIDE 5 MG: 5 TABLET ORAL at 05:05

## 2023-11-10 RX ADMIN — TOPIRAMATE 200 MG: 100 TABLET, FILM COATED ORAL at 09:10

## 2023-11-10 RX ADMIN — IPRATROPIUM BROMIDE AND ALBUTEROL SULFATE 3 ML: 2.5; .5 SOLUTION RESPIRATORY (INHALATION) at 07:42

## 2023-11-10 NOTE — TELEPHONE ENCOUNTER
Caller: Mara Martínez    Relationship to patient: Self    Best call back number: [phone]     New or established patient?  [x] New  [] Established    Date of discharge:11/10/23    Facility discharged from: [facility]    Diagnosis/Symptoms: CVA+    Length of stay (If applicable): 7 DAYS    Specialty Only: Did you see a [facility] provider?    [x] Yes  [] No  If so, who? JAYDE SCHERER/SUNNY    PATIENT IS NEEDING [facility] F/U IN [place] FOR DX:CVA.    NO PREV. NEURO AT THIS TIME.    OK TO SCHEDULE? PLEASE REVIEW & ADVISE-THANK YOU

## 2023-11-10 NOTE — PROGRESS NOTES
"Pharmacy Consult  -  Warfarin    Mara Martínez is a  53 y.o. female   Height - 152.4 cm (60\")  Weight - 73.4 kg (161 lb 13.1 oz)    Consulting Provider: - Durga Mccain  Indication: - Mechanical Valve  Goal INR: - 2.5-3.5  Home Regimen:   - 4 mg daily     Bridge Therapy: Yes   and unfractionated heparin    Drug-Drug Interactions with current regimen:  Heparin Drip - increased risk of bleeding  Aspirin- increased risk of bleeding    Warfarin Dosing During Admission:    Date  11/6 11/7 11/8         INR  1.05 1.1 1.23         Dose  6 mg 4 mg (6 mg)               Education Provided: Will follow up     Discharge Follow up:   Following Provider - Rowan Nolasco ([facility])   Follow up time range or appointment - Will assess closer to discharge      Labs:    Results from last 7 days   Lab Units 11/10/23  0505 11/09/23  0709 11/08/23  0407 11/07/23  0431 11/07/23  0430 11/06/23  0641 11/05/23  0742 11/04/23  0808   INR  1.74* 1.44* 1.23* 1.10  --  1.05 1.11  --    HEMOGLOBIN g/dL  --  7.5* 7.5*  --  7.1* 7.2* 7.3* 7.7*   HEMATOCRIT %  --  25.0* 25.2*  --  23.8* 24.4* 24.8* 25.4*     Results from last 7 days   Lab Units 11/09/23  0716 11/08/23  0407 11/07/23  0430   SODIUM mmol/L 138 137 139   POTASSIUM mmol/L 4.1 4.5 4.4   CHLORIDE mmol/L 106 107 109*   CO2 mmol/L 21.0* 20.0* 20.0*   BUN mg/dL 18 15 14   CREATININE mg/dL 0.78 0.64 0.61   CALCIUM mg/dL 10.2 9.8 9.5   GLUCOSE mg/dL 95 84 82     Current dietary intake:   11/6: 50% of Breakfast, Lunch, Dinner  11/7: 25% of Breakfast, 75% Lunch    Assessment/Plan:   1. Patient is currently on Warfarin for mechanical aortic and mitral valve repair. Patient's goal INR is 2.5-3.5.   2. Patient's INR is subtherapeutic today at 1.74. Beginning to trend slowly up after boost on 11/6 and home dose 11/7.  3. Continue Warfarin 6 mg daily until therapeutic INR.  4. Patient with anemia, but hemoglobin is slightly increased today to 7.5 g/dL. Patient is bridging with enoxaparin until " INR therapeutic.   5.Will monitor signs/symptoms of bleeding, dietary intake, and drug-drug interactions. Will follow daily PT/INR and adjust dose accordingly    Thank you  Yancy Pickering, PharmD  Pharmacy Resident  11/10/2023  11:30 EST

## 2023-11-10 NOTE — THERAPY TREATMENT NOTE
Acute Care - Speech Language Pathology   Swallow Treatment Note [facility]     Patient Name: Mara Martínez  : 1970  MRN: [id number]  Today's Date: 11/10/2023               Admit Date: 10/31/2023    Visit Dx:     ICD-10-CM ICD-9-CM   1. Acute CVA (cerebrovascular accident)  I63.9 434.91   2. Dysphagia, unspecified type  R13.10 787.20   3. Communication deficit  F80.9 307.9   4. Anemia, unspecified type  D64.9 285.9   5. Acute UTI  N39.0 599.0     Patient Active Problem List   Diagnosis    Anemia    Aortic valve insufficiency    Bruit of left carotid artery    Chest pain    Positive D-dimer    Dizziness    Edema    Fatigue    HTN (hypertension)    Heart murmur    Hyperlipidemia    Mitral valve stenosis    Mitral valve insufficiency    Palpitations    Pulmonary edema    Seizure disorder    Shortness of breath    Difficulty sleeping    Snoring    Supravalvular aortic stenosis    Syncope    Tachycardia    Mitral stenosis    Rheumatic aortic stenosis    Valvular heart disease    Epilepsy    ISREAL (cerebral atherosclerosis)    Tobacco use    Migraines    Aortic regurgitation    Rheumatic mitral regurgitation    SOB (shortness of breath)    Supraventricular aortic stenosis    Aortic valve stenosis    Rheumatic aortic valve insufficiency    Moderate aortic stenosis    Aortic valve disorder    Coronary artery disease involving native coronary artery of native heart without angina pectoris    S/P mitral valve replacement    S/P mechanical aortic and mitral valve replacement (2018)    Stenosis of carotid artery    Generalized abdominal pain    Rectal bleeding    Functional diarrhea    Lower extremity edema    Rectal bleed    (HFpEF) heart failure with preserved ejection fraction    Chronic pain with intrathecal pump in place    Dyslipidemia    Bilateral carotid artery stenosis    Low back pain    Degeneration of lumbar intervertebral disc    Lumbosacral radiculopathy    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    Cerebral aneurysm    Complex partial seizures with consciousness impaired    Migraine without aura and with status migrainosus, not intractable    Chronic obstructive pulmonary disease    Essential tremor    Hesitancy of micturition    Complex partial epilepsy with generalization and with intractable epilepsy    Focal (motor) epilepsy    Occipital neuralgia    Paresthesia    Restless legs syndrome    Retinal drusen    Acute ischemic left MCA stroke    H/O recurrent GIB 2* AVMs    UTI (urinary tract infection)    Chronic hypotension on midodrine     Past Medical History:   Diagnosis Date    Anemia     Aortic regurgitation     Arthritis     Back pain     Carotid bruit     ISREAL (cerebral atherosclerosis) 12/2014    nonobstructive    Chest pain     Chronic hypotension on midodrine 11/01/2023    Chronic obstructive pulmonary disease 09/07/2022    Coronary artery disease     COVID-19 vaccine administered     phizer    D-dimer, elevated     Diastolic congestive heart failure 07/25/2019    Dizziness     Edema     Epilepsy     Fatigue     Heart murmur     History of blood transfusion 08/2019    History of blood transfusion 2022    History of blood transfusion 11/2022    History of blood transfusion     August 2023 x2    History of recent blood transfusion 12/2021    History of transfusion     Hyperlipidemia     Hyperlipidemia 04/28/2016    Hypertension     Kidney stones     Migraines     Mitral regurgitation     Mitral stenosis     mild    Neuropathy     Neuropathy     Palpitations     Pulmonary edema     Retinal drusen     Seizure     Sleep apnea 09/2019    Sleeping difficulties     SOB (shortness of breath)     Supraventricular aortic stenosis     Syncope     Tachycardia     Tobacco abuse     VHD (valvular heart disease)     Wears dentures     Wears eyeglasses      Past Surgical History:   Procedure Laterality Date    APPENDECTOMY      CARDIAC CATHETERIZATION      CARDIAC SURGERY      2 valves replaced      SECTION      x 2    CHOLECYSTECTOMY      COLONOSCOPY      COLONOSCOPY N/A 2019    Procedure: COLONOSCOPY;  Surgeon: Kurt Gaines MD;  Location:  [facility];  Service: Gastroenterology    CORONARY ARTERY BYPASS GRAFT  2018    EXPLORATORY LAPAROTOMY      HERNIA REPAIR      HYSTERECTOMY      INTERVENTIONAL RADIOLOGY PROCEDURE Bilateral 10/31/2023    Procedure: Carotid Cerebral Angiogram;  Surgeon: Cayetano Mckeon MD;  Location:  [facility];  Service: Interventional Radiology;  Laterality: Bilateral;    JOINT REPLACEMENT Right     knee replacement    PAIN PUMP INSERTION/REVISION      morphine    PORTACATH PLACEMENT      UPPER GASTROINTESTINAL ENDOSCOPY         SLP Recommendation and Plan     SLP Diet Recommendation: regular textures, thin liquids, other (see comments) (no straw, single cup sips) (11/10/23 0910)  Recommended Precautions and Strategies: no straw, small bites of food and sips of liquid, upright posture during/after eating, general aspiration precautions (11/10/23 0910)  SLP Rec. for Method of Medication Administration: meds whole, with puree, as tolerated (11/10/23 0910)     Monitor for Signs of Aspiration: yes, notify SLP if any concerns (11/10/23 0910)        Anticipated Discharge Disposition (SLP): home with home health (11/10/23 0910)     Therapy Frequency (Swallow): 5 days per week (11/10/23 0910)  Predicted Duration Therapy Intervention (Days): until discharge (11/10/23 0910)  Oral Care Recommendations: Oral Care BID/PRN (11/10/23 0910)        Daily Summary of Progress (SLP): progress toward functional goals as expected (11/10/23 0910)               Treatment Assessment (SLP): toleration of diet, no clinical signs of, pharyngeal dysphagia, continued, dysarthria (11/10/23 0910)  Treatment Assessment Comments (SLP): Reviewed/completed all exercises & encouraged independent practice. No overt s/s of aspiration w/ thin liquid trials. Reviewed  compensations/rationale, pt able to demonstrate understanding. Pt noted w/ straws on bedside table, provided education & removed. RN aware. Recommend continued SLP services to address dysphagia and communication (11/10/23 0910)  Plan for Continued Treatment (SLP): continue treatment per plan of care (11/10/23 0910)       Oral Care Recommendations: Oral Care BID/PRN (11/10/23 0910)           SWALLOW EVALUATION (last 72 hours)       SLP Adult Swallow Evaluation       Row Name 11/10/23 0910 11/09/23 1047 11/07/23 1530             Rehab Evaluation    Document Type therapy note (daily note)  -MH therapy note (daily note)  -MP therapy note (daily note)  -MP      Subjective Information no complaints  -MH no complaints  -MP no complaints  -MP      Patient Observations alert;cooperative  -MH alert;cooperative  -MP alert;cooperative  -MP      Patient/Family/Caregiver Comments/Observations No family present  -MH No family present  -MP No family present  -MP      Patient Effort good  -MH good  -MP good  -MP      Symptoms Noted During/After Treatment none  -MH -- --         Pain    Additional Documentation Pain Scale: FACES Pre/Post-Treatment (Group)  -MH Pain Scale: FACES Pre/Post-Treatment (Group)  -MP Pain Scale: FACES Pre/Post-Treatment (Group)  -MP         Pain Scale: FACES Pre/Post-Treatment    Pain: FACES Scale, Pretreatment 0-->no hurt  -MH 0-->no hurt  -MP 0-->no hurt  -MP      Posttreatment Pain Rating 0-->no hurt  -MH 0-->no hurt  -MP 0-->no hurt  -MP         SLP Treatment Clinical Impressions    Treatment Assessment (SLP) toleration of diet;no clinical signs of;pharyngeal dysphagia;continued;dysarthria  -MH toleration of diet;suspected;continued;mild;pharyngeal dysphagia;dysarthria  -MP suspected;continued;pharyngeal dysphagia;toleration of diet;dysarthria  -MP      Treatment Assessment Comments (SLP) Reviewed/completed all exercises & encouraged independent practice. No overt s/s of aspiration w/ thin liquid trials.  Reviewed compensations/rationale, pt able to demonstrate understanding. Pt noted w/ straws on bedside table, provided education & removed. RN aware. Recommend continued SLP services to address dysphagia and communication  - -- Reviewed & completed dysphagia exercises w/ pt, as well as completed speech tx.  -      Daily Summary of Progress (SLP) progress toward functional goals as expected  - progress toward functional goals is good  - progress toward functional goals is good  -MP      Plan for Continued Treatment (SLP) continue treatment per plan of care  - continue treatment per plan of care  -MP continue treatment per plan of care  -MP      Care Plan Review care plan/treatment goals reviewed  - care plan/treatment goals reviewed;patient/other agree to care plan  -MP care plan/treatment goals reviewed;patient/other agree to care plan  -MP         Recommendations    Therapy Frequency (Swallow) 5 days per week  - 5 days per week  - 5 days per week  -      Predicted Duration Therapy Intervention (Days) until discharge  - until discharge  - until discharge  -      SLP Diet Recommendation regular textures;thin liquids;other (see comments)  no straw, single cup sips  - regular textures;thin liquids;other (see comments)  no straw, single cup sips  - regular textures;thin liquids;other (see comments)  no straw  -      Recommended Precautions and Strategies no straw;small bites of food and sips of liquid;upright posture during/after eating;general aspiration precautions  - no straw;small bites of food and sips of liquid;upright posture during/after eating;general aspiration precautions  - upright posture during/after eating;small bites of food and sips of liquid;no straw;general aspiration precautions  -      Oral Care Recommendations Oral Care BID/PRN  - Oral Care BID/PRN  -MP Oral Care BID/PRN  -MP      SLP Rec. for Method of Medication Administration meds whole;with puree;as  tolerated  -MH meds whole;with puree;as tolerated  -MP meds whole;with puree;as tolerated  -MP      Monitor for Signs of Aspiration yes;notify SLP if any concerns  - yes;notify SLP if any concerns  -MP yes;notify SLP if any concerns  -MP      Anticipated Discharge Disposition (SLP) home with home health  -MH home with home health  -MP home with home health  -MP                User Key  (r) = Recorded By, (t) = Taken By, (c) = Cosigned By      Initials Name Effective Dates    MP Roly Lopez, MS CCC-SLP 12/28/21 -      Briana Obregon, MS CCC-SLP 05/12/23 -                     EDUCATION  The patient has been educated in the following areas:   Communication Impairment Dysphagia (Swallowing Impairment).        SLP GOALS       Row Name 11/10/23 0910 11/09/23 1047 11/07/23 1530       (LTG) Patient will demonstrate functional swallow for    Diet Texture (Demonstrate functional swallow) regular textures  - regular textures  -MP regular textures  -MP    Liquid viscosity (Demonstrate functional swallow) thin liquids  - thin liquids  -MP thin liquids  -MP    [place] (Demonstrate functional swallow) with minimal cues (75-90% accuracy)  - with minimal cues (75-90% accuracy)  -MP with minimal cues (75-90% accuracy)  -MP    Time Frame (Demonstrate functional swallow) by discharge  - by discharge  -MP by discharge  -MP    Progress/Outcomes (Demonstrate functional swallow) continuing progress toward goal  -MH continuing progress toward goal  -MP continuing progress toward goal  -MP    Comment (Demonstrate functional swallow) -- Pt independently recalls no straw and reported has been setting them to the side when they are brought to her  -MP --       (STG) Pharyngeal Strengthening Exercise Goal 1 (SLP)    Activity (Pharyngeal Strengthening Goal 1, SLP) increase superior movement of the hyolaryngeal complex;increase anterior movement of the hyolaryngeal complex;increase closure at entrance to  airway/closure of airway at glottis;increase squeeze/positive pressure generation;increase timing  - -- --    Increase Timing prepping - 3 second prep or suck swallow or 3-step swallow  -MH prepping - 3 second prep or suck swallow or 3-step swallow  -MP prepping - 3 second prep or suck swallow or 3-step swallow  -MP    Increase Superior Movement of the Hyolaryngeal Complex falsetto  -MH falsetto  -MP falsetto  -MP    Increase Anterior Movement of the Hyolaryngeal Complex chin tuck against resistance (CTAR)  -MH chin tuck against resistance (CTAR)  -MP chin tuck against resistance (CTAR)  -MP    Increase Closure at Entrance to Airway/Closure of Airway at Glottis breath hold exercises  -MH breath hold exercises  -MP breath hold exercises  -MP    Increase Squeeze/Positive Pressure Generation hard effortful swallow  -MH hard effortful swallow  -MP hard effortful swallow  -MP    [place]/Accuracy (Pharyngeal Strengthening Goal 1, SLP) with minimal cues (75-90% accuracy)  -MH with minimal cues (75-90% accuracy)  -MP with minimal cues (75-90% accuracy)  -MP    Time Frame (Pharyngeal Strengthening Goal 1, SLP) by discharge  -MH by discharge  -MP by discharge  -MP    Progress/Outcomes (Pharyngeal Strengthening Goal 1, SLP) continuing progress toward goal  -MH continuing progress toward goal  -MP continuing progress toward goal  -MP    Comment (Pharyngeal Strengthening Goal 1, SLP) Has handout, reviewed/completed all  - Has handout of exercises & has been completing independently. Completed 3 reps of each w/ pt  -MP Reviewed & completed. Provided handout.  -MP       Patient will demonstrate functional language skills for return to discharge environment     [place] with moderate cues  - with moderate cues  -MP with moderate cues  -MP    Time frame by discharge  -MH by discharge  -MP by discharge  -MP    Progress/Outcomes continuing progress toward goal  -MH continuing progress toward goal  -MP continuing  progress toward goal  -MP       SLP Diagnostic Treatment     Patient will participate in further assessment in the following areas reading comprehension;graphic expression;cognitive-linguistic  -MH reading comprehension;graphic expression;cognitive-linguistic  -MP reading comprehension;graphic expression;cognitive-linguistic  -MP    Time Frame (Diagnostic) by discharge  - by discharge  -MP by discharge  -MP    Progress/Outcomes (Additional Goal 1, SLP) goal met  - goal met  -MP goal ongoing  -MP       Comprehend Questions Goal 1 (SLP)    Improve Ability to Comprehend Questions Goal 1 (SLP) complex general questions;90%;independently (over 90% accuracy)  - complex general questions;90%;independently (over 90% accuracy)  -MP complex general questions;90%;independently (over 90% accuracy)  -MP    Time Frame (Comprehend Questions Goal 1, SLP) by discharge  - by discharge  -MP by discharge  -MP    Progress (Ability to Comprehend Questions Goal 1, SLP) 70%;with minimal cues (75-90%)  - 70%;with minimal cues (75-90%)  -MP 70%;with minimal cues (75-90%)  -MP    Progress/Outcomes (Comprehend Questions Goal 1, SLP) continuing progress toward goal  -MH continuing progress toward goal  -MP continuing progress toward goal  -MP    Comment (Comprehend Questions Goal 1, SLP) Intermittent able to self correct  - -- --       Follow Directions Goal 2 (SLP)    Improve Ability to Follow Directions Goal 1 (SLP) multistep commands;90%;independently (over 90% accuracy)  - multistep commands;90%;independently (over 90% accuracy)  -MP multistep commands;90%;independently (over 90% accuracy)  -MP    Time Frame (Follow Directions Goal 1, SLP) by discharge  - by discharge  -MP by discharge  -MP    Progress (Ability to Follow Directions Goal 1, SLP) 100%;independently (over 90% accuracy)  - 100%;independently (over 90% accuracy)  -MP 80%;with minimal cues (75-90%)  -MP    Progress/Outcomes (Follow Directions Goal 1, SLP) goal  met  -MH goal met  -MP continuing progress toward goal  -MP       Articulation Goal 1 (SLP)    Improve Articulation Goal 1 (SLP) by over-articulating at phrase level;80%;with minimal cues (75-90%)  -MH by over-articulating at phrase level;80%;with minimal cues (75-90%)  -MP by over-articulating at phrase level;80%;with minimal cues (75-90%)  -MP    Time Frame (Articulation Goal 1, SLP) by discharge  -MH by discharge  -MP by discharge  -MP    Progress (Articulation Goal 1, SLP) 70%;with minimal cues (75-90%)  -MH 70%;with minimal cues (75-90%)  -MP 70%;with minimal cues (75-90%)  -MP    Progress/Outcomes (Articulation Goal 1, SLP) continuing progress toward goal  -MH continuing progress toward goal  -MP continuing progress toward goal  -MP    Comment (Articulation Goal 1, SLP) Able to over articulate @ phrase level following initial cue  -MH -- --              User Key  (r) = Recorded By, (t) = Taken By, (c) = Cosigned By      Initials Name Provider Type    Roly Miller, MS CCC-SLP Speech and Language Pathologist     Briana Obregon MS CCC-SLP Speech and Language Pathologist                       Time Calculation:    Time Calculation- SLP       Row Name 11/10/23 1020             Time Calculation- SLP    SLP Start Time 0910  -MH      SLP Received On 11/10/23  -         Untimed Charges    59108-NH Treatment/ST Modification Prosth Aug Alter  36  -MH      07147-MG Treatment Swallow Minutes 36  -MH         Total Minutes    Untimed Charges Total Minutes 72  -MH       Total Minutes 72  -MH                User Key  (r) = Recorded By, (t) = Taken By, (c) = Cosigned By      Initials Name Provider Type     Briana Obregon, MS CCC-SLP Speech and Language Pathologist                    Therapy Charges for Today       Code Description Service Date Service Provider Modifiers Qty    95669872057 HC ST TREATMENT SWALLOW 2 11/10/2023 Briana Obregon MS CCC-SLP GN 1    [id number] HC ST TREATMENT SPEECH 2  11/10/2023 Briana Obregon, MS JASWINDER-SLP GN 1                 MS TARA Wyatt  11/10/2023

## 2023-11-10 NOTE — DISCHARGE SUMMARY
[facility] Medicine Services  DISCHARGE SUMMARY    Patient Name: Mara Martínez  : 1970  MRN: [id number]    Date of Admission: 10/31/2023  9:15 PM  Date of Discharge: 11/10/2023  Primary Care Physician: Rowan Nolasco APRN    Consults       Date and Time Order Name Status Description    10/31/2023  9:24 PM Inpatient Neurology Consult Stroke Completed             Hospital Course       Active Hospital Problems    Diagnosis  POA    **Acute ischemic left MCA stroke [I63.512]  Yes    H/O recurrent GIB 2* AVMs [Z87.19]  Not Applicable    UTI (urinary tract infection) [N39.0]  Yes    Chronic hypotension on midodrine [I95.89]  Yes    Cerebral aneurysm [I67.1]  Yes    Dyslipidemia [E78.5]  Yes    Chronic pain with intrathecal pump in place [G89.29]  Yes    (HFpEF) heart failure with preserved ejection fraction [I50.30]  Yes    S/P mechanical aortic and mitral valve replacement (2018) [Z95.2]  Not Applicable    Tobacco use [Z72.0]  Yes    Anemia [D64.9]  Yes      Resolved Hospital Problems   No resolved problems to display.          Hospital Course:  Mara Martínez is a 53 y.o. female with HTN, HLD, LICA stenting 2020, seizure disorder, chronic anemia/GIB from AVM, chronic pain with pain pump, JOHN on CPAP, active tobacco use, mechanical aortic and mitral valves who acutely developed altered mentation, gait disturbance, and EMS was called.  She was brought to the ED with initial NIHSS of 22, CTh was negative for acute finding, CTA head and neck revealed 3 mm RT distal M1 saccular aneurysm, LT MCA branch occlusion with subsequent CTP showing corresponding area of ischemic penumbra within the LT MCA territory.  She was not a candidate for thrombolytic therapy but was a candidate for endovascular intervention, she was taken to Cath Lab for successful mechanical thrombectomy of saddle embolus of the LT MCA bifurcation per Dr. Mckeon.  She was managed in the ICU postprocedure verbally,  additionally received 2U PRBC and covered with empiric Rocephin for pyuria (urine culture with Klebsiella pneumonia).  She was transferred out of the ICU 11/8/2023 after resumption of her Coumadin with a Lovenox bridge.  H&H has remained stable in the mid 7 range for the previous 6 days.  INR today is 1.74, she reports she is experienced with uncomfortable with a home Lovenox bridge, she has home INR testing equipment, and her Coumadin is managed by [facility].  She is discharging home with a prescription for Lovenox and will follow-up with her PCP and stroke neurology clinic.      Discharge Follow Up Recommendations for outpatient labs/diagnostics:  PCP next week  Stroke neuro clinic in [place], 6 weeks    Day of Discharge     HPI:   Denies acute complaints, states that she has previously done a Lovenox bridge and is comfortable doing the same.  Has not noted any bleeding.    Vital Signs:   Temp:  [98 °F (36.7 °C)-98.6 °F (37 °C)] 98 °F (36.7 °C)  Heart Rate:  [58-72] 68  Resp:  [16-18] 18  BP: (104-120)/(54-65) 120/65      Physical Exam:  Constitutional: Awake, alert, sitting up in bedside chair no acute distress  HENT: NCAT, mucous membranes moist  Respiratory: Clear to auscultation bilaterally, respiratory effort normal   Cardiovascular: RRR, palpable radial pulse  Gastrointestinal: Positive bowel sounds, soft, nontender, nondistended  Musculoskeletal: No bilateral ankle edema  Psychiatric: Appropriate affect, cooperative  Neurologic: Speech clear and fluent, oriented x3, moving all extremities spontaneously    Pertinent  and/or Most Recent Results     LAB RESULTS:      Lab 11/10/23  0505 11/09/23  0709 11/08/23  0407 11/07/23  0431 11/07/23  0430 11/06/23  2204 11/06/23  1613 11/06/23  0641 11/05/23  1758 11/05/23  0742   WBC  --  7.50 7.66  --  8.02  --   --  9.39  --  10.13   HEMOGLOBIN  --  7.5* 7.5*  --  7.1*  --   --  7.2*  --  7.3*   HEMATOCRIT  --  25.0* 25.2*  --  23.8*  --   --  24.4*  --   24.8*   PLATELETS  --  290 275  --  253  --   --  235  --  241   NEUTROS ABS  --  5.98 5.99  --  6.38  --   --  7.76*  --  8.43*   IMMATURE GRANS (ABS)  --  0.03 0.05  --  0.05  --   --  0.06*  --  0.05   LYMPHS ABS  --  0.63* 0.68*  --  0.69*  --   --  0.66*  --  0.69*   MONOS ABS  --  0.59 0.66  --  0.62  --   --  0.67  --  0.78   EOS ABS  --  0.23 0.23  --  0.23  --   --  0.19  --  0.15   MCV  --  100.0* 99.6*  --  99.6*  --   --  101.2*  --  101.2*   PROTIME 20.5* 17.7* 15.7* 14.3  --   --   --  13.8  --  14.5   HEPARIN ANTI-XA  --   --  0.36 0.38  --  0.45 0.43 0.26*   < > 0.32    < > = values in this interval not displayed.         Lab 11/09/23  0716 11/08/23  0407 11/07/23  0430 11/06/23  0641 11/05/23  0742 11/04/23  0808   SODIUM 138 137 139 140 141 141   POTASSIUM 4.1 4.5 4.4 4.7 4.3 3.9   CHLORIDE 106 107 109* 111* 113* 111*   CO2 21.0* 20.0* 20.0* 20.0* 22.0 22.0   ANION GAP 11.0 10.0 10.0 9.0 6.0 8.0   BUN 18 15 14 12 13 12   CREATININE 0.78 0.64 0.61 0.54* 0.49* 0.48*   EGFR 91.0 105.8 107.1 110.2 112.9 113.4   GLUCOSE 95 84 82 93 121* 133*   CALCIUM 10.2 9.8 9.5 9.1 8.5* 8.1*   MAGNESIUM 2.2  --  2.1 2.2 2.0 1.9   PHOSPHORUS 5.1*  --   --   --  3.4 2.9             Lab 11/10/23  0505 11/09/23  0709 11/08/23  0407 11/07/23  0431 11/06/23  0641   PROTIME 20.5* 17.7* 15.7* 14.3 13.8   INR 1.74* 1.44* 1.23* 1.10 1.05                 Brief Urine Lab Results  (Last result in the past 365 days)        Color   Clarity   Blood   Leuk Est   Nitrite   Protein   CREAT   Urine HCG        10/31/23 2204 Yellow   Clear   Negative   Moderate (2+)   Positive   Negative                 Microbiology Results (last 10 days)       Procedure Component Value - Date/Time    Blood Culture - Blood, Hand, Right [[id number]]  (Normal) Collected: 11/01/23 0312    Lab Status: Final result Specimen: Blood from Hand, Right Updated: 11/06/23 0245     Blood Culture No growth at 5 days    Blood Culture - Blood, Hand, Left [[id number]]   (Normal) Collected: 11/01/23 0151    Lab Status: Final result Specimen: Blood from Hand, Left Updated: 11/06/23 0115     Blood Culture No growth at 5 days    Urine Culture - Urine, Straight Cath [[id number]]  (Abnormal)  (Susceptibility) Collected: 10/31/23 2204    Lab Status: Final result Specimen: Urine from Straight Cath Updated: 11/02/23 0732     Urine Culture >100,000 CFU/mL Klebsiella pneumoniae ssp pneumoniae    Narrative:      Colonization of the urinary tract without infection is common. Treatment is discouraged unless the patient is symptomatic, pregnant, or undergoing an invasive urologic procedure.    Susceptibility        Klebsiella pneumoniae ssp pneumoniae      [place]      Ampicillin Resistant      Ampicillin + Sulbactam Susceptible      Cefazolin Susceptible      Cefepime Susceptible      Ceftazidime Susceptible      Ceftriaxone Susceptible      Gentamicin Susceptible      Levofloxacin Susceptible      Nitrofurantoin Intermediate      Piperacillin + Tazobactam Susceptible      Trimethoprim + Sulfamethoxazole Susceptible                                   CT Head Without Contrast    Result Date: 11/7/2023  CT HEAD WO CONTRAST Date of Exam: 11/7/2023 4:40 AM CST Indication: Stroke, follow up follow up left mca stroke. Comparison: 11/2/2023 Technique: Axial CT images were obtained of the head without contrast administration.  Automated exposure control and iterative construction methods were used. Findings: There is no evidence of new acute territorial infarction. There there is no acute intracranial hemorrhage. There are no extra-axial collections. Ventricles and CSF spaces are symmetric. No mass effect nor hydrocephalus. Brain parenchyma appears unchanged with persistent hypodensity in the left insular region..  Paranasal sinuses and mastoid air cells are unchanged. Osseous structures and orbits appear intact.     Impression: No significant change identified. Electronically Signed: Saman Lopez MD   11/7/2023 6:42 AM CST  Workstation ID: [id number]    SLP FEES - Fiberoptic Endo Eval Swallow    Result Date: 11/5/2023  This procedure was auto-finalized with no dictation required.    XR Abdomen KUB    Result Date: 11/3/2023  XR ABDOMEN KUB Date of Exam: 11/3/2023 9:19 AM CDT Indication: feeding tube Comparison: 11/1/2023 Findings: There is an enteric tube with tip in the distal duodenum. There is a nonobstructive bowel gas pattern.     Impression: Enteric tube tip within the distal duodenum. Electronically Signed: Saman Lopez MD  11/3/2023 9:39 AM CDT  Workstation ID: [id number]    CT Head Without Contrast    Result Date: 11/2/2023  CT HEAD WO CONTRAST Date of Exam: 11/2/2023 2:36 PM CDT Indication: Neuro deficit, acute, stroke suspected. Comparison: 11/1/2023 Technique: Axial CT images were obtained of the head without contrast administration.  Automated exposure control and iterative construction methods were used. Findings: Increasing hypoattenuation in the region of the left insula consistent with evolving infarct. No new territorial infarction identified. There there is no acute intracranial hemorrhage. There are no extra-axial collections. Ventricles and CSF spaces are symmetric. No mass effect nor hydrocephalus. Resolution of previous cortical contrast staining. Brain parenchyma is otherwise unchanged  Paranasal sinuses and mastoid air cells are adequately aerated.  Osseous structures and orbits appear intact.     Impression: Evolving left MCA territory infarct. There has been resolution of previous left cortical contrast staining. No superimposed acute process identified. Electronically Signed: Saman Lopez MD  11/2/2023 2:52 PM CDT  Workstation ID: [id number]    Adult Transthoracic Echo Complete W/ Cont if Necessary Per Protocol (With Agitated Saline)    Result Date: 11/1/2023    Left ventricular systolic function is normal. Calculated left ventricular EF = 62.1%   Left ventricular wall thickness is  "consistent with mild concentric hypertrophy.   Saline test results are negative.   There is a mechanical aortic valve prosthesis present.   There is a mechanical mitral valve prosthesis present.   Estimated right ventricular systolic pressure from tricuspid regurgitation is normal (<35 mmHg).   Calcification on pap muscle     XR Abdomen KUB    Result Date: 11/1/2023  XR ABDOMEN KUB Date of Exam: 11/1/2023 11:48 AM EDT Indication: Corepak placement Comparison: None available. Findings: The examination is centered over the upper abdomen. There is a feeding tube in place which is coiled within the gastric antrum. Contrast media is present within the collecting systems of both kidneys. There are postop changes of prior sternotomy and valve replacement.     Impression: The tip of the feeding tube lies within the gastric antrum. Electronically Signed: Yasir Cr MD  11/1/2023 12:22 PM EDT  Workstation ID: [id number]    Invasive peripheral vascular study    Result Date: 11/1/2023  Clinical Indication: 53-year-old female presenting with left MCA syndrome and LVO stroke (NIHSS 22).  She was not a candidate for IV thrombolytic therapy, but CT perfusion demonstrated at dominant M2 left MCA occlusion with some ischemic \"penumbra\".  She has been offered emergent neuro intervention on a compassionate care basis. : Dr. Cayetano Mckeon. Access: Right common femoral artery. Estimated blood loss: 20 mL Complication: None apparent. Conscious sedation: Moderate sedation was provided by me for a total of 47 minutes.  Physical vitals were continuously monitored by an independently trained observer.  A total of 2 mg of Versed and 50 ug of fentanyl were administered intravenously. Procedures: 1.  Mechanical thrombectomy (Solitaire stent retriever) left middle cerebral artery (MCA) 2.  Placement of an arterial closure device. 3.  Conscious sedation. Technique: The patient was brought emergently to the cardiac catheterization suite.  " "The right groin region was prepped and draped in the usual, sterile fashion.  Local anesthesia with 1% lidocaine infiltration was achieved.  Additionally, intravenous fentanyl and Versed were given for patient comfort throughout the procedure, the details of which are documented in the nursing record.  Utilizing micropuncture technique, a 8 Azeri vascular sheath was placed into the right common femoral artery without difficulty.  Subsequently, a 5 Azeri Berenstein catheter was advanced into the aortic arch and used to select the right common carotid and internal carotid arteries under fluoroscopic guidance.  Appropriate angiographic sequences were filled following contrast injection.  Findings: Selective left internal carotid artery catheterization and carotid angiography: The cervical portions of the left carotid vasculature demonstrate changes related to prior left common carotid artery stent placement (at outside institution).  There is a mild degree of neoatherosclerosis/intimal hyperplasia within the stent, but without recurrent flow-limiting (less than 50%) stenosis.  There are no ulcerative or other \"culprit\" lesions identified.  The intracranial circulation was opacified via a left internal carotid injection, demonstrating abrupt occlusion of the superior division M2 branch of the left MCA bifurcation, with a saddle embolus extending into the M3 branches, consistent with an acute thromboembolic event.  This represents TICI 2A flow to the entirety of the left MCA vascular territory.  No additional large vessel occlusions are identified.  There are no changes of vasculitis or vasospasm.  The dural venous sinuses are patent. The Berenstein catheter was exchanged over a Wyman wire for an 8 Azeri FlowGate balloon guide catheter which was positioned in the high cervical left internal carotid artery without difficulty. Under fluoroscopic guidance, a Showpad 21 microcatheter was advanced over Synchro 14 and " "Transcend EX soft tip microwires into the intracranial circulation, and care was taken to navigate past the aforementioned \"saddle\" embolus into the dominant M3 branch.  Microcatheter injection confirms appropriate placement within an M23 branch distal to the embolism.  Next, a 3 mm Solitaire stent retriever was advanced through the microcatheter and deployed across the left MCA occlusion without difficulty.  After approximately 3-5 minute wait period, the Solitaire device and microcatheter were removed utilizing temporary balloon occlusion technique and vigorous aspiration of the balloon guide catheter.  This resulted in successful extraction of a large embolus, which appeared to be more \"soft tissue\" caliber rather than \"blood clot\".  Follow-up angiography demonstrates restoration of normal flow (TICI 3) to the left MCA vascular territory.  There were no emboli to new vascular territory identified.  No complicating features.  At the end of the procedure, all catheters and sheaths were removed from the groin and hemostasis was achieved at the puncture site utilizing manual compression and placement of a 8 Frisian Angio-Seal arterial closure device. Impression: Abrupt occlusion of a dominant M2 branch of the left MCA bifurcation, with \"saddle\" embolus extending into the M3 branches, consistent with an acute thromboembolic occlusion.  This responded well to mechanical thrombectomy (Solitaire stent retriever), restoring normal flow (TICI 3) to the left MCA vascular territory.   The extracted embolus have more of a \"soft tissue\" caliber (rather than blood clot), and given the patient's history of valvular disease/valve replacement, this was sent for surgical pathologic analysis.  There were no emboli to new vascular territory or complicating features.  There was no carotid \"culprit\" lesion, and the aforementioned thromboembolism is concerning for a cardiac thromboembolic source.     EEG    Result Date: 11/1/2023  Reason " for referral: 53 y.o.female with altered mental status Technical Summary:  A 19 channel digital EEG was performed using the international 10-20 placement system, including eye leads and EKG leads. Duration: 23 minutes Findings: The patient is lethargic.  The background shows diffuse medium amplitude 5 to 6 Hz theta which is present symmetrically over both hemispheres.  Intermixed beta frequencies are variably prominent.  EMG artifact is noted anteriorly.  Photic stimulation does not change the background.  Hyperventilation is not performed.  No focal features or epileptiform activity are seen. Video: Available Technical quality: Superior SUMMARY: Mild generalized slow No focal features or epileptiform activity are seen     Diffuse cerebral dysfunction of at least mild degree but nonspecific No ongoing seizures are present This report is transcribed using the Dragon dictation system.      CT Head Without Contrast    Result Date: 11/1/2023  CT HEAD WO CONTRAST Date of Exam: 11/1/2023 5:33 AM EDT Indication: Stroke, follow up. Comparison: 10/31/2023. Technique: Axial CT images were obtained of the head without contrast administration.  Automated exposure control and iterative construction methods were used. Findings: There is an evolving area of hypoattenuation in the left MCA territory, primarily in the insula. There is associated cortical contrast staining. There is no acute intracranial hemorrhage. No mass effect or midline shift. No abnormal extra-axial collection. There is atelectasis and mucosal thickening in the right maxillary sinus. There is mild ethmoid sinus mucosal disease. Mastoids are clear. Calvarium is intact. Orbits are unremarkable.     Impression: Evolving left MCA territory infarct with associated cortical contrast staining. No acute hemorrhage. Electronically Signed: Geraldo Cueva MD  11/1/2023 5:44 AM EDT  Workstation ID: [id number]    XR Chest 1 View    Result Date: 10/31/2023  XR CHEST 1 VW Date  of Exam: 10/31/2023 8:57 PM CDT Indication: Acute Stroke Protocol (onset < 12 hrs) Comparison: 3/1/2018 Findings: Cardiomediastinal silhouette is enlarged. There is a left subclavian port with tip in the distal SVC. There is generalized interstitial prominence. No airspace disease, pneumothorax, nor pleural effusion.No acute osseous abnormality identified.     Impression: Generalized interstitial prominence without acute airspace disease. Electronically Signed: Saman Lopez MD  10/31/2023 9:40 PM CDT  Workstation ID: [id number]    CT Angiogram Head w AI Analysis of LVO    Result Date: 10/31/2023  CT ANGIOGRAM HEAD W AI ANALYSIS OF LVO, CT ANGIOGRAM NECK Date of Exam: 10/31/2023 8:25 PM CDT Indication: Neuro deficit, acute stroke suspected Neuro deficit, acute stroke suspected. Comparison: None available. Technique: CTA of the head and neck was performed after the uneventful intravenous administration of 75 cc Isovue-370. Reconstructed coronal and sagittal images were also obtained. In addition, a 3-D volume rendered image was created for interpretation. Automated exposure control and iterative reconstruction methods were used. Findings: CTA NECK: *Aortic arch: No aneurysm. No significant stenosis or occlusion of the major arch vessels. *Left carotid system: There is a left common carotid stent with less than 50% internal stenosis. No aneurysm, additional stenosis or occlusion. *Right carotid system: No aneurysm, significant stenosis or occlusion. *Vertebrobasilar system: The vertebral arteries arise from their respective subclavian arteries. No aneurysm, significant stenosis or occlusion. CTA HEAD: *Anterior circulation: There is a 3 mm posterior inferior projecting saccular aneurysm arising from the distal right M1 segment. There is segmental occlusion of the superior division of the left M2 segment with more distal reconstitution of flow. *Posterior circulation: No aneurysm, significant stenosis or occlusion.  *Anatomic variants: None of significance. *Venous sinuses: Patent.     1.Left M2 superior division segmental occlusion. 2.Right distal M1 saccular aneurysm. Electronically Signed: Saman Lopez MD  10/31/2023 9:15 PM CDT  Workstation ID: [id number]    CT Angiogram Neck    Result Date: 10/31/2023  CT ANGIOGRAM HEAD W AI ANALYSIS OF LVO, CT ANGIOGRAM NECK Date of Exam: 10/31/2023 8:25 PM CDT Indication: Neuro deficit, acute stroke suspected Neuro deficit, acute stroke suspected. Comparison: None available. Technique: CTA of the head and neck was performed after the uneventful intravenous administration of 75 cc Isovue-370. Reconstructed coronal and sagittal images were also obtained. In addition, a 3-D volume rendered image was created for interpretation. Automated exposure control and iterative reconstruction methods were used. Findings: CTA NECK: *Aortic arch: No aneurysm. No significant stenosis or occlusion of the major arch vessels. *Left carotid system: There is a left common carotid stent with less than 50% internal stenosis. No aneurysm, additional stenosis or occlusion. *Right carotid system: No aneurysm, significant stenosis or occlusion. *Vertebrobasilar system: The vertebral arteries arise from their respective subclavian arteries. No aneurysm, significant stenosis or occlusion. CTA HEAD: *Anterior circulation: There is a 3 mm posterior inferior projecting saccular aneurysm arising from the distal right M1 segment. There is segmental occlusion of the superior division of the left M2 segment with more distal reconstitution of flow. *Posterior circulation: No aneurysm, significant stenosis or occlusion. *Anatomic variants: None of significance. *Venous sinuses: Patent.     1.Left M2 superior division segmental occlusion. 2.Right distal M1 saccular aneurysm. Electronically Signed: Saman Lopez MD  10/31/2023 9:15 PM CDT  Workstation ID: [id number]    CT CEREBRAL PERFUSION WITH & WITHOUT CONTRAST    Result Date:  10/31/2023  CT CEREBRAL PERFUSION W WO CONTRAST Date of Exam: 10/31/2023 8:25 PM CDT Indication: Neuro deficit, acute stroke suspected.  Comparison: None available. Technique: Axial CT images of the brain were obtained prior to and after the administration of 40 cc Isovue-370. Core blood volume, core blood flow, mean transit time, and Tmax images were obtained utilizing the Rapid software protocol. A limited CT angiogram of the head was also performed to measure the blood vessel density. The radiation dose reduction device was turned on for each scan per the ALARA (As Low as Reasonably Achievable) protocol. Findings:    CT PERFUSION BRAIN:  Area of perfusion abnormality noted within the left frontotemporal region corresponding to MCA territory.  CBF<30% volume: 8 mL Tmax>6sec volume: 33 mL Mismatch volume: 25 mL Mismatch ratio: 4.1          1. Imaging features are consistent with a left MCA territory core infarct with surrounding ischemic brain at risk. Electronically Signed: Saman Lopez MD  10/31/2023 8:59 PM CDT  Workstation ID: [id number]    CT Head Without Contrast Stroke Protocol    Result Date: 10/31/2023  CT HEAD WO CONTRAST STROKE PROTOCOL Date of Exam: 10/31/2023 8:17 PM CDT Indication: Neuro deficit, acute, stroke suspected. Comparison: None available. Technique: Axial CT images were obtained of the head without contrast administration.  Reconstructed coronal images were also obtained. Automated exposure control and iterative construction methods were used. Scan Time: 21:16 Results discussed with stroke team at 21:23. Findings: There is no evidence of acute territorial infarction. There there is no acute intracranial hemorrhage. There are no extra-axial collections. Ventricles and CSF spaces are symmetric. No mass effect nor hydrocephalus. Brain parenchyma appears normal for age.  Hypoplastic right maxillary sinus. Paranasal sinuses are otherwise relatively well aerated.  Osseous structures and orbits  appear intact.     Impression: No acute intracranial process identified. Electronically Signed: Saman Lopez MD  10/31/2023 8:26 PM CDT  Workstation ID: [id number]     Results for orders placed during the hospital encounter of 01/28/20    Duplex Carotid Ultrasound CAR    Interpretation Summary  1.  The right common carotid artery is patent throughout.  There is 50 to 75% stenosis in the distal left common carotid artery.    2.  16 to 49% stenosis by Doppler in the bulb and bifurcation bilaterally.    3.  16 to 49% stenosis by Doppler throughout the right internal carotid artery.  A similar degree of stenosis is identified in the mid left internal carotid artery.  The distal left internal carotid artery is not visualized.    4.  Antegrade flow in both vertebral arteries.    Summary: Potentially hemodynamically significant stenosis in the distal left common carotid artery.  Antegrade flow in both vertebral arteries.      Results for orders placed during the hospital encounter of 01/28/20    Duplex Carotid Ultrasound CAR    Interpretation Summary  1.  The right common carotid artery is patent throughout.  There is 50 to 75% stenosis in the distal left common carotid artery.    2.  16 to 49% stenosis by Doppler in the bulb and bifurcation bilaterally.    3.  16 to 49% stenosis by Doppler throughout the right internal carotid artery.  A similar degree of stenosis is identified in the mid left internal carotid artery.  The distal left internal carotid artery is not visualized.    4.  Antegrade flow in both vertebral arteries.    Summary: Potentially hemodynamically significant stenosis in the distal left common carotid artery.  Antegrade flow in both vertebral arteries.      Results for orders placed during the hospital encounter of 10/31/23    Adult Transthoracic Echo Complete W/ Cont if Necessary Per Protocol (With Agitated Saline)    Interpretation Summary    Left ventricular systolic function is normal. Calculated left  ventricular EF = 62.1%    Left ventricular wall thickness is consistent with mild concentric hypertrophy.    Saline test results are negative.    There is a mechanical aortic valve prosthesis present.    There is a mechanical mitral valve prosthesis present.    Estimated right ventricular systolic pressure from tricuspid regurgitation is normal (<35 mmHg).    Calcification on pap muscle        Discharge Details        Discharge Medications        New Medications        Instructions Start Date   Enoxaparin Sodium 80 MG/0.8ML solution prefilled syringe syringe  Commonly known as: LOVENOX   1 mg/kg (70 mg), Subcutaneous, 2 Times Daily             Changes to Medications        Instructions Start Date   furosemide 40 MG tablet  Commonly known as: LASIX  What changed: Another medication with the same name was removed. Continue taking this medication, and follow the directions you see here.   TAKE 1 TABLET BY MOUTH TWICE A DAY             Continue These Medications        Instructions Start Date   ascorbic acid 1000 MG tablet  Commonly known as: VITAMIN C   1 tablet, Oral, Daily      aspirin 81 MG EC tablet   81 mg, Oral, Daily      atorvastatin 80 MG tablet  Commonly known as: LIPITOR   TAKE 1 TABLET BY MOUTH ONCE DAILY      cholecalciferol 25 MCG (1000 UT) tablet  Commonly known as: VITAMIN D3   1,000 Units, Oral, Daily      Emgality 120 MG/ML auto-injector pen  Generic drug: galcanezumab-gnlm   Inject 1 mL under the skin into the appropriate area as directed Every 30 (Thirty) Days.      ferrous sulfate 325 (65 FE) MG tablet   325 mg, Oral, 2 Times Daily      folic acid 1 MG tablet  Commonly known as: FOLVITE   Oral, Daily      levETIRAcetam 1000 MG tablet  Commonly known as: KEPPRA   1,000 mg, Oral, 2 Times Daily      levothyroxine 50 MCG tablet  Commonly known as: SYNTHROID, LEVOTHROID   50 mcg, Oral, Daily      midodrine 5 MG tablet  Commonly known as: PROAMATINE   5 mg, Oral, 3 Times Daily Before Meals       nitroglycerin 0.4 MG SL tablet  Commonly known as: NITROSTAT   0.4 mg, Sublingual, Every 5 Minutes PRN, May take maximum of 3 tabs in 15 minutes.      nortriptyline 50 MG capsule  Commonly known as: PAMELOR   100 mg, Oral, Nightly      pain patient supplied pump   Intrathecal, Continuous, Morphine       pantoprazole 40 MG EC tablet  Commonly known as: PROTONIX   TAKE 1 TABLET BY MOUTH ONCE DAILY      potassium chloride 20 MEQ CR tablet  Commonly known as: K-DUR,KLOR-CON   20 mEq, Oral, 2 Times Daily      rOPINIRole 0.5 MG tablet  Commonly known as: REQUIP   0.5 mg, Oral, Nightly, Take 1 hour before bedtime.      tiZANidine 4 MG tablet  Commonly known as: ZANAFLEX   4 mg, Oral, 3 Times Daily PRN      topiramate 100 MG tablet  Commonly known as: TOPAMAX   200 mg, Oral, 2 Times Daily      vitamin B-12 100 MCG tablet  Commonly known as: CYANOCOBALAMIN   100 mcg, Oral, Daily      warfarin 4 MG tablet  Commonly known as: COUMADIN   4 mg, Oral, Daily               Allergies   Allergen Reactions    Azithromycin Shortness Of Breath     Rash, itching    Metformin Itching     Vomiting    Gabapentin Other (See Comments) and Unknown - High Severity     dizziness         Discharge Disposition:  Home-Health Care [facility]    Diet:  Hospital:No active diet order             CODE STATUS:    Code Status and Medical Interventions:   Ordered at: 11/01/23 0225     Code Status (Patient has no pulse and is not breathing):    CPR (Attempt to Resuscitate)     Medical Interventions (Patient has pulse or is breathing):    Full Support       Future Appointments   Date Time Provider Department Center   3/28/2024 10:30 AM Tavares Yeh PA [facility]       Additional Instructions for the Follow-ups that You Need to Schedule       Discharge Follow-up with PCP   As directed       Currently Documented PCP:    Rowan Nolasco APRN    PCP Phone Number:    [phone]     Follow Up Details: Next week        Discharge Follow-up with  [facility]ified Provider[facility]   As directed      To: [facility] in 6 weeks                      Geraldo Miramontes DO  11/10/23      Time Spent on Discharge:  I spent  35  minutes on this discharge activity which included: face-to-face encounter with the patient, reviewing the data in the system, coordination of the care with the nursing staff as well as consultants, documentation, and entering orders.

## 2023-11-10 NOTE — PLAN OF CARE
Goal Outcome Evaluation:   SLP treatment completed. Will continue to address dysphagia and communication. Please see note for further details and recommendations.

## 2023-11-11 NOTE — OUTREACH NOTE
Prep Survey      Flowsheet Row Responses   [facility] facility patient discharged from? [facility]   Is LACE score < 7 ? No   Eligibility Readm Mgmt   Discharge diagnosis Acute ischemic left MCA stroke   Does the patient have one of the following disease processes/diagnoses(primary or secondary)? Stroke   Does the patient have Home health ordered? No   Is there a DME ordered? No   Prep survey completed? Yes            Hemalatha GUO - Registered Nurse

## 2023-11-13 ENCOUNTER — TELEPHONE (OUTPATIENT)
Dept: NEUROLOGY | Facility: CLINIC | Age: 53
End: 2023-11-13
Payer: MEDICARE

## 2023-11-13 ENCOUNTER — READMISSION MANAGEMENT (OUTPATIENT)
Dept: CALL CENTER | Facility: HOSPITAL | Age: 53
End: 2023-11-13
Payer: MEDICARE

## 2023-11-13 NOTE — TELEPHONE ENCOUNTER
Caller: SANDEEP Ruiz call back number: [phone] CAN LEAVE DETAILED MESS IF YOU GET V.M . EVEN CAN LEAVE DATE AND TIME OF APPT AND WHICH OFFICE WITH ADDRESS .     New or established patient?  [] New  [x] Established    Date of discharge: 10.31.23    Facility discharged from: [facility]     Diagnosis/Symptoms: ACUTE ISCHEMIC LEFT ZARINA STROKE     Length of stay (If applicable): 10 DAYS     Specialty Only: Did you see a [facility] health provider?    [x] Yes  [] No  If so, who? JAYDE ROMO 6 WEEKS. PT WOULD LIKE TO GO TO [facility] IF POSSIBLE AS IS CLOSER TO HER?      PLEASE CALL AND ADVISE OF SCHED APPT .

## 2023-11-13 NOTE — OUTREACH NOTE
Stroke Week 1 Survey      Flowsheet Row Responses   [facility]   Does the patient have one of the following disease processes/diagnoses(primary or secondary)? Stroke   Week 1 attempt successful? Yes   Call start time 0836   Call end time 0843   Discharge diagnosis Acute ischemic left MCA stroke   Meds reviewed with patient/caregiver? Yes   Is the patient having any side effects they believe may be caused by any medication additions or changes? No   Does the patient have all medications ordered at discharge? Yes   Is the patient taking all medications as directed (includes completed medication regime)? Yes   Does the patient have a primary care provider?  Yes   Does the patient have an appointment with their PCP within 7 days of discharge? Yes   Comments regarding PCP PCP appointment is 11/17/23   Has the patient kept scheduled appointments due by today? N/A   The [facility] requests you follow up with them within 30 days for important follow up care. Please call [phone] to schedule this appointment. Thank you. Yes   Has home health visited the patient within 72 hours of discharge? N/A   Psychosocial issues? No   Does the patient require any assistance with activities of daily living such as eating, bathing, dressing, walking, etc.? Yes   ADL comments Patient states she needs a little assistance with getting dressed, but her  helps her.   Does the patient have any residual symptoms from stroke/TIA? Yes   Residual symptoms comments Patient states she is having speech and swallowing issues, and some mild weakness of her left side.   Does the patient understand the diet ordered at discharge? Yes   Did the patient receive a copy of their discharge instructions? Yes   Nursing interventions Reviewed instructions with patient   What is the patient's perception of their health status since discharge? Improving   Nursing interventions Nurse  provided patient education   Is the patient/caregiver able to teach back the risk factors for a stroke? High blood pressure-goal below 120/80, High Cholesterol, Physical inactivity and obesity, Carotid or other artery disease, History of TIAs   Is the patient/caregiver able to teach back signs and symptoms related to disease process for when to call PCP? Yes   Is the patient/caregiver able to teach back signs and symptoms related to disease process for when to call 911? Yes   If the patient is a current smoker, are they able to teach back resources for cessation? [id number]   Is the patient/caregiver able to teach back the hierarchy of who to call/visit for symptoms/problems? PCP, Specialist, Home health nurse, Urgent Care, ED, 911 Yes   Is the patient able to teach back FAST for Stroke? B alance: Watch for sudden loss of balance, E yes: Check for vision loss, F ace: Look for an uneven smile, A rm: Check if one arm is weak, S peech: Listen for slurred speech, T karla: Call 9-1-1 right away   Week 1 call completed? Yes   Is the patient interested in additional calls from an ambulatory ? No   Would this patient benefit from a Referral to [facility] Social Work? No   Call end time [phone]            Lyn MCMAHON - Licensed Nurse

## 2023-11-16 DIAGNOSIS — K21.9 GASTROESOPHAGEAL REFLUX DISEASE: ICD-10-CM

## 2023-11-16 RX ORDER — PANTOPRAZOLE SODIUM 40 MG/1
TABLET, DELAYED RELEASE ORAL
Qty: 90 TABLET | Refills: 2 | Status: SHIPPED | OUTPATIENT
Start: 2023-11-16

## 2023-12-28 NOTE — PROGRESS NOTES
New Patient Office Visit      Encounter Date: 2023   Patient Name: Mara Martínez  : 1970   MRN: [id number]   PCP: Rowan Nolasco APRN    Referring Provider: NEEL Robison*     Chief Complaint:  No chief complaint on file.      History of Present Illness: Mara Martínez is a 53 y.o. female with known medical diagnoses of hypertension, hyperlipidemia hypotension, diabetes type 2, CHF, aortic and mitral valve replacement on Coumadin, CAD, seizure disorder, and recent stroke presents today to establish care.  Initially presented as a scene flight to [facility] on 10/31/2023 with complaints of aphasia and generalized weakness.  She was out of the window for TNK.  Initial NIH was 22.  Initial CT head did not show any acute changes.  CTA head/neck showed left M2 superior division segmental occlusion and a right distal M1 saccular aneurysm.  CTP showed area of reversible ischemia in left MCA territory.  Repeat CT head the next day showed an evolving left MCA territory infarct.  She was taken to [facility] Lab for MT and had a TICI 3 recanalization.  She was unable to have MRI performed due to an incompatible pain pump.    Today she reports only some mild ongoing word finding issues as her only residual symptom.  She has had significant improvement in her symptoms. She does tell me that when she awoke in hospital after procedure her neck was sore and remains somewhat sore, but gradually improving.  PT is also addressing this thru home health.  She is also seeing SLP in her home. She is compliant with her medications, no issues with cost.  INR is monitored by her local PCP. She denies any new stroke/TIA symptoms.  She states she and her  are quite anxious that she will have another stroke, but she is coping well.  They are installing in home video equipment and have purchased a medical alert button.  She continues to smoke, but is willing to make attempts at cessation.    Stroke Risk  Factors:      diabetes mellitus, hypertension, and smoking      Subjective      Review of Systems:   Review of Systems   Constitutional:  Negative for chills and fever.   Eyes: Negative.    Respiratory: Negative.     Cardiovascular: Negative.    Musculoskeletal:  Positive for arthralgias, back pain and neck pain.   Neurological:  Positive for speech difficulty and weakness. Negative for facial asymmetry and numbness.   Psychiatric/Behavioral:  The patient is nervous/anxious.        Past Medical History:   Past Medical History:   Diagnosis Date    Anemia     Aortic regurgitation     Arthritis     Back pain     Carotid bruit     ISREAL (cerebral atherosclerosis) 2014    nonobstructive    Chest pain     Chronic hypotension on midodrine 2023    Chronic obstructive pulmonary disease 2022    Coronary artery disease     COVID-19 vaccine administered     phizer    D-dimer, elevated     Diastolic congestive heart failure 2019    Dizziness     Edema     Epilepsy     Fatigue     Heart murmur     History of blood transfusion 2019    History of blood transfusion     History of blood transfusion 2022    History of blood transfusion     August 2023 x2    History of recent blood transfusion 2021    History of transfusion     Hyperlipidemia     Hyperlipidemia 2016    Hypertension     Kidney stones     Migraines     Mitral regurgitation     Mitral stenosis     mild    Neuropathy     Neuropathy     Palpitations     Pulmonary edema     Retinal drusen     Seizure     Sleep apnea 2019    Sleeping difficulties     SOB (shortness of breath)     Supraventricular aortic stenosis     Syncope     Tachycardia     Tobacco abuse     VHD (valvular heart disease)     Wears dentures     Wears eyeglasses        Past Surgical History:   Past Surgical History:   Procedure Laterality Date    APPENDECTOMY      CARDIAC CATHETERIZATION      CARDIAC SURGERY      2 valves replaced     SECTION      x 2     CHOLECYSTECTOMY      COLONOSCOPY      COLONOSCOPY N/A 2019    Procedure: COLONOSCOPY;  Surgeon: Kurt Gaines MD;  Location:  [facility];  Service: Gastroenterology    CORONARY ARTERY BYPASS GRAFT  2018    EXPLORATORY LAPAROTOMY      HERNIA REPAIR      HYSTERECTOMY      INTERVENTIONAL RADIOLOGY PROCEDURE Bilateral 10/31/2023    Procedure: Carotid Cerebral Angiogram;  Surgeon: Cayetano Mckeon MD;  Location:  [facility];  Service: Interventional Radiology;  Laterality: Bilateral;    JOINT REPLACEMENT Right     knee replacement    PAIN PUMP INSERTION/REVISION      morphine    PORTACATH PLACEMENT      UPPER GASTROINTESTINAL ENDOSCOPY         Family History:   Family History   Problem Relation Age of Onset    Cancer Mother     Cancer Father     Hypertension Father     Other Sister         ACUTE MYOCARDIAL INFARCTION,CABG    Heart failure Sister     Hypertension Sister     Stroke Other        Social History:   Social History     Socioeconomic History    Marital status:     Number of children: 2   Tobacco Use    Smoking status: Every Day     Packs/day: 1.00     Years: 40.00     Additional pack years: 0.00     Total pack years: 40.00     Types: Cigarettes     Last attempt to quit: 2023     Years since quittin.9    Smokeless tobacco: Never   Vaping Use    Vaping Use: Never used   Substance and Sexual Activity    Alcohol use: No    Drug use: No    Sexual activity: Yes       Medications:     Current Outpatient Medications:     ascorbic acid (VITAMIN C) 1000 MG tablet, Take 1 tablet by mouth Daily., Disp: , Rfl:     aspirin 81 MG EC tablet, Take 1 tablet by mouth Daily., Disp: , Rfl:     atorvastatin (LIPITOR) 80 MG tablet, TAKE 1 TABLET BY MOUTH ONCE DAILY, Disp: 90 tablet, Rfl: 3    cholecalciferol (VITAMIN D3) 25 MCG (1000 UT) tablet, Take 1 tablet by mouth Daily., Disp: , Rfl:     ferrous sulfate 325 (65 FE) MG tablet, Take 1 tablet by mouth 2 (Two) Times a Day., Disp: , Rfl:      folic acid (FOLVITE) 1 MG tablet, Take  by mouth Daily., Disp: , Rfl:     furosemide (LASIX) 40 MG tablet, TAKE 1 TABLET BY MOUTH TWICE A DAY, Disp: 60 tablet, Rfl: 5    galcanezumab-gnlm (Emgality) 120 MG/ML auto-injector pen, Inject 1 mL under the skin into the appropriate area as directed Every 30 (Thirty) Days., Disp: , Rfl:     levETIRAcetam (KEPPRA) 1000 MG tablet, Take 1 tablet by mouth 2 (Two) Times a Day., Disp: , Rfl:     levothyroxine (SYNTHROID, LEVOTHROID) 50 MCG tablet, Take 1 tablet by mouth Daily., Disp: , Rfl:     midodrine (PROAMATINE) 5 MG tablet, Take 1 tablet by mouth 3 (Three) Times a Day Before Meals., Disp: 90 tablet, Rfl: 3    nortriptyline (PAMELOR) 50 MG capsule, Take 2 capsules by mouth Every Night., Disp: , Rfl:     pantoprazole (PROTONIX) 40 MG EC tablet, TAKE 1 TABLET BY MOUTH ONCE DAILY, Disp: 90 tablet, Rfl: 2    potassium chloride (K-DUR,KLOR-CON) 20 MEQ CR tablet, Take 1 tablet by mouth 2 (Two) Times a Day., Disp: , Rfl:     rOPINIRole (REQUIP) 0.5 MG tablet, Take 1 tablet by mouth Every Night. Take 1 hour before bedtime., Disp: , Rfl:     tiZANidine (ZANAFLEX) 4 MG tablet, Take 1 tablet by mouth 3 (Three) Times a Day As Needed., Disp: , Rfl:     topiramate (TOPAMAX) 100 MG tablet, Take 2 tablets by mouth 2 (Two) Times a Day., Disp: , Rfl:     vitamin B-12 (CYANOCOBALAMIN) 100 MCG tablet, Take 1 tablet by mouth Daily., Disp: , Rfl:     warfarin (COUMADIN) 4 MG tablet, Take 1 tablet by mouth Daily., Disp: , Rfl:     pain patient supplied pump, by Intrathecal route Continuous. Morphine, Disp: , Rfl:     Allergies:   Allergies   Allergen Reactions    Azithromycin Shortness Of Breath     Rash, itching    Metformin Itching     Vomiting    Gabapentin Other (See Comments) and Unknown - High Severity     dizziness       Objective     Physical Exam:  Vital Signs:   Vitals:    12/29/23 1324   BP: 104/65   BP Location: Right arm   Patient Position: Sitting   Cuff Size: Small Adult   Pulse: 77  "  SpO2: 98%   Weight: 63.4 kg (139 lb 12.8 oz)   Height: 152.4 cm (60\")     Body mass index is 27.3 kg/m².     Physical Exam  Vitals reviewed.   Constitutional:       General: She is awake. She is not in acute distress.     Appearance: Normal appearance. She is ill-appearing.   HENT:      Head: Normocephalic.      Mouth/Throat:      Mouth: Mucous membranes are moist.      Pharynx: Oropharynx is clear.   Eyes:      General: Lids are normal.      Extraocular Movements: Extraocular movements intact.   Cardiovascular:      Rate and Rhythm: Normal rate.   Pulmonary:      Effort: Pulmonary effort is normal. No respiratory distress.   Skin:     General: Skin is warm and dry.   Neurological:      Mental Status: She is alert.      Motor: Motor strength is normal.  Psychiatric:         Mood and Affect: Mood normal.         Speech: Speech normal.         Behavior: Behavior normal.       Neurological Exam  Mental Status  Awake and alert. Oriented to person, place, time and situation. Recent and remote memory are intact. Speech is normal. Language is fluent with no aphasia. Language: Very minor and intermittent loss of fluency, improves when she slows her speech. Attention and concentration are normal. Fund of knowledge is appropriate for level of education.    Cranial Nerves  CN II: Visual fields full to confrontation.  CN III, IV, VI: Extraocular movements intact bilaterally. Normal lids and orbits bilaterally.  CN V: Facial sensation is normal.  CN VII: Full and symmetric facial movement.  CN IX, X: Palate elevates symmetrically  CN XI: Shoulder shrug strength is normal.  CN XII: Tongue midline without atrophy or fasciculations.    Motor  Decreased muscle bulk throughout. Normal muscle tone. No abnormal involuntary movements. Strength is 5/5 throughout all four extremities.    Sensory  Light touch is normal in upper and lower extremities.     Coordination  Right: Finger-to-nose normal. Heel-to-shin normal.Left: Finger-to-nose " normal. Heel-to-shin normal.    Gait  Casual gait is normal including stance, stride, and arm swing.       NIH Stroke Scale    1a  Level of consciousness: 0=alert; keenly responsive   1b. LOC questions:  0=Answers both questions correctly    1c. LOC commands: 0=Performs both tasks correctly   2.  Best Gaze: 0=normal   3. Visual: 0=No visual loss   4. Facial Palsy: 0=Normal symmetric movement   5a. Motor left arm: 0=No drift, limb holds 90 (or 45) degrees for full 10 seconds   5b.  Motor right arm: 0=No drift, limb holds 90 (or 45) degrees for full 10 seconds   6a. Motor left le=No drift, limb holds 90 (or 45) degrees for full 10 seconds   6b  Motor right le=No drift, limb holds 90 (or 45) degrees for full 10 seconds   7. Limb Ataxia: 0=Absent   8.  Sensory: 0=Normal; no sensory loss   9. Best Language:  1=Mild to moderate aphasia; some obvious loss of fluency or facility of comprehension without significant limitation on ideas expressed or form of expression.   10. Dysarthria: 0=Normal   11. Extinction and Inattention: 0=No abnormality    Total:   1         Modified Harold Score: 1        0  No Symptoms    1 No significant disability. Able to carry out all usual activities, despite some symptoms.    2 Slight disability. Able to look after own affairs without assistance, but unable to carry out all previous activities.    3 Moderate disability. Requires some help, but able to walk unassisted.    4 Moderately severe disability. Unable to attend to own bodily needs without assistance, and unable to walk unassisted.    5 Severe disability. Requires constant nursing care and attention, bedridden, incontinent.    6 Dead      PHQ-9 Depression Screening  Little interest or pleasure in doing things? 0-->not at all   Feeling down, depressed, or hopeless? 0-->not at all   Trouble falling or staying asleep, or sleeping too much?     Feeling tired or having little energy?     Poor appetite or overeating?     Feeling bad  about yourself - or that you are a failure or have let yourself or your family down?     Trouble concentrating on things, such as reading the newspaper or watching television?     Moving or speaking so slowly that other people could have noticed? Or the opposite - being so fidgety or restless that you have been moving around a lot more than usual?     Thoughts that you would be better off dead, or of hurting yourself in some way?     PHQ-9 Total Score 0   If you checked off any problems, how difficult have these problems made it for you to do your work, take care of things at home, or get along with other people?        Imaging Reviewed:     Results for orders placed during the hospital encounter of 10/31/23    Adult Transthoracic Echo Complete W/ Cont if Necessary Per Protocol (With Agitated Saline)    Interpretation Summary    Left ventricular systolic function is normal. Calculated left ventricular EF = 62.1%    Left ventricular wall thickness is consistent with mild concentric hypertrophy.    Saline test results are negative.    There is a mechanical aortic valve prosthesis present.    There is a mechanical mitral valve prosthesis present.    Estimated right ventricular systolic pressure from tricuspid regurgitation is normal (<35 mmHg).    Calcification on pap muscle    CT Head Without Contrast  Narrative: CT HEAD WO CONTRAST    Date of Exam: 11/7/2023 4:40 AM CST    Indication: Stroke, follow up  follow up left mca stroke.    Comparison: 11/2/2023    Technique: Axial CT images were obtained of the head without contrast administration.  Automated exposure control and iterative construction methods were used.    Findings:  There is no evidence of new acute territorial infarction.    There there is no acute intracranial hemorrhage. There are no extra-axial collections.    Ventricles and CSF spaces are symmetric. No mass effect nor hydrocephalus.    Brain parenchyma appears unchanged with persistent hypodensity in  the left insular region..     Paranasal sinuses and mastoid air cells are unchanged.    Osseous structures and orbits appear intact.  Impression: Impression:  No significant change identified.    Electronically Signed: Saman Lopez MD    11/7/2023 6:42 AM CST    Workstation ID: [id number]    CT Head Without Contrast    Result Date: 11/7/2023  Impression: No significant change identified. Electronically Signed: Saman Lopez MD  11/7/2023 6:42 AM CST  Workstation ID: [id number]    XR Abdomen KUB    Result Date: 11/3/2023  Impression: Enteric tube tip within the distal duodenum. Electronically Signed: Saman Lopez MD  11/3/2023 9:39 AM CDT  Workstation ID: [id number]    CT Head Without Contrast    Result Date: 11/2/2023  Impression: Evolving left MCA territory infarct. There has been resolution of previous left cortical contrast staining. No superimposed acute process identified. Electronically Signed: Saman Lopez MD  11/2/2023 2:52 PM CDT  Workstation ID: [id number]    XR Abdomen KUB    Result Date: 11/1/2023  Impression: The tip of the feeding tube lies within the gastric antrum. Electronically Signed: Yasir Cr MD  11/1/2023 12:22 PM EDT  Workstation ID: [id number]    EEG    Result Date: 11/1/2023  Diffuse cerebral dysfunction of at least mild degree but nonspecific No ongoing seizures are present This report is transcribed using the Dragon dictation system.      CT Head Without Contrast    Result Date: 11/1/2023  Impression: Evolving left MCA territory infarct with associated cortical contrast staining. No acute hemorrhage. Electronically Signed: Geraldo Cueva MD  11/1/2023 5:44 AM EDT  Workstation ID: [id number]    XR Chest 1 View    Result Date: 10/31/2023  Impression: Generalized interstitial prominence without acute airspace disease. Electronically Signed: Saman Lopez MD  10/31/2023 9:40 PM CDT  Workstation ID: [id number]    CT Angiogram Head w AI Analysis of LVO    Result Date: 10/31/2023  1.Left M2 superior  "division segmental occlusion. 2.Right distal M1 saccular aneurysm. Electronically Signed: Saman Lopez MD  10/31/2023 9:15 PM CDT  Workstation ID: [id number]    CT Angiogram Neck    Result Date: 10/31/2023  1.Left M2 superior division segmental occlusion. 2.Right distal M1 saccular aneurysm. Electronically Signed: Saman Lopez MD  10/31/2023 9:15 PM CDT  Workstation ID: [id number]    CT CEREBRAL PERFUSION WITH & WITHOUT CONTRAST    Result Date: 10/31/2023   1. Imaging features are consistent with a left MCA territory core infarct with surrounding ischemic brain at risk. Electronically Signed: Saman Lopez MD  10/31/2023 8:59 PM CDT  Workstation ID: [id number]    CT Head Without Contrast Stroke Protocol    Result Date: 10/31/2023  Impression: No acute intracranial process identified. Electronically Signed: Saman Lopez MD  10/31/2023 8:26 PM CDT  Workstation ID: [id number]    Laboratory Results:    Lab Results   Component Value Date    HGBA1C <4.20 (L) 11/01/2023     Lab Results   Component Value Date    WBC 7.50 11/09/2023    HGB 7.5 (L) 11/09/2023    HCT 25.0 (L) 11/09/2023    .0 (H) 11/09/2023     11/09/2023      Lab Results   Component Value Date    GLUCOSE 95 11/09/2023    BUN 18 11/09/2023    CREATININE 0.78 11/09/2023    EGFRIFNONA 63 02/18/2020    BCR 23.1 11/09/2023    K 4.1 11/09/2023    CO2 21.0 (L) 11/09/2023    CALCIUM 10.2 11/09/2023    ALBUMIN 2.4 (L) 11/02/2023    AST 8 11/02/2023    ALT <5 11/02/2023      Lab Results   Component Value Date    CHOL 88 11/01/2023    CHOL 195 02/18/2020     Lab Results   Component Value Date    TRIG 88 11/01/2023    TRIG 243 (H) 02/18/2020     Lab Results   Component Value Date    HDL 30 (L) 11/01/2023    HDL 39 (L) 02/18/2020     Lab Results   Component Value Date    LDL 40 11/01/2023     (H) 02/18/2020      Lab Results   Component Value Date    TSH 1.690 10/31/2023     No results found for: \"FOLATE\"  No results found for: " "\"[id number]\"            Assessment / Plan      Assessment/Plan:   Diagnoses and all orders for this visit:    1. Aphasia as late effect of stroke (Primary)    2. Primary hypertension    3. Obesity, unspecified classification, unspecified obesity type, unspecified whether serious comorbidity present    4. Type 2 diabetes mellitus with hypoglycemia without coma, without long-term current use of insulin    5. Tobacco use    6. S/P mitral valve replacement    7. S/P mechanical aortic and mitral valve replacement (2018)       -Continue Coumadin as directed  -Continue aspirin and statin   -I have encouraged her to quit smoking, she is willing to make attempts  -BP today 104/65  -Repeat stroke labs at next visit  -Continue home Keppra and Topamax  -Continue to work with SLP and PT per HH    Discussed the importance of medication compliance and lifestyle modifications (adequate blood pressure control, adequate control of hyperlipidemia, adequate glycemic control, increase physical activity, and healthy diet) to help reduce the risk of future cerebrovascular events.  Also discussed the signs symptoms that would warrant the patient return back to the emergency department including unilateral weakness, unilateral numbness, visual disturbances, loss of balance, speech difficulties, and/or a sudden severe headache.     Blood Pressure control to < 130/80  Goal LDL <70  Serum Glucose < 140  Call 911 for any stroke symptoms    Follow Up:   Return in about 3 months (around 3/29/2024).    SHARON [facility]     This document has been electronically signed by CHARLIE Huston  December 29, 2023 15:04 EST    Please note that portions of this note were completed with a voice recognition program. Efforts were made to edit dictation, but occasionally words are mistranscribed.    "

## 2023-12-29 ENCOUNTER — OFFICE VISIT (OUTPATIENT)
Dept: NEUROLOGY | Facility: CLINIC | Age: 53
End: 2023-12-29
Payer: MEDICARE

## 2023-12-29 VITALS
SYSTOLIC BLOOD PRESSURE: 104 MMHG | BODY MASS INDEX: 27.45 KG/M2 | HEIGHT: 60 IN | DIASTOLIC BLOOD PRESSURE: 65 MMHG | OXYGEN SATURATION: 98 % | WEIGHT: 139.8 LBS | HEART RATE: 77 BPM

## 2023-12-29 DIAGNOSIS — Z72.0 TOBACCO USE: Chronic | ICD-10-CM

## 2023-12-29 DIAGNOSIS — E66.9 OBESITY, UNSPECIFIED CLASSIFICATION, UNSPECIFIED OBESITY TYPE, UNSPECIFIED WHETHER SERIOUS COMORBIDITY PRESENT: ICD-10-CM

## 2023-12-29 DIAGNOSIS — E11.649 TYPE 2 DIABETES MELLITUS WITH HYPOGLYCEMIA WITHOUT COMA, WITHOUT LONG-TERM CURRENT USE OF INSULIN: ICD-10-CM

## 2023-12-29 DIAGNOSIS — I69.320 APHASIA AS LATE EFFECT OF STROKE: Primary | ICD-10-CM

## 2023-12-29 DIAGNOSIS — Z95.2 S/P MITRAL VALVE REPLACEMENT: ICD-10-CM

## 2023-12-29 DIAGNOSIS — Z95.2 S/P HEART VALVE REPLACEMENT WITH MECHANICAL VALVE: ICD-10-CM

## 2023-12-29 DIAGNOSIS — I10 PRIMARY HYPERTENSION: ICD-10-CM

## 2024-01-04 RX ORDER — MIDODRINE HYDROCHLORIDE 5 MG/1
TABLET ORAL
Qty: 90 TABLET | Refills: 3 | Status: SHIPPED | OUTPATIENT
Start: 2024-01-04

## 2024-01-25 ENCOUNTER — CALL CENTER PROGRAMS (OUTPATIENT)
Dept: CALL CENTER | Facility: HOSPITAL | Age: 54
End: 2024-01-25
Payer: MEDICARE

## 2024-01-25 NOTE — OUTREACH NOTE
Stroke [place] Survey      Flowsheet Row Responses   Facility patient discharged from? [facility]   Attempt successful Yes   Call start time 1428   Person spoke with today (if not patient) and relationship Caregiver  [spouse, Mario Martínez]   Call end time 1434   Patient location 30 days post discharge if known Home   Was the patient readmitted within 30 days of discharge? No   Could you live alone without any help from another person? Yes  [Independent with ADL's]   Can you do everything that you were doing right before your stroke even if slower and not as much? Yes   Are you completely back to the way you were right before your stroke? No  [ reports mild word finding issues but has greatly improved.]   Can you walk from one room to another without help from another person? Yes  [Walks independently]   Call Center [place] Score 1   [place] score call completed Yes   Comments  reports patient recovering well with only mild word finding issues residual post stroke.            DAPHNIE PEMBERTON - Registered Nurse

## 2024-03-10 DIAGNOSIS — R60.9 EDEMA, UNSPECIFIED TYPE: ICD-10-CM

## 2024-03-10 DIAGNOSIS — R06.02 SOB (SHORTNESS OF BREATH): ICD-10-CM

## 2024-03-10 DIAGNOSIS — I50.30 DIASTOLIC CONGESTIVE HEART FAILURE, UNSPECIFIED HF CHRONICITY: ICD-10-CM

## 2024-03-11 RX ORDER — FUROSEMIDE 40 MG/1
TABLET ORAL
Qty: 60 TABLET | Refills: 5 | Status: SHIPPED | OUTPATIENT
Start: 2024-03-11

## 2024-03-14 NOTE — PROGRESS NOTES
Follow Up Office Visit      Encounter Date: 2024   Patient Name: Mara Martínez  : 1970   MRN: [id number]   PCP: Rowan Nolasco APRN    Chief Complaint:    Chief Complaint   Patient presents with    Follow-up     Aphasia as late effect of stroke       History of Present Illness: Mara Martínez is a 53 y.o. female with known medical diagnoses of hypertension, hyperlipidemia hypotension, diabetes type 2, CHF, aortic and mitral valve replacement on Coumadin, CAD, seizure disorder, and recent stroke presents today for follow up.  Initially presented as a scene flight to [facility] on 10/31/2023 with complaints of aphasia and generalized weakness.  She was out of the window for TNK.  Initial NIH was 22.  Initial CT head did not show any acute changes.  CTA head/neck showed left M2 superior division segmental occlusion and a right distal M1 saccular aneurysm.  CTP showed area of reversible ischemia in left MCA territory.  Repeat CT head the next day showed an evolving left MCA territory infarct.  She was taken to [facility] Lab for MT and had a TICI 3 recanalization.  She was unable to have MRI performed due to an incompatible pain pump.     OV 2023: Today she reports only some mild ongoing word finding issues as her only residual symptom.  She has had significant improvement in her symptoms. She does tell me that when she awoke in hospital after procedure her neck was sore and remains somewhat sore, but gradually improving.  PT is also addressing this thru home health.  She is also seeing SLP in her home. She is compliant with her medications, no issues with cost.  INR is monitored by her local PCP. She denies any new stroke/TIA symptoms.  She states she and her  are quite anxious that she will have another stroke, but she is coping well.  They are installing in home video equipment and have purchased a medical alert button.  She continues to smoke, but is willing to make attempts  at cessation.    OV 03/20/2024: She reports her issues with word finding have improved significantly, she no longer attends speech therapy.  She will still occasionally have an issue when she tries to talk too fast.  She is complaint with her medication regimen, no issues with cost.  Her INR is managed by her PCP. She denies any new stroke/TIA symptoms.  Follow-up with neurosurgery for aneurysm scheduled next month.  She sees general Neuro for seizure disorder. She reports that she is doing very well overall.  Reports minor issues with swallowing large medication tablets.      Subjective        I have reviewed and the following portions of the patient's history were updated as appropriate: past family history, past medical history, past social history, past surgical history and problem list.    Medications:     Current Outpatient Medications:     ascorbic acid (VITAMIN C) 1000 MG tablet, Take 1 tablet by mouth Daily., Disp: , Rfl:     aspirin 81 MG EC tablet, Take 1 tablet by mouth Daily., Disp: , Rfl:     atorvastatin (LIPITOR) 80 MG tablet, TAKE 1 TABLET BY MOUTH ONCE DAILY, Disp: 90 tablet, Rfl: 3    cholecalciferol (VITAMIN D3) 25 MCG (1000 UT) tablet, Take 1 tablet by mouth Daily., Disp: , Rfl:     ferrous sulfate 325 (65 FE) MG tablet, Take 1 tablet by mouth 2 (Two) Times a Day., Disp: , Rfl:     folic acid (FOLVITE) 1 MG tablet, Take  by mouth Daily., Disp: , Rfl:     furosemide (LASIX) 40 MG tablet, TAKE 1 TABLET BY MOUTH TWICE A DAY, Disp: 60 tablet, Rfl: 5    galcanezumab-gnlm (Emgality) 120 MG/ML auto-injector pen, Inject 1 mL under the skin into the appropriate area as directed Every 30 (Thirty) Days., Disp: , Rfl:     levETIRAcetam (KEPPRA) 1000 MG tablet, Take 1 tablet by mouth 2 (Two) Times a Day., Disp: , Rfl:     levothyroxine (SYNTHROID, LEVOTHROID) 50 MCG tablet, Take 1 tablet by mouth Daily., Disp: , Rfl:     midodrine (PROAMATINE) 5 MG tablet, TAKE 1 TABLET BY MOUTH 3 TIMES A DAY TAKE PRIOR TO  "MEALS, Disp: 90 tablet, Rfl: 3    nortriptyline (PAMELOR) 50 MG capsule, Take 2 capsules by mouth Every Night., Disp: , Rfl:     pain patient supplied pump, by Intrathecal route Continuous. Morphine, Disp: , Rfl:     pantoprazole (PROTONIX) 40 MG EC tablet, TAKE 1 TABLET BY MOUTH ONCE DAILY, Disp: 90 tablet, Rfl: 2    potassium chloride (K-DUR,KLOR-CON) 20 MEQ CR tablet, Take 1 tablet by mouth 2 (Two) Times a Day., Disp: , Rfl:     rOPINIRole (REQUIP) 0.5 MG tablet, Take 1 tablet by mouth Every Night. Take 1 hour before bedtime., Disp: , Rfl:     tiZANidine (ZANAFLEX) 4 MG tablet, Take 1 tablet by mouth 3 (Three) Times a Day As Needed., Disp: , Rfl:     topiramate (TOPAMAX) 100 MG tablet, Take 2 tablets by mouth 2 (Two) Times a Day., Disp: , Rfl:     vitamin B-12 (CYANOCOBALAMIN) 100 MCG tablet, Take 1 tablet by mouth Daily., Disp: , Rfl:     warfarin (COUMADIN) 4 MG tablet, Take 1 tablet by mouth Daily., Disp: , Rfl:     Allergies:   Allergies   Allergen Reactions    Azithromycin Shortness Of Breath     Rash, itching    Metformin Itching     Vomiting    Gabapentin Other (See Comments) and Unknown - High Severity     dizziness       Review of Systems   Constitutional:  Negative for chills and fever.   HENT:  Positive for trouble swallowing.    Respiratory: Negative.     Cardiovascular: Negative.    Musculoskeletal:  Positive for arthralgias.   Neurological:  Positive for seizures, speech difficulty and headaches. Negative for tremors, syncope, facial asymmetry, weakness and numbness.   Psychiatric/Behavioral: Negative.          Objective     Physical Exam:   Vital Signs:   Vitals:    03/20/24 1450   BP: 97/64   BP Location: Right arm   Patient Position: Sitting   Cuff Size: Small Adult   Pulse: 88   SpO2: 98%   Weight: 61.1 kg (134 lb 12.8 oz)   Height: 152.4 cm (60\")     Body mass index is 26.33 kg/m².    Physical Exam  Vitals reviewed.   Constitutional:       General: She is awake. She is not in acute distress.    "  Appearance: Normal appearance.   HENT:      Head: Normocephalic.      Mouth/Throat:      Mouth: Mucous membranes are moist.      Pharynx: Oropharynx is clear.   Eyes:      General: Lids are normal.      Extraocular Movements: Extraocular movements intact.   Cardiovascular:      Rate and Rhythm: Normal rate.   Pulmonary:      Effort: Pulmonary effort is normal. No respiratory distress.   Skin:     General: Skin is warm and dry.   Neurological:      Mental Status: She is alert.      Motor: Motor strength is normal.  Psychiatric:         Mood and Affect: Mood normal.         Speech: Speech normal.         Behavior: Behavior normal.       Neurological Exam  Mental Status  Awake and alert. Oriented to person, place, time and situation. Recent and remote memory are intact. Speech is normal. Language is fluent with no aphasia. Attention and concentration are normal. Fund of knowledge is appropriate for level of education.    Cranial Nerves  CN II: Visual fields full to confrontation.  CN III, IV, VI: Extraocular movements intact bilaterally. Normal lids and orbits bilaterally.  CN V: Facial sensation is normal.  CN VII: Full and symmetric facial movement.  CN IX, X: Palate elevates symmetrically  CN XI: Shoulder shrug strength is normal.  CN XII: Tongue midline without atrophy or fasciculations.    Motor  Decreased muscle bulk throughout. Normal muscle tone. No abnormal involuntary movements. Strength is 5/5 throughout all four extremities.    Sensory  Light touch is normal in upper and lower extremities.     Coordination  Right: Finger-to-nose normal. Heel-to-shin normal.Left: Finger-to-nose normal. Heel-to-shin normal.    Gait  Casual gait is normal including stance, stride, and arm swing.       Procedures    NIH Stroke Scale    1a  Level of consciousness: 0=alert; keenly responsive   1b. LOC questions:  0=Answers both questions correctly    1c. LOC commands: 0=Performs both tasks correctly   2.  Best Gaze: 0=normal    3. Visual: 0=No visual loss   4. Facial Palsy: 0=Normal symmetric movement   5a. Motor left arm: 0=No drift, limb holds 90 (or 45) degrees for full 10 seconds   5b.  Motor right arm: 0=No drift, limb holds 90 (or 45) degrees for full 10 seconds   6a. Motor left le=No drift, limb holds 90 (or 45) degrees for full 10 seconds   6b  Motor right le=No drift, limb holds 90 (or 45) degrees for full 10 seconds   7. Limb Ataxia: 0=Absent   8.  Sensory: 0=Normal; no sensory loss   9. Best Language:  0=No aphasia, normal   10. Dysarthria: 0=Normal   11. Extinction and Inattention: 0=No abnormality    Total:   0         Modified Flagler Scale: 1          0  No Symptoms    1 No significant disability. Able to carry out all usual activities, despite some symptoms.    2 Slight disability. Able to look after own affairs without assistance, but unable to carry out all previous activities.    3 Moderate disability. Requires some help, but able to walk unassisted.    4 Moderately severe disability. Unable to attend to own bodily needs without assistance, and unable to walk unassisted.    5 Severe disability. Requires constant nursing care and attention, bedridden, incontinent.    6 Dead          PHQ-9 Depression Screening  Little interest or pleasure in doing things? 0-->not at all   Feeling down, depressed, or hopeless? 0-->not at all   Trouble falling or staying asleep, or sleeping too much?     Feeling tired or having little energy?     Poor appetite or overeating?     Feeling bad about yourself - or that you are a failure or have let yourself or your family down?     Trouble concentrating on things, such as reading the newspaper or watching television?     Moving or speaking so slowly that other people could have noticed? Or the opposite - being so fidgety or restless that you have been moving around a lot more than usual?     Thoughts that you would be better off dead, or of hurting yourself in some way?     PHQ-9 Total  "Score 0   If you checked off any problems, how difficult have these problems made it for you to do your work, take care of things at home, or get along with other people?        Imaging Resluts:     No radiology results for the last 90 days.     Laboratory Results:    Lab Results   Component Value Date    HGBA1C <4.20 (L) 11/01/2023     Lab Results   Component Value Date    WBC 7.50 11/09/2023    HGB 7.5 (L) 11/09/2023    HCT 25.0 (L) 11/09/2023    .0 (H) 11/09/2023     11/09/2023      Lab Results   Component Value Date    GLUCOSE 95 11/09/2023    BUN 18 11/09/2023    CREATININE 0.78 11/09/2023    EGFRIFNONA 63 02/18/2020    BCR 23.1 11/09/2023    K 4.1 11/09/2023    CO2 21.0 (L) 11/09/2023    CALCIUM 10.2 11/09/2023    ALBUMIN 2.4 (L) 11/02/2023    AST 8 11/02/2023    ALT <5 11/02/2023      Lab Results   Component Value Date    CHOL 88 11/01/2023    CHOL 195 02/18/2020     Lab Results   Component Value Date    TRIG 88 11/01/2023    TRIG 243 (H) 02/18/2020     Lab Results   Component Value Date    HDL 30 (L) 11/01/2023    HDL 39 (L) 02/18/2020     Lab Results   Component Value Date    LDL 40 11/01/2023     (H) 02/18/2020      No results found for: \"[id number]\"  No results found for: \"FOLATE\"  Lab Results   Component Value Date    TSH 1.690 10/31/2023       Assessment / Plan      Assessment/Plan:   Diagnoses and all orders for this visit:    1. Aphasia as late effect of stroke (Primary)    2. Obesity, unspecified classification, unspecified obesity type, unspecified whether serious comorbidity present    3. Tobacco use    4. S/P mechanical aortic and mitral valve replacement (2018)    5. Primary hypertension    6. Type 2 diabetes mellitus with hypoglycemia without coma, without long-term current use of insulin       -Continue Coumadin as directed  -Continue aspirin and statin   -She continues to smoke about 1/2 ppd, I reinforced the importance of smoking cessation in relation to stroke prevention " and overall health, she verbalized understanding of the same  -BP today 97/64.  She tells me she takes medicine to increase her blood pressure but it does not seem to help, follows up next month  -Stroke labs per PCP  -Keep f/u with General Neuro for seizure disorder 05/2024  -NSGY f/u in May for aneurysm  -States she has been evaluated for sleep apnea in the past, initially prescribed CPAP but could not tolerate treatment,  Reports that repeat testing showed she no longer needed to use it  -Discharged from therapy, does not feel she needs it now    Discussed the importance of medication compliance and lifestyle modifications (adequate blood pressure control, adequate control of hyperlipidemia, adequate glycemic control, increase physical activity, and healthy diet) to help reduce the risk of future cerebrovascular events.  Also discussed the signs symptoms that would warrant the patient return back to the emergency department including unilateral weakness, unilateral numbness, visual disturbances, loss of balance, speech difficulties, and/or a sudden severe headache.      Blood Pressure control to < 130/80  Goal LDL <70-recommend high dose statins  Goal A1c < 7.0  Call 911 for any stroke symptoms    Follow Up:   Return in about 6 months (around 9/20/2024).      [facility]    This document has been electronically signed by CHARLIE Huston  March 20, 2024 15:22 EDT    Please note that portions of this note were completed with a voice recognition program. Efforts were made to edit dictation, but occasionally words are mistranscribed.

## 2024-03-20 ENCOUNTER — OFFICE VISIT (OUTPATIENT)
Dept: NEUROLOGY | Facility: CLINIC | Age: 54
End: 2024-03-20
Payer: MEDICARE

## 2024-03-20 VITALS
OXYGEN SATURATION: 98 % | SYSTOLIC BLOOD PRESSURE: 97 MMHG | DIASTOLIC BLOOD PRESSURE: 64 MMHG | BODY MASS INDEX: 26.46 KG/M2 | WEIGHT: 134.8 LBS | HEART RATE: 88 BPM | HEIGHT: 60 IN

## 2024-03-20 DIAGNOSIS — Z95.2 S/P HEART VALVE REPLACEMENT WITH MECHANICAL VALVE: ICD-10-CM

## 2024-03-20 DIAGNOSIS — I69.320 APHASIA AS LATE EFFECT OF STROKE: Primary | ICD-10-CM

## 2024-03-20 DIAGNOSIS — I10 PRIMARY HYPERTENSION: ICD-10-CM

## 2024-03-20 DIAGNOSIS — E11.649 TYPE 2 DIABETES MELLITUS WITH HYPOGLYCEMIA WITHOUT COMA, WITHOUT LONG-TERM CURRENT USE OF INSULIN: ICD-10-CM

## 2024-03-20 DIAGNOSIS — E66.9 OBESITY, UNSPECIFIED CLASSIFICATION, UNSPECIFIED OBESITY TYPE, UNSPECIFIED WHETHER SERIOUS COMORBIDITY PRESENT: ICD-10-CM

## 2024-03-20 DIAGNOSIS — Z72.0 TOBACCO USE: ICD-10-CM

## 2024-04-03 ENCOUNTER — OFFICE VISIT (OUTPATIENT)
Dept: CARDIOLOGY | Facility: CLINIC | Age: 54
End: 2024-04-03
Payer: MEDICARE

## 2024-04-03 VITALS
HEART RATE: 73 BPM | HEIGHT: 60 IN | SYSTOLIC BLOOD PRESSURE: 103 MMHG | OXYGEN SATURATION: 99 % | WEIGHT: 136.6 LBS | DIASTOLIC BLOOD PRESSURE: 65 MMHG | BODY MASS INDEX: 26.82 KG/M2

## 2024-04-03 DIAGNOSIS — I95.1 ORTHOSTATIC HYPOTENSION: ICD-10-CM

## 2024-04-03 DIAGNOSIS — I50.30 DIASTOLIC CONGESTIVE HEART FAILURE, UNSPECIFIED HF CHRONICITY: ICD-10-CM

## 2024-04-03 DIAGNOSIS — I05.9 MITRAL VALVE DISEASE, RHEUMATIC: ICD-10-CM

## 2024-04-03 DIAGNOSIS — I06.9 AORTIC VALVE DISEASE, RHEUMATIC: Primary | ICD-10-CM

## 2024-04-03 PROCEDURE — 99214 OFFICE O/P EST MOD 30 MIN: CPT | Performed by: PHYSICIAN ASSISTANT

## 2024-04-03 PROCEDURE — 3078F DIAST BP <80 MM HG: CPT | Performed by: PHYSICIAN ASSISTANT

## 2024-04-03 PROCEDURE — 3074F SYST BP LT 130 MM HG: CPT | Performed by: PHYSICIAN ASSISTANT

## 2024-04-03 RX ORDER — MIDODRINE HYDROCHLORIDE 10 MG/1
10 TABLET ORAL
Qty: 90 TABLET | Refills: 3 | Status: SHIPPED | OUTPATIENT
Start: 2024-04-03

## 2024-04-03 NOTE — PROGRESS NOTES
Problem list     Subjective   Mara Martínez is a 53 y.o. female     Chief Complaint   Patient presents with    Follow-up     [facility] f/u for stroke   Problem list   1.  Valvular heart disease  1.1 status post aortic and mitral valve replacement by Dr. Cavazos with mechanical valves July 2018  1.2 follow-up echocardiogram 2019 demonstrating stable valve parameters  1.3 echocardiogram August 2022 with stable valve parameters  2.  Preserved systolic function  3.  Cardiac catheterization 2017 with minimal atherosclerosis noted  3.1 repeat stress test August 2022 with no evidence of ischemia preserved LV function  4.  Carotid artery stenosis  4.1 left carotid stenting by Dr. Mckeon [facility] July 2020  5.  MCA aneurysm estimated at 3.5 mm followed by [facility]  5.  Epilepsy  6.  Hypertension  7.  Dyslipidemia  8.  Chronic tobacco use  9.  GI bleed  9.1.  Patient with history of AVMs  9.2 recurrent GI bleeds with multiple hospitalizations, blood transfusions, and cautery.  Seen at multiple institutions  10.  CVA  10.1 cerebrovascular accident due to thrombus in the left MCA status post endovascular thrombectomy by Dr. Tejal WHITMORE    Patient is a 53-year-old female that presents to the office for evaluation and for follow-up from recent CVA.    Patient is well-known to the clinic having history of aortic and mitral valve replacements by Dr. Cavazos in 2018.  Patient has been monitored routinely in the office and has done well until recently.  Patient was diagnosed with colonic AVMs and has had multiple hospitalizations with blood transfusions and cautery.  She has been seen at multiple institutions.    Her INR has been difficult to manage with minor adjustments causing profound changes in her levels.  She recently was subtherapeutic with an INR of 1.8.  She had strokelike symptoms with expressive aphasia.  She was evaluated at [facility] in [place] and underwent endovascular thrombectomy  of the left MCA.  She was discharged from that facility.    Patient does not have any significant residual neurologic deficits.  She feels well.  However, this has been a recurrent issue with difficulty managing her condition.  Her anticoagulation has been a significant problem causing much of her issues especially with her noncardiac AVMs.    Patient occasionally will experience chest pain.  She will notice a slight discomfort at times.  It is mild.  She has chronic dyspnea that is mild.  She does not describe progressive shortness of breath.  No PND or orthopnea.    She does not describe palpitating or she complain of dysrhythmic symptoms.  She is stable otherwise.      Current Outpatient Medications on File Prior to Visit   Medication Sig Dispense Refill    ascorbic acid (VITAMIN C) 1000 MG tablet Take 1 tablet by mouth Daily.      aspirin 81 MG EC tablet Take 1 tablet by mouth Daily.      atorvastatin (LIPITOR) 80 MG tablet TAKE 1 TABLET BY MOUTH ONCE DAILY 90 tablet 3    cholecalciferol (VITAMIN D3) 25 MCG (1000 UT) tablet Take 1 tablet by mouth Daily.      ferrous sulfate 325 (65 FE) MG tablet Take 1 tablet by mouth 2 (Two) Times a Day.      folic acid (FOLVITE) 1 MG tablet Take  by mouth Daily.      furosemide (LASIX) 40 MG tablet TAKE 1 TABLET BY MOUTH TWICE A DAY 60 tablet 5    galcanezumab-gnlm (Emgality) 120 MG/ML auto-injector pen Inject 1 mL under the skin into the appropriate area as directed Every 30 (Thirty) Days.      levETIRAcetam (KEPPRA) 1000 MG tablet Take 1 tablet by mouth 2 (Two) Times a Day.      levothyroxine (SYNTHROID, LEVOTHROID) 50 MCG tablet Take 1 tablet by mouth Daily.      nortriptyline (PAMELOR) 50 MG capsule Take 2 capsules by mouth Every Night.      pain patient supplied pump by Intrathecal route Continuous. Morphine      pantoprazole (PROTONIX) 40 MG EC tablet TAKE 1 TABLET BY MOUTH ONCE DAILY 90 tablet 2    potassium chloride (K-DUR,KLOR-CON) 20 MEQ CR tablet Take 1 tablet by  mouth 2 (Two) Times a Day.      rOPINIRole (REQUIP) 0.5 MG tablet Take 1 tablet by mouth Every Night. Take 1 hour before bedtime.      tiZANidine (ZANAFLEX) 4 MG tablet Take 1 tablet by mouth 3 (Three) Times a Day As Needed.      topiramate (TOPAMAX) 100 MG tablet Take 2 tablets by mouth 2 (Two) Times a Day.      vitamin B-12 (CYANOCOBALAMIN) 100 MCG tablet Take 1 tablet by mouth Daily.      warfarin (COUMADIN) 4 MG tablet Take 1 tablet by mouth Daily.      [DISCONTINUED] midodrine (PROAMATINE) 5 MG tablet TAKE 1 TABLET BY MOUTH 3 TIMES A DAY TAKE PRIOR TO MEALS 90 tablet 3     No current facility-administered medications on file prior to visit.       Azithromycin, Gabapentin, and Metformin    Past Medical History:   Diagnosis Date    Anemia     Aortic regurgitation     Arthritis     Back pain     Carotid bruit     ISREAL (cerebral atherosclerosis) 12/2014    nonobstructive    Chest pain     Chronic hypotension on midodrine 11/01/2023    Chronic obstructive pulmonary disease 09/07/2022    Clotting disorder 2018    Coronary artery disease     COVID-19 vaccine administered     phizer    D-dimer, elevated     Diabetes mellitus APRIL/2023    Diastolic congestive heart failure 07/25/2019    Dizziness     Edema     Epilepsy     Fatigue     Heart murmur     History of blood transfusion 08/2019    History of blood transfusion 2022    History of blood transfusion 11/2022    History of blood transfusion     August 2023 x2    History of recent blood transfusion 12/2021    History of transfusion     Hyperlipidemia     Hyperlipidemia 04/28/2016    Hypertension     Kidney stones     Migraines     Mitral regurgitation     Mitral stenosis     mild    Neuropathy     Neuropathy     Palpitations     Pulmonary edema     Retinal drusen     Seizure     Sleep apnea 09/2019    Sleeping difficulties     SOB (shortness of breath)     Stroke 10/31/23    Supraventricular aortic stenosis     Syncope     Tachycardia     Tobacco abuse     D  (valvular heart disease)     Wears dentures     Wears eyeglasses        Social History     Socioeconomic History    Marital status:     Number of children: 2   Tobacco Use    Smoking status: Some Days     Current packs/day: 0.00     Average packs/day: 1 pack/day for 40.0 years (40.0 ttl pk-yrs)     Types: Cigarettes     Start date: 1983     Last attempt to quit: 2023     Years since quittin.2    Smokeless tobacco: Never   Vaping Use    Vaping status: Never Used   Substance and Sexual Activity    Alcohol use: No    Drug use: No    Sexual activity: Yes     Partners: Male     Birth control/protection: Condom, Hysterectomy       Family History   Problem Relation Age of Onset    Cancer Mother     Cancer Father     Hypertension Father     Other Sister         ACUTE MYOCARDIAL INFARCTION,CABG    Heart failure Sister     Hypertension Sister     Stroke Other        Review of Systems   Constitutional:  Positive for chills and fatigue. Negative for diaphoresis and fever.   Eyes: Negative.  Negative for visual disturbance (glasses).   Respiratory:  Positive for chest tightness and shortness of breath. Negative for apnea, cough and wheezing.    Cardiovascular:  Positive for chest pain (on and off) and leg swelling (has neuropathy). Negative for palpitations.   Gastrointestinal: Negative.  Negative for blood in stool.   Endocrine: Negative.  Negative for cold intolerance and heat intolerance.   Genitourinary: Negative.  Negative for hematuria.   Musculoskeletal:  Positive for arthralgias, back pain and neck pain. Negative for myalgias and neck stiffness.   Skin: Negative.  Negative for color change, rash and wound.   Allergic/Immunologic: Positive for environmental allergies (takes allergy injections- has several and unsure of type(trees)). Negative for food allergies.   Neurological:  Positive for dizziness (has vertigo), light-headedness, numbness (has neuropathy in feet) and headaches (migraines). Negative  "for syncope and weakness.   Hematological:  Bruises/bleeds easily.   Psychiatric/Behavioral: Negative.  Negative for sleep disturbance.        Objective   Vitals:    04/03/24 1424   BP: 103/65   BP Location: Left arm   Patient Position: Sitting   Pulse: 73   SpO2: 99%   Weight: 62 kg (136 lb 9.6 oz)   Height: 152.4 cm (60\")      /65 (BP Location: Left arm, Patient Position: Sitting)   Pulse 73   Ht 152.4 cm (60\")   Wt 62 kg (136 lb 9.6 oz)   SpO2 99%   BMI 26.68 kg/m²     Lab Results (most recent)       None            Physical Exam  Vitals and nursing note reviewed.   Constitutional:       General: She is not in acute distress.     Appearance: Normal appearance. She is well-developed.   HENT:      Head: Normocephalic and atraumatic.   Eyes:      General: No scleral icterus.        Right eye: No discharge.         Left eye: No discharge.      Conjunctiva/sclera: Conjunctivae normal.   Neck:      Vascular: No carotid bruit.   Cardiovascular:      Rate and Rhythm: Normal rate and regular rhythm.      Heart sounds: Normal heart sounds. No murmur heard.     No friction rub. No gallop.      Comments: Mechanical click auscultated with grade 1-2/6 systolic murmur heard  Pulmonary:      Effort: Pulmonary effort is normal. No respiratory distress.      Breath sounds: Normal breath sounds. No wheezing or rales.   Chest:      Chest wall: No tenderness.   Musculoskeletal:      Right lower leg: No edema.      Left lower leg: No edema.   Skin:     General: Skin is warm and dry.      Coloration: Skin is not pale.      Findings: No erythema or rash.   Neurological:      Mental Status: She is alert and oriented to person, place, and time.      Cranial Nerves: No cranial nerve deficit.   Psychiatric:         Behavior: Behavior normal.         Procedure   Procedures       Assessment & Plan     Problems Addressed this Visit          Cardiac and Vasculature    Mitral valve disease, rheumatic    Relevant Medications    " midodrine (PROAMATINE) 10 MG tablet    Other Relevant Orders    Ambulatory Referral to Cardiothoracic Surgery (Completed)    Ambulatory Referral to Disease State Management    Aortic valve disease, rheumatic - Primary    Relevant Medications    midodrine (PROAMATINE) 10 MG tablet    Other Relevant Orders    Ambulatory Referral to Cardiothoracic Surgery (Completed)    Ambulatory Referral to Disease State Management    Diastolic congestive heart failure    Relevant Medications    midodrine (PROAMATINE) 10 MG tablet    Orthostatic hypotension     Diagnoses         Codes Comments    Aortic valve disease, rheumatic    -  Primary ICD-10-CM: I06.9  ICD-9-CM: 395.9     Mitral valve disease, rheumatic     ICD-10-CM: I05.9  ICD-9-CM: 394.9     Diastolic congestive heart failure, unspecified HF chronicity     ICD-10-CM: I50.30  ICD-9-CM: 428.30, 428.0     Orthostatic hypotension     ICD-10-CM: I95.1  ICD-9-CM: 458.0           Recommendation  1.  Patient is a 53-year-old female that is here to follow-up from hospitalization due to CVA.  This is a very complex case.  Patient has mechanical aortic and mitral valves.  She has done well with that until her recent diagnosis of AVMs.  She has underwent multiple hospitalizations at different institutions with cautery and extensive GI consultations.  She has required multiple transfusions.  Her INR levels have been extremely fragile even to minimal changes.  In the interim, I am putting a stat consult into pharmacy to help for potential drug interactions and for consultation to help with this patient.  Ultimately, if patient could come off anticoagulation, I feel that she would obviously have less recurrent GI bleeds.  Unfortunately, with her mechanical valves, that is not a possibility and she recently had a CVA with endovascular thrombectomy of the left MCA.  I am referring patient to [facility] for evaluation.  I hope is that she can undergo some type of valve replacement with  bioprosthetic valves that could ultimately improve this complex clinical case.  Patient is willing to travel to [facility] and we will make a referral.  We discussed case with Dr. Garcia.    2.  We will still consult pharmacy to help with INR management.  Patient is stable from a cardiac standpoint.  She is euvolemic with history of failure.    3.  Patient with orthostatic hypotension.  I am increasing her Midodrine to help from that standpoint.  She is to call the office for any worsening symptoms.  We will see her back for follow-up in 2 to 3 months for evaluation.  Follow-up with primary as scheduled.           Mara Martínez  reports that she has been smoking cigarettes. She started smoking about 41 years ago. She has a 40 pack-year smoking history. She has never used smokeless tobacco. I have educated her on the risk of diseases from using tobacco products such as cancer, COPD, and heart disease.     I advised her to quit and she is willing to quit. We have discussed the following method/s for tobacco cessation:  Education Material.    I spent 3  minutes counseling the patient.              Electronically signed by:

## 2024-04-03 NOTE — LETTER
April 3, 2024       No Recipients    Patient: Mara Martínez   YOB: 1970   Date of Visit: 4/3/2024       Dear CHARLIE Robison    Mara Martínez was in my office today. Below is a copy of my note.    If you have questions, please do not hesitate to call me. I look forward to following Mara along with you.         Sincerely,        YUE Krishna        CC:   No Recipients    Problem list     Subjective  Mara Martínez is a 53 y.o. female     Chief Complaint   Patient presents with   • Follow-up     [facility] f/u for stroke   Problem list   1.  Valvular heart disease  1.1 status post aortic and mitral valve replacement by Dr. Cavazos with mechanical valves July 2018  1.2 follow-up echocardiogram 2019 demonstrating stable valve parameters  1.3 echocardiogram August 2022 with stable valve parameters  2.  Preserved systolic function  3.  Cardiac catheterization 2017 with minimal atherosclerosis noted  3.1 repeat stress test August 2022 with no evidence of ischemia preserved LV function  4.  Carotid artery stenosis  4.1 left carotid stenting by Dr. Mckeon [facility] July 2020  5.  MCA aneurysm estimated at 3.5 mm followed by [facility]  5.  Epilepsy  6.  Hypertension  7.  Dyslipidemia  8.  Chronic tobacco use  9.  GI bleed  9.1.  Patient with history of AVMs  9.2 recurrent GI bleeds with multiple hospitalizations, blood transfusions, and cautery.  Seen at multiple institutions  10.  CVA  10.1 cerebrovascular accident due to thrombus in the left MCA status post endovascular thrombectomy by Dr. Tejal WHITMORE    Patient is a 53-year-old female that presents to the office for evaluation and for follow-up from recent CVA.    Patient is well-known to the clinic having history of aortic and mitral valve replacements by Dr. Cavazos in 2018.  Patient has been monitored routinely in the office and has done well until recently.  Patient was diagnosed with colonic AVMs and has had  multiple hospitalizations with blood transfusions and cautery.  She has been seen at multiple institutions.    Her INR has been difficult to manage with minor adjustments causing profound changes in her levels.  She recently was subtherapeutic with an INR of 1.8.  She had strokelike symptoms with expressive aphasia.  She was evaluated at [facility] in [place] and underwent endovascular thrombectomy of the left MCA.  She was discharged from that facility.    Patient does not have any significant residual neurologic deficits.  She feels well.  However, this has been a recurrent issue with difficulty managing her condition.  Her anticoagulation has been a significant problem causing much of her issues especially with her noncardiac AVMs.    Patient occasionally will experience chest pain.  She will notice a slight discomfort at times.  It is mild.  She has chronic dyspnea that is mild.  She does not describe progressive shortness of breath.  No PND or orthopnea.    She does not describe palpitating or she complain of dysrhythmic symptoms.  She is stable otherwise.      Current Outpatient Medications on File Prior to Visit   Medication Sig Dispense Refill   • ascorbic acid (VITAMIN C) 1000 MG tablet Take 1 tablet by mouth Daily.     • aspirin 81 MG EC tablet Take 1 tablet by mouth Daily.     • atorvastatin (LIPITOR) 80 MG tablet TAKE 1 TABLET BY MOUTH ONCE DAILY 90 tablet 3   • cholecalciferol (VITAMIN D3) 25 MCG (1000 UT) tablet Take 1 tablet by mouth Daily.     • ferrous sulfate 325 (65 FE) MG tablet Take 1 tablet by mouth 2 (Two) Times a Day.     • folic acid (FOLVITE) 1 MG tablet Take  by mouth Daily.     • furosemide (LASIX) 40 MG tablet TAKE 1 TABLET BY MOUTH TWICE A DAY 60 tablet 5   • galcanezumab-gnlm (Emgality) 120 MG/ML auto-injector pen Inject 1 mL under the skin into the appropriate area as directed Every 30 (Thirty) Days.     • levETIRAcetam (KEPPRA) 1000 MG tablet Take 1 tablet by mouth 2 (Two)  Times a Day.     • levothyroxine (SYNTHROID, LEVOTHROID) 50 MCG tablet Take 1 tablet by mouth Daily.     • nortriptyline (PAMELOR) 50 MG capsule Take 2 capsules by mouth Every Night.     • pain patient supplied pump by Intrathecal route Continuous. Morphine     • pantoprazole (PROTONIX) 40 MG EC tablet TAKE 1 TABLET BY MOUTH ONCE DAILY 90 tablet 2   • potassium chloride (K-DUR,KLOR-CON) 20 MEQ CR tablet Take 1 tablet by mouth 2 (Two) Times a Day.     • rOPINIRole (REQUIP) 0.5 MG tablet Take 1 tablet by mouth Every Night. Take 1 hour before bedtime.     • tiZANidine (ZANAFLEX) 4 MG tablet Take 1 tablet by mouth 3 (Three) Times a Day As Needed.     • topiramate (TOPAMAX) 100 MG tablet Take 2 tablets by mouth 2 (Two) Times a Day.     • vitamin B-12 (CYANOCOBALAMIN) 100 MCG tablet Take 1 tablet by mouth Daily.     • warfarin (COUMADIN) 4 MG tablet Take 1 tablet by mouth Daily.     • [DISCONTINUED] midodrine (PROAMATINE) 5 MG tablet TAKE 1 TABLET BY MOUTH 3 TIMES A DAY TAKE PRIOR TO MEALS 90 tablet 3     No current facility-administered medications on file prior to visit.       Azithromycin, Gabapentin, and Metformin    Past Medical History:   Diagnosis Date   • Anemia    • Aortic regurgitation    • Arthritis    • Back pain    • Carotid bruit    • ISREAL (cerebral atherosclerosis) 12/2014    nonobstructive   • Chest pain    • Chronic hypotension on midodrine 11/01/2023   • Chronic obstructive pulmonary disease 09/07/2022   • Clotting disorder 2018   • Coronary artery disease    • COVID-19 vaccine administered     phizer   • D-dimer, elevated    • Diabetes mellitus APRIL/2023   • Diastolic congestive heart failure 07/25/2019   • Dizziness    • Edema    • Epilepsy    • Fatigue    • Heart murmur    • History of blood transfusion 08/2019   • History of blood transfusion 2022   • History of blood transfusion 11/2022   • History of blood transfusion     August 2023 x2   • History of recent blood transfusion 12/2021   • History of  transfusion    • Hyperlipidemia    • Hyperlipidemia 2016   • Hypertension    • Kidney stones    • Migraines    • Mitral regurgitation    • Mitral stenosis     mild   • Neuropathy    • Neuropathy    • Palpitations    • Pulmonary edema    • Retinal drusen    • Seizure    • Sleep apnea 2019   • Sleeping difficulties    • SOB (shortness of breath)    • Stroke 10/31/23   • Supraventricular aortic stenosis    • Syncope    • Tachycardia    • Tobacco abuse    • VHD (valvular heart disease)    • Wears dentures    • Wears eyeglasses        Social History     Socioeconomic History   • Marital status:    • Number of children: 2   Tobacco Use   • Smoking status: Some Days     Current packs/day: 0.00     Average packs/day: 1 pack/day for 40.0 years (40.0 ttl pk-yrs)     Types: Cigarettes     Start date: 1983     Last attempt to quit: 2023     Years since quittin.2   • Smokeless tobacco: Never   Vaping Use   • Vaping status: Never Used   Substance and Sexual Activity   • Alcohol use: No   • Drug use: No   • Sexual activity: Yes     Partners: Male     Birth control/protection: Condom, Hysterectomy       Family History   Problem Relation Age of Onset   • Cancer Mother    • Cancer Father    • Hypertension Father    • Other Sister         ACUTE MYOCARDIAL INFARCTION,CABG   • Heart failure Sister    • Hypertension Sister    • Stroke Other        Review of Systems   Constitutional:  Positive for chills and fatigue. Negative for diaphoresis and fever.   Eyes: Negative.  Negative for visual disturbance (glasses).   Respiratory:  Positive for chest tightness and shortness of breath. Negative for apnea, cough and wheezing.    Cardiovascular:  Positive for chest pain (on and off) and leg swelling (has neuropathy). Negative for palpitations.   Gastrointestinal: Negative.  Negative for blood in stool.   Endocrine: Negative.  Negative for cold intolerance and heat intolerance.   Genitourinary: Negative.  Negative for  "hematuria.   Musculoskeletal:  Positive for arthralgias, back pain and neck pain. Negative for myalgias and neck stiffness.   Skin: Negative.  Negative for color change, rash and wound.   Allergic/Immunologic: Positive for environmental allergies (takes allergy injections- has several and unsure of type(trees)). Negative for food allergies.   Neurological:  Positive for dizziness (has vertigo), light-headedness, numbness (has neuropathy in feet) and headaches (migraines). Negative for syncope and weakness.   Hematological:  Bruises/bleeds easily.   Psychiatric/Behavioral: Negative.  Negative for sleep disturbance.        Objective  Vitals:    04/03/24 1424   BP: 103/65   BP Location: Left arm   Patient Position: Sitting   Pulse: 73   SpO2: 99%   Weight: 62 kg (136 lb 9.6 oz)   Height: 152.4 cm (60\")      /65 (BP Location: Left arm, Patient Position: Sitting)   Pulse 73   Ht 152.4 cm (60\")   Wt 62 kg (136 lb 9.6 oz)   SpO2 99%   BMI 26.68 kg/m²     Lab Results (most recent)       None            Physical Exam  Vitals and nursing note reviewed.   Constitutional:       General: She is not in acute distress.     Appearance: Normal appearance. She is well-developed.   HENT:      Head: Normocephalic and atraumatic.   Eyes:      General: No scleral icterus.        Right eye: No discharge.         Left eye: No discharge.      Conjunctiva/sclera: Conjunctivae normal.   Neck:      Vascular: No carotid bruit.   Cardiovascular:      Rate and Rhythm: Normal rate and regular rhythm.      Heart sounds: Normal heart sounds. No murmur heard.     No friction rub. No gallop.      Comments: Mechanical click auscultated with grade 1-2/6 systolic murmur heard  Pulmonary:      Effort: Pulmonary effort is normal. No respiratory distress.      Breath sounds: Normal breath sounds. No wheezing or rales.   Chest:      Chest wall: No tenderness.   Musculoskeletal:      Right lower leg: No edema.      Left lower leg: No edema. "   Skin:     General: Skin is warm and dry.      Coloration: Skin is not pale.      Findings: No erythema or rash.   Neurological:      Mental Status: She is alert and oriented to person, place, and time.      Cranial Nerves: No cranial nerve deficit.   Psychiatric:         Behavior: Behavior normal.         Procedure  Procedures       Assessment & Plan    Problems Addressed this Visit          Cardiac and Vasculature    Mitral valve disease, rheumatic    Relevant Medications    midodrine (PROAMATINE) 10 MG tablet    Other Relevant Orders    Ambulatory Referral to Cardiothoracic Surgery (Completed)    Ambulatory Referral to Disease State Management    Aortic valve disease, rheumatic - Primary    Relevant Medications    midodrine (PROAMATINE) 10 MG tablet    Other Relevant Orders    Ambulatory Referral to Cardiothoracic Surgery (Completed)    Ambulatory Referral to Disease State Management    Diastolic congestive heart failure    Relevant Medications    midodrine (PROAMATINE) 10 MG tablet    Orthostatic hypotension     Diagnoses         Codes Comments    Aortic valve disease, rheumatic    -  Primary ICD-10-CM: I06.9  ICD-9-CM: 395.9     Mitral valve disease, rheumatic     ICD-10-CM: I05.9  ICD-9-CM: 394.9     Diastolic congestive heart failure, unspecified HF chronicity     ICD-10-CM: I50.30  ICD-9-CM: 428.30, 428.0     Orthostatic hypotension     ICD-10-CM: I95.1  ICD-9-CM: 458.0           Recommendation  1.  Patient is a 53-year-old female that is here to follow-up from hospitalization due to CVA.  This is a very complex case.  Patient has mechanical aortic and mitral valves.  She has done well with that until her recent diagnosis of AVMs.  She has underwent multiple hospitalizations at different institutions with cautery and extensive GI consultations.  She has required multiple transfusions.  Her INR levels have been extremely fragile even to minimal changes.  In the interim, I am putting a stat consult into  pharmacy to help for potential drug interactions and for consultation to help with this patient.  Ultimately, if patient could come off anticoagulation, I feel that she would obviously have less recurrent GI bleeds.  Unfortunately, with her mechanical valves, that is not a possibility and she recently had a CVA with endovascular thrombectomy of the left MCA.  I am referring patient to [facility] for evaluation.  I hope is that she can undergo some type of valve replacement with bioprosthetic valves that could ultimately improve this complex clinical case.  Patient is willing to travel to [facility] and we will make a referral.  We discussed case with Dr. Garcia.    2.  We will still consult pharmacy to help with INR management.  Patient is stable from a cardiac standpoint.  She is euvolemic with history of failure.    3.  Patient with orthostatic hypotension.  I am increasing her Midodrine to help from that standpoint.  She is to call the office for any worsening symptoms.  We will see her back for follow-up in 2 to 3 months for evaluation.  Follow-up with primary as scheduled.           Mara Martínez  reports that she has been smoking cigarettes. She started smoking about 41 years ago. She has a 40 pack-year smoking history. She has never used smokeless tobacco. I have educated her on the risk of diseases from using tobacco products such as cancer, COPD, and heart disease.     I advised her to quit and she is willing to quit. We have discussed the following method/s for tobacco cessation:  Education Material.    I spent 3  minutes counseling the patient.              Electronically signed by:

## 2024-04-04 ENCOUNTER — TELEPHONE (OUTPATIENT)
Dept: PHARMACY | Facility: HOSPITAL | Age: 54
End: 2024-04-04
Payer: MEDICARE

## 2024-04-04 NOTE — TELEPHONE ENCOUNTER
Spoke to patient about referral to be seen for warfarin management. Patient lives in [place] and says it would not be feasible for her to drive to [place] as often as needed to be managed here. Mentioned she may need more thorough dosing but she wishes to continue seeing CHARLIE Fernandez for this.

## 2024-04-08 ENCOUNTER — TELEPHONE (OUTPATIENT)
Dept: CARDIOLOGY | Facility: CLINIC | Age: 54
End: 2024-04-08

## 2024-05-21 RX ORDER — ATORVASTATIN CALCIUM 80 MG/1
TABLET, FILM COATED ORAL
Qty: 90 TABLET | Refills: 3 | Status: SHIPPED | OUTPATIENT
Start: 2024-05-21

## 2024-07-02 RX ORDER — MIDODRINE HYDROCHLORIDE 10 MG/1
10 TABLET ORAL
Qty: 90 TABLET | Refills: 3 | Status: SHIPPED | OUTPATIENT
Start: 2024-07-02

## 2024-07-30 ENCOUNTER — OFFICE VISIT (OUTPATIENT)
Dept: CARDIOLOGY | Facility: CLINIC | Age: 54
End: 2024-07-30
Payer: MEDICARE

## 2024-07-30 VITALS
BODY MASS INDEX: 26.39 KG/M2 | HEIGHT: 60 IN | DIASTOLIC BLOOD PRESSURE: 67 MMHG | SYSTOLIC BLOOD PRESSURE: 107 MMHG | OXYGEN SATURATION: 97 % | WEIGHT: 134.4 LBS | HEART RATE: 70 BPM

## 2024-07-30 DIAGNOSIS — Z95.1 S/P CABG X 1: ICD-10-CM

## 2024-07-30 DIAGNOSIS — Z95.2 S/P MVR (MITRAL VALVE REPLACEMENT): ICD-10-CM

## 2024-07-30 DIAGNOSIS — Z95.2 S/P AVR (AORTIC VALVE REPLACEMENT): Primary | ICD-10-CM

## 2024-07-30 DIAGNOSIS — I25.10 CORONARY ARTERY DISEASE INVOLVING NATIVE CORONARY ARTERY OF NATIVE HEART WITHOUT ANGINA PECTORIS: ICD-10-CM

## 2024-07-30 PROCEDURE — 3078F DIAST BP <80 MM HG: CPT | Performed by: CLINICAL NURSE SPECIALIST

## 2024-07-30 PROCEDURE — 3074F SYST BP LT 130 MM HG: CPT | Performed by: CLINICAL NURSE SPECIALIST

## 2024-07-30 PROCEDURE — 99214 OFFICE O/P EST MOD 30 MIN: CPT | Performed by: CLINICAL NURSE SPECIALIST

## 2024-07-30 RX ORDER — AMIODARONE HYDROCHLORIDE 200 MG/1
200 TABLET ORAL DAILY
COMMUNITY
Start: 2024-07-02 | End: 2024-08-05

## 2024-07-30 RX ORDER — ACETAMINOPHEN 325 MG/1
650 TABLET ORAL EVERY 6 HOURS PRN
COMMUNITY
Start: 2024-07-01 | End: 2024-07-30 | Stop reason: ALTCHOICE

## 2024-07-30 RX ORDER — AMOXICILLIN 875 MG/1
875 TABLET, COATED ORAL 2 TIMES DAILY
COMMUNITY

## 2024-07-30 RX ORDER — TIRZEPATIDE 2.5 MG/.5ML
INJECTION, SOLUTION SUBCUTANEOUS
COMMUNITY

## 2024-07-30 NOTE — PROGRESS NOTES
Subjective     Mara Martínez is a 53 y.o. female who presents today for Cardiac Valve Problem (Mitral and aortic valve replacement, [facility]) and Coronary Artery Disease.    CHIEF COMPLIANT  Chief Complaint   Patient presents with    Cardiac Valve Problem     Mitral and aortic valve replacement, [facility]    Coronary Artery Disease       Active Problems:  Problem list   1.  Valvular heart disease  1.1 status post aortic and mitral valve replacement by Dr. Cavazos with mechanical valves July 2018  1.2 follow-up echocardiogram 2019 demonstrating stable valve parameters  1.3 echocardiogram August 2022 with stable valve parameters  1.4 status post redo AVR with Inspiris #23 prosthetic aortic valve and redo MVR with #31 prosthetic mitral valve 6/19/2024.  While in CVICU in recovery the patient developed ST elevation in the inferior leads and was taken emergently to the Cath Lab where she was found to have occlusion of LCx.  She was taken emergently back to surgery for emergent bypass with SVG to L-PLB.  Echocardiogram prior to discharge showed EF 60 to 65% and normally functioning aortic and mitral valve. left atrial appendage clipping with 35 mm atrial clip  2.  Preserved systolic function  3.  Cardiac catheterization 2017 with minimal atherosclerosis noted  3.1 repeat stress test August 2022 with no evidence of ischemia preserved LV function  4.  Carotid artery stenosis  4.1 left carotid stenting by Dr. Mckeon [facility] July 2020  5.  MCA aneurysm estimated at 3.5 mm followed by [facility]  5.  Epilepsy  6.  Hypertension  7.  Dyslipidemia  8.  Chronic tobacco use  9.  GI bleed  9.1.  Patient with history of AVMs  9.2 recurrent GI bleeds with multiple hospitalizations, blood transfusions, and cautery.  Seen at multiple institutions  10.  CVA  10.1 cerebrovascular accident due to thrombus in the left MCA status post endovascular thrombectomy by Dr. Tejal WHITMORE  The patient is a  53-year-old female that returns for follow-up after recent redo AVR/MVR at [facility].  The patient is status post redo AVR with Inspiris #23 prosthetic aortic valve and redo MVR with #31 prosthetic mitral valve on 6/19/2024.  While in CVICU in recovery the patient developed ST elevation in the inferior leads and was taken emergently to the Cath Lab where she was found to have occlusion of LCx.  She was taken emergently back to surgery on 6/20/2024 for emergent bypass with SVG to L-PLB.  Echocardiogram prior to discharge showed EF 60 to 65% and normally functioning aortic and mitral valve.  She also had left atrial appendage clipping with 35 mm atrial clip  The patient is recovering as expected from surgery.  Incisions to chest and right lower extremity SVG incision site are healing well.  The patient needs referral to cardiac rehab.  She remains with soreness in her chest as expected postop but denies chest pain.  She denies significant dyspnea, palpitations, syncope or near syncope.  Heart rate and blood pressure stable.  The patient is now off warfarin.  Overall she feels she is recovering well.    PRIOR MEDS  Current Outpatient Medications on File Prior to Visit   Medication Sig Dispense Refill    amiodarone (PACERONE) 200 MG tablet Take 1 tablet by mouth Daily.      amoxicillin (AMOXIL) 875 MG tablet Take 1 tablet by mouth 2 (Two) Times a Day.      ascorbic acid (VITAMIN C) 1000 MG tablet Take 1 tablet by mouth Daily.      aspirin 81 MG EC tablet Take 1 tablet by mouth Daily.      atorvastatin (LIPITOR) 80 MG tablet TAKE 1 TABLET BY MOUTH ONCE DAILY 90 tablet 3    cholecalciferol (VITAMIN D3) 25 MCG (1000 UT) tablet Take 1 tablet by mouth Daily.      ferrous sulfate 325 (65 FE) MG tablet Take 1 tablet by mouth 2 (Two) Times a Day.      folic acid (FOLVITE) 1 MG tablet Take  by mouth Daily.      furosemide (LASIX) 40 MG tablet TAKE 1 TABLET BY MOUTH TWICE A DAY 60 tablet 5    galcanezumab-gnlm (Emgality)  120 MG/ML auto-injector pen Inject 1 mL under the skin into the appropriate area as directed Every 30 (Thirty) Days.      levETIRAcetam (KEPPRA) 1000 MG tablet Take 1 tablet by mouth 2 (Two) Times a Day.      levothyroxine (SYNTHROID, LEVOTHROID) 75 MCG tablet Take 1 tablet by mouth Daily.      metoprolol tartrate (LOPRESSOR) 25 MG tablet Take 0.5 tablets by mouth 2 (Two) Times a Day.      Mounjaro 2.5 MG/0.5ML solution pen-injector pen INJECT 2.5MG ONCE A WEEK      nortriptyline (PAMELOR) 50 MG capsule Take 2 capsules by mouth Every Night.      pain patient supplied pump by Intrathecal route Continuous. Morphine      pantoprazole (PROTONIX) 40 MG EC tablet TAKE 1 TABLET BY MOUTH ONCE DAILY 90 tablet 2    potassium chloride (K-DUR,KLOR-CON) 20 MEQ CR tablet Take 1 tablet by mouth 2 (Two) Times a Day.      rOPINIRole (REQUIP) 0.5 MG tablet Take 1 tablet by mouth Every Night. Take 1 hour before bedtime.      tiZANidine (ZANAFLEX) 4 MG tablet Take 1 tablet by mouth 3 (Three) Times a Day As Needed.      topiramate (TOPAMAX) 100 MG tablet Take 2 tablets by mouth 2 (Two) Times a Day.      Ubrogepant (UBRELVY PO) Take  by mouth.      vitamin B-12 (CYANOCOBALAMIN) 100 MCG tablet Take 1 tablet by mouth Daily.      [DISCONTINUED] acetaminophen (TYLENOL) 325 MG tablet 2 tablets by Enteral route Every 6 (Six) Hours As Needed.      [DISCONTINUED] midodrine (PROAMATINE) 10 MG tablet TAKE 1 TABLET BY MOUTH 3 TIMES A DAY BEFORE MEALS 90 tablet 3    [DISCONTINUED] warfarin (COUMADIN) 4 MG tablet Take 1 tablet by mouth Daily.       No current facility-administered medications on file prior to visit.       ALLERGIES  Azithromycin, Gabapentin, and Metformin    HISTORY  Past Medical History:   Diagnosis Date    Anemia     Aortic regurgitation     Arthritis     Back pain     Carotid bruit     ISREAL (cerebral atherosclerosis) 12/2014    nonobstructive    Chest pain     Chronic hypotension on midodrine 11/01/2023    Chronic obstructive  pulmonary disease 2022    Clotting disorder 2018    Coronary artery disease     COVID-19 vaccine administered     phizer    D-dimer, elevated     Diabetes mellitus 2023    Diastolic congestive heart failure 2019    Dizziness     Edema     Epilepsy     Fatigue     Heart murmur     History of blood transfusion 2019    History of blood transfusion     History of blood transfusion 2022    History of blood transfusion     August 2023 x2    History of recent blood transfusion 2021    History of transfusion     Hyperlipidemia     Hyperlipidemia 2016    Hypertension     Kidney stones     Migraines     Mitral regurgitation     Mitral stenosis     mild    Neuropathy     Neuropathy     Palpitations     Pulmonary edema     Retinal drusen     Seizure     Sleep apnea 2019    Sleeping difficulties     SOB (shortness of breath)     Stroke 10/31/23    Supraventricular aortic stenosis     Syncope     Tachycardia     Tobacco abuse     VHD (valvular heart disease)     Wears dentures     Wears eyeglasses        Social History     Socioeconomic History    Marital status:     Number of children: 2   Tobacco Use    Smoking status: Former     Current packs/day: 0.00     Average packs/day: 1 pack/day for 40.0 years (40.0 ttl pk-yrs)     Types: Cigarettes     Start date: 1983     Quit date: 2023     Years since quittin.5    Smokeless tobacco: Never   Vaping Use    Vaping status: Never Used   Substance and Sexual Activity    Alcohol use: No    Drug use: No    Sexual activity: Yes     Partners: Male     Birth control/protection: Condom, Hysterectomy       Family History   Problem Relation Age of Onset    Cancer Mother     Cancer Father     Hypertension Father     Other Sister         ACUTE MYOCARDIAL INFARCTION,CABG    Heart failure Sister     Hypertension Sister     Stroke Other        Review of Systems   Constitutional:  Positive for fatigue. Negative for chills, diaphoresis and  "fever.   HENT: Negative.          Ear infection    Eyes: Negative.    Respiratory:  Positive for apnea (unable to tolerate cpap) and cough (cold). Negative for chest tightness, shortness of breath and wheezing.    Cardiovascular:  Positive for leg swelling (R more than L). Negative for chest pain and palpitations.   Gastrointestinal: Negative.  Negative for abdominal pain, blood in stool, constipation, diarrhea, nausea and vomiting.   Endocrine: Negative.    Genitourinary: Negative.  Negative for hematuria.   Musculoskeletal: Negative.  Negative for arthralgias, back pain, myalgias and neck pain.        Shoulder pain   Skin:  Positive for wound (recent bypass).   Allergic/Immunologic: Negative.    Neurological:  Positive for dizziness (noticed after vavle replacement) and numbness (neuropathy). Negative for syncope, weakness, light-headedness and headaches.   Hematological: Negative.  Does not bruise/bleed easily.   Psychiatric/Behavioral: Negative.  Negative for sleep disturbance.        Objective     VITALS: /67 (BP Location: Left arm, Patient Position: Sitting)   Pulse 70   Ht 152.4 cm (60\")   Wt 61 kg (134 lb 6.4 oz)   SpO2 97%   BMI 26.25 kg/m²     LABS:   Lab Results (most recent)       None            IMAGING:   XR Chest PA & Lateral    Result Date: 6/28/2024  IMPRESSION: See Result. : [facility]   Transcribe Date/Time: Jun 28 2024 11:04A Dictated by : TAMMY JOHNSON MD This examination was interpreted and the report reviewed and electronically signed by: TAMMY JOHNSON MD on Jun 28 2024 11:08AM  EST    XR Chest 1 View    Result Date: 6/27/2024  IMPRESSION: Lines, Tubes, and Devices:  Stable support lines and tubes. Lungs and Pleura:  Bilateral lung opacities and small effusions are not significantly changed. No pneumothorax. Cardiomediastinal silhouette:  Stable cardiac silhouette. : GARCIA   Transcribe Date/Time: Jun 27 2024  8:20A Dictated by : TAMMY JOHNSON MD This " examination was interpreted and the report reviewed and electronically signed by: TAMMY JOHNSON MD on Jun 27 2024  8:20AM  EST    XR Chest 1 View    Result Date: 6/26/2024  IMPRESSION: See result. : [facility]   Transcribe Date/Time: Jun 26 2024  8:53A Dictated by : GEO SEGAL MD This examination was interpreted and the report reviewed and electronically signed by: GEO SEGAL MD on Jun 26 2024  8:54AM  EST    XR Chest 1 View    Result Date: 6/25/2024  IMPRESSION: See result : [facility]   Transcribe Date/Time: Jun 25 2024  1:09P Dictated by : MICHAEL LINDO MD This examination was interpreted and the report reviewed and electronically signed by: MICHAEL LINDO MD on Jun 25 2024  1:10PM  EST    XR Chest 1 View    Result Date: 6/24/2024  IMPRESSION: See result. : [facility]   Transcribe Date/Time: Jun 24 2024 12:21P Dictated by : EARNEST WEIR MD This examination was interpreted and the report reviewed and electronically signed by: EARNEST WEIR MD on Jun 24 2024 12:23PM  EST    XR Chest 1 View    Result Date: 6/23/2024  IMPRESSION: See result. : [facility]   Transcribe Date/Time: Jun 23 2024 10:03A Dictated by : DINORA MAN MD This examination was interpreted and the report reviewed and electronically signed by: DINORA MAN MD on Jun 23 2024 10:04AM  EST    XR Chest 1 View    Result Date: 6/22/2024  IMPRESSION: See result. : [facility]   Transcribe Date/Time: Jun 22 2024  8:32A Dictated by : DINORA MAN MD This examination was interpreted and the report reviewed and electronically signed by: DINORA MAN MD on Jun 22 2024  8:34AM  EST    XR Chest 1 View    Result Date: 6/21/2024  IMPRESSION: See result. : [facility]   Transcribe Date/Time: Jun 21 2024  4:16P Dictated by : EARNEST WEIR MD This examination was interpreted and the report reviewed and electronically signed by: EARNEST WEIR MD on Jun 21 2024  4:18PM  EST    XR Chest 1  View    Result Date: 6/21/2024  IMPRESSION: Lines, Tubes, and Devices:  Interval removal of ET tube and enteric tube, with otherwise stable support lines and tubes. Lungs and Pleura:  Interstitial lung opacities and small right effusion with overlying consolidation slightly increased. No pneumothorax. Cardiomediastinal silhouette:  Stable cardiac silhouette. : [facility]ARCIA   Transcribe Date/Time: Jun 21 2024  9:06A Dictated by : TAMMY JOHNSON MD This examination was interpreted and the report reviewed and electronically signed by: TAMMY JOHNSON MD on Jun 21 2024  9:07AM  EST    XR Chest 1 View    Result Date: 6/20/2024  IMPRESSION: See result. : [facility]   Transcribe Date/Time: Jun 20 2024  1:32P Dictated by : GEO SEGAL MD This examination was interpreted and the report reviewed and electronically signed by: GEO SEGAL MD on Jun 20 2024  1:34PM  EST    XR Chest PA & Lateral    Result Date: 6/16/2024  IMPRESSION: See Result. : [facility]   Transcribe Date/Time: Jun 16 2024  9:48P Dictated by : TAMMY JOHNSON MD This examination was interpreted and the report reviewed and electronically signed by: TAMMY JOHNSON MD on Jun 16 2024  9:50PM  EST    CT CHEST CARDIAC WO IVCON    Result Date: 6/14/2024  IMPRESSION: Normal course and caliber thoracic aorta with mild scattered calcific changes including ascending segments. Mechanical prostheses in aortic and mitral positions. Calcific changes coronary arteries. Mild LEFT atrial dilation.  Subvalvar calcifications LEFT ventricle. LEFT prepectoral Mediport catheter with tip to SVC, adjacent calcification at upper SVC level Suspected pulmonary edema.  Mild tobacco exposure related changes of lungs suspected.  Few tiny nodules, indeterminate. Incidental Finding:  Follow-up Acuity: Incidental Finding: Solid: <6 mm (solitary or multiple) Routing Code:  N/A Recommendation: No imaging follow-up is recommended Time Frame:  N/A Comments:  If  there are risk factors for lung malignancy, a follow-up chest CT exam could be obtained in 12 months --END OF FINDING-- COMMUNICATION:? Results will be communicated with the ordering provider via Epic staff message by [facility] within 2 business days of report finalization. : [facility]   Transcribe Date/Time: Jun 14 2024 12:19P Dictated by : TRISTAN BRUNO MD This examination was interpreted and the report reviewed and electronically signed by: TRISTAN BRUNO MD on Jun 14 2024 12:43PM  EST    CT Head Without Contrast    Result Date: 6/14/2024  IMPRESSION: No acute intracranial findings. Presumed remote left MCA distribution infarct. : [facility]   Transcribe Date/Time: Jun 14 2024 10:54A Dictated by : KM OLIVO MD This examination was interpreted and the report reviewed and electronically signed by: GONSALO RANDALL MD on Jun 14 2024 11:25AM  EST    CT Angiogram Head    Result Date: 6/7/2024  Neck CTA: 1. Patent endovascular stent graft within the left common carotid artery with no significant interval change in intimal hyperplasia with multifocal mild stenoses greatest at the proximal and near distal ends of the stent. 2. No significant interval change in multifocal mild stenosis of the right subclavian artery. Head CTA: 1. No change in size of a 3 mm right M1 aneurysm. 2. No significant intracranial arterial stenosis. CRITICAL RESULT: No. COMMUNICATION: Per this written report. By electronically signing this report, I, the attending physician, attest that I have personally reviewed the images/data for the above examination(s) and agree with the final edited report. Drafted by Francisco Diaz MD on 6/7/2024 2:49 PM Final report signed by Nargis Cowan MD on 6/7/2024 4:53 PM    CT Angiogram Neck    Result Date: 6/7/2024  Neck CTA: 1. Patent endovascular stent graft within the left common carotid artery with no significant interval change in intimal hyperplasia with  multifocal mild stenoses greatest at the proximal and near distal ends of the stent. 2. No significant interval change in multifocal mild stenosis of the right subclavian artery. Head CTA: 1. No change in size of a 3 mm right M1 aneurysm. 2. No significant intracranial arterial stenosis. CRITICAL RESULT: No. COMMUNICATION: Per this written report. By electronically signing this report, I, the attending physician, attest that I have personally reviewed the images/data for the above examination(s) and agree with the final edited report. Drafted by Francisco Diaz MD on 6/7/2024 2:49 PM Final report signed by Nargis Cowan MD on 6/7/2024 4:53 PM    CT Chest Without Contrast Diagnostic    Result Date: 6/7/2024  Similar basilar predominant interstitial lung disease, with associated air trapping. Consider RB-ILD or hypersensitivity pneumonitis. CRITICAL RESULT:   No. COMMUNICATION: Per this written report. Preliminary report signed by Raphael Becerra DO on 6/7/2024 1:47 PM By electronically signing this report, I, the attending physician, attest that I have personally reviewed the images/data for the above examination(s) and agree with the final edited report. Drafted by Raphael Becerra DO on 6/7/2024 1:43 PM Final report signed by Sam Murillo MD on 6/7/2024 2:02 PM    XR Chest PA & Lateral    Result Date: 4/17/2024  IMPRESSION: No acute disease identified in the lungs or mediastinum. : [facility]   Transcribe Date/Time: Apr 17 2024  4:50P Dictated by : EARNEST WEIR MD This examination was interpreted and the report reviewed and electronically signed by: EARNEST WEIR MD on Apr 17 2024  4:53PM  EST      EXAM:  Physical Exam  Constitutional:       Appearance: Normal appearance.   Eyes:      Pupils: Pupils are equal, round, and reactive to light.   Cardiovascular:      Rate and Rhythm: Normal rate and regular rhythm.      Pulses:           Carotid pulses are 2+ on the right side and 2+ on the left  side.       Radial pulses are 2+ on the right side and 2+ on the left side.        Dorsalis pedis pulses are 2+ on the right side and 2+ on the left side.        Posterior tibial pulses are 2+ on the right side and 2+ on the left side.      Heart sounds: Murmur heard.   Pulmonary:      Effort: Pulmonary effort is normal.      Breath sounds: Normal breath sounds.   Abdominal:      General: Bowel sounds are normal.      Palpations: Abdomen is soft.   Musculoskeletal:      Right lower leg: No edema.      Left lower leg: No edema.   Skin:     General: Skin is warm and dry.      Capillary Refill: Capillary refill takes less than 2 seconds.   Neurological:      General: No focal deficit present.      Mental Status: She is alert and oriented to person, place, and time.   Psychiatric:         Mood and Affect: Mood normal.         Thought Content: Thought content normal.         Procedure   Procedures       Assessment & Plan    Diagnosis Plan   1. S/P AVR (aortic valve replacement)        2. S/P MVR (mitral valve replacement)        3. Coronary artery disease involving native coronary artery of native heart without angina pectoris        4. S/P CABG x 1          Plan:  1.  The patient is status post redo AVR/MVR with prosthetic valve.  She is now able to be off warfarin.  She is recovering as expected from surgery.  Will make referral to cardiac rehab.  2.  Coronary artery disease: Postoperatively after AVR/MVR in CVICU the patient developed ST elevation in inferior leads and was taken to Cath Lab where she was found to have occlusion of LCx.  She was taken back to surgery emergently for bypass surgery underwent SVG to L-PLB.  Continue medical management including statin, aspirin, metoprolol tartrate.  The patient has soreness in her chest as expected postoperatively but denies chest pain symptoms.  Will make referral to cardiac rehab.    Return in about 2 months (around 9/30/2024).    Mara Tanya  reports that she quit  smoking about 18 months ago. Her smoking use included cigarettes. She started smoking about 41 years ago. She has a 40 pack-year smoking history. She has never used smokeless tobacco.         MEDS ORDERED DURING VISIT:  No orders of the defined types were placed in this encounter.      DISCONTINUED MEDS DURING VISIT:   Medications Discontinued During This Encounter   Medication Reason    warfarin (COUMADIN) 4 MG tablet Discontinued by another clinician    midodrine (PROAMATINE) 10 MG tablet Discontinued by another clinician    acetaminophen (TYLENOL) 325 MG tablet Discontinued by another clinician          This document has been electronically signed by CHARLIE Moran  July 30, 2024 14:20 EDT    Dictated Utilizing Dragon Dictation: Part of this note may be an electronic transcription/translation of spoken language to printed text using the Dragon Dictation System

## 2024-08-05 RX ORDER — AMIODARONE HYDROCHLORIDE 200 MG/1
200 TABLET ORAL DAILY
Qty: 30 TABLET | Refills: 0 | Status: SHIPPED | OUTPATIENT
Start: 2024-08-05

## 2024-08-16 ENCOUNTER — TELEPHONE (OUTPATIENT)
Dept: CARDIOLOGY | Facility: CLINIC | Age: 54
End: 2024-08-16

## 2024-08-28 ENCOUNTER — TELEPHONE (OUTPATIENT)
Dept: CARDIOLOGY | Facility: CLINIC | Age: 54
End: 2024-08-28

## 2024-09-03 DIAGNOSIS — K21.9 GASTROESOPHAGEAL REFLUX DISEASE: ICD-10-CM

## 2024-09-03 RX ORDER — PANTOPRAZOLE SODIUM 40 MG/1
TABLET, DELAYED RELEASE ORAL
Qty: 90 TABLET | Refills: 2 | Status: SHIPPED | OUTPATIENT
Start: 2024-09-03

## 2024-09-09 RX ORDER — AMIODARONE HYDROCHLORIDE 200 MG/1
200 TABLET ORAL DAILY
Qty: 30 TABLET | Refills: 5 | Status: SHIPPED | OUTPATIENT
Start: 2024-09-09

## 2024-09-25 ENCOUNTER — OFFICE VISIT (OUTPATIENT)
Dept: NEUROLOGY | Facility: CLINIC | Age: 54
End: 2024-09-25
Payer: MEDICARE

## 2024-09-25 VITALS
DIASTOLIC BLOOD PRESSURE: 58 MMHG | HEART RATE: 56 BPM | BODY MASS INDEX: 25.9 KG/M2 | WEIGHT: 132.6 LBS | OXYGEN SATURATION: 96 % | SYSTOLIC BLOOD PRESSURE: 91 MMHG

## 2024-09-25 DIAGNOSIS — I69.320 APHASIA AS LATE EFFECT OF STROKE: Primary | ICD-10-CM

## 2024-09-25 DIAGNOSIS — E11.649 TYPE 2 DIABETES MELLITUS WITH HYPOGLYCEMIA WITHOUT COMA, WITHOUT LONG-TERM CURRENT USE OF INSULIN: ICD-10-CM

## 2024-09-25 DIAGNOSIS — Z95.2 S/P MITRAL VALVE REPLACEMENT: ICD-10-CM

## 2024-09-25 DIAGNOSIS — E66.9 OBESITY, UNSPECIFIED CLASSIFICATION, UNSPECIFIED OBESITY TYPE, UNSPECIFIED WHETHER SERIOUS COMORBIDITY PRESENT: ICD-10-CM

## 2024-09-25 DIAGNOSIS — Z95.2 S/P HEART VALVE REPLACEMENT WITH MECHANICAL VALVE: ICD-10-CM

## 2024-09-25 DIAGNOSIS — I10 PRIMARY HYPERTENSION: ICD-10-CM

## 2024-09-25 DIAGNOSIS — Z72.0 TOBACCO USE: ICD-10-CM

## 2024-10-10 ENCOUNTER — OFFICE VISIT (OUTPATIENT)
Dept: CARDIOLOGY | Facility: CLINIC | Age: 54
End: 2024-10-10
Payer: MEDICARE

## 2024-10-10 VITALS
DIASTOLIC BLOOD PRESSURE: 69 MMHG | HEIGHT: 60 IN | SYSTOLIC BLOOD PRESSURE: 119 MMHG | WEIGHT: 135 LBS | BODY MASS INDEX: 26.5 KG/M2 | HEART RATE: 58 BPM | OXYGEN SATURATION: 96 %

## 2024-10-10 DIAGNOSIS — I48.0 PAF (PAROXYSMAL ATRIAL FIBRILLATION): ICD-10-CM

## 2024-10-10 DIAGNOSIS — Z95.1 S/P CABG X 1: ICD-10-CM

## 2024-10-10 DIAGNOSIS — Z95.2 S/P AVR (AORTIC VALVE REPLACEMENT): ICD-10-CM

## 2024-10-10 DIAGNOSIS — M79.604 RIGHT LEG PAIN: ICD-10-CM

## 2024-10-10 DIAGNOSIS — Z95.2 S/P MVR (MITRAL VALVE REPLACEMENT): ICD-10-CM

## 2024-10-10 DIAGNOSIS — R42 DIZZINESS: Primary | ICD-10-CM

## 2024-10-10 PROCEDURE — 3078F DIAST BP <80 MM HG: CPT | Performed by: CLINICAL NURSE SPECIALIST

## 2024-10-10 PROCEDURE — 3074F SYST BP LT 130 MM HG: CPT | Performed by: CLINICAL NURSE SPECIALIST

## 2024-10-10 PROCEDURE — 99214 OFFICE O/P EST MOD 30 MIN: CPT | Performed by: CLINICAL NURSE SPECIALIST

## 2024-10-10 NOTE — PROGRESS NOTES
Subjective     Mara Martínez is a 54 y.o. female who presents today for Follow-up (2mth ).    CHIEF COMPLIANT  Chief Complaint   Patient presents with    Follow-up     2mth        Active Problems:  Problem list   1.  Valvular heart disease  1.1 status post aortic and mitral valve replacement by Dr. Cavazos with mechanical valves July 2018  1.2 follow-up echocardiogram 2019 demonstrating stable valve parameters  1.3 echocardiogram August 2022 with stable valve parameters  1.4 status post redo AVR with Inspiris #23 prosthetic aortic valve and redo MVR with #31 prosthetic mitral valve 6/19/2024.  While in CVICU in recovery the patient developed ST elevation in the inferior leads and was taken emergently to the Cath Lab where she was found to have occlusion of LCx.  She was taken emergently back to surgery for emergent bypass with SVG to L-PLB.  Echocardiogram prior to discharge showed EF 60 to 65% and normally functioning aortic and mitral valve. left atrial appendage clipping with 35 mm atrial clip  2.  Preserved systolic function  3.  Cardiac catheterization 2017 with minimal atherosclerosis noted  3.1 repeat stress test August 2022 with no evidence of ischemia preserved LV function  4.  Carotid artery stenosis  4.1 left carotid stenting by Dr. Mckeon [facility] July 2020  5.  MCA aneurysm estimated at 3.5 mm followed by [facility]  5.  Epilepsy  6.  Hypertension  7.  Dyslipidemia  8.  Chronic tobacco use  9.  GI bleed  9.1.  Patient with history of AVMs  9.2 recurrent GI bleeds with multiple hospitalizations, blood transfusions, and cautery.  Seen at multiple institutions  10.  CVA  10.1 cerebrovascular accident due to thrombus in the left MCA status post endovascular thrombectomy by Dr. Tejal WHITMORE  The patient is a 54-year-old female that returns for follow-up.  The patient underwent a redo AVR/MVR at [facility] with prosthetic aortic valve and prosthetic mitral valve on 6/19/2024.   Immediately postop the patient developed ST elevation in inferior leads and was taken back to Cath Lab emergently and was found to have an occlusion of LCx.  She was taken back for emergency surgery and underwent bypass with SVG to L-PLB.  Patient also underwent left atrial appendage clipping with 35 mm atrial clip.  She did have postoperative A-fib and has been on amiodarone with no known recurrence of A-fib since that time.  The patient states she is feeling much better.  She denies chest pain or dyspnea.  She was seen back at [facility] in September.  At that time they recommended placing her on a 2-week heart monitor and if negative for further A-fib amiodarone was going to be discontinued.  Unfortunately the patient was only able to wear the monitor for 4 days because it would not stay attached.  We will request monitor results.  The patient denies palpitations but does states she is having intermittent episodes of dizziness.  Her only other complaint is discomfort at the right lower extremity SVG harvest sites.  Specifically in her inner right thigh.  She states she is having discomfort on a almost daily basis that she rates as a 5 out of 10 on the pain scale.    PRIOR MEDS  Current Outpatient Medications on File Prior to Visit   Medication Sig Dispense Refill    amiodarone (PACERONE) 200 MG tablet TAKE 1 TABLET BY MOUTH ONCE DAILY 30 tablet 5    ascorbic acid (VITAMIN C) 1000 MG tablet Take 1 tablet by mouth Daily.      aspirin 81 MG EC tablet Take 1 tablet by mouth Daily.      atorvastatin (LIPITOR) 80 MG tablet TAKE 1 TABLET BY MOUTH ONCE DAILY 90 tablet 3    cholecalciferol (VITAMIN D3) 25 MCG (1000 UT) tablet Take 1 tablet by mouth Daily.      ferrous sulfate 325 (65 FE) MG tablet Take 1 tablet by mouth 2 (Two) Times a Day.      folic acid (FOLVITE) 1 MG tablet Take  by mouth Daily.      furosemide (LASIX) 40 MG tablet TAKE 1 TABLET BY MOUTH TWICE A DAY 60 tablet 5    galcanezumab-gnlm (Emgality)  120 MG/ML auto-injector pen Inject 1 mL under the skin into the appropriate area as directed Every 30 (Thirty) Days.      levETIRAcetam (KEPPRA) 1000 MG tablet Take 1 tablet by mouth 2 (Two) Times a Day.      levothyroxine (SYNTHROID, LEVOTHROID) 75 MCG tablet Take 1 tablet by mouth Daily.      metoprolol tartrate (LOPRESSOR) 25 MG tablet Take 0.5 tablets by mouth 2 (Two) Times a Day.      Mounjaro 2.5 MG/0.5ML solution pen-injector pen INJECT 2.5MG ONCE A WEEK      nortriptyline (PAMELOR) 50 MG capsule Take 2 capsules by mouth Every Night.      pain patient supplied pump by Intrathecal route Continuous. Morphine      pantoprazole (PROTONIX) 40 MG EC tablet TAKE 1 TABLET BY MOUTH ONCE DAILY 90 tablet 2    potassium chloride (K-DUR,KLOR-CON) 20 MEQ CR tablet Take 1 tablet by mouth 2 (Two) Times a Day.      rOPINIRole (REQUIP) 0.5 MG tablet Take 1 tablet by mouth Every Night. Take 1 hour before bedtime.      tiZANidine (ZANAFLEX) 4 MG tablet Take 1 tablet by mouth 3 (Three) Times a Day As Needed.      topiramate (TOPAMAX) 100 MG tablet Take 2 tablets by mouth 2 (Two) Times a Day.      Ubrogepant (UBRELVY PO) Take  by mouth.      vitamin B-12 (CYANOCOBALAMIN) 100 MCG tablet Take 1 tablet by mouth Daily.       No current facility-administered medications on file prior to visit.       ALLERGIES  Azithromycin, Gabapentin, and Metformin    HISTORY  Past Medical History:   Diagnosis Date    Anemia     Aortic regurgitation     Arthritis     Back pain     Carotid bruit     ISREAL (cerebral atherosclerosis) 12/2014    nonobstructive    Chest pain     Chronic hypotension on midodrine 11/01/2023    Chronic obstructive pulmonary disease 09/07/2022    Clotting disorder 2018    Coronary artery disease     COVID-19 vaccine administered     phizer    D-dimer, elevated     Diabetes mellitus APRIL/2023    Diastolic congestive heart failure 07/25/2019    Dizziness     Edema     Epilepsy     Fatigue     Heart murmur     History of blood  transfusion 2019    History of blood transfusion     History of blood transfusion 2022    History of blood transfusion     August 2023 x2    History of recent blood transfusion 2021    History of transfusion     Hyperlipidemia     Hyperlipidemia 2016    Hypertension     Kidney stones     Migraines     Mitral regurgitation     Mitral stenosis     mild    Neuropathy     Neuropathy     Palpitations     Pulmonary edema     Retinal drusen     Seizure     Sleep apnea 2019    Sleeping difficulties     SOB (shortness of breath)     Stroke 10/31/23    Supraventricular aortic stenosis     Syncope     Tachycardia     Tobacco abuse     VHD (valvular heart disease)     Wears dentures     Wears eyeglasses        Social History     Socioeconomic History    Marital status:     Number of children: 2   Tobacco Use    Smoking status: Former     Current packs/day: 0.00     Average packs/day: 1 pack/day for 40.0 years (40.0 ttl pk-yrs)     Types: Cigarettes     Start date: 1983     Quit date: 2023     Years since quittin.7    Smokeless tobacco: Never   Vaping Use    Vaping status: Never Used   Substance and Sexual Activity    Alcohol use: No    Drug use: No    Sexual activity: Yes     Partners: Male     Birth control/protection: Condom, Hysterectomy       Family History   Problem Relation Age of Onset    Cancer Mother     Cancer Father     Hypertension Father     Other Sister         ACUTE MYOCARDIAL INFARCTION,CABG    Heart failure Sister     Hypertension Sister     Stroke Other        Review of Systems   Constitutional:  Positive for fatigue.   HENT:  Negative for congestion, rhinorrhea and sore throat.    Eyes:  Positive for visual disturbance (Glasses daily).   Respiratory:  Positive for apnea (Doesn't use CPAP) and cough. Negative for chest tightness and shortness of breath.    Cardiovascular:  Positive for leg swelling (States from neuropathy). Negative for chest pain and palpitations.  "  Gastrointestinal: Negative.    Genitourinary: Negative.    Neurological:  Positive for dizziness (States seems unrelated to position change. States she can be sitting and a wave of dizziness comes over her. States it's been an issue for a few weeks. States she's trying to get appt w/ ENT.), light-headedness and numbness (Neuropathy). Negative for syncope, weakness and headaches.   Hematological:  Does not bruise/bleed easily (None since coming off Coumadin).   Psychiatric/Behavioral:  Positive for sleep disturbance (Unchanged).        Objective     VITALS: /69   Pulse 58   Ht 152.4 cm (60\")   Wt 61.2 kg (135 lb)   SpO2 96%   BMI 26.37 kg/m²     LABS:   Lab Results (most recent)       None            IMAGING:   XR Chest PA & Lateral    Result Date: 6/28/2024  IMPRESSION: See Result. : GARCIA   Transcribe Date/Time: Jun 28 2024 11:04A Dictated by : TAMMY JOHNSON MD This examination was interpreted and the report reviewed and electronically signed by: TAMMY JOHNSON MD on Jun 28 2024 11:08AM  EST    XR Chest 1 View    Result Date: 6/27/2024  IMPRESSION: Lines, Tubes, and Devices:  Stable support lines and tubes. Lungs and Pleura:  Bilateral lung opacities and small effusions are not significantly changed. No pneumothorax. Cardiomediastinal silhouette:  Stable cardiac silhouette. : GARCIA   Transcribe Date/Time: Jun 27 2024  8:20A Dictated by : TAMMY JOHNSON MD This examination was interpreted and the report reviewed and electronically signed by: TAMMY JOHNSON MD on Jun 27 2024  8:20AM  EST    XR Chest 1 View    Result Date: 6/26/2024  IMPRESSION: See result. : [facility]   Transcribe Date/Time: Jun 26 2024  8:53A Dictated by : GEO SEGAL MD This examination was interpreted and the report reviewed and electronically signed by: GEO SEGAL MD on Jun 26 2024  8:54AM  EST    XR Chest 1 View    Result Date: 6/25/2024  IMPRESSION: See result : " [facility]   Transcribe Date/Time: Jun 25 2024  1:09P Dictated by : MICHAEL LINDO MD This examination was interpreted and the report reviewed and electronically signed by: MICHAEL LINDO MD on Jun 25 2024  1:10PM  EST    XR Chest 1 View    Result Date: 6/24/2024  IMPRESSION: See result. : [facility]   Transcribe Date/Time: Jun 24 2024 12:21P Dictated by : EARNEST WEIR MD This examination was interpreted and the report reviewed and electronically signed by: EARNEST WEIR MD on Jun 24 2024 12:23PM  EST    XR Chest 1 View    Result Date: 6/23/2024  IMPRESSION: See result. : [facility]   Transcribe Date/Time: Jun 23 2024 10:03A Dictated by : DINORA MAN MD This examination was interpreted and the report reviewed and electronically signed by: DINORA MAN MD on Jun 23 2024 10:04AM  EST    XR Chest 1 View    Result Date: 6/22/2024  IMPRESSION: See result. : [facility]   Transcribe Date/Time: Jun 22 2024  8:32A Dictated by : DINORA MAN MD This examination was interpreted and the report reviewed and electronically signed by: DINORA MAN MD on Jun 22 2024  8:34AM  EST    XR Chest 1 View    Result Date: 6/21/2024  IMPRESSION: See result. : [facility]   Transcribe Date/Time: Jun 21 2024  4:16P Dictated by : EARNEST WEIR MD This examination was interpreted and the report reviewed and electronically signed by: EARNEST WEIR MD on Jun 21 2024  4:18PM  EST    XR Chest 1 View    Result Date: 6/21/2024  IMPRESSION: Lines, Tubes, and Devices:  Interval removal of ET tube and enteric tube, with otherwise stable support lines and tubes. Lungs and Pleura:  Interstitial lung opacities and small right effusion with overlying consolidation slightly increased. No pneumothorax. Cardiomediastinal silhouette:  Stable cardiac silhouette. : [facility]   Transcribe Date/Time: Jun 21 2024  9:06A Dictated by : TAMMY JOHNSON MD This examination was interpreted and the report reviewed and  electronically signed by: TAMMY JOHNSON MD on Jun 21 2024  9:07AM  EST    XR Chest 1 View    Result Date: 6/20/2024  IMPRESSION: See result. : [facility]   Transcribe Date/Time: Jun 20 2024  1:32P Dictated by : GEO SEGAL MD This examination was interpreted and the report reviewed and electronically signed by: GEO SEGAL MD on Jun 20 2024  1:34PM  EST    XR Chest PA & Lateral    Result Date: 6/16/2024  IMPRESSION: See Result. : [facility]   Transcribe Date/Time: Jun 16 2024  9:48P Dictated by : TAMMY JOHNSON MD This examination was interpreted and the report reviewed and electronically signed by: TAMMY JOHNSON MD on Jun 16 2024  9:50PM  EST    CT CHEST CARDIAC WO IVCON    Result Date: 6/14/2024  IMPRESSION: Normal course and caliber thoracic aorta with mild scattered calcific changes including ascending segments. Mechanical prostheses in aortic and mitral positions. Calcific changes coronary arteries. Mild LEFT atrial dilation.  Subvalvar calcifications LEFT ventricle. LEFT prepectoral Mediport catheter with tip to SVC, adjacent calcification at upper SVC level Suspected pulmonary edema.  Mild tobacco exposure related changes of lungs suspected.  Few tiny nodules, indeterminate. Incidental Finding:  Follow-up Acuity: Incidental Finding: Solid: <6 mm (solitary or multiple) Routing Code:  N/A Recommendation: No imaging follow-up is recommended Time Frame:  N/A Comments:  If there are risk factors for lung malignancy, a follow-up chest CT exam could be obtained in 12 months --END OF FINDING-- COMMUNICATION:? Results will be communicated with the ordering provider via Epic staff message by [facility] within 2 business days of report finalization. : GARCIA   Transcribe Date/Time: Jun 14 2024 12:19P Dictated by : TRISTAN BRUNO MD This examination was interpreted and the report reviewed and electronically signed by: TRISTAN BRUNO MD on Jun 14 2024 12:43PM   EST    CT Head Without Contrast    Result Date: 6/14/2024  IMPRESSION: No acute intracranial findings. Presumed remote left MCA distribution infarct. : [facility]   Transcribe Date/Time: Jun 14 2024 10:54A Dictated by : KM OLIVO MD This examination was interpreted and the report reviewed and electronically signed by: GONSALO RANDALL MD on Jun 14 2024 11:25AM  EST      EXAM:    Constitutional:       Appearance: Normal appearance.   Eyes:      Pupils: Pupils are equal, round, and reactive to light.   Cardiovascular:      Rate and Rhythm: Normal rate and regular rhythm.      Pulses:           Carotid pulses are 2+ on the right side and 2+ on the left side.       Radial pulses are 2+ on the right side and 2+ on the left side.        Dorsalis pedis pulses are 2+ on the right side and 2+ on the left side.        Posterior tibial pulses are 2+ on the right side and 2+ on the left side.      Heart sounds: Murmur heard.   Pulmonary:      Effort: Pulmonary effort is normal.      Breath sounds: Normal breath sounds.   Abdominal:      General: Bowel sounds are normal.      Palpations: Abdomen is soft.   Musculoskeletal:      Right lower leg: No edema.      Left lower leg: No edema.   Skin:     General: Skin is warm and dry.      Capillary Refill: Capillary refill takes less than 2 seconds.   Neurological:      General: No focal deficit present.      Mental Status: She is alert and oriented to person, place, and time.   Psychiatric:         Mood and Affect: Mood normal.         Thought Content: Thought content normal.     Procedure   Procedures       Assessment & Plan    Diagnosis Plan   1. Dizziness  Holter Monitor - 72 Hour Up To 15 Days      2. PAF (paroxysmal atrial fibrillation)  Holter Monitor - 72 Hour Up To 15 Days      3. Right leg pain  Duplex Venous Lower Extremity - Right CAR      4. S/P MVR (mitral valve replacement)        5. S/P AVR (aortic valve replacement)        6. S/P CABG x 1           Plan:  1.  Dizziness: The patient is having intermittent dizziness.  She does follow with ENT and has a history of vertigo and states she is getting an appointment with them.  She did have some dizzy spells while she was wearing the monitor.  She was only able to wear the heart monitor for 4 days.  I will place a Holter monitor 2 weeks to complete recommended monitoring period.   2.  PAF: The patient did have postoperative atrial fibrillation.  She has been on amiodarone since surgery.  There has been no known recurrence of A-fib.  She was placed on a monitor in September but was only able to wear the monitor for 4 days.  We will request monitor results from [facility].  In care everywhere there is a 1 page summary of monitor findings but would like to review the actual monitor strips.  There is some indication the patient had further A-fib and the summary report.  The patient states they have not called her the results of the monitor yet.  Will place a 2-week Holter monitor in the office today for further evaluation.  If no recurrence of A-fib her surgeon has recommended that she can come off of amiodarone.  3.  Right leg pain: The patient is having discomfort at SVG incision sites.  Will check a lower extremity Doppler to evaluate further.  4.  The patient is status post MVR and AVR with bioprosthetic valve.  She is recovering as expected after surgery.  Repeat echocardiogram in September at her follow-up with CT surgery showed normally functioning valves.  5.  Status post CABG x 1: Continue medical management.  Continue aspirin, Toprol tartrate, atorvastatin.  She denies current chest pain symptoms.  She was advised notify our office if symptoms develop.       Return in about 2 months (around 12/10/2024).    Mara Martínez  reports that she quit smoking about 21 months ago. Her smoking use included cigarettes. She started smoking about 41 years ago. She has a 40 pack-year smoking history. She has never  used smokeless tobacco.     BMI is >= 25 and <30. (Overweight) The following options were offered after discussion;: referral to primary care           MEDS ORDERED DURING VISIT:  No orders of the defined types were placed in this encounter.      DISCONTINUED MEDS DURING VISIT:   There are no discontinued medications.       This document has been electronically signed by CHARLIE Moran  October 10, 2024 17:35 EDT    Dictated Utilizing Dragon Dictation: Part of this note may be an electronic transcription/translation of spoken language to printed text using the Dragon Dictation System

## 2024-10-29 ENCOUNTER — HOSPITAL ENCOUNTER (OUTPATIENT)
Dept: CARDIOLOGY | Facility: HOSPITAL | Age: 54
Discharge: HOME | End: 2024-10-29
Admitting: CLINICAL NURSE SPECIALIST
Payer: MEDICARE

## 2024-10-29 DIAGNOSIS — M79.604 RIGHT LEG PAIN: ICD-10-CM

## 2024-10-29 LAB
BH CV LOWER VASCULAR RIGHT COMMON FEMORAL AUGMENT: NORMAL
BH CV LOWER VASCULAR RIGHT COMMON FEMORAL COMPETENT: NORMAL
BH CV LOWER VASCULAR RIGHT COMMON FEMORAL COMPRESS: NORMAL
BH CV LOWER VASCULAR RIGHT COMMON FEMORAL PHASIC: NORMAL
BH CV LOWER VASCULAR RIGHT COMMON FEMORAL SPONT: NORMAL
BH CV LOWER VASCULAR RIGHT DISTAL FEMORAL COMPRESS: NORMAL
BH CV LOWER VASCULAR RIGHT GREATER SAPH AK COMPRESS: NORMAL
BH CV LOWER VASCULAR RIGHT GREATER SAPH BK COMPRESS: NORMAL
BH CV LOWER VASCULAR RIGHT MID FEMORAL AUGMENT: NORMAL
BH CV LOWER VASCULAR RIGHT MID FEMORAL COMPETENT: NORMAL
BH CV LOWER VASCULAR RIGHT MID FEMORAL COMPRESS: NORMAL
BH CV LOWER VASCULAR RIGHT MID FEMORAL PHASIC: NORMAL
BH CV LOWER VASCULAR RIGHT MID FEMORAL SPONT: NORMAL
BH CV LOWER VASCULAR RIGHT PERONEAL COMPRESS: NORMAL
BH CV LOWER VASCULAR RIGHT POPLITEAL AUGMENT: NORMAL
BH CV LOWER VASCULAR RIGHT POPLITEAL COMPETENT: NORMAL
BH CV LOWER VASCULAR RIGHT POPLITEAL COMPRESS: NORMAL
BH CV LOWER VASCULAR RIGHT POPLITEAL PHASIC: NORMAL
BH CV LOWER VASCULAR RIGHT POPLITEAL SPONT: NORMAL
BH CV LOWER VASCULAR RIGHT POSTERIOR TIBIAL COMPRESS: NORMAL
BH CV LOWER VASCULAR RIGHT PROFUNDA FEMORAL COMPRESS: NORMAL
BH CV LOWER VASCULAR RIGHT PROXIMAL FEMORAL COMPRESS: NORMAL
BH CV LOWER VASCULAR RIGHT SAPHENOFEMORAL JUNCTION COMPRESS: NORMAL
RIGHT GROIN CFA SYS: 111 CM/SEC

## 2024-10-29 PROCEDURE — 93971 EXTREMITY STUDY: CPT

## 2024-10-31 DIAGNOSIS — R06.02 SOB (SHORTNESS OF BREATH): ICD-10-CM

## 2024-10-31 DIAGNOSIS — I50.30 DIASTOLIC CONGESTIVE HEART FAILURE, UNSPECIFIED HF CHRONICITY: ICD-10-CM

## 2024-10-31 DIAGNOSIS — R60.9 EDEMA, UNSPECIFIED TYPE: ICD-10-CM

## 2024-10-31 RX ORDER — FUROSEMIDE 40 MG/1
TABLET ORAL
Qty: 60 TABLET | Refills: 5 | Status: SHIPPED | OUTPATIENT
Start: 2024-10-31

## 2024-11-13 RX ORDER — METOPROLOL TARTRATE 25 MG/1
12.5 TABLET, FILM COATED ORAL 2 TIMES DAILY
Qty: 90 TABLET | Refills: 3 | Status: SHIPPED | OUTPATIENT
Start: 2024-11-13

## 2024-11-13 NOTE — TELEPHONE ENCOUNTER
Received a faxed RF request from [facility]  for Metoprolol Tart 25mg 1/2 tab BID.       Per chart review, this is on pt's historic rx list, requires provider okay for RF.

## 2024-11-25 ENCOUNTER — TELEPHONE (OUTPATIENT)
Dept: CARDIOLOGY | Facility: CLINIC | Age: 54
End: 2024-11-25
Payer: MEDICARE

## 2024-11-25 NOTE — TELEPHONE ENCOUNTER
RELAY  US VENOUS  Called patient to notify of no acute findings or abnormalities. Keep follow up as scheduled. If you have any problem between now and then give our office a call.   ----- Message from Rowan MCMAHON sent at 11/21/2024  8:04 AM EST -----    ----- Message -----  From: Queenie Carmona APRN  Sent: 11/20/2024   5:17 PM EST  To: Rowan Elder    No evidence of DVT.  Keep follow up

## 2024-12-02 RX ORDER — MIDODRINE HYDROCHLORIDE 10 MG/1
10 TABLET ORAL
Qty: 90 TABLET | Refills: 3 | OUTPATIENT
Start: 2024-12-02

## 2024-12-06 ENCOUNTER — TELEPHONE (OUTPATIENT)
Dept: CARDIOLOGY | Facility: CLINIC | Age: 54
End: 2024-12-06
Payer: MEDICARE

## 2024-12-06 NOTE — TELEPHONE ENCOUNTER
I was shawn to obtain the monitor from [facility] and review the monitor we placed.  She has had no further afib.  Originally the monitor from [facility] autogenerated an afib finding but this was modified by the cardiologist to state that was no identified afib.  From my standpoint she can stop amiodarone.  I also reviewed the last note from [facility] and they also agreed if no further afib identified she could stop amiodarone.  If she develops any palpitation symptoms have her notify our office.   Continue beta blocker.

## 2024-12-09 NOTE — TELEPHONE ENCOUNTER
Called and informed pt of the message from Queenie, she verbalized understanding. I removed Amiodarone from pt's med list.

## 2025-01-16 ENCOUNTER — OFFICE VISIT (OUTPATIENT)
Dept: CARDIOLOGY | Facility: CLINIC | Age: 55
End: 2025-01-16
Payer: MEDICARE

## 2025-01-16 VITALS
HEIGHT: 60 IN | BODY MASS INDEX: 26.5 KG/M2 | SYSTOLIC BLOOD PRESSURE: 86 MMHG | OXYGEN SATURATION: 95 % | DIASTOLIC BLOOD PRESSURE: 54 MMHG | WEIGHT: 135 LBS | HEART RATE: 62 BPM

## 2025-01-16 DIAGNOSIS — I50.30 DIASTOLIC CONGESTIVE HEART FAILURE, UNSPECIFIED HF CHRONICITY: ICD-10-CM

## 2025-01-16 DIAGNOSIS — R06.02 SOB (SHORTNESS OF BREATH): ICD-10-CM

## 2025-01-16 DIAGNOSIS — R60.9 EDEMA, UNSPECIFIED TYPE: ICD-10-CM

## 2025-01-16 PROCEDURE — 3078F DIAST BP <80 MM HG: CPT | Performed by: CLINICAL NURSE SPECIALIST

## 2025-01-16 PROCEDURE — 99214 OFFICE O/P EST MOD 30 MIN: CPT | Performed by: CLINICAL NURSE SPECIALIST

## 2025-01-16 PROCEDURE — 3074F SYST BP LT 130 MM HG: CPT | Performed by: CLINICAL NURSE SPECIALIST

## 2025-01-16 RX ORDER — FUROSEMIDE 40 MG/1
40 TABLET ORAL DAILY
Qty: 60 TABLET | Refills: 5 | Status: SHIPPED | OUTPATIENT
Start: 2025-01-16

## 2025-01-16 NOTE — PROGRESS NOTES
Subjective     Mara Martínez is a 54 y.o. female who presents today for Follow-up (2mth medication changes).    CHIEF COMPLIANT  Chief Complaint   Patient presents with    Follow-up     2mth medication changes       Active Problems:  1.  Valvular heart disease  1.1 status post aortic and mitral valve replacement by Dr. Cavazos with mechanical valves July 2018  1.2 follow-up echocardiogram 2019 demonstrating stable valve parameters  1.3 echocardiogram August 2022 with stable valve parameters  1.4 status post redo AVR with Inspiris #23 prosthetic aortic valve and redo MVR with #31 prosthetic mitral valve 6/19/2024.  While in CVICU in recovery the patient developed ST elevation in the inferior leads and was taken emergently to the Cath Lab where she was found to have occlusion of LCx.  She was taken emergently back to surgery for emergent bypass with SVG to L-PLB.  Echocardiogram prior to discharge showed EF 60 to 65% and normally functioning aortic and mitral valve. left atrial appendage clipping with 35 mm atrial clip  2.  Preserved systolic function  3.  Cardiac catheterization 2017 with minimal atherosclerosis noted  3.1 repeat stress test August 2022 with no evidence of ischemia preserved LV function  4.  Carotid artery stenosis  4.1 left carotid stenting by Dr. Mckeon [facility] July 2020  5.  MCA aneurysm estimated at 3.5 mm followed by [facility]  5.  Epilepsy  6.  Hypertension  7.  Dyslipidemia  8.  Chronic tobacco use  9.  GI bleed  9.1.  Patient with history of AVMs  9.2 recurrent GI bleeds with multiple hospitalizations, blood transfusions, and cautery.  Seen at multiple institutions  10.  CVA  10.1 cerebrovascular accident due to thrombus in the left MCA status post endovascular thrombectomy by Dr. Tejal WHITMORE  The patient is a 54-year-old female that returns for follow-up to discuss cardiac monitor results.  There was no identified atrial fibrillation.  There were episodes of  bradycardia which occurred around 36% of the time with a minimum heart rate of 45 and an average heart rate of 60.  We were able to obtain the repeat monitor results from [facility] and there was no identified atrial fibrillation and it was recommended that the patient could stop amiodarone.  She has been off amiodarone for around 1 month.  The patient continues to have some fatigue and dizziness.  She does have episodes where she will feel very tired.  Her blood pressure is low in the office today at 86/45.  Her blood pressure has been running low at home.  She says her primary care advised her to speak with us about this at her appointment today.  She denies syncope, chest pain, dyspnea or palpitations.    PRIOR MEDS  Current Outpatient Medications on File Prior to Visit   Medication Sig Dispense Refill    ascorbic acid (VITAMIN C) 1000 MG tablet Take 1 tablet by mouth Daily.      aspirin 81 MG EC tablet Take 1 tablet by mouth Daily.      atorvastatin (LIPITOR) 80 MG tablet TAKE 1 TABLET BY MOUTH ONCE DAILY 90 tablet 3    cholecalciferol (VITAMIN D3) 25 MCG (1000 UT) tablet Take 1 tablet by mouth Daily.      ferrous sulfate 325 (65 FE) MG tablet Take 1 tablet by mouth 2 (Two) Times a Day.      folic acid (FOLVITE) 1 MG tablet Take  by mouth Daily.      furosemide (LASIX) 40 MG tablet TAKE 1 TABLET BY MOUTH TWICE A DAY 60 tablet 5    levETIRAcetam (KEPPRA) 1000 MG tablet Take 1 tablet by mouth 2 (Two) Times a Day.      levothyroxine (SYNTHROID, LEVOTHROID) 75 MCG tablet Take 1 tablet by mouth Daily.      Mounjaro 2.5 MG/0.5ML solution pen-injector pen INJECT 2.5MG ONCE A WEEK      nortriptyline (PAMELOR) 50 MG capsule Take 2 capsules by mouth Every Night.      pain patient supplied pump by Intrathecal route Continuous. Morphine      pantoprazole (PROTONIX) 40 MG EC tablet TAKE 1 TABLET BY MOUTH ONCE DAILY 90 tablet 2    potassium chloride (K-DUR,KLOR-CON) 20 MEQ CR tablet Take 1 tablet by mouth 2 (Two) Times  a Day.      rOPINIRole (REQUIP) 0.5 MG tablet Take 1 tablet by mouth Every Night. Take 1 hour before bedtime.      tiZANidine (ZANAFLEX) 4 MG tablet Take 1 tablet by mouth 3 (Three) Times a Day As Needed.      topiramate (TOPAMAX) 100 MG tablet Take 2 tablets by mouth 2 (Two) Times a Day.      Ubrogepant (UBRELVY PO) Take  by mouth.      vitamin B-12 (CYANOCOBALAMIN) 100 MCG tablet Take 1 tablet by mouth Daily.      [DISCONTINUED] metoprolol tartrate (LOPRESSOR) 25 MG tablet Take 0.5 tablets by mouth 2 (Two) Times a Day. 90 tablet 3    galcanezumab-gnlm (Emgality) 120 MG/ML auto-injector pen Inject 1 mL under the skin into the appropriate area as directed Every 30 (Thirty) Days.       No current facility-administered medications on file prior to visit.       ALLERGIES  Azithromycin, Gabapentin, and Metformin    HISTORY  Past Medical History:   Diagnosis Date    Anemia     Aortic regurgitation     Arthritis     Back pain     Carotid bruit     ISREAL (cerebral atherosclerosis) 12/2014    nonobstructive    Chest pain     Chronic hypotension on midodrine 11/01/2023    Chronic obstructive pulmonary disease 09/07/2022    Clotting disorder 2018    Coronary artery disease     COVID-19 vaccine administered     phizer    D-dimer, elevated     Diabetes mellitus APRIL/2023    Diastolic congestive heart failure 07/25/2019    Dizziness     Edema     Epilepsy     Fatigue     Heart murmur     History of blood transfusion 08/2019    History of blood transfusion 2022    History of blood transfusion 11/2022    History of blood transfusion     August 2023 x2    History of recent blood transfusion 12/2021    History of transfusion     Hyperlipidemia     Hyperlipidemia 04/28/2016    Hypertension     Kidney stones     Migraines     Mitral regurgitation     Mitral stenosis     mild    Neuropathy     Neuropathy     Palpitations     Pulmonary edema     Retinal drusen     Seizure     Sleep apnea 09/2019    Sleeping difficulties     SOB  "(shortness of breath)     Stroke 10/31/23    Supraventricular aortic stenosis     Syncope     Tachycardia     Tobacco abuse     VHD (valvular heart disease)     Wears dentures     Wears eyeglasses        Social History     Socioeconomic History    Marital status:     Number of children: 2   Tobacco Use    Smoking status: Every Day     Current packs/day: 0.00     Average packs/day: 1 pack/day for 40.0 years (40.0 ttl pk-yrs)     Types: Cigarettes     Start date: 1983     Last attempt to quit: 2023     Years since quittin.0    Smokeless tobacco: Never   Vaping Use    Vaping status: Never Used   Substance and Sexual Activity    Alcohol use: No    Drug use: No    Sexual activity: Yes     Partners: Male     Birth control/protection: Condom, Hysterectomy       Family History   Problem Relation Age of Onset    Cancer Mother     Cancer Father     Hypertension Father     Other Sister         ACUTE MYOCARDIAL INFARCTION,CABG    Heart failure Sister     Hypertension Sister     Stroke Other        Review of Systems   Constitutional:  Negative for fever.   HENT:  Positive for congestion and rhinorrhea. Negative for postnasal drip, sinus pressure and sinus pain.    Respiratory:  Negative for chest tightness and shortness of breath.    Cardiovascular:  Negative for chest pain, palpitations and leg swelling.   Gastrointestinal: Negative.    Genitourinary: Negative.    Neurological:  Positive for dizziness and headaches. Negative for syncope.   Hematological:  Does not bruise/bleed easily.   Psychiatric/Behavioral:  Negative for sleep disturbance.        Objective     VITALS: BP (!) 86/54 (BP Location: Right arm, Patient Position: Sitting, Cuff Size: Adult)   Pulse 62   Ht 152.4 cm (60\")   Wt 61.2 kg (135 lb)   SpO2 95%   BMI 26.37 kg/m²     LABS:   Lab Results (most recent)       None            IMAGING:   No Images in the past 120 days found..    EXAM:    Constitutional:       Appearance: Normal appearance. "   Eyes:      Pupils: Pupils are equal, round, and reactive to light.   Cardiovascular:      Rate and Rhythm: Normal rate and regular rhythm.      Pulses:           Carotid pulses are 2+ on the right side and 2+ on the left side.       Radial pulses are 2+ on the right side and 2+ on the left side.        Dorsalis pedis pulses are 2+ on the right side and 2+ on the left side.        Posterior tibial pulses are 2+ on the right side and 2+ on the left side.      Heart sounds: Murmur heard.   Pulmonary:      Effort: Pulmonary effort is normal.      Breath sounds: Normal breath sounds.   Abdominal:      General: Bowel sounds are normal.      Palpations: Abdomen is soft.   Musculoskeletal:      Right lower leg: No edema.      Left lower leg: No edema.   Skin:     General: Skin is warm and dry.      Capillary Refill: Capillary refill takes less than 2 seconds.   Neurological:      General: No focal deficit present.      Mental Status: She is alert and oriented to person, place, and time.   Psychiatric:         Mood and Affect: Mood normal.         Thought Content: Thought content normal.   Copied information reviewed.  No change in assessment.     Procedure   Procedures       Assessment & Plan    Diagnosis Plan   1. SOB (shortness of breath)        2. Diastolic congestive heart failure, unspecified HF chronicity        3. Edema, unspecified type             Plan:  1.  Dizziness: The patient is having intermittent dizziness.  She does follow with ENT and has a history of vertigo.  Her blood pressure is low in the office today and has been running low at home.  Repeat monitor did show episodes of bradycardia with a 36% bradycardic burden with minimum heart rate 45 bpm.  Will hold metoprolol tartrate at this time due to bradycardia to see if symptoms will improve.  Will also decrease Lasix to 40 mg daily.  I have asked her to call the office in 2 weeks to give us an update on her heart rate and blood pressure.  If it is still  running low we will further decrease or discontinue Lasix at that time.    2.  PAF: The patient did have postoperative atrial fibrillation.  There has been no known recurrence of A-fib.  She was able to stop amiodarone.  She denies current palpitation symptoms.  She was advised notify our office if the symptoms develop.  4.  The patient is status post MVR and AVR with bioprosthetic valve.  She has recovered well from surgery.    5.  Status post CABG x 1: Continue medical management.  Continue aspirin, atorvastatin.  Due to bradycardia and hypotension we will stop metoprolol tartrate at this time.  She denies current chest pain symptoms.  She was advised notify our office if symptoms develop.     Return in about 3 months (around 4/16/2025).    Mara Martínez  reports that she has been smoking cigarettes. She started smoking about 42 years ago. She has a 40 pack-year smoking history. She has never used smokeless tobacco.          MEDS ORDERED DURING VISIT:  No orders of the defined types were placed in this encounter.      DISCONTINUED MEDS DURING VISIT:   Medications Discontinued During This Encounter   Medication Reason    metoprolol tartrate (LOPRESSOR) 25 MG tablet           This document has been electronically signed by CHARLIE Moran  January 16, 2025 15:44 EST    Dictated Utilizing Dragon Dictation: Part of this note may be an electronic transcription/translation of spoken language to printed text using the Dragon Dictation System

## 2025-03-14 RX ORDER — AMIODARONE HYDROCHLORIDE 200 MG/1
200 TABLET ORAL DAILY
Qty: 90 TABLET | Refills: 1 | OUTPATIENT
Start: 2025-03-14

## 2025-05-01 ENCOUNTER — OFFICE VISIT (OUTPATIENT)
Dept: CARDIOLOGY | Facility: CLINIC | Age: 55
End: 2025-05-01
Payer: MEDICARE

## 2025-05-01 NOTE — PROGRESS NOTES
Subjective     Mara Martínez is a 54 y.o. female who presents today for Follow-up (3mth).    CHIEF COMPLIANT  Chief Complaint   Patient presents with    Follow-up     3mth       Active Problems:  1.  Valvular heart disease  1.1 status post aortic and mitral valve replacement by Dr. Cavazos with mechanical valves July 2018  1.2 follow-up echocardiogram 2019 demonstrating stable valve parameters  1.3 echocardiogram August 2022 with stable valve parameters  1.4 status post redo AVR with Inspiris #23 prosthetic aortic valve and redo MVR with #31 prosthetic mitral valve 6/19/2024.  While in CVICU in recovery the patient developed ST elevation in the inferior leads and was taken emergently to the Cath Lab where she was found to have occlusion of LCx.  She was taken emergently back to surgery for emergent bypass with SVG to L-PLB.  Echocardiogram prior to discharge showed EF 60 to 65% and normally functioning aortic and mitral valve. left atrial appendage clipping with 35 mm atrial clip  2.  Preserved systolic function  3.  Cardiac catheterization 2017 with minimal atherosclerosis noted  3.1 repeat stress test August 2022 with no evidence of ischemia preserved LV function  4.  Carotid artery stenosis  4.1 left carotid stenting by Dr. Mckeon [facility] July 2020  5.  MCA aneurysm estimated at 3.5 mm followed by [facility]  5.  Epilepsy  6.  Hypertension  7.  Dyslipidemia  8.  Chronic tobacco use  9.  GI bleed  9.1.  Patient with history of AVMs  9.2 recurrent GI bleeds with multiple hospitalizations, blood transfusions, and cautery.  Seen at multiple institutions  10.  CVA  10.1 cerebrovascular accident due to thrombus in the left MCA status post endovascular thrombectomy by Dr. Toure    HPI  ***    PRIOR MEDS  Current Outpatient Medications on File Prior to Visit   Medication Sig Dispense Refill    ascorbic acid (VITAMIN C) 1000 MG tablet Take 1 tablet by mouth Daily.      aspirin 81 MG EC tablet Take 1  tablet by mouth Daily.      atorvastatin (LIPITOR) 80 MG tablet TAKE 1 TABLET BY MOUTH ONCE DAILY 90 tablet 3    cholecalciferol (VITAMIN D3) 25 MCG (1000 UT) tablet Take 1 tablet by mouth Daily.      ferrous sulfate 325 (65 FE) MG tablet Take 1 tablet by mouth 2 (Two) Times a Day.      folic acid (FOLVITE) 1 MG tablet Take  by mouth Daily.      furosemide (LASIX) 40 MG tablet Take 1 tablet by mouth Daily. 60 tablet 5    galcanezumab-gnlm (Emgality) 120 MG/ML auto-injector pen Inject 1 mL under the skin into the appropriate area as directed Every 30 (Thirty) Days.      levETIRAcetam (KEPPRA) 1000 MG tablet Take 1 tablet by mouth 2 (Two) Times a Day.      levothyroxine (SYNTHROID, LEVOTHROID) 75 MCG tablet Take 1 tablet by mouth Daily.      Mounjaro 2.5 MG/0.5ML solution pen-injector pen INJECT 2.5MG ONCE A WEEK      nortriptyline (PAMELOR) 50 MG capsule Take 2 capsules by mouth Every Night.      pain patient supplied pump by Intrathecal route Continuous. Morphine      pantoprazole (PROTONIX) 40 MG EC tablet TAKE 1 TABLET BY MOUTH ONCE DAILY 90 tablet 2    potassium chloride (K-DUR,KLOR-CON) 20 MEQ CR tablet Take 1 tablet by mouth 2 (Two) Times a Day.      rOPINIRole (REQUIP) 0.5 MG tablet Take 1 tablet by mouth Every Night. Take 1 hour before bedtime.      tiZANidine (ZANAFLEX) 4 MG tablet Take 1 tablet by mouth 3 (Three) Times a Day As Needed.      topiramate (TOPAMAX) 100 MG tablet Take 2 tablets by mouth 2 (Two) Times a Day.      Ubrogepant (UBRELVY PO) Take  by mouth.      vitamin B-12 (CYANOCOBALAMIN) 100 MCG tablet Take 1 tablet by mouth Daily.       No current facility-administered medications on file prior to visit.       ALLERGIES  Azithromycin, Gabapentin, and Metformin    HISTORY  Past Medical History:   Diagnosis Date    Anemia     Aortic regurgitation     Arthritis     Back pain     Carotid bruit     ISREAL (cerebral atherosclerosis) 12/2014    nonobstructive    Chest pain     Chronic hypotension on  midodrine 2023    Chronic obstructive pulmonary disease 2022    Clotting disorder 2018    Coronary artery disease     COVID-19 vaccine administered     phizer    D-dimer, elevated     Diabetes mellitus 2023    Diastolic congestive heart failure 2019    Dizziness     Edema     Epilepsy     Fatigue     Heart murmur     History of blood transfusion 2019    History of blood transfusion     History of blood transfusion 2022    History of blood transfusion     August 2023 x2    History of recent blood transfusion 2021    History of transfusion     Hyperlipidemia     Hyperlipidemia 2016    Hypertension     Kidney stones     Migraines     Mitral regurgitation     Mitral stenosis     mild    Neuropathy     Neuropathy     Palpitations     Pulmonary edema     Retinal drusen     Seizure     Sleep apnea 2019    Sleeping difficulties     SOB (shortness of breath)     Stroke 10/31/23    Supraventricular aortic stenosis     Syncope     Tachycardia     Tobacco abuse     VHD (valvular heart disease)     Wears dentures     Wears eyeglasses        Social History     Socioeconomic History    Marital status:     Number of children: 2   Tobacco Use    Smoking status: Every Day     Current packs/day: 0.00     Average packs/day: 1 pack/day for 40.0 years (40.0 ttl pk-yrs)     Types: Cigarettes     Start date: 1983     Last attempt to quit: 2023     Years since quittin.3    Smokeless tobacco: Never   Vaping Use    Vaping status: Never Used   Substance and Sexual Activity    Alcohol use: No    Drug use: No    Sexual activity: Yes     Partners: Male     Birth control/protection: Condom, Hysterectomy       Family History   Problem Relation Age of Onset    Cancer Mother     Cancer Father     Hypertension Father     Other Sister         ACUTE MYOCARDIAL INFARCTION,CABG    Heart failure Sister     Hypertension Sister     Stroke Other        Review of Systems    Objective     VITALS:  There were no vitals taken for this visit.    LABS:   Lab Results (most recent)       None            IMAGING:   No Images in the past 120 days found..    EXAM:  Physical Exam    Procedure   Procedures       Assessment & Plan   No diagnosis found.  ***  No follow-ups on file.    Mara Martínez  reports that she has been smoking cigarettes. She started smoking about 42 years ago. She has a 40 pack-year smoking history. She has never used smokeless tobacco. I have educated her on the risk of diseases from using tobacco products such as cancer, COPD, and heart disease.     I advised her to quit and she is not willing to quit.       MEDS ORDERED DURING VISIT:  No orders of the defined types were placed in this encounter.      DISCONTINUED MEDS DURING VISIT:   There are no discontinued medications.       This document has been electronically signed by Rowan Johnson  May 1, 2025 09:50 EDT    Dictated Utilizing Dragon Dictation: Part of this note may be an electronic transcription/translation of spoken language to printed text using the Dragon Dictation System

## 2025-06-20 DIAGNOSIS — K21.9 GASTROESOPHAGEAL REFLUX DISEASE: ICD-10-CM

## 2025-06-20 RX ORDER — PANTOPRAZOLE SODIUM 40 MG/1
40 TABLET, DELAYED RELEASE ORAL DAILY
Qty: 90 TABLET | Refills: 2 | Status: SHIPPED | OUTPATIENT
Start: 2025-06-20

## 2025-07-10 DIAGNOSIS — I50.30 DIASTOLIC CONGESTIVE HEART FAILURE, UNSPECIFIED HF CHRONICITY: ICD-10-CM

## 2025-07-10 DIAGNOSIS — R06.02 SOB (SHORTNESS OF BREATH): ICD-10-CM

## 2025-07-10 DIAGNOSIS — R60.9 EDEMA, UNSPECIFIED TYPE: ICD-10-CM

## 2025-07-10 RX ORDER — FUROSEMIDE 40 MG/1
40 TABLET ORAL EVERY 12 HOURS SCHEDULED
Qty: 180 TABLET | Refills: 3 | Status: SHIPPED | OUTPATIENT
Start: 2025-07-10

## 2025-07-18 RX ORDER — ATORVASTATIN CALCIUM 80 MG/1
80 TABLET, FILM COATED ORAL DAILY
Qty: 90 TABLET | Refills: 3 | Status: SHIPPED | OUTPATIENT
Start: 2025-07-18

## (undated) DEVICE — NDL PERC 1PRT THNWALL W/BASEPLT 18G 7CM

## (undated) DEVICE — OASIS DRAIN, SINGLE, INLINE & ATS COMPATIBLE: Brand: OASIS

## (undated) DEVICE — DRSNG SURESITE WNDW 4X4.5

## (undated) DEVICE — BALLOON GUIDE CATHETER: Brand: FLOWGATE2

## (undated) DEVICE — ANGIO-SEAL VIP VASCULAR CLOSURE DEVICE: Brand: ANGIO-SEAL

## (undated) DEVICE — SYS SKIN CLS DERMABOND PRINEO W/22CM MESH TP

## (undated) DEVICE — SUT PROLN 7/0 CV BV1 24IN 8304H BX/36

## (undated) DEVICE — BNDG ELAS ELITE V/CLOSE 6IN 5YD LF STRL

## (undated) DEVICE — 32 FR RIGHT ANGLE – SOFT PVC CATHETER: Brand: PVC THORACIC CATHETERS

## (undated) DEVICE — ORGANIZER SUT GABBAY FRATER GFS10A PK/3

## (undated) DEVICE — ENDOGATOR AUXILIARY WATER JET CONNECTOR: Brand: ENDOGATOR

## (undated) DEVICE — ENDOCUFF VISION PRP SM 10.4

## (undated) DEVICE — STPCK LP 1WY RA 200PSI

## (undated) DEVICE — AVANTI + 4F STD W/GW: Brand: AVANTI

## (undated) DEVICE — GLV SURG TRIUMPH CLASSIC PF LTX 8.5 STRL

## (undated) DEVICE — TUBING, SUCTION, 3/16" X 10', STRAIGHT: Brand: MEDLINE

## (undated) DEVICE — STNT RETRV SOLITAIREX REVASCULARIZATION 3X20MM 10MM 150CM

## (undated) DEVICE — VASOVIEW HEMOPRO: Brand: VASOVIEW HEMOPRO

## (undated) DEVICE — SUCTION CANISTER, 2500CC, RIGID: Brand: DEROYAL

## (undated) DEVICE — SUT PROLN 2/0 PC3 8833H

## (undated) DEVICE — SYR LUERLOK 30CC

## (undated) DEVICE — TUBING, SUCTION, 1/4" X 20', STRAIGHT: Brand: MEDLINE INDUSTRIES, INC.

## (undated) DEVICE — 12 FOOT DISPOSABLE EXTENSION CABLE WITH SAFE CONNECT / SCREW-DOWN

## (undated) DEVICE — SAFESECURE,SECUREMENT,FOLEY CATH,STERILE: Brand: MEDLINE

## (undated) DEVICE — TP UMB COTN 30IN U11T

## (undated) DEVICE — SUT SILK 4/0 TIES 18IN A183H

## (undated) DEVICE — Device

## (undated) DEVICE — CLTH CLENS READYCLEANSE PERI CARE PK/5

## (undated) DEVICE — LIMB HOLDER, WRIST/ANKLE: Brand: DEROYAL

## (undated) DEVICE — Device: Brand: DEFENDO AIR/WATER/SUCTION AND BIOPSY VALVE

## (undated) DEVICE — SUT PROLN 3/0 8832H

## (undated) DEVICE — BNDG ELAS ELITE V/CLOSE 4IN 5YD LF STRL

## (undated) DEVICE — CONN Y IRR DISP 1P/U

## (undated) DEVICE — TOWEL,OR,DSP,ST,BLUE,STD,8/PK,10PK/CS: Brand: MEDLINE

## (undated) DEVICE — LEX NEURO ANGIOGRAPHY: Brand: MEDLINE INDUSTRIES, INC.

## (undated) DEVICE — 3M™ TEGADERM™ CHG DRESSING 25/CARTON 4 CARTONS/CASE 1658: Brand: TEGADERM™

## (undated) DEVICE — APPL CHLORAPREP W/TINT 26ML ORNG

## (undated) DEVICE — PK HEART OPN 10

## (undated) DEVICE — CATHETER,URETHRAL,REDRUBBER,STERILE,22FR: Brand: MEDLINE

## (undated) DEVICE — SINGLE PORT MANIFOLD: Brand: NEPTUNE 2

## (undated) DEVICE — COVER,MAYO STAND,STERILE: Brand: MEDLINE

## (undated) DEVICE — SUT PROLN 6/0 C1 D/A 30IN 8706H

## (undated) DEVICE — CATH MIC PHENOM .021 .018IN 160CM

## (undated) DEVICE — TUBING, SUCTION, 1/4" X 10', STRAIGHT: Brand: MEDLINE

## (undated) DEVICE — ADHS LIQ MASTISOL 2/3ML

## (undated) DEVICE — ROTATING HEMOSTATIC VALVE .096": Brand: RHV

## (undated) DEVICE — CATH TEMPO 5F BER 100CM: Brand: TEMPO

## (undated) DEVICE — CVR HNDL LT SURG ACCSSRY BLU STRL

## (undated) DEVICE — RADIFOCUS GLIDEWIRE: Brand: GLIDEWIRE

## (undated) DEVICE — GUIDEWIRE WITH ICE™ HYDROPHILIC COATING: Brand: TRANSEND™ EX

## (undated) DEVICE — SUCTION CANISTER, 1500CC, RIGID: Brand: DEROYAL

## (undated) DEVICE — ST ACC MICROPUNCTURE .018 TRANSLSS/SS/TP 5F/10CM 21G/7CM

## (undated) DEVICE — SKIN AFFIX SURG ADHESIVE 72/CS 0.55ML: Brand: MEDLINE

## (undated) DEVICE — STPCK 3/WY HP M/RA W/OFF/HNDL 1050PSI STRL

## (undated) DEVICE — PINNACLE INTRODUCER SHEATH: Brand: PINNACLE

## (undated) DEVICE — GOWN,REINF,POLY,ECL,PP SLV,XL: Brand: MEDLINE

## (undated) DEVICE — NEURO GUIDEWIRE WITH HYDROPHILIC COATING: Brand: SYNCHRO 14

## (undated) DEVICE — GLV SURG PREMIERPRO ORTHO LTX PF SZ8 BRN

## (undated) DEVICE — 3M™ STERI-STRIP™ REINFORCED ADHESIVE SKIN CLOSURES, R1547, 1/2 IN X 4 IN (12 MM X 100 MM), 6 STRIPS/ENVELOPE: Brand: 3M™ STERI-STRIP™